# Patient Record
Sex: FEMALE | Race: WHITE | NOT HISPANIC OR LATINO | Employment: OTHER | URBAN - METROPOLITAN AREA
[De-identification: names, ages, dates, MRNs, and addresses within clinical notes are randomized per-mention and may not be internally consistent; named-entity substitution may affect disease eponyms.]

---

## 2017-01-18 ENCOUNTER — OFFICE VISIT (OUTPATIENT)
Dept: INTERNAL MEDICINE | Facility: CLINIC | Age: 62
End: 2017-01-18
Payer: COMMERCIAL

## 2017-01-18 VITALS
RESPIRATION RATE: 20 BRPM | BODY MASS INDEX: 50.59 KG/M2 | TEMPERATURE: 98 F | DIASTOLIC BLOOD PRESSURE: 82 MMHG | OXYGEN SATURATION: 97 % | SYSTOLIC BLOOD PRESSURE: 116 MMHG | WEIGHT: 241 LBS | HEART RATE: 88 BPM | HEIGHT: 58 IN

## 2017-01-18 DIAGNOSIS — J06.9 VIRAL URI WITH COUGH: Primary | ICD-10-CM

## 2017-01-18 PROCEDURE — 99213 OFFICE O/P EST LOW 20 MIN: CPT | Mod: 25,S$GLB,, | Performed by: INTERNAL MEDICINE

## 2017-01-18 PROCEDURE — 3079F DIAST BP 80-89 MM HG: CPT | Mod: S$GLB,,, | Performed by: INTERNAL MEDICINE

## 2017-01-18 PROCEDURE — 1159F MED LIST DOCD IN RCRD: CPT | Mod: S$GLB,,, | Performed by: INTERNAL MEDICINE

## 2017-01-18 PROCEDURE — 99999 PR PBB SHADOW E&M-EST. PATIENT-LVL III: CPT | Mod: PBBFAC,,, | Performed by: INTERNAL MEDICINE

## 2017-01-18 PROCEDURE — 3074F SYST BP LT 130 MM HG: CPT | Mod: S$GLB,,, | Performed by: INTERNAL MEDICINE

## 2017-01-18 PROCEDURE — 96372 THER/PROPH/DIAG INJ SC/IM: CPT | Mod: S$GLB,,, | Performed by: INTERNAL MEDICINE

## 2017-01-18 RX ORDER — METHYLPREDNISOLONE ACETATE 40 MG/ML
40 INJECTION, SUSPENSION INTRA-ARTICULAR; INTRALESIONAL; INTRAMUSCULAR; SOFT TISSUE
Status: COMPLETED | OUTPATIENT
Start: 2017-01-18 | End: 2017-01-18

## 2017-01-18 RX ADMIN — METHYLPREDNISOLONE ACETATE 40 MG: 40 INJECTION, SUSPENSION INTRA-ARTICULAR; INTRALESIONAL; INTRAMUSCULAR; SOFT TISSUE at 02:01

## 2017-01-18 NOTE — PROGRESS NOTES
Subjective:       Patient ID: Micaela Arvizu is a 61 y.o. female.    Chief Complaint: Nasal Congestion (head pressure x 5 days ) and Cough      HPI:  Patient is known to me and presents with cough and congestion. Sx started 5 days ago. Having trouble wearing CPAP due to cough and congestion. Cough is productive. No SOB or wheezing. No fevers. Started with sore throat but better now. + b/l ear congestion but no pain. Tried zyrtec without relief.     Past Medical History   Diagnosis Date    Arthritis     Asthma     GERD (gastroesophageal reflux disease)     Hiatal hernia     History of colonoscopy with polypectomy     History of esophagogastroduodenoscopy (EGD)     Hyperlipidemia     Hypertension     Lumbar facet arthropathy        Family History   Problem Relation Age of Onset    Hypertension Mother     Hypertension Father        Social History     Social History    Marital status:      Spouse name: N/A    Number of children: N/A    Years of education: N/A     Occupational History    Not on file.     Social History Main Topics    Smoking status: Never Smoker    Smokeless tobacco: Never Used    Alcohol use No    Drug use: No    Sexual activity: Not Currently      Comment:      Other Topics Concern    Not on file     Social History Narrative       Review of Systems   Constitutional: Negative for activity change, fatigue, fever and unexpected weight change.   HENT: Positive for congestion. Negative for ear pain, hearing loss, rhinorrhea and sore throat.         B/l ear congestion   Eyes: Negative for pain, redness and visual disturbance.   Respiratory: Positive for cough. Negative for shortness of breath and wheezing.    Cardiovascular: Negative for chest pain, palpitations and leg swelling.   Gastrointestinal: Negative for abdominal pain, constipation, diarrhea, nausea and vomiting.   Genitourinary: Negative for dysuria, frequency, pelvic pain and urgency.   Musculoskeletal: Negative  for back pain, joint swelling and neck pain.   Skin: Negative for color change, rash and wound.   Neurological: Negative for dizziness, tremors, weakness, light-headedness and headaches.         Objective:      Physical Exam   Constitutional: She is oriented to person, place, and time. She appears well-developed and well-nourished. No distress.   HENT:   Head: Normocephalic and atraumatic.   Right Ear: External ear normal.   Left Ear: External ear normal.   Eyes: Conjunctivae and EOM are normal. Pupils are equal, round, and reactive to light. Right eye exhibits no discharge. Left eye exhibits no discharge.   Neck: Neck supple. No tracheal deviation present.   Cardiovascular: Normal rate and regular rhythm.  Exam reveals no gallop and no friction rub.    No murmur heard.  Pulmonary/Chest: Effort normal and breath sounds normal. No respiratory distress. She has no wheezes. She has no rales.   Abdominal: Soft. Bowel sounds are normal. She exhibits no distension. There is no tenderness.   Lymphadenopathy:     She has no cervical adenopathy.   Neurological: She is alert and oriented to person, place, and time. No cranial nerve deficit.   Skin: Skin is warm and dry.   Psychiatric: She has a normal mood and affect. Her behavior is normal.   Vitals reviewed.      Assessment:       1. Viral URI with cough        Plan:       Micaela was seen today for nasal congestion and cough.    Diagnoses and all orders for this visit:    Viral URI with cough  -     methylPREDNISolone acetate injection 40 mg; Inject 1 mL (40 mg total) into the muscle one time.  start zyrtec and flonase daily  Saline nasal spray PRN for congestion    Call for worsening sx or fever > 101 F

## 2017-01-18 NOTE — MR AVS SNAPSHOT
[facility]  [address] [place] [address]  Phone: [phone]  Fax: [phone]                  Micaela Arvizu   2017 1:45 PM   Office Visit    Description:  Female : 1955   Provider:  Kinga Robles MD   Department:  [facility]           Reason for Visit     Nasal Congestion     Cough           Diagnoses this Visit        Comments    Viral URI with cough    -  Primary            To Do List           Future Appointments        Provider Department Dept Phone    3/9/2017 8:30 AM NURSEST. CHAN [facility] [phone]    3/16/2017 12:30 PM Zena Pettit NP [facility] [phone]      Goals (5 Years of Data)     None      Follow-Up and Disposition     Return if symptoms worsen or fail to improve.      [facility] On Call     [facility] On Call Nurse [facility] Line -  Assistance  Registered nurses in the [facility] On Call Center provide clinical advisement, health education, appointment booking, and other advisory services.  Call for this free service at [phone].             Medications           Message regarding Medications     Verify the changes and/or additions to your medication regime listed below are the same as discussed with your clinician today.  If any of these changes or additions are incorrect, please notify your healthcare provider.        These medications were administered today        Dose Freq    methylPREDNISolone acetate injection 40 mg 40 mg Clinic/HOD 1 time    Sig: Inject 1 mL (40 mg total) into the muscle one time.    Class: Normal    Route: Intramuscular           Verify that the below list of medications is an accurate representation of the medications you are currently taking.  If none reported, the list may be blank. If incorrect, please contact your healthcare provider. Carry this list with you in case of emergency.           Current Medications     aspirin 81 MG Chew Take 81 mg  "by mouth once daily.    atorvastatin (LIPITOR) 10 MG tablet Take 1 tablet (10 mg total) by mouth once daily.    clonazePAM (KLONOPIN) 0.5 MG tablet Take 1 tablet (0.5 mg total) by mouth daily as needed for Anxiety.    hydrOXYzine HCl (ATARAX) 10 MG Tab Take 1 tablet (10 mg total) by mouth 3 (three) times daily as needed.    lisinopril-hydrochlorothiazide (PRINZIDE,ZESTORETIC) 10-12.5 mg per tablet Take 1 tablet by mouth once daily.    multivitamin capsule Take 1 capsule by mouth once daily.      omega-3 fatty acids-vitamin E (FISH OIL) 1,000 mg Cap Take 2 tablets by mouth once daily.      omeprazole (PRILOSEC) 20 MG capsule Take 1 capsule (20 mg total) by mouth once daily.    PROLIA 60 mg/mL Syrg Inject 60 mg into the skin every 6 (six) months.     ranitidine (ZANTAC) 150 MG tablet Take 1 tablet (150 mg total) by mouth 2 (two) times daily.    sucralfate (CARAFATE) 100 mg/mL suspension Take 10 mLs (1 g total) by mouth 4 (four) times daily with meals and nightly.    tramadol (ULTRAM) 50 mg tablet Take 1 tablet (50 mg total) by mouth every 6 (six) hours as needed.    WELCHOL 625 mg tablet TAKE 2 TABLETS (1,250 MG TOTAL) TWICE A DAY WITH MEALS           Clinical Reference Information           Vital Signs - Last Recorded  Most recent update: 1/18/2017  2:09 PM by Adriana Urias MA    BP Pulse Temp Resp Ht Wt    116/82 88 98.1 °F (36.7 °C) (Oral) 20 4' 10" (1.473 m) 109.3 kg (241 lb)    SpO2 BMI             97% 50.37 kg/m2         Blood Pressure          Most Recent Value    BP  116/82      Allergies as of 1/18/2017     No Known Allergies      Immunizations Administered on Date of Encounter - 1/18/2017     None      Instructions      Viral Upper Respiratory Illness (Adult)  You have a viral upper respiratory illness (URI), which is another term for the common cold. This illness is contagious during the first few days. It is spread through the air by coughing and sneezing. It may also be spread by direct contact " (touching the sick person and then touching your own eyes, nose, or mouth). Frequent handwashing will decrease risk of spread. Most viral illnesses go away within 7 to 10 days with rest and simple home remedies. Sometimes the illness may last for several weeks. Antibiotics will not kill a virus, and they are generally not prescribed for this condition.    Home care  · If symptoms are severe, rest at home for the first 2 to 3 days. When you resume activity, don't let yourself get too tired.  · Avoid being exposed to cigarette smoke (yours or others).  · You may use acetaminophen or ibuprofen to control pain and fever, unless another medicine was prescribed. (Note: If you have chronic liver or kidney disease, have ever had a stomach ulcer or gastrointestinal bleeding, or are taking blood-thinning medicines, talk with your healthcare provider before using these medicines.) Aspirin should never be given to anyone under 18 years of age who is ill with a viral infection or fever. It may cause severe liver or brain damage.  · Your appetite may be poor, so a light diet is fine. Avoid dehydration by drinking 6 to 8 glasses of fluids per day (water, soft drinks, juices, tea, or soup). Extra fluids will help loosen secretions in the nose and lungs.  · Over-the-counter cold medicines will not shorten the length of time youre sick, but they may be helpful for the following symptoms: cough, sore throat, and nasal and sinus congestion. (Note: Do not use decongestants if you have high blood pressure.)  Follow-up care  Follow up with your healthcare provider, or as advised.  When to seek medical advice  Call your healthcare provider right away if any of these occur:  · Cough with lots of colored sputum (mucus)  · Severe headache; face, neck, or ear pain  · Difficulty swallowing due to throat pain  · Fever of 100.4°F (38°C)  Call 911, or get immediate medical care  Call emergency services right away if any of these occur:  · Chest  pain, shortness of breath, wheezing, or difficulty breathing  · Coughing up blood  · Inability to swallow due to throat pain  © 3476-2229 [employer]. [address] [place] [address], [place] [address]. All rights reserved. This information is not intended as a substitute for professional medical care. Always follow your healthcare professional's instructions.

## 2017-01-18 NOTE — PATIENT INSTRUCTIONS
Viral Upper Respiratory Illness (Adult)  You have a viral upper respiratory illness (URI), which is another term for the common cold. This illness is contagious during the first few days. It is spread through the air by coughing and sneezing. It may also be spread by direct contact (touching the sick person and then touching your own eyes, nose, or mouth). Frequent handwashing will decrease risk of spread. Most viral illnesses go away within 7 to 10 days with rest and simple home remedies. Sometimes the illness may last for several weeks. Antibiotics will not kill a virus, and they are generally not prescribed for this condition.    Home care  · If symptoms are severe, rest at home for the first 2 to 3 days. When you resume activity, don't let yourself get too tired.  · Avoid being exposed to cigarette smoke (yours or others).  · You may use acetaminophen or ibuprofen to control pain and fever, unless another medicine was prescribed. (Note: If you have chronic liver or kidney disease, have ever had a stomach ulcer or gastrointestinal bleeding, or are taking blood-thinning medicines, talk with your healthcare provider before using these medicines.) Aspirin should never be given to anyone under 18 years of age who is ill with a viral infection or fever. It may cause severe liver or brain damage.  · Your appetite may be poor, so a light diet is fine. Avoid dehydration by drinking 6 to 8 glasses of fluids per day (water, soft drinks, juices, tea, or soup). Extra fluids will help loosen secretions in the nose and lungs.  · Over-the-counter cold medicines will not shorten the length of time youre sick, but they may be helpful for the following symptoms: cough, sore throat, and nasal and sinus congestion. (Note: Do not use decongestants if you have high blood pressure.)  Follow-up care  Follow up with your healthcare provider, or as advised.  When to seek medical advice  Call your healthcare provider right away if any  of these occur:  · Cough with lots of colored sputum (mucus)  · Severe headache; face, neck, or ear pain  · Difficulty swallowing due to throat pain  · Fever of 100.4°F (38°C)  Call 911, or get immediate medical care  Call emergency services right away if any of these occur:  · Chest pain, shortness of breath, wheezing, or difficulty breathing  · Coughing up blood  · Inability to swallow due to throat pain  © 5628-6503 [employer]. [address] [place] [address], [place] [address]. All rights reserved. This information is not intended as a substitute for professional medical care. Always follow your healthcare professional's instructions.

## 2017-03-09 ENCOUNTER — TELEPHONE (OUTPATIENT)
Dept: INTERNAL MEDICINE | Facility: CLINIC | Age: 62
End: 2017-03-09

## 2017-03-09 ENCOUNTER — CLINICAL SUPPORT (OUTPATIENT)
Dept: INTERNAL MEDICINE | Facility: CLINIC | Age: 62
End: 2017-03-09
Payer: COMMERCIAL

## 2017-03-09 DIAGNOSIS — I10 ESSENTIAL HYPERTENSION: ICD-10-CM

## 2017-03-09 DIAGNOSIS — E78.00 HYPERCHOLESTEREMIA: ICD-10-CM

## 2017-03-09 LAB
ALBUMIN SERPL BCP-MCNC: 3.2 G/DL
ALP SERPL-CCNC: 82 U/L
ALT SERPL W/O P-5'-P-CCNC: 21 U/L
ANION GAP SERPL CALC-SCNC: 9 MMOL/L
AST SERPL-CCNC: 18 U/L
BASOPHILS # BLD AUTO: 0.02 K/UL
BASOPHILS NFR BLD: 0.3 %
BILIRUB SERPL-MCNC: 0.6 MG/DL
BUN SERPL-MCNC: 15 MG/DL
CALCIUM SERPL-MCNC: 9.7 MG/DL
CHLORIDE SERPL-SCNC: 103 MMOL/L
CHOLEST/HDLC SERPL: 3.2 {RATIO}
CO2 SERPL-SCNC: 27 MMOL/L
CREAT SERPL-MCNC: 0.6 MG/DL
DIFFERENTIAL METHOD: ABNORMAL
EOSINOPHIL # BLD AUTO: 0.2 K/UL
EOSINOPHIL NFR BLD: 3.4 %
ERYTHROCYTE [DISTWIDTH] IN BLOOD BY AUTOMATED COUNT: 14.4 %
EST. GFR  (AFRICAN AMERICAN): >60 ML/MIN/1.73 M^2
EST. GFR  (NON AFRICAN AMERICAN): >60 ML/MIN/1.73 M^2
GLUCOSE SERPL-MCNC: 121 MG/DL
HCT VFR BLD AUTO: 44.2 %
HDL/CHOLESTEROL RATIO: 30.8 %
HDLC SERPL-MCNC: 172 MG/DL
HDLC SERPL-MCNC: 53 MG/DL
HGB BLD-MCNC: 14.4 G/DL
LDLC SERPL CALC-MCNC: 94.8 MG/DL
LYMPHOCYTES # BLD AUTO: 1.4 K/UL
LYMPHOCYTES NFR BLD: 21.6 %
MCH RBC QN AUTO: 27.2 PG
MCHC RBC AUTO-ENTMCNC: 32.6 %
MCV RBC AUTO: 84 FL
MONOCYTES # BLD AUTO: 0.5 K/UL
MONOCYTES NFR BLD: 8.1 %
NEUTROPHILS # BLD AUTO: 4.3 K/UL
NEUTROPHILS NFR BLD: 66.6 %
NONHDLC SERPL-MCNC: 119 MG/DL
PLATELET # BLD AUTO: 326 K/UL
PMV BLD AUTO: 8.9 FL
POTASSIUM SERPL-SCNC: 4 MMOL/L
PROT SERPL-MCNC: 7.3 G/DL
RBC # BLD AUTO: 5.29 M/UL
SODIUM SERPL-SCNC: 139 MMOL/L
TRIGL SERPL-MCNC: 121 MG/DL
WBC # BLD AUTO: 6.4 K/UL

## 2017-03-09 PROCEDURE — 36415 COLL VENOUS BLD VENIPUNCTURE: CPT | Mod: S$GLB,,, | Performed by: NURSE PRACTITIONER

## 2017-03-09 PROCEDURE — 80061 LIPID PANEL: CPT

## 2017-03-09 PROCEDURE — 80053 COMPREHEN METABOLIC PANEL: CPT

## 2017-03-09 PROCEDURE — 85025 COMPLETE CBC W/AUTO DIFF WBC: CPT

## 2017-03-09 RX ORDER — LISINOPRIL AND HYDROCHLOROTHIAZIDE 10; 12.5 MG/1; MG/1
TABLET ORAL
Qty: 90 TABLET | Refills: 0 | Status: SHIPPED | OUTPATIENT
Start: 2017-03-09 | End: 2017-05-28 | Stop reason: SDUPTHER

## 2017-03-09 NOTE — TELEPHONE ENCOUNTER
----- Message from Iris Villanueva sent at 3/9/2017  2:59 PM CST -----  Contact: self  Micaela Arvizu  MRN: [id number]  : 1955  PCP: Casimiro Stone  Home Phone      [phone]  Work Phone      Not on file.  Mobile          [phone]      MESSAGE:   Pt said that her ob doctor is asking to see if in her labs she had her vitamin d and cmt checked. Please call to let her know.    Phone: [phone]

## 2017-03-13 RX ORDER — TRAMADOL HYDROCHLORIDE 50 MG/1
50 TABLET ORAL EVERY 6 HOURS PRN
Qty: 180 TABLET | Refills: 0 | Status: SHIPPED | OUTPATIENT
Start: 2017-03-13 | End: 2017-04-17 | Stop reason: SDUPTHER

## 2017-03-20 ENCOUNTER — OFFICE VISIT (OUTPATIENT)
Dept: INTERNAL MEDICINE | Facility: CLINIC | Age: 62
End: 2017-03-20
Payer: COMMERCIAL

## 2017-03-20 VITALS
HEIGHT: 58 IN | BODY MASS INDEX: 50.12 KG/M2 | WEIGHT: 238.75 LBS | SYSTOLIC BLOOD PRESSURE: 124 MMHG | RESPIRATION RATE: 18 BRPM | HEART RATE: 80 BPM | DIASTOLIC BLOOD PRESSURE: 70 MMHG

## 2017-03-20 DIAGNOSIS — I10 ESSENTIAL HYPERTENSION: ICD-10-CM

## 2017-03-20 DIAGNOSIS — R73.01 ELEVATED FASTING GLUCOSE: ICD-10-CM

## 2017-03-20 DIAGNOSIS — E78.00 HYPERCHOLESTEREMIA: ICD-10-CM

## 2017-03-20 DIAGNOSIS — K91.1 DUMPING SYNDROME: ICD-10-CM

## 2017-03-20 DIAGNOSIS — M54.16 BILATERAL LUMBAR RADICULOPATHY: ICD-10-CM

## 2017-03-20 DIAGNOSIS — F41.1 GAD (GENERALIZED ANXIETY DISORDER): Primary | ICD-10-CM

## 2017-03-20 PROCEDURE — 1160F RVW MEDS BY RX/DR IN RCRD: CPT | Mod: S$GLB,,, | Performed by: NURSE PRACTITIONER

## 2017-03-20 PROCEDURE — 3078F DIAST BP <80 MM HG: CPT | Mod: S$GLB,,, | Performed by: NURSE PRACTITIONER

## 2017-03-20 PROCEDURE — 3074F SYST BP LT 130 MM HG: CPT | Mod: S$GLB,,, | Performed by: NURSE PRACTITIONER

## 2017-03-20 PROCEDURE — 99214 OFFICE O/P EST MOD 30 MIN: CPT | Mod: S$GLB,,, | Performed by: NURSE PRACTITIONER

## 2017-03-20 PROCEDURE — 99999 PR PBB SHADOW E&M-EST. PATIENT-LVL III: CPT | Mod: PBBFAC,,, | Performed by: NURSE PRACTITIONER

## 2017-03-20 RX ORDER — PROPRANOLOL HYDROCHLORIDE 80 MG/1
80 TABLET ORAL 2 TIMES DAILY
Qty: 60 TABLET | Refills: 5 | Status: SHIPPED | OUTPATIENT
Start: 2017-03-20 | End: 2017-04-17 | Stop reason: SDUPTHER

## 2017-03-20 NOTE — PROGRESS NOTES
Subjective:       Patient ID: Micaela Arvizu is a 61 y.o. female.    Chief Complaint: 6 month checkup    HPI: pt known to me presents for 6 month f/up. Reports she is not doing the sleeve anymore. Reports she is noticing that her head has a tremor. Reports her father as well as some of her sisters have it as well. Also reports being woken up by her legs being restless. Doesn't want another med. Reports sometimes night time klonopin helps.     Lab Results   Component Value Date    WBC 6.40 03/09/2017    HGB 14.4 03/09/2017    HCT 44.2 03/09/2017     03/09/2017    CHOL 172 03/09/2017    TRIG 121 03/09/2017    HDL 53 03/09/2017    ALT 21 03/09/2017    AST 18 03/09/2017     03/09/2017    K 4.0 03/09/2017     03/09/2017    CREATININE 0.6 03/09/2017    BUN 15 03/09/2017    CO2 27 03/09/2017    TSH 1.610 11/03/2016    HGBA1C 6.0 09/09/2016       Review of Systems   Constitutional: Negative for chills, diaphoresis, fatigue and fever.   Respiratory: Negative for cough, shortness of breath and wheezing.    Cardiovascular: Negative for chest pain.   Gastrointestinal: Negative for abdominal distention, abdominal pain, constipation, diarrhea, nausea and vomiting.   Musculoskeletal: Positive for arthralgias. Negative for back pain and myalgias.   Neurological: Negative for headaches.   Psychiatric/Behavioral: Negative for sleep disturbance.       Objective:      Physical Exam   Constitutional: She is oriented to person, place, and time. She appears well-developed and well-nourished.   HENT:   Head: Normocephalic and atraumatic.   Cardiovascular: Normal rate, regular rhythm and normal heart sounds.    Pulmonary/Chest: Effort normal and breath sounds normal.   Abdominal: Soft. Bowel sounds are normal. There is no tenderness.   Musculoskeletal: She exhibits edema (plus 1 pitting edema).   Neurological: She is alert and oriented to person, place, and time.   Skin: Skin is warm and dry.   Psychiatric: She has a  normal mood and affect. Her behavior is normal. Judgment and thought content normal.   Nursing note and vitals reviewed.      Assessment:       1. JOHNNIE (generalized anxiety disorder)    2. Bilateral lumbar radiculopathy    3. Hypercholesteremia    4. Essential hypertension    5. Dumping syndrome        Plan:   1. JOHNNIE (generalized anxiety disorder)  Doing well on occ klonopin    2. Bilateral lumbar radiculopathy  Doing well with weight loss; still having occ pain. May do 2 week trial of no statin and compare.     3. Hypercholesteremia  Well controlled on current therapy    4. Essential hypertension  Doing well on current therapy.     5. Dumping syndrome  Doing well on welchol.

## 2017-03-20 NOTE — MR AVS SNAPSHOT
[facility]  [address] [facility] [address]  Phone: [phone]  Fax: [phone]                  Micaela Arvizu   3/20/2017 9:15 AM   Office Visit    Description:  Female : 1955   Provider:  Zena Pettit NP   Department:  [facility]           Reason for Visit     6 month checkup           Diagnoses this Visit        Comments    JOHNNIE (generalized anxiety disorder)    -  Primary     Bilateral lumbar radiculopathy         Hypercholesteremia         Essential hypertension         Dumping syndrome         Elevated fasting glucose                To Do List           Future Appointments        Provider Department Dept Phone    2017 7:45 AM Zena Pettit NP [facility] [phone]    2017 8:30 AM NURSE, [facility] [phone]    2017 12:45 PM Zena Pettit NP [facility] [phone]      Goals (5 Years of Data)     None      Follow-Up and Disposition     Return in about 4 weeks (around 2017).    Follow-up and Disposition History       These Medications        Disp Refills Start End    propranolol (INDERAL) 80 MG tablet 60 tablet 5 3/20/2017 3/20/2018    Take 1 tablet (80 mg total) by mouth 2 (two) times daily. - Oral    Pharmacy: [facility] [address] [place] [address] #: [phone]         [facility] On Call     [facility] On Call Nurse Care Line -  Assistance  Registered nurses in the [facility] On Call Center provide clinical advisement, health education, appointment booking, and other advisory services.  Call for this free service at [phone].             Medications           Message regarding Medications     Verify the changes and/or additions to your medication regime listed below are the same as discussed with your clinician today.  If any of these changes or additions are incorrect, please notify your healthcare provider.         START taking these NEW medications        Refills    propranolol (INDERAL) 80 MG tablet 5    Sig: Take 1 tablet (80 mg total) by mouth 2 (two) times daily.    Class: Normal    Route: Oral      STOP taking these medications     hydrOXYzine HCl (ATARAX) 10 MG Tab Take 1 tablet (10 mg total) by mouth 3 (three) times daily as needed.    ranitidine (ZANTAC) 150 MG tablet Take 1 tablet (150 mg total) by mouth 2 (two) times daily.           Verify that the below list of medications is an accurate representation of the medications you are currently taking.  If none reported, the list may be blank. If incorrect, please contact your healthcare provider. Carry this list with you in case of emergency.           Current Medications     aspirin 81 MG Chew Take 81 mg by mouth once daily.    atorvastatin (LIPITOR) 10 MG tablet Take 1 tablet (10 mg total) by mouth once daily.    clonazePAM (KLONOPIN) 0.5 MG tablet Take 1 tablet (0.5 mg total) by mouth daily as needed for Anxiety.    lisinopril-hydrochlorothiazide (PRINZIDE,ZESTORETIC) 10-12.5 mg per tablet TAKE 1 TABLET DAILY    multivitamin capsule Take 1 capsule by mouth once daily.      omega-3 fatty acids-vitamin E (FISH OIL) 1,000 mg Cap Take 2 tablets by mouth once daily.      omeprazole (PRILOSEC) 20 MG capsule Take 1 capsule (20 mg total) by mouth once daily.    PROLIA 60 mg/mL Syrg Inject 60 mg into the skin every 6 (six) months.     sucralfate (CARAFATE) 100 mg/mL suspension Take 10 mLs (1 g total) by mouth 4 (four) times daily with meals and nightly.    tramadol (ULTRAM) 50 mg tablet Take 1 tablet (50 mg total) by mouth every 6 (six) hours as needed.    WELCHOL 625 mg tablet TAKE 2 TABLETS (1,250 MG TOTAL) TWICE A DAY WITH MEALS    propranolol (INDERAL) 80 MG tablet Take 1 tablet (80 mg total) by mouth 2 (two) times daily.           Clinical Reference Information           Your Vitals Were     BP Pulse Resp Height Weight BMI    124/70 (BP Location: Right arm, Patient  "Position: Sitting, BP Method: Manual) 80 18 4' 10" (1.473 m) 108.3 kg (238 lb 12.1 oz) 49.9 kg/m2      Blood Pressure          Most Recent Value    BP  124/70      Allergies as of 3/20/2017     No Known Allergies      Immunizations Administered on Date of Encounter - 3/20/2017     None      Orders Placed During Today's Visit     Future Labs/Procedures Expected by Expires    CBC auto differential  9/16/2017 (Approximate) 3/20/2018    Comprehensive metabolic panel  9/16/2017 (Approximate) 3/20/2018    Hemoglobin A1c  9/16/2017 (Approximate) 3/20/2018    Lipid panel  9/16/2017 (Approximate) 3/20/2018      Instructions    Tylenol or motrin for legs; if not working well, try Mag ox 400 mg nightly.       Language Assistance Services     ATTENTION: Language assistance services are available, free of charge. Please call [phone].      ATENCIÓN: Si habla español, tiene a solis disposición servicios gratuitos de asistencia lingüística. Llame al [phone].     CHÚ Ý: N?u b?n nói Ti?ng Vi?t, có các d?ch v? h? tr? ngôn ng? mi?n phí dành cho b?n. G?i s? [phone].         [facility] complies with applicable Federal civil rights laws and does not discriminate on the basis of race, color, national origin, age, disability, or sex.        "

## 2017-04-17 ENCOUNTER — OFFICE VISIT (OUTPATIENT)
Dept: INTERNAL MEDICINE | Facility: CLINIC | Age: 62
End: 2017-04-17
Payer: COMMERCIAL

## 2017-04-17 VITALS
WEIGHT: 239 LBS | SYSTOLIC BLOOD PRESSURE: 114 MMHG | DIASTOLIC BLOOD PRESSURE: 64 MMHG | HEIGHT: 58 IN | HEART RATE: 60 BPM | BODY MASS INDEX: 50.17 KG/M2 | RESPIRATION RATE: 16 BRPM | OXYGEN SATURATION: 98 %

## 2017-04-17 DIAGNOSIS — F41.1 GAD (GENERALIZED ANXIETY DISORDER): ICD-10-CM

## 2017-04-17 DIAGNOSIS — G25.81 RESTLESS LEG SYNDROME: ICD-10-CM

## 2017-04-17 DIAGNOSIS — G25.0 FAMILIAL TREMOR: ICD-10-CM

## 2017-04-17 DIAGNOSIS — M54.16 BILATERAL LUMBAR RADICULOPATHY: ICD-10-CM

## 2017-04-17 DIAGNOSIS — Z09 FOLLOW UP: Primary | ICD-10-CM

## 2017-04-17 PROCEDURE — 99999 PR PBB SHADOW E&M-EST. PATIENT-LVL III: CPT | Mod: PBBFAC,,, | Performed by: NURSE PRACTITIONER

## 2017-04-17 PROCEDURE — 3074F SYST BP LT 130 MM HG: CPT | Mod: S$GLB,,, | Performed by: NURSE PRACTITIONER

## 2017-04-17 PROCEDURE — 99214 OFFICE O/P EST MOD 30 MIN: CPT | Mod: S$GLB,,, | Performed by: NURSE PRACTITIONER

## 2017-04-17 PROCEDURE — 1160F RVW MEDS BY RX/DR IN RCRD: CPT | Mod: S$GLB,,, | Performed by: NURSE PRACTITIONER

## 2017-04-17 PROCEDURE — 3078F DIAST BP <80 MM HG: CPT | Mod: S$GLB,,, | Performed by: NURSE PRACTITIONER

## 2017-04-17 RX ORDER — TRAMADOL HYDROCHLORIDE 50 MG/1
50 TABLET ORAL EVERY 6 HOURS PRN
Qty: 180 TABLET | Refills: 0 | Status: SHIPPED | OUTPATIENT
Start: 2017-04-17 | End: 2017-05-24 | Stop reason: SDUPTHER

## 2017-04-17 RX ORDER — PROPRANOLOL HYDROCHLORIDE 80 MG/1
80 TABLET ORAL 2 TIMES DAILY
Qty: 180 TABLET | Refills: 1 | Status: SHIPPED | OUTPATIENT
Start: 2017-04-17 | End: 2017-10-24 | Stop reason: SDUPTHER

## 2017-04-17 NOTE — PROGRESS NOTES
Subjective:       Patient ID: Micaela Arvizu is a 61 y.o. female.    Chief Complaint: Follow-up (4 week follow-up) and Medication Refill    HPI: pt known to me presents for 4 week f/up. Reports doing well with tremors. Feels like the last week has really settled down. Also reports her leg restlessness has gotten better as well.     Review of Systems   Constitutional: Negative for chills, diaphoresis, fatigue and fever.   Respiratory: Negative for cough, shortness of breath and wheezing.    Cardiovascular: Negative for chest pain.   Gastrointestinal: Negative for abdominal distention, abdominal pain, constipation, diarrhea, nausea and vomiting.   Musculoskeletal: Negative for arthralgias, back pain and myalgias.   Neurological: Negative for headaches.   Psychiatric/Behavioral: Negative for sleep disturbance.       Objective:      Physical Exam   Constitutional: She is oriented to person, place, and time. She appears well-developed and well-nourished.   HENT:   Head: Normocephalic and atraumatic.   Cardiovascular: Normal rate, regular rhythm and normal heart sounds.    Pulmonary/Chest: Effort normal and breath sounds normal.   Abdominal: Soft. Bowel sounds are normal. There is no tenderness.   Musculoskeletal: She exhibits no edema.   Neurological: She is alert and oriented to person, place, and time.   Skin: Skin is warm and dry.   Psychiatric: She has a normal mood and affect. Her behavior is normal. Judgment and thought content normal.   Nursing note and vitals reviewed.      Assessment:       1. Follow up    2. JOHNNIE (generalized anxiety disorder)    3. Familial tremor    4. Restless leg syndrome    5. Bilateral lumbar radiculopathy        Plan:   1. Follow up      2. JOHNNIE (generalized anxiety disorder)  3. Familial tremor  - propranolol (INDERAL) 80 MG tablet; Take 1 tablet (80 mg total) by mouth 2 (two) times daily.  Dispense: 180 tablet; Refill: 1    4. Restless leg syndrome  Much better.     5. Bilateral lumbar  radiculopathy    - tramadol (ULTRAM) 50 mg tablet; Take 1 tablet (50 mg total) by mouth every 6 (six) hours as needed.  Dispense: 180 tablet; Refill: 0

## 2017-04-17 NOTE — MR AVS SNAPSHOT
[facility]  [address] [facility] [address]  Phone: [phone]  Fax: [phone]                  Micaela Arvizu   2017 7:45 AM   Office Visit    Description:  Female : 1955   Provider:  Zena Pettit NP   Department:  [facility]           Reason for Visit     Follow-up     Medication Refill           Diagnoses this Visit        Comments    Follow up    -  Primary     JOHNNIE (generalized anxiety disorder)         Familial tremor         Restless leg syndrome         Bilateral lumbar radiculopathy                To Do List           Future Appointments        Provider Department Dept Phone    2017 8:30 AM NURSE, [facility] [phone]    2017 12:45 PM Zena Pettit NP [facility] [phone]      Goals (5 Years of Data)     None      Follow-Up and Disposition     Return if symptoms worsen or fail to improve.       These Medications        Disp Refills Start End    propranolol (INDERAL) 80 MG tablet 180 tablet 1 2017    Take 1 tablet (80 mg total) by mouth 2 (two) times daily. - Oral    Pharmacy: [facility] - [address] Ph #: [phone]       tramadol (ULTRAM) 50 mg tablet 180 tablet 0 2017     Take 1 tablet (50 mg total) by mouth every 6 (six) hours as needed. - Oral    Pharmacy: [facility] - [address] Ph #: [phone]         [facility]     [facility] -  Assistance  Unless otherwise directed by your provider, please contact [facility], our nurse care line that is available for  assistance.     Registered nurses in the [facility] provide: appointment scheduling, clinical advisement, health education, and other advisory services.  Call: [phone] (toll free)               Medications           Message regarding  "Medications     Verify the changes and/or additions to your medication regime listed below are the same as discussed with your clinician today.  If any of these changes or additions are incorrect, please notify your healthcare provider.             Verify that the below list of medications is an accurate representation of the medications you are currently taking.  If none reported, the list may be blank. If incorrect, please contact your healthcare provider. Carry this list with you in case of emergency.           Current Medications     aspirin 81 MG Chew Take 81 mg by mouth once daily.    atorvastatin (LIPITOR) 10 MG tablet Take 1 tablet (10 mg total) by mouth once daily.    clonazePAM (KLONOPIN) 0.5 MG tablet Take 1 tablet (0.5 mg total) by mouth daily as needed for Anxiety.    lisinopril-hydrochlorothiazide (PRINZIDE,ZESTORETIC) 10-12.5 mg per tablet TAKE 1 TABLET DAILY    multivitamin capsule Take 1 capsule by mouth once daily.      omega-3 fatty acids-vitamin E (FISH OIL) 1,000 mg Cap Take 2 tablets by mouth once daily.      omeprazole (PRILOSEC) 20 MG capsule Take 1 capsule (20 mg total) by mouth once daily.    PROLIA 60 mg/mL Syrg Inject 60 mg into the skin every 6 (six) months.     propranolol (INDERAL) 80 MG tablet Take 1 tablet (80 mg total) by mouth 2 (two) times daily.    sucralfate (CARAFATE) 100 mg/mL suspension Take 10 mLs (1 g total) by mouth 4 (four) times daily with meals and nightly.    tramadol (ULTRAM) 50 mg tablet Take 1 tablet (50 mg total) by mouth every 6 (six) hours as needed.    WELCHOL 625 mg tablet TAKE 2 TABLETS (1,250 MG TOTAL) TWICE A DAY WITH MEALS           Clinical Reference Information           Your Vitals Were     BP Pulse Resp Height Weight SpO2    114/64 60 16 4' 10" (1.473 m) 108.4 kg (238 lb 15.7 oz) 98%    BMI                49.95 kg/m2          Blood Pressure          Most Recent Value    BP  114/64      Allergies as of 4/17/2017     No Known Allergies      Immunizations " Administered on Date of Encounter - 4/17/2017     None      Language Assistance Services     ATTENTION: Language assistance services are available, free of charge. Please call [phone].      ATENCIÓN: Si habmahad fonseca, tiene a solis disposición servicios gratuitos de asistencia lingüística. Llame al [phone].     CHÚ Ý: N?u b?n nói Ti?ng Vi?t, có các d?ch v? h? tr? ngôn ng? mi?n phí dành cho b?n. G?i s? [phone].         [facility] complies with applicable Federal civil rights laws and does not discriminate on the basis of race, color, national origin, age, disability, or sex.

## 2017-04-18 ENCOUNTER — TELEPHONE (OUTPATIENT)
Dept: INTERNAL MEDICINE | Facility: CLINIC | Age: 62
End: 2017-04-18

## 2017-04-18 NOTE — TELEPHONE ENCOUNTER
----- Message from Iris Villanueva sent at 2017  8:40 AM CDT -----  Contact: self  Micaela Arvizu  MRN: [id number]  : 1955  PCP: Casimiro Stone  Home Phone      [phone]  Work Phone      Not on file.  Mobile          [phone]      MESSAGE:   Pt was seen yesterday and forgot to ask to get a shot for her arthritis. She is wanting to see if she can come in to get one.    Phone: [phone]

## 2017-04-18 NOTE — TELEPHONE ENCOUNTER
Patient is aware that you are off today and states that she can wait until tomorrow.  Please advise.

## 2017-04-19 ENCOUNTER — CLINICAL SUPPORT (OUTPATIENT)
Dept: INTERNAL MEDICINE | Facility: CLINIC | Age: 62
End: 2017-04-19
Payer: COMMERCIAL

## 2017-04-19 DIAGNOSIS — M19.90 ARTHRITIS PAIN: Primary | ICD-10-CM

## 2017-04-19 PROCEDURE — 96372 THER/PROPH/DIAG INJ SC/IM: CPT | Mod: S$GLB,,, | Performed by: NURSE PRACTITIONER

## 2017-04-19 RX ORDER — KETOROLAC TROMETHAMINE 30 MG/ML
60 INJECTION, SOLUTION INTRAMUSCULAR; INTRAVENOUS
Status: COMPLETED | OUTPATIENT
Start: 2017-04-19 | End: 2017-04-19

## 2017-04-19 RX ADMIN — KETOROLAC TROMETHAMINE 60 MG: 30 INJECTION, SOLUTION INTRAMUSCULAR; INTRAVENOUS at 10:04

## 2017-05-11 ENCOUNTER — OFFICE VISIT (OUTPATIENT)
Dept: FAMILY MEDICINE | Facility: CLINIC | Age: 62
End: 2017-05-11
Payer: COMMERCIAL

## 2017-05-11 VITALS
HEART RATE: 71 BPM | SYSTOLIC BLOOD PRESSURE: 130 MMHG | BODY MASS INDEX: 46.88 KG/M2 | RESPIRATION RATE: 18 BRPM | WEIGHT: 238.81 LBS | HEIGHT: 60 IN | DIASTOLIC BLOOD PRESSURE: 78 MMHG

## 2017-05-11 DIAGNOSIS — R22.0 SUPERFICIAL SWELLING OF SCALP: ICD-10-CM

## 2017-05-11 DIAGNOSIS — L08.9 INFECTION OF SCALP: Primary | ICD-10-CM

## 2017-05-11 PROCEDURE — 99999 PR PBB SHADOW E&M-EST. PATIENT-LVL III: CPT | Mod: PBBFAC,,, | Performed by: NURSE PRACTITIONER

## 2017-05-11 PROCEDURE — 3078F DIAST BP <80 MM HG: CPT | Mod: S$GLB,,, | Performed by: NURSE PRACTITIONER

## 2017-05-11 PROCEDURE — 96372 THER/PROPH/DIAG INJ SC/IM: CPT | Mod: S$GLB,,, | Performed by: NURSE PRACTITIONER

## 2017-05-11 PROCEDURE — 99214 OFFICE O/P EST MOD 30 MIN: CPT | Mod: 25,S$GLB,, | Performed by: NURSE PRACTITIONER

## 2017-05-11 PROCEDURE — 1160F RVW MEDS BY RX/DR IN RCRD: CPT | Mod: S$GLB,,, | Performed by: NURSE PRACTITIONER

## 2017-05-11 PROCEDURE — 3075F SYST BP GE 130 - 139MM HG: CPT | Mod: S$GLB,,, | Performed by: NURSE PRACTITIONER

## 2017-05-11 RX ORDER — METHYLPREDNISOLONE ACETATE 40 MG/ML
60 INJECTION, SUSPENSION INTRA-ARTICULAR; INTRALESIONAL; INTRAMUSCULAR; SOFT TISSUE
Status: COMPLETED | OUTPATIENT
Start: 2017-05-11 | End: 2017-05-11

## 2017-05-11 RX ORDER — AMOXICILLIN AND CLAVULANATE POTASSIUM 875; 125 MG/1; MG/1
1 TABLET, FILM COATED ORAL EVERY 12 HOURS
Qty: 20 TABLET | Refills: 0 | Status: SHIPPED | OUTPATIENT
Start: 2017-05-11 | End: 2017-05-21

## 2017-05-11 RX ADMIN — METHYLPREDNISOLONE ACETATE 60 MG: 40 INJECTION, SUSPENSION INTRA-ARTICULAR; INTRALESIONAL; INTRAMUSCULAR; SOFT TISSUE at 11:05

## 2017-05-11 NOTE — PROGRESS NOTES
Subjective:       Patient ID: Micaela Arvizu is a 61 y.o. female.    Chief Complaint: Insect Bite (in back of L ear; constant pain; ps 8)    HPI Comments: Swelling and pain behind the left ear for a few days. Doesn't remember having been bitten. No itching.    Insect Bite   Pertinent negatives include no rash.     Review of Systems   Constitutional: Negative.    HENT: Negative.  Negative for ear pain.    Eyes: Negative.    Respiratory: Negative.    Cardiovascular: Negative.    Gastrointestinal: Negative.    Endocrine: Negative.    Genitourinary: Negative.    Musculoskeletal: Negative.    Skin: Negative for color change, rash and wound.        Swelling behind the left ear with pain that goes into the back of the auricle   Neurological: Negative.    Psychiatric/Behavioral: Negative.    All other systems reviewed and are negative.      Objective:      Physical Exam   Constitutional: She is oriented to person, place, and time. She appears well-developed and well-nourished. No distress.   HENT:   Head: Normocephalic and atraumatic.   Right Ear: Tympanic membrane, external ear and ear canal normal.   Left Ear: Tympanic membrane, external ear and ear canal normal.   Eyes: Pupils are equal, round, and reactive to light.   Neck: Normal range of motion. Neck supple.   Cardiovascular: Normal rate and regular rhythm.    No murmur heard.  Pulmonary/Chest: Effort normal and breath sounds normal. No respiratory distress.   Musculoskeletal: Normal range of motion.   Neurological: She is alert and oriented to person, place, and time.   Skin: Skin is warm and dry.   Tissue behind the left ear is swollen with no redness or wound. Area of swelling is over the mastoid process with no pain to palpation   Psychiatric: She has a normal mood and affect.   Nursing note and vitals reviewed.      Assessment:       1. Infection of scalp    2. Superficial swelling of scalp        Plan:   Micaela was seen today for insect bite.    Diagnoses and  all orders for this visit:    Infection of scalp  -     amoxicillin-clavulanate 875-125mg (AUGMENTIN) 875-125 mg per tablet; Take 1 tablet by mouth every 12 (twelve) hours.  -     methylPREDNISolone acetate injection 60 mg; Inject 1.5 mLs (60 mg total) into the muscle one time.    Superficial swelling of scalp  -     amoxicillin-clavulanate 875-125mg (AUGMENTIN) 875-125 mg per tablet; Take 1 tablet by mouth every 12 (twelve) hours.  -     methylPREDNISolone acetate injection 60 mg; Inject 1.5 mLs (60 mg total) into the muscle one time.    RTC Monday for recheck - differentials include mastoiditis, skin infection and shingles    She will take the gabapentin she has at home 300 mg nightly

## 2017-05-11 NOTE — MR AVS SNAPSHOT
[address] [address]  Phone: [phone]  Fax: [phone]                  Micaela Arvizu   2017 10:45 AM   Office Visit    Description:  Female : 1955   Provider:  Pebbles Mcleod NP   Department:  [facility]           Reason for Visit     Insect Bite           Diagnoses this Visit        Comments    Infection of scalp    -  Primary     Superficial swelling of scalp                To Do List           Future Appointments        Provider Department Dept Phone    5/15/2017 12:45 PM Pebbles Mcleod NP [facility] [phone]    2017 8:30 AM NURSE, [facility] [phone]    2017 12:45 PM Zena Pettit NP [facility] [phone]      Goals (5 Years of Data)     None       These Medications        Disp Refills Start End    amoxicillin-clavulanate 875-125mg (AUGMENTIN) 875-125 mg per tablet 20 tablet 0 2017    Take 1 tablet by mouth every 12 (twelve) hours. - Oral    Pharmacy: [facility] [address] [place] [address] #: [phone]         [facility]     [facility] Nurse Care Line -  Assistance  Unless otherwise directed by your provider, please contact [facility], our nurse care line that is available for  assistance.     Registered nurses in the [facility] provide: appointment scheduling, clinical advisement, health education, and other advisory services.  Call: [phone] (toll free)               Medications           Message regarding Medications     Verify the changes and/or additions to your medication regime listed below are the same as discussed with your clinician today.  If any of these changes or additions are incorrect, please notify your healthcare provider.        START taking these NEW medications        Refills    amoxicillin-clavulanate 875-125mg (AUGMENTIN)  875-125 mg per tablet 0    Sig: Take 1 tablet by mouth every 12 (twelve) hours.    Class: Normal    Route: Oral      These medications were administered today        Dose Freq    methylPREDNISolone acetate injection 60 mg 60 mg Clinic/HOD 1 time    Sig: Inject 1.5 mLs (60 mg total) into the muscle one time.    Class: Normal    Route: Intramuscular           Verify that the below list of medications is an accurate representation of the medications you are currently taking.  If none reported, the list may be blank. If incorrect, please contact your healthcare provider. Carry this list with you in case of emergency.           Current Medications     aspirin 81 MG Chew Take 81 mg by mouth once daily.    atorvastatin (LIPITOR) 10 MG tablet Take 1 tablet (10 mg total) by mouth once daily.    clonazePAM (KLONOPIN) 0.5 MG tablet Take 1 tablet (0.5 mg total) by mouth daily as needed for Anxiety.    lisinopril-hydrochlorothiazide (PRINZIDE,ZESTORETIC) 10-12.5 mg per tablet TAKE 1 TABLET DAILY    multivitamin capsule Take 1 capsule by mouth once daily.      omega-3 fatty acids-vitamin E (FISH OIL) 1,000 mg Cap Take 2 tablets by mouth once daily.      omeprazole (PRILOSEC) 20 MG capsule Take 1 capsule (20 mg total) by mouth once daily.    PROLIA 60 mg/mL Syrg Inject 60 mg into the skin every 6 (six) months.     propranolol (INDERAL) 80 MG tablet Take 1 tablet (80 mg total) by mouth 2 (two) times daily.    sucralfate (CARAFATE) 100 mg/mL suspension Take 10 mLs (1 g total) by mouth 4 (four) times daily with meals and nightly.    tramadol (ULTRAM) 50 mg tablet Take 1 tablet (50 mg total) by mouth every 6 (six) hours as needed.    WELCHOL 625 mg tablet TAKE 2 TABLETS (1,250 MG TOTAL) TWICE A DAY WITH MEALS    amoxicillin-clavulanate 875-125mg (AUGMENTIN) 875-125 mg per tablet Take 1 tablet by mouth every 12 (twelve) hours.           Clinical Reference Information           Your Vitals Were     BP Pulse Resp Height Weight BMI     130/78 71 18 5' (1.524 m) 108.3 kg (238 lb 12.8 oz) 46.64 kg/m2      Blood Pressure          Most Recent Value    BP  130/78      Allergies as of 5/11/2017     No Known Allergies      Immunizations Administered on Date of Encounter - 5/11/2017     None      Language Assistance Services     ATTENTION: Language assistance services are available, free of charge. Please call [phone].      ATENCIÓN: Si habla español, tiene a solis disposición servicios gratuitos de asistencia lingüística. Llame al [phone].     CHÚ Ý: N?u b?n nói Ti?ng Vi?t, có các d?ch v? h? tr? ngôn ng? mi?n phí dành cho b?n. G?i s? [phone].         [facility] complies with applicable Federal civil rights laws and does not discriminate on the basis of race, color, national origin, age, disability, or sex.

## 2017-05-23 DIAGNOSIS — E78.00 HYPERCHOLESTEREMIA: ICD-10-CM

## 2017-05-24 DIAGNOSIS — M54.16 BILATERAL LUMBAR RADICULOPATHY: ICD-10-CM

## 2017-05-24 RX ORDER — TRAMADOL HYDROCHLORIDE 50 MG/1
50 TABLET ORAL EVERY 6 HOURS PRN
Qty: 180 TABLET | Refills: 0 | Status: SHIPPED | OUTPATIENT
Start: 2017-05-24 | End: 2017-08-08 | Stop reason: SDUPTHER

## 2017-05-24 RX ORDER — ATORVASTATIN CALCIUM 10 MG/1
TABLET, FILM COATED ORAL
Qty: 90 TABLET | Refills: 0 | Status: SHIPPED | OUTPATIENT
Start: 2017-05-24 | End: 2017-08-16 | Stop reason: SDUPTHER

## 2017-05-28 DIAGNOSIS — I10 ESSENTIAL HYPERTENSION: ICD-10-CM

## 2017-05-29 RX ORDER — LISINOPRIL AND HYDROCHLOROTHIAZIDE 10; 12.5 MG/1; MG/1
TABLET ORAL
Qty: 90 TABLET | Refills: 1 | Status: SHIPPED | OUTPATIENT
Start: 2017-05-29 | End: 2017-10-24 | Stop reason: SDUPTHER

## 2017-07-25 DIAGNOSIS — M54.16 BILATERAL LUMBAR RADICULOPATHY: ICD-10-CM

## 2017-07-26 RX ORDER — TRAMADOL HYDROCHLORIDE 50 MG/1
50 TABLET ORAL EVERY 6 HOURS PRN
Qty: 180 TABLET | Refills: 0 | OUTPATIENT
Start: 2017-07-26

## 2017-08-08 DIAGNOSIS — M54.16 BILATERAL LUMBAR RADICULOPATHY: ICD-10-CM

## 2017-08-09 RX ORDER — TRAMADOL HYDROCHLORIDE 50 MG/1
50 TABLET ORAL EVERY 6 HOURS PRN
Qty: 180 TABLET | Refills: 0 | Status: SHIPPED | OUTPATIENT
Start: 2017-08-09 | End: 2017-10-24 | Stop reason: SDUPTHER

## 2017-08-16 DIAGNOSIS — K21.9 GASTROESOPHAGEAL REFLUX DISEASE WITHOUT ESOPHAGITIS: ICD-10-CM

## 2017-08-16 DIAGNOSIS — E78.00 HYPERCHOLESTEREMIA: ICD-10-CM

## 2017-08-21 ENCOUNTER — PATIENT MESSAGE (OUTPATIENT)
Dept: INTERNAL MEDICINE | Facility: CLINIC | Age: 62
End: 2017-08-21

## 2017-08-21 RX ORDER — OMEPRAZOLE 20 MG/1
CAPSULE, DELAYED RELEASE ORAL
Qty: 90 CAPSULE | Refills: 2 | Status: SHIPPED | OUTPATIENT
Start: 2017-08-21 | End: 2018-05-09 | Stop reason: SDUPTHER

## 2017-08-21 RX ORDER — ATORVASTATIN CALCIUM 10 MG/1
TABLET, FILM COATED ORAL
Qty: 90 TABLET | Refills: 0 | Status: SHIPPED | OUTPATIENT
Start: 2017-08-21 | End: 2017-11-02 | Stop reason: SDUPTHER

## 2017-08-22 ENCOUNTER — PATIENT MESSAGE (OUTPATIENT)
Dept: INTERNAL MEDICINE | Facility: CLINIC | Age: 62
End: 2017-08-22

## 2017-08-22 NOTE — TELEPHONE ENCOUNTER
OK for handicap permit. If we have the forms here I can fill out and sign for her to come .    She has lumbar radiculopathy which limits her ability to ambulate therefore OK for permit. Thanks

## 2017-09-18 ENCOUNTER — CLINICAL SUPPORT (OUTPATIENT)
Dept: INTERNAL MEDICINE | Facility: CLINIC | Age: 62
End: 2017-09-18
Payer: COMMERCIAL

## 2017-09-18 DIAGNOSIS — E78.00 HYPERCHOLESTEREMIA: ICD-10-CM

## 2017-09-18 DIAGNOSIS — R73.01 ELEVATED FASTING GLUCOSE: ICD-10-CM

## 2017-09-18 LAB
ALBUMIN SERPL BCP-MCNC: 2.9 G/DL
ALP SERPL-CCNC: 75 U/L
ALT SERPL W/O P-5'-P-CCNC: 20 U/L
ANION GAP SERPL CALC-SCNC: 7 MMOL/L
AST SERPL-CCNC: 18 U/L
BASOPHILS # BLD AUTO: 0.03 K/UL
BASOPHILS NFR BLD: 0.4 %
BILIRUB SERPL-MCNC: 0.5 MG/DL
BUN SERPL-MCNC: 20 MG/DL
CALCIUM SERPL-MCNC: 9.5 MG/DL
CHLORIDE SERPL-SCNC: 104 MMOL/L
CHOLEST SERPL-MCNC: 171 MG/DL
CHOLEST/HDLC SERPL: 3.1 {RATIO}
CO2 SERPL-SCNC: 29 MMOL/L
CREAT SERPL-MCNC: 0.6 MG/DL
DIFFERENTIAL METHOD: ABNORMAL
EOSINOPHIL # BLD AUTO: 0.3 K/UL
EOSINOPHIL NFR BLD: 3.7 %
ERYTHROCYTE [DISTWIDTH] IN BLOOD BY AUTOMATED COUNT: 15.5 %
EST. GFR  (AFRICAN AMERICAN): >60 ML/MIN/1.73 M^2
EST. GFR  (NON AFRICAN AMERICAN): >60 ML/MIN/1.73 M^2
GLUCOSE SERPL-MCNC: 115 MG/DL
HCT VFR BLD AUTO: 42.7 %
HDLC SERPL-MCNC: 56 MG/DL
HDLC SERPL: 32.7 %
HGB BLD-MCNC: 13.4 G/DL
LDLC SERPL CALC-MCNC: 90.4 MG/DL
LYMPHOCYTES # BLD AUTO: 1.7 K/UL
LYMPHOCYTES NFR BLD: 24.9 %
MCH RBC QN AUTO: 26.6 PG
MCHC RBC AUTO-ENTMCNC: 31.4 G/DL
MCV RBC AUTO: 85 FL
MONOCYTES # BLD AUTO: 0.7 K/UL
MONOCYTES NFR BLD: 9.6 %
NEUTROPHILS # BLD AUTO: 4.2 K/UL
NEUTROPHILS NFR BLD: 61.4 %
NONHDLC SERPL-MCNC: 115 MG/DL
PLATELET # BLD AUTO: 283 K/UL
PMV BLD AUTO: 8.9 FL
POTASSIUM SERPL-SCNC: 4.2 MMOL/L
PROT SERPL-MCNC: 7.1 G/DL
RBC # BLD AUTO: 5.04 M/UL
SODIUM SERPL-SCNC: 140 MMOL/L
TRIGL SERPL-MCNC: 123 MG/DL
WBC # BLD AUTO: 6.76 K/UL

## 2017-09-18 PROCEDURE — 83036 HEMOGLOBIN GLYCOSYLATED A1C: CPT

## 2017-09-18 PROCEDURE — 85025 COMPLETE CBC W/AUTO DIFF WBC: CPT

## 2017-09-18 PROCEDURE — 80061 LIPID PANEL: CPT

## 2017-09-18 PROCEDURE — 36415 COLL VENOUS BLD VENIPUNCTURE: CPT | Mod: S$GLB,,, | Performed by: NURSE PRACTITIONER

## 2017-09-18 PROCEDURE — 80053 COMPREHEN METABOLIC PANEL: CPT

## 2017-09-19 LAB
ESTIMATED AVG GLUCOSE: 117 MG/DL
HBA1C MFR BLD HPLC: 5.7 %

## 2017-09-22 ENCOUNTER — OFFICE VISIT (OUTPATIENT)
Dept: INTERNAL MEDICINE | Facility: CLINIC | Age: 62
End: 2017-09-22
Payer: COMMERCIAL

## 2017-09-22 VITALS
DIASTOLIC BLOOD PRESSURE: 60 MMHG | HEIGHT: 57 IN | HEART RATE: 59 BPM | WEIGHT: 242.31 LBS | RESPIRATION RATE: 18 BRPM | SYSTOLIC BLOOD PRESSURE: 112 MMHG | BODY MASS INDEX: 52.28 KG/M2

## 2017-09-22 DIAGNOSIS — M81.0 OSTEOPOROSIS, UNSPECIFIED OSTEOPOROSIS TYPE, UNSPECIFIED PATHOLOGICAL FRACTURE PRESENCE: ICD-10-CM

## 2017-09-22 DIAGNOSIS — I10 ESSENTIAL HYPERTENSION: Primary | ICD-10-CM

## 2017-09-22 DIAGNOSIS — R73.01 IFG (IMPAIRED FASTING GLUCOSE): ICD-10-CM

## 2017-09-22 DIAGNOSIS — I87.2 VENOUS INSUFFICIENCY OF BOTH LOWER EXTREMITIES: ICD-10-CM

## 2017-09-22 DIAGNOSIS — R25.1 TREMOR: ICD-10-CM

## 2017-09-22 DIAGNOSIS — F41.1 GAD (GENERALIZED ANXIETY DISORDER): ICD-10-CM

## 2017-09-22 DIAGNOSIS — E78.00 HYPERCHOLESTEREMIA: ICD-10-CM

## 2017-09-22 DIAGNOSIS — Z23 NEED FOR INFLUENZA VACCINATION: ICD-10-CM

## 2017-09-22 DIAGNOSIS — G47.33 OSA (OBSTRUCTIVE SLEEP APNEA): ICD-10-CM

## 2017-09-22 PROCEDURE — 90688 IIV4 VACCINE SPLT 0.5 ML IM: CPT | Mod: S$GLB,,, | Performed by: INTERNAL MEDICINE

## 2017-09-22 PROCEDURE — 90471 IMMUNIZATION ADMIN: CPT | Mod: S$GLB,,, | Performed by: INTERNAL MEDICINE

## 2017-09-22 PROCEDURE — 3078F DIAST BP <80 MM HG: CPT | Mod: S$GLB,,, | Performed by: INTERNAL MEDICINE

## 2017-09-22 PROCEDURE — 99214 OFFICE O/P EST MOD 30 MIN: CPT | Mod: 25,S$GLB,, | Performed by: INTERNAL MEDICINE

## 2017-09-22 PROCEDURE — 99999 PR PBB SHADOW E&M-EST. PATIENT-LVL III: CPT | Mod: PBBFAC,,, | Performed by: INTERNAL MEDICINE

## 2017-09-22 PROCEDURE — 3074F SYST BP LT 130 MM HG: CPT | Mod: S$GLB,,, | Performed by: INTERNAL MEDICINE

## 2017-09-22 PROCEDURE — 3008F BODY MASS INDEX DOCD: CPT | Mod: S$GLB,,, | Performed by: INTERNAL MEDICINE

## 2017-09-22 NOTE — PROGRESS NOTES
"Subjective:       Patient ID: Micaela Arvizu is a 62 y.o. female.    Chief Complaint: 6 month checkup      HPI:  Patient is known to me and presents to establish care with me (Zena patient) and follow up HTN, HLD, osteoporosis, GERD and anxiety.    HTN: on lisinopril-HCTZ and propranolol. (however BB started for shaking in the head).Home BP < 140/90. Denies chest pains, SOB and LE edema.     HLD: on atorvastatin and fish oil. LDL 90. Denies medicatoin side effects    GERD: taking omeprazole. She has h/o of esophageal stricture. Had "stretching" with Dr. Bojorquez. Denies abd pain, n/v/d/c.     Anxiety: using Klonopin PRN, not using daily. Uses for panic attacks.     Osteoporosis: follows with GYN for this. Had DEXA done 9/2017 with GYN which showed improvement. Doing Prolia with her    Tremor: dad has Parkinson's disease. Tells me she and her sisters all have this. Tremor only occurs in head. Notices it with rest. Prior PCP started propranolol without relief of sx. No other tremors reports. Has had this for <1 year.     Past Medical History:   Diagnosis Date    Arthritis     Asthma     GERD (gastroesophageal reflux disease)     Hiatal hernia     History of colonoscopy with polypectomy     History of esophagogastroduodenoscopy (EGD)     Hyperlipidemia     Hypertension     Lumbar facet arthropathy        Family History   Problem Relation Age of Onset    Hypertension Mother     Hypertension Father        Social History     Social History    Marital status:      Spouse name: N/A    Number of children: N/A    Years of education: N/A     Occupational History    Not on file.     Social History Main Topics    Smoking status: Never Smoker    Smokeless tobacco: Never Used    Alcohol use No    Drug use: No    Sexual activity: Not Currently      Comment:      Other Topics Concern    Not on file     Social History Narrative    No narrative on file       Review of Systems   Constitutional: " Negative for activity change, fatigue, fever and unexpected weight change.   HENT: Negative for congestion, ear pain, hearing loss, rhinorrhea and sore throat.    Eyes: Negative for pain, redness and visual disturbance.   Respiratory: Negative for cough, shortness of breath and wheezing.    Cardiovascular: Negative for chest pain, palpitations and leg swelling.   Gastrointestinal: Negative for abdominal pain, constipation, diarrhea, nausea and vomiting.   Genitourinary: Negative for dysuria, frequency and urgency.   Musculoskeletal: Negative for back pain, joint swelling and neck pain.   Skin: Negative for color change, rash and wound.   Neurological: Positive for tremors (head). Negative for dizziness, weakness, light-headedness and headaches.         Objective:      Physical Exam   Constitutional: She is oriented to person, place, and time. She appears well-developed and well-nourished. No distress.   HENT:   Head: Normocephalic and atraumatic.   Right Ear: External ear normal.   Left Ear: External ear normal.   Eyes: Conjunctivae and EOM are normal. Pupils are equal, round, and reactive to light. Right eye exhibits no discharge. Left eye exhibits no discharge.   Neck: Neck supple. No tracheal deviation present.   Cardiovascular: Normal rate and regular rhythm.  Exam reveals no gallop.    Pulmonary/Chest: Effort normal and breath sounds normal. No respiratory distress. She has no wheezes. She has no rales.   Abdominal: Soft. Bowel sounds are normal. She exhibits no distension. There is no tenderness.   Neurological: She is alert and oriented to person, place, and time. No cranial nerve deficit.   Skin: Skin is warm and dry.   Psychiatric: She has a normal mood and affect. Her behavior is normal.   Vitals reviewed.      Assessment:       1. Essential hypertension    2. Hypercholesteremia    3. Venous insufficiency of both lower extremities    4. JOHNNIE (generalized anxiety disorder)    5. Osteoporosis, unspecified  osteoporosis type, unspecified pathological fracture presence    6. Tremor    7. ROBERTO (obstructive sleep apnea)    8. IFG (impaired fasting glucose)    9. Need for influenza vaccination        Plan:         Micaela was seen today for 6 month checkup.    Diagnoses and all orders for this visit:    Essential hypertension  -     CBC auto differential; Future  -     Comprehensive metabolic panel; Future  -     Lipid panel; Future  -     Hemoglobin A1c; Future  -     Vitamin D; Future  Chronic conrolled  Cont lis-hctz at aubrey dose  Low Na diet  Exercise, weight loss  Check BP and keep log  Can stop propranolol if not helping tremor    Hypercholesteremia  -     CBC auto differential; Future  -     Comprehensive metabolic panel; Future  -     Lipid panel; Future  -     Hemoglobin A1c; Future  -     Vitamin D; Future  Chronic controlled  Cont statin and fish oil at same dose  Exercise, weigh tloss    Venous insufficiency of both lower extremities  Has compresison stockings  Controlled    JOHNNIE (generalized anxiety disorder)  Cont klonopin PRN from her prior PCP  If worsening will trial SSRI    Osteoporosis, unspecified osteoporosis type, unspecified pathological fracture presence  Cont prolia with GYN who is managing this condition  dEXA 9/2017    Tremor  -     Ambulatory Referral to Neurology  Ok to stop propranolol if not effective  Given her FH will send to neurology for eval    ROBERTO (obstructive sleep apnea)  weraing CPAP nightly    IFG (impaired fasting glucose)  -     CBC auto differential; Future  -     Comprehensive metabolic panel; Future  -     Lipid panel; Future  -     Hemoglobin A1c; Future  -     Vitamin D; Future    Need for influenza vaccination  -     Influenza - Quadrivalent          Health Maintenance:  -CRC: doing with Dr. Bojorquez  -PAP/MMG: doing with GYN  -Bone Density: doing with GYH  -tobacco: not smoking  -Vaccines  Flu- 9/2017    RTC  6 months with labs

## 2017-10-17 ENCOUNTER — OFFICE VISIT (OUTPATIENT)
Dept: NEUROLOGY | Facility: CLINIC | Age: 62
End: 2017-10-17
Payer: COMMERCIAL

## 2017-10-17 VITALS
SYSTOLIC BLOOD PRESSURE: 126 MMHG | DIASTOLIC BLOOD PRESSURE: 68 MMHG | HEART RATE: 68 BPM | WEIGHT: 242.06 LBS | RESPIRATION RATE: 16 BRPM | BODY MASS INDEX: 50.81 KG/M2 | HEIGHT: 58 IN

## 2017-10-17 DIAGNOSIS — G25.0 BENIGN ESSENTIAL TREMOR: ICD-10-CM

## 2017-10-17 DIAGNOSIS — G24.3 CERVICAL DYSTONIA: Primary | ICD-10-CM

## 2017-10-17 DIAGNOSIS — M54.16 BILATERAL LUMBAR RADICULOPATHY: ICD-10-CM

## 2017-10-17 PROCEDURE — 99214 OFFICE O/P EST MOD 30 MIN: CPT | Mod: S$GLB,,, | Performed by: PSYCHIATRY & NEUROLOGY

## 2017-10-17 PROCEDURE — 99999 PR PBB SHADOW E&M-EST. PATIENT-LVL III: CPT | Mod: PBBFAC,,, | Performed by: PSYCHIATRY & NEUROLOGY

## 2017-10-17 RX ORDER — ACETAMINOPHEN 500 MG
2 TABLET ORAL DAILY
COMMUNITY

## 2017-10-17 NOTE — LETTER
October 17, 2017      Kinga Robles MD  [address] [address]           [facility]  [address] [facility] [address]  Phone: [phone]  Fax: [phone]          Patient: Micaela Arvizu   MR Number: [id number]   YOB: 1955   Date of Visit: 10/17/2017       Dear Dr. Kinga Robles:    Thank you for referring Micaela Arvizu to me for evaluation. Attached you will find relevant portions of my assessment and plan of care.    If you have questions, please do not hesitate to call me. I look forward to following Micaela Arvizu along with you.    Sincerely,    Malik Ellis MD    Enclosure  CC:  No Recipients    If you would like to receive this communication electronically, please contact [email or web address] or [phone] to request more information on [employer] access.    For providers and/or their staff who would like to refer a patient to [facility], please contact us through our one-stop-shop provider referral line, [facility], at [phone].    If you feel you have received this communication in error or would no longer like to receive these types of communications, please e-mail [email or web address]

## 2017-10-17 NOTE — PROGRESS NOTES
"HPI: Micaela Arvizu is a 62 y.o. female with multiple level degenerative changes of the L spine, prior lower T spine compression fractures and L2  And L4 compression deformities. She has bilateral  lumbar radicular pain. EMG/NCS of the legs normal 5/2015  Patient has not seen me in over 2 years.  Since then, her PCP has treated a head tremor. She has noted this for about a year especially in quite time. She has some head and neck pain. She was found to have some "spurring in the neck" by chiropractor after having "lump" in the right shoulder and she no longer sees chiropractor. PCP performed US which was normal. She notices her head is tilted to the left side  The patient's father and sister both have essential tremor.  PCP started the patient on Propranolol 80mg, but patient noted some hypotension. She is splitting the pill to 40mg BID but medication is expensive for her and is not effective at all.  She feels her head shakes in a no-no pattern.   She uses tramadol for lower back pain which is under good control. BP was good prior to propranolol with lisinopril.   No tremor of the hands.    Review of Systems   Constitutional: Negative for fever.   HENT: Negative for nosebleeds.    Eyes: Negative for double vision.   Respiratory: Negative for hemoptysis.    Cardiovascular: Negative for leg swelling.   Gastrointestinal: Negative for blood in stool.   Genitourinary: Negative for hematuria.   Musculoskeletal: Positive for back pain and neck pain.   Skin: Negative for rash.   Neurological: Positive for tremors.   Psychiatric/Behavioral: The patient has insomnia.         Poor sleep due to pain     Exam:  Gen Appearance, well developed/nourished in no apparent distress  CV: 2+ distal pulses with no edema or swelling  Neuro:  MS: Awake, alert, oriented to place, person, time, situation. Sustains attention. Recent/remote memory intact, Language is full to spontaneous speech/comprehension. Fund of Knowledge is full  CN: Optic " discs are flat with normal vasculature, PERRL, Extraoccular movements and visual fields are full. Normal facial sensation and strength, Tongue and Palate are midline and strong. Shoulder Shrug symmetric and strong.  Motor: Normal bulk, tone, no abnormal movements in the hands but there is a no-no tremor of the head. 5/5 strength bilateral upper/lower extremities with 1+ reflexes  Sensory: symmetric to temp, and vibration.  Romberg negative  Cerebellar: Finger-nose, Rapid alternating movements intact  Gait: Normal stance, no ataxia  MSK: Multiple spasms in the right more then left trapezius and paraspinal muscle.   Some evidence of venous stasis in the feet, chronic      EMG: Micaela Arvizu is a 60 y.o. female with multiple level degenerative changes of the L spine, prior lower T spine compression fractures and L2  And L4 compression deformities. She has bilateral  lumbar radicular pain    6/2015 MRI L spine:     Interval resolution of the edema like signal involving the inferior end plate of L2 which has been replaced with fatty marrow.    Mild spondylosis of the lumbar spine without evidence for significant central canal stenosis or neural foraminal narrowing.    MRI C spine 2016: Multilevel cervical spondylosis with foraminal encroachment greatest from the C3-C5 levels as noted above.    Assessment/Plan: Micaela Arvizu is a 62 y.o. female with multiple level degenerative changes of the L spine, prior lower T spine compression fractures and L2  And L4 compression deformities. She has bilateral  lumbar radicular pain. EMG/NCS of the legs normal 5/2015  She has essential tremor of the head, which runs in her family. 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 She has not had any relief with propranolol. She can stop use unless HTN worsens  2. Botox 200 units for cervical dystonia and tremor  Pain management facet injections helped relieve pain by 90%- she will see   Tolba back PRN. She is no longer requiring gabapentin. She also had compression deformities of the L spine, treated by orthopedist. The patient is also being treated for osteoporosis.     RTC for Botox

## 2017-10-24 DIAGNOSIS — I10 ESSENTIAL HYPERTENSION: ICD-10-CM

## 2017-10-24 DIAGNOSIS — M54.16 BILATERAL LUMBAR RADICULOPATHY: ICD-10-CM

## 2017-10-24 DIAGNOSIS — G25.0 FAMILIAL TREMOR: ICD-10-CM

## 2017-10-24 RX ORDER — PROPRANOLOL HYDROCHLORIDE 80 MG/1
80 TABLET ORAL 2 TIMES DAILY
Qty: 180 TABLET | Refills: 1 | Status: SHIPPED | OUTPATIENT
Start: 2017-10-24 | End: 2018-03-20 | Stop reason: SDUPTHER

## 2017-10-24 RX ORDER — TRAMADOL HYDROCHLORIDE 50 MG/1
50 TABLET ORAL EVERY 6 HOURS PRN
Qty: 120 TABLET | Refills: 0 | Status: SHIPPED | OUTPATIENT
Start: 2017-10-24 | End: 2017-12-29 | Stop reason: SDUPTHER

## 2017-10-24 RX ORDER — LISINOPRIL AND HYDROCHLOROTHIAZIDE 10; 12.5 MG/1; MG/1
1 TABLET ORAL DAILY
Qty: 90 TABLET | Refills: 1 | Status: SHIPPED | OUTPATIENT
Start: 2017-10-24 | End: 2018-03-20 | Stop reason: SDUPTHER

## 2017-11-02 DIAGNOSIS — E78.00 HYPERCHOLESTEREMIA: ICD-10-CM

## 2017-11-03 RX ORDER — ATORVASTATIN CALCIUM 10 MG/1
TABLET, FILM COATED ORAL
Qty: 90 TABLET | Refills: 1 | Status: SHIPPED | OUTPATIENT
Start: 2017-11-03 | End: 2018-04-11 | Stop reason: SDUPTHER

## 2017-11-09 ENCOUNTER — PROCEDURE VISIT (OUTPATIENT)
Dept: NEUROLOGY | Facility: CLINIC | Age: 62
End: 2017-11-09
Payer: COMMERCIAL

## 2017-11-09 DIAGNOSIS — G24.3 CERVICAL DYSTONIA: ICD-10-CM

## 2017-11-09 DIAGNOSIS — G25.0 BENIGN ESSENTIAL TREMOR: ICD-10-CM

## 2017-11-09 PROCEDURE — 64616 CHEMODENERV MUSC NECK DYSTON: CPT | Mod: 50,S$GLB,, | Performed by: PSYCHIATRY & NEUROLOGY

## 2017-11-09 NOTE — PROCEDURES
Micaela Arvizu is a 62 y.o. female patient.           BOTOX PROCEDURE NOTE     Date of Procedure: 11/9/17    Reason for Proceedure: Cervical dystonia and tremor    BOTOX WAS SUPPLIED FROM [facility]    Informed consent was obtained prior to performing this study. Two patient identifiers were confirmed with the patient prior to performing this study. A time out to determine correct patient and and agreement on procedure performed was conducted prior to the injections.     Procedure Details: After informed consent obtained the patient's r neck was cleansed with alcohol rub and 200 Units of Botox (diluted 1:1) was injected in the following bilateral muscles:   40Units in  eachTrapezius  30Units in each Levator Scapulae*  10Units in eachSplenius capitus*  10Units in eachSemispinal capitus*  10Units in each Longismus*  *= denotes bilateral injections  The patient tolerated the procedure well with no  blood loss. She was observed for several minutes post injection and given a handout from [employer] regarding when and where to seek help if side effects are experienced.Stoppping propranolol caused worse tremor so she resumed.   RTC in 6 weeks.       Procedures    Malik Ellis  11/9/2017

## 2017-12-21 ENCOUNTER — OFFICE VISIT (OUTPATIENT)
Dept: NEUROLOGY | Facility: CLINIC | Age: 62
End: 2017-12-21
Payer: COMMERCIAL

## 2017-12-21 VITALS
WEIGHT: 250.56 LBS | HEIGHT: 58 IN | HEART RATE: 64 BPM | BODY MASS INDEX: 52.59 KG/M2 | DIASTOLIC BLOOD PRESSURE: 80 MMHG | RESPIRATION RATE: 18 BRPM | SYSTOLIC BLOOD PRESSURE: 114 MMHG

## 2017-12-21 DIAGNOSIS — G24.3 CERVICAL DYSTONIA: ICD-10-CM

## 2017-12-21 DIAGNOSIS — G25.0 ESSENTIAL TREMOR: ICD-10-CM

## 2017-12-21 PROCEDURE — 99999 PR PBB SHADOW E&M-EST. PATIENT-LVL IV: CPT | Mod: PBBFAC,,, | Performed by: NURSE PRACTITIONER

## 2017-12-21 PROCEDURE — 99214 OFFICE O/P EST MOD 30 MIN: CPT | Mod: S$GLB,,, | Performed by: NURSE PRACTITIONER

## 2017-12-21 RX ORDER — PRIMIDONE 50 MG/1
50 TABLET ORAL NIGHTLY
Qty: 30 TABLET | Refills: 11 | Status: SHIPPED | OUTPATIENT
Start: 2017-12-21 | End: 2018-03-20 | Stop reason: SDUPTHER

## 2017-12-21 NOTE — PROGRESS NOTES
HPI: Micaela Arvizu is a 62 y.o. female with cervical dystonia with head tremor, multiple level degenerative changes of the L spine, prior lower T spine compression fractures and L2  And L4 compression deformities. She has bilateral  lumbar radicular pain. EMG/NCS of the legs normal 5/2015.     She presents today for a Botox follow up. She received injections for cervical dystonia and head tremor on 11/9/2017, which were overall ineffective, other than some loosening of her shoulder, and resulted in a high co-pay.     She continues with a head tremor in a no-no pattern. She attempted to discontinue Propranolol, as she thought it to be ineffective for her head tremor, but noticed worsening of her head tremor afterwards, with appearance of bilateral hand tremors.     Review of Systems   Constitutional: Negative for fever.   HENT: Negative for nosebleeds.    Eyes: Negative for double vision.   Respiratory: Negative for hemoptysis.    Cardiovascular: Negative for leg swelling.   Gastrointestinal: Negative for blood in stool.   Genitourinary: Negative for hematuria.   Musculoskeletal: Positive for back pain and neck pain.   Skin: Negative for rash.   Neurological: Positive for tremors.   Psychiatric/Behavioral: The patient has insomnia.         Poor sleep due to pain     Exam:  Gen Appearance, well developed/nourished in no apparent distress  CV: 2+ distal pulses with no edema or swelling  Neuro:  MS: Awake, alert, oriented to place, person, time, situation. Sustains attention. Recent/remote memory intact, Language is full to spontaneous speech/comprehension. Fund of Knowledge is full  CN: Optic discs are flat with normal vasculature, PERRL, Extraoccular movements and visual fields are full. Normal facial sensation and strength, Tongue and Palate are midline and strong. Shoulder Shrug symmetric and strong.  Motor: Normal bulk, tone, no abnormal movements in the hands but there is a no-no tremor of the head. 5/5 strength  bilateral upper/lower extremities with 1+ reflexes  Sensory: symmetric to temp, and vibration.  Romberg negative  Cerebellar: Finger-nose, Rapid alternating movements intact  Gait: Normal stance, no ataxia  MSK: Multiple spasms in the right more then left trapezius and paraspinal muscle.   Some evidence of venous stasis in the feet, chronic    EMG: Micaela Arvizu is a 60 y.o. female with multiple level degenerative changes of the L spine, prior lower T spine compression fractures and L2  And L4 compression deformities. She has bilateral  lumbar radicular pain    6/2015 MRI L spine:     Interval resolution of the edema like signal involving the inferior end plate of L2 which has been replaced with fatty marrow.    Mild spondylosis of the lumbar spine without evidence for significant central canal stenosis or neural foraminal narrowing.    MRI C spine 2016: Multilevel cervical spondylosis with foraminal encroachment greatest from the C3-C5 levels as noted above.    Assessment/Plan: Micaela Arvizu is a 62 y.o. female with multiple level degenerative changes of the L spine, prior lower T spine compression fractures and L2  And L4 compression deformities. She has bilateral  lumbar radicular pain. EMG/NCS of the legs normal 5/2015  She has essential tremor of the head, which runs in her family. Exam shows essential tremor with cervical dystonia with left torticollis    I recommend:   1. Start Primidone for essential tremor, and titrate as tolerated.   2. Continue Propranolol for ET. She cannot tolerate higher dosing, due to hypotension.   3. Botox was ineffective for cervical dystonia; advised to try Flexeril Rx, which she has at home.  4. Pain management facet injections helped relieve pain by 90%- she will see Dr Wolfe back PRN. She is no longer requiring Gabapentin. She also had compression deformities of the L spine, treated by orthopedist. The patient is also being treated for osteoporosis.     FU 6 weeks.

## 2017-12-29 DIAGNOSIS — M54.16 BILATERAL LUMBAR RADICULOPATHY: ICD-10-CM

## 2017-12-29 DIAGNOSIS — F41.1 GAD (GENERALIZED ANXIETY DISORDER): ICD-10-CM

## 2018-01-02 RX ORDER — CLONAZEPAM 0.5 MG/1
0.5 TABLET ORAL DAILY PRN
Qty: 30 TABLET | Refills: 0 | Status: SHIPPED | OUTPATIENT
Start: 2018-01-02 | End: 2018-03-20 | Stop reason: SDUPTHER

## 2018-01-02 RX ORDER — TRAMADOL HYDROCHLORIDE 50 MG/1
50 TABLET ORAL EVERY 6 HOURS PRN
Qty: 120 TABLET | Refills: 0 | Status: SHIPPED | OUTPATIENT
Start: 2018-01-02 | End: 2018-03-20 | Stop reason: SDUPTHER

## 2018-01-15 ENCOUNTER — TELEPHONE (OUTPATIENT)
Dept: INTERNAL MEDICINE | Facility: CLINIC | Age: 63
End: 2018-01-15

## 2018-01-15 NOTE — TELEPHONE ENCOUNTER
----- Message from Krishna Crum sent at 1/15/2018  2:14 PM CST -----  Contact: SELF  Micaela Arvizu  MRN: [id number]  : 1955  PCP: Kinga Yung  Home Phone      [phone]  Work Phone      Not on file.  Mobile          [phone]      MESSAGE: PT SCHEDULED AN APPT TOMORROW WITH DR YUNG BUT WOULD LIKE TO SPEAK WITH NURSE TO DISCUSS SYMPTOMS. PLEASE CALL    [phone]

## 2018-03-12 ENCOUNTER — TELEPHONE (OUTPATIENT)
Dept: INTERNAL MEDICINE | Facility: CLINIC | Age: 63
End: 2018-03-12

## 2018-03-12 DIAGNOSIS — Z11.59 NEED FOR HEPATITIS C SCREENING TEST: Primary | ICD-10-CM

## 2018-03-14 ENCOUNTER — LAB VISIT (OUTPATIENT)
Dept: LAB | Facility: HOSPITAL | Age: 63
End: 2018-03-14
Attending: INTERNAL MEDICINE
Payer: COMMERCIAL

## 2018-03-14 DIAGNOSIS — Z11.59 NEED FOR HEPATITIS C SCREENING TEST: ICD-10-CM

## 2018-03-14 DIAGNOSIS — E78.00 HYPERCHOLESTEREMIA: ICD-10-CM

## 2018-03-14 DIAGNOSIS — R73.01 IFG (IMPAIRED FASTING GLUCOSE): ICD-10-CM

## 2018-03-14 DIAGNOSIS — I10 ESSENTIAL HYPERTENSION: ICD-10-CM

## 2018-03-14 LAB
25(OH)D3+25(OH)D2 SERPL-MCNC: 57 NG/ML
ALBUMIN SERPL BCP-MCNC: 3.2 G/DL
ALP SERPL-CCNC: 81 U/L
ALT SERPL W/O P-5'-P-CCNC: 25 U/L
ANION GAP SERPL CALC-SCNC: 10 MMOL/L
AST SERPL-CCNC: 22 U/L
BASOPHILS # BLD AUTO: 0.02 K/UL
BASOPHILS NFR BLD: 0.3 %
BILIRUB SERPL-MCNC: 0.7 MG/DL
BUN SERPL-MCNC: 14 MG/DL
CALCIUM SERPL-MCNC: 9.6 MG/DL
CHLORIDE SERPL-SCNC: 103 MMOL/L
CHOLEST SERPL-MCNC: 175 MG/DL
CHOLEST/HDLC SERPL: 3 {RATIO}
CO2 SERPL-SCNC: 27 MMOL/L
CREAT SERPL-MCNC: 0.6 MG/DL
DIFFERENTIAL METHOD: ABNORMAL
EOSINOPHIL # BLD AUTO: 0.2 K/UL
EOSINOPHIL NFR BLD: 3 %
ERYTHROCYTE [DISTWIDTH] IN BLOOD BY AUTOMATED COUNT: 14.2 %
EST. GFR  (AFRICAN AMERICAN): >60 ML/MIN/1.73 M^2
EST. GFR  (NON AFRICAN AMERICAN): >60 ML/MIN/1.73 M^2
ESTIMATED AVG GLUCOSE: 120 MG/DL
GLUCOSE SERPL-MCNC: 123 MG/DL
HBA1C MFR BLD HPLC: 5.8 %
HCT VFR BLD AUTO: 42.7 %
HDLC SERPL-MCNC: 59 MG/DL
HDLC SERPL: 33.7 %
HGB BLD-MCNC: 14.1 G/DL
LDLC SERPL CALC-MCNC: 94.4 MG/DL
LYMPHOCYTES # BLD AUTO: 1.4 K/UL
LYMPHOCYTES NFR BLD: 18.2 %
MCH RBC QN AUTO: 28.2 PG
MCHC RBC AUTO-ENTMCNC: 33 G/DL
MCV RBC AUTO: 85 FL
MONOCYTES # BLD AUTO: 0.8 K/UL
MONOCYTES NFR BLD: 10.5 %
NEUTROPHILS # BLD AUTO: 5.4 K/UL
NEUTROPHILS NFR BLD: 68 %
NONHDLC SERPL-MCNC: 116 MG/DL
PLATELET # BLD AUTO: 308 K/UL
PMV BLD AUTO: 8.4 FL
POTASSIUM SERPL-SCNC: 4.3 MMOL/L
PROT SERPL-MCNC: 7.3 G/DL
RBC # BLD AUTO: 5 M/UL
SODIUM SERPL-SCNC: 140 MMOL/L
TRIGL SERPL-MCNC: 108 MG/DL
WBC # BLD AUTO: 7.87 K/UL

## 2018-03-14 PROCEDURE — 83036 HEMOGLOBIN GLYCOSYLATED A1C: CPT

## 2018-03-14 PROCEDURE — 36415 COLL VENOUS BLD VENIPUNCTURE: CPT

## 2018-03-14 PROCEDURE — 86803 HEPATITIS C AB TEST: CPT

## 2018-03-14 PROCEDURE — 80061 LIPID PANEL: CPT

## 2018-03-14 PROCEDURE — 85025 COMPLETE CBC W/AUTO DIFF WBC: CPT

## 2018-03-14 PROCEDURE — 80053 COMPREHEN METABOLIC PANEL: CPT

## 2018-03-14 PROCEDURE — 82306 VITAMIN D 25 HYDROXY: CPT

## 2018-03-15 LAB — HCV AB SERPL QL IA: NEGATIVE

## 2018-03-20 ENCOUNTER — OFFICE VISIT (OUTPATIENT)
Dept: INTERNAL MEDICINE | Facility: CLINIC | Age: 63
End: 2018-03-20
Payer: COMMERCIAL

## 2018-03-20 VITALS
OXYGEN SATURATION: 96 % | HEART RATE: 68 BPM | DIASTOLIC BLOOD PRESSURE: 68 MMHG | WEIGHT: 256.63 LBS | RESPIRATION RATE: 18 BRPM | SYSTOLIC BLOOD PRESSURE: 120 MMHG | BODY MASS INDEX: 53.87 KG/M2 | HEIGHT: 58 IN

## 2018-03-20 DIAGNOSIS — E78.00 HYPERCHOLESTEREMIA: ICD-10-CM

## 2018-03-20 DIAGNOSIS — G25.0 ESSENTIAL TREMOR: ICD-10-CM

## 2018-03-20 DIAGNOSIS — I87.2 VENOUS INSUFFICIENCY OF BOTH LOWER EXTREMITIES: ICD-10-CM

## 2018-03-20 DIAGNOSIS — M54.16 BILATERAL LUMBAR RADICULOPATHY: ICD-10-CM

## 2018-03-20 DIAGNOSIS — F41.1 GAD (GENERALIZED ANXIETY DISORDER): ICD-10-CM

## 2018-03-20 DIAGNOSIS — I10 ESSENTIAL HYPERTENSION: Primary | ICD-10-CM

## 2018-03-20 DIAGNOSIS — R73.01 IFG (IMPAIRED FASTING GLUCOSE): ICD-10-CM

## 2018-03-20 PROCEDURE — 99214 OFFICE O/P EST MOD 30 MIN: CPT | Mod: S$GLB,,, | Performed by: INTERNAL MEDICINE

## 2018-03-20 PROCEDURE — 99999 PR PBB SHADOW E&M-EST. PATIENT-LVL III: CPT | Mod: PBBFAC,,, | Performed by: INTERNAL MEDICINE

## 2018-03-20 PROCEDURE — 3074F SYST BP LT 130 MM HG: CPT | Mod: CPTII,S$GLB,, | Performed by: INTERNAL MEDICINE

## 2018-03-20 PROCEDURE — 3078F DIAST BP <80 MM HG: CPT | Mod: CPTII,S$GLB,, | Performed by: INTERNAL MEDICINE

## 2018-03-20 RX ORDER — PROPRANOLOL HYDROCHLORIDE 80 MG/1
80 TABLET ORAL 2 TIMES DAILY
Qty: 180 TABLET | Refills: 1 | Status: SHIPPED | OUTPATIENT
Start: 2018-03-20 | End: 2018-05-22 | Stop reason: SDUPTHER

## 2018-03-20 RX ORDER — LISINOPRIL AND HYDROCHLOROTHIAZIDE 10; 12.5 MG/1; MG/1
1 TABLET ORAL DAILY
Qty: 90 TABLET | Refills: 1 | Status: SHIPPED | OUTPATIENT
Start: 2018-03-20 | End: 2018-11-04 | Stop reason: SDUPTHER

## 2018-03-20 RX ORDER — PRIMIDONE 50 MG/1
50 TABLET ORAL NIGHTLY
Qty: 90 TABLET | Refills: 1 | Status: SHIPPED | OUTPATIENT
Start: 2018-03-20 | End: 2018-05-22

## 2018-03-20 RX ORDER — CLONAZEPAM 0.5 MG/1
0.5 TABLET ORAL DAILY PRN
Qty: 30 TABLET | Refills: 0 | Status: SHIPPED | OUTPATIENT
Start: 2018-03-20 | End: 2018-07-03 | Stop reason: SDUPTHER

## 2018-03-20 RX ORDER — TRAMADOL HYDROCHLORIDE 50 MG/1
50 TABLET ORAL EVERY 8 HOURS PRN
Qty: 30 TABLET | Refills: 0 | Status: SHIPPED | OUTPATIENT
Start: 2018-03-20 | End: 2018-04-20 | Stop reason: SDUPTHER

## 2018-03-20 NOTE — PROGRESS NOTES
"Subjective:       Patient ID: Micaela Arvizu is a 62 y.o. female.    Chief Complaint: Follow-up (6 month followup)      HPI:  Patient is known to me and presents for follow up HTN, HLD, osteoporosis, GERD and anxiety. Labs from 3/14/18 were personally reviewed and discussed with the patient today.     Today she reports b/l sciatica. She reports b/l low back pain and pain radiating down the back of both legs. Pain is intermittent. Worse with walking. Has had MRIs in the past. She sees Dr. Wolfe for this already---planning for more injections. She uses tramadol for pain management for her arthrits which is working well. Had stomach ulcer with NSAIDs.    HTN: on lisinopril-HCTZ. Home BP < 140/90. Denies chest pains, SOB and LE edema.      HLD: on atorvastatin and fish oil. LDL 90. Denies medicatoin side effects     GERD: taking omeprazole. She has h/o of esophageal stricture. Had "stretching" with Dr. Bojorquez. Denies abd pain, n/v/d/c.      Anxiety: using Klonopin PRN, using half tab every morning and sometimes half tab at night. Uses for panic attacks.      Osteoporosis: follows with GYN for this. Had DEXA done 9/2017 with GYN which showed improvement. Doing Prolia with her     Tremor: dad has Parkinson's disease. Tells me she and her sisters all have this. Sees neurology and dx with essential tremor on propranolol and primidone.     Past Medical History:   Diagnosis Date    Arthritis     Asthma     GERD (gastroesophageal reflux disease)     Hiatal hernia     History of colonoscopy with polypectomy     History of esophagogastroduodenoscopy (EGD)     Hyperlipidemia     Hypertension     Lumbar facet arthropathy        Family History   Problem Relation Age of Onset    Hypertension Mother     Hypertension Father     Parkinsonism Father        Social History     Social History    Marital status:      Spouse name: N/A    Number of children: N/A    Years of education: N/A     Occupational History    Not " on file.     Social History Main Topics    Smoking status: Never Smoker    Smokeless tobacco: Never Used    Alcohol use No    Drug use: No    Sexual activity: Not Currently      Comment:      Other Topics Concern    Not on file     Social History Narrative    No narrative on file       Review of Systems   Constitutional: Positive for activity change and unexpected weight change. Negative for fatigue and fever.   HENT: Negative for congestion, ear pain, hearing loss, rhinorrhea, sore throat, tinnitus and trouble swallowing.    Eyes: Negative for pain, discharge, redness and visual disturbance.   Respiratory: Negative for cough, chest tightness, shortness of breath and wheezing.    Cardiovascular: Negative for chest pain, palpitations and leg swelling.   Gastrointestinal: Negative for abdominal pain, blood in stool, constipation, diarrhea, nausea and vomiting.   Endocrine: Negative for polydipsia and polyuria.   Genitourinary: Negative for difficulty urinating, dysuria, frequency, hematuria, menstrual problem, pelvic pain and urgency.   Musculoskeletal: Positive for arthralgias. Negative for back pain, joint swelling and neck pain.   Skin: Negative for color change, rash and wound.   Neurological: Positive for tremors. Negative for dizziness, weakness, light-headedness and headaches.   Psychiatric/Behavioral: Negative for confusion and dysphoric mood.         Objective:      Physical Exam   Constitutional: She is oriented to person, place, and time. She appears well-developed and well-nourished. No distress.   HENT:   Head: Normocephalic and atraumatic.   Right Ear: External ear normal.   Left Ear: External ear normal.   Eyes: Conjunctivae and EOM are normal. Pupils are equal, round, and reactive to light. Right eye exhibits no discharge. Left eye exhibits no discharge.   Neck: Neck supple. No thyromegaly present.   Cardiovascular: Normal rate and regular rhythm.  Exam reveals no gallop and no friction  rub.    No murmur heard.  Pulmonary/Chest: Effort normal and breath sounds normal. No respiratory distress. She has no wheezes. She has no rales.   Abdominal: Soft. Bowel sounds are normal. She exhibits no distension. There is no tenderness.   Musculoskeletal: She exhibits edema (1+ b/l LE edema).   Lymphadenopathy:     She has no cervical adenopathy.   Neurological: She is alert and oriented to person, place, and time. No cranial nerve deficit.   Skin: Skin is warm and dry.   Venous stasis dermatitis b/l   Psychiatric: She has a normal mood and affect. Her behavior is normal.   Vitals reviewed.      Assessment:       1. Essential hypertension    2. Hypercholesteremia    3. JOHNNIE (generalized anxiety disorder)    4. Essential tremor    5. IFG (impaired fasting glucose)    6. Venous insufficiency of both lower extremities    7. Familial tremor    8. Bilateral lumbar radiculopathy        Plan:       Micaela was seen today for follow-up.    Diagnoses and all orders for this visit:    Essential hypertension  -     lisinopril-hydrochlorothiazide (PRINZIDE,ZESTORETIC) 10-12.5 mg per tablet; Take 1 tablet by mouth once daily.  -     Comprehensive metabolic panel; Future  -     CBC auto differential; Future  -     TSH; Future  -     Lipid panel; Future  -     Hemoglobin A1c; Future  Chronic controlled  Continue medications at same dose  Low Na diet  Exercise, weight loss  Check BP and keep log for next visit    Hypercholesteremia  -     Comprehensive metabolic panel; Future  -     CBC auto differential; Future  -     TSH; Future  -     Lipid panel; Future  -     Hemoglobin A1c; Future  Chronic controlled  Cont statin and fish oil at same dose    JOHNNIE (generalized anxiety disorder)  -     clonazePAM (KLONOPIN) 0.5 MG tablet; Take 1 tablet (0.5 mg total) by mouth daily as needed for Anxiety.  Chronic controlled  Cont klonopoin PRN at same dose  No early refills    Essential tremor  -     primidone (MYSOLINE) 50 MG Tab; Take 1  tablet (50 mg total) by mouth every evening.  Sees neurology  Cont meds at same dose  Controlled    IFG (impaired fasting glucose)  -     Comprehensive metabolic panel; Future  -     CBC auto differential; Future  -     TSH; Future  -     Lipid panel; Future  -     Hemoglobin A1c; Future  A1C stable  Diet and exercise discussed    Venous insufficiency of both lower extremities  -     COMPRESSION STOCKINGS  Chronic stable  Exercise, weight loss  Leg elevation      Bilateral lumbar radiculopathy  -     traMADol (ULTRAM) 50 mg tablet; Take 1 tablet (50 mg total) by mouth every 8 (eight) hours as needed.  Sees Dr. Wolfe  Cont PRN tramadol  PUD with NSAIDs  Exercise, weight loss       Health Maintenance:  -CRC: doing with Dr. Bojorquez  -PAP/MMG: doing with GYN  -Bone Density: doing with GYN  -tobacco: not smoking  -Hep C: 3/2018 negative  -Vaccines  Flu- 9/2017    RTC 6 months with labs and PRN

## 2018-03-26 ENCOUNTER — TELEPHONE (OUTPATIENT)
Dept: HEPATOLOGY | Facility: HOSPITAL | Age: 63
End: 2018-03-26

## 2018-03-26 DIAGNOSIS — G47.33 OSA (OBSTRUCTIVE SLEEP APNEA): Primary | ICD-10-CM

## 2018-03-26 NOTE — TELEPHONE ENCOUNTER
----- Message from Opal Dennis LPN sent at 3/26/2018 12:16 PM CDT -----  Contact: Silverio @ [facility], [id number]      ----- Message -----  From: Savanna Humphrey  Sent: 3/26/2018  11:07 AM  To: Opal Dennis LPN    My apologies.     The Pt advised she has seen this Provider for her ROBERTO, so it was assumed that information was discussed face to face. According to our records from her previous provider, she is on a CPAP with pressure of 12cm H20: Ramp time: mins. Pt is requesting an Rx for [facility], headgear, tubing, disposable filters, reusable filter and water chamber. The sleep study information that we have on file indicates that she had a PSG on 10/22/2012.    Thank you.    ----- Message -----  From: Opal Dennis LPN  Sent: 3/26/2018  10:16 AM  To: Savanna Humphrey        ----- Message -----  From: Kinga Robles MD  Sent: 3/23/2018   8:38 AM  To: Opal Dennis LPN    Do we know exactly what she needs? This is for CPAP? Auto-PAP? What are her settings? How old was her last sleep study?    She needs to send us this info so I can write a new order  ----- Message -----  From: Opal Dennis LPN  Sent: 3/22/2018   2:55 PM  To: Kinga Robles MD        ----- Message -----  From: Savanna Humphrey  Sent: 3/22/2018  12:33 PM  To: Travis Medina afternoon,     Pt called in to [facility] to order PAP supplies. The Rx we have on file from her previous provider has  and she has advised that she is now seeing Dr. Robles for her care. Please submit an order for PAP supplies. Pt uses [facility].    Thank you!

## 2018-03-27 ENCOUNTER — PATIENT MESSAGE (OUTPATIENT)
Dept: INTERNAL MEDICINE | Facility: CLINIC | Age: 63
End: 2018-03-27

## 2018-03-28 RX ORDER — KETOROLAC TROMETHAMINE 30 MG/ML
30 INJECTION, SOLUTION INTRAMUSCULAR; INTRAVENOUS ONCE
Qty: 1 ML | Refills: 0
Start: 2018-03-28 | End: 2018-03-28

## 2018-03-28 NOTE — TELEPHONE ENCOUNTER
IM toradol ordered. Please scheudle patient for nurse visit for this injection for low back pain. Thanks!

## 2018-04-11 DIAGNOSIS — E78.00 HYPERCHOLESTEREMIA: ICD-10-CM

## 2018-04-11 RX ORDER — ATORVASTATIN CALCIUM 10 MG/1
TABLET, FILM COATED ORAL
Qty: 90 TABLET | Refills: 1 | Status: SHIPPED | OUTPATIENT
Start: 2018-04-11 | End: 2018-11-04 | Stop reason: SDUPTHER

## 2018-04-13 ENCOUNTER — TELEPHONE (OUTPATIENT)
Dept: INTERNAL MEDICINE | Facility: CLINIC | Age: 63
End: 2018-04-13

## 2018-04-13 RX ORDER — ONDANSETRON 4 MG/1
4 TABLET, FILM COATED ORAL EVERY 8 HOURS PRN
Qty: 30 TABLET | Refills: 0 | Status: SHIPPED | OUTPATIENT
Start: 2018-04-13 | End: 2018-05-22

## 2018-04-13 RX ORDER — ONDANSETRON 4 MG/1
4 TABLET, FILM COATED ORAL EVERY 8 HOURS PRN
Qty: 30 TABLET | Refills: 0 | Status: SHIPPED | OUTPATIENT
Start: 2018-04-13 | End: 2018-04-13 | Stop reason: SDUPTHER

## 2018-04-13 NOTE — TELEPHONE ENCOUNTER
Sent but [facility]--please cancel that prescription. Resent to [facility]

## 2018-04-13 NOTE — TELEPHONE ENCOUNTER
----- Message from Krishna Crum sent at 2018 11:08 AM CDT -----  Contact: SELF  Micaela Arvizu  MRN: [id number]  : 1955  PCP: Kinga Robles  Home Phone      [phone]  Work Phone      Not on file.  Mobile          [phone]      MESSAGE: PT HAS VIRUS AND WANTS SOMETHING CALLED IN FOR NAUSEA. PLEASE CALL     PHONE: [phone]    PHARMACY: [facility]

## 2018-04-20 DIAGNOSIS — M54.16 BILATERAL LUMBAR RADICULOPATHY: ICD-10-CM

## 2018-04-20 RX ORDER — TRAMADOL HYDROCHLORIDE 50 MG/1
50 TABLET ORAL EVERY 8 HOURS PRN
Qty: 30 TABLET | Refills: 0 | Status: SHIPPED | OUTPATIENT
Start: 2018-04-20 | End: 2018-06-05 | Stop reason: SDUPTHER

## 2018-05-09 DIAGNOSIS — K21.9 GASTROESOPHAGEAL REFLUX DISEASE WITHOUT ESOPHAGITIS: ICD-10-CM

## 2018-05-11 RX ORDER — OMEPRAZOLE 20 MG/1
CAPSULE, DELAYED RELEASE ORAL
Qty: 90 CAPSULE | Refills: 2 | Status: SHIPPED | OUTPATIENT
Start: 2018-05-11 | End: 2019-02-08 | Stop reason: SDUPTHER

## 2018-05-22 ENCOUNTER — OFFICE VISIT (OUTPATIENT)
Dept: NEUROLOGY | Facility: CLINIC | Age: 63
End: 2018-05-22
Payer: COMMERCIAL

## 2018-05-22 VITALS
RESPIRATION RATE: 16 BRPM | WEIGHT: 260.13 LBS | HEIGHT: 58 IN | BODY MASS INDEX: 54.6 KG/M2 | HEART RATE: 66 BPM | DIASTOLIC BLOOD PRESSURE: 74 MMHG | SYSTOLIC BLOOD PRESSURE: 128 MMHG

## 2018-05-22 DIAGNOSIS — F41.1 GAD (GENERALIZED ANXIETY DISORDER): ICD-10-CM

## 2018-05-22 DIAGNOSIS — G24.3 CERVICAL DYSTONIA: Primary | ICD-10-CM

## 2018-05-22 DIAGNOSIS — G25.0 ESSENTIAL TREMOR: ICD-10-CM

## 2018-05-22 DIAGNOSIS — M54.16 BILATERAL LUMBAR RADICULOPATHY: ICD-10-CM

## 2018-05-22 PROCEDURE — 99214 OFFICE O/P EST MOD 30 MIN: CPT | Mod: SA,S$GLB,, | Performed by: NURSE PRACTITIONER

## 2018-05-22 PROCEDURE — 3074F SYST BP LT 130 MM HG: CPT | Mod: CPTII,S$GLB,, | Performed by: NURSE PRACTITIONER

## 2018-05-22 PROCEDURE — 99999 PR PBB SHADOW E&M-EST. PATIENT-LVL IV: CPT | Mod: PBBFAC,,, | Performed by: NURSE PRACTITIONER

## 2018-05-22 PROCEDURE — 3078F DIAST BP <80 MM HG: CPT | Mod: CPTII,S$GLB,, | Performed by: NURSE PRACTITIONER

## 2018-05-22 PROCEDURE — 3008F BODY MASS INDEX DOCD: CPT | Mod: CPTII,S$GLB,, | Performed by: NURSE PRACTITIONER

## 2018-05-22 RX ORDER — PROPRANOLOL HYDROCHLORIDE 40 MG/1
40 TABLET ORAL 2 TIMES DAILY
Qty: 180 TABLET | Refills: 1 | Status: SHIPPED | OUTPATIENT
Start: 2018-05-22 | End: 2018-05-29

## 2018-05-22 NOTE — PROGRESS NOTES
HPI: Micaela Arvizu is a 62 y.o. female with cervical dystonia with head tremor, multiple level degenerative changes of the L spine, prior lower T spine compression fractures and L2  And L4 compression deformities. She has bilateral  lumbar radicular pain. EMG/NCS of the legs normal 5/2015.     She presents today to reestablish care. She was placed on a trial of Primidone for her head tremor in 12/2017, but did not follow up 6 weeks afterwards as advised. She does not believe that this has helped her head tremor, and she now has gait imbalance.     She additionally has some crying spells lately, and more anxiety; however, this began recently and not near initiation of Primidone 6 months ago. Her father's health is declining, and this has caused her a great deal of stress lately. She is prescribed Klonopin by her PCP prn, but rarely takes this.     BP was in the 100's per Ob/Gyn, and she was advised to report to our office for evaluation.     Review of Systems   Constitutional: Negative for fever.   HENT: Negative for nosebleeds.    Eyes: Negative for double vision.   Respiratory: Negative for hemoptysis.    Cardiovascular: Negative for leg swelling.   Gastrointestinal: Negative for blood in stool.   Genitourinary: Negative for hematuria.   Musculoskeletal: Positive for back pain and neck pain.   Skin: Negative for rash.   Neurological: Positive for tremors.   Psychiatric/Behavioral: Positive for depression. The patient is nervous/anxious and has insomnia.         Poor sleep due to pain     Exam:  Gen Appearance, well developed/nourished in no apparent distress  CV: 2+ distal pulses with no edema or swelling  Neuro:  MS: Awake, alert, oriented to place, person, time, situation. Sustains attention. Recent/remote memory intact, Language is full to spontaneous speech/comprehension. Fund of Knowledge is full  CN: Optic discs are flat with normal vasculature, PERRL, Extraoccular movements and visual fields are full. Normal  facial sensation and strength, Tongue and Palate are midline and strong. Shoulder Shrug symmetric and strong.  Motor: Normal bulk, tone, no abnormal movements in the hands but there is a no-no tremor of the head. 5/5 strength bilateral upper/lower extremities with 1+ reflexes  Sensory: symmetric to temp, and vibration.  Romberg negative  Cerebellar: Finger-nose, Rapid alternating movements intact  Gait: Normal stance, no ataxia  MSK: Multiple spasms in the right more then left trapezius and paraspinal muscle.   Some evidence of venous stasis in the feet, chronic    EMG: Micaela Arvizu is a 60 y.o. female with multiple level degenerative changes of the L spine, prior lower T spine compression fractures and L2  And L4 compression deformities. She has bilateral  lumbar radicular pain    6/2015 MRI L spine:     Interval resolution of the edema like signal involving the inferior end plate of L2 which has been replaced with fatty marrow.    Mild spondylosis of the lumbar spine without evidence for significant central canal stenosis or neural foraminal narrowing.    MRI C spine 2016: Multilevel cervical spondylosis with foraminal encroachment greatest from the C3-C5 levels as noted above.    Assessment/Plan: Micaela Arvizu is a 62 y.o. female with multiple level degenerative changes of the L spine, prior lower T spine compression fractures and L2  And L4 compression deformities. She has bilateral  lumbar radicular pain. EMG/NCS of the legs normal 5/2015  She has essential tremor of the head, which runs in her family. Exam shows essential tremor with cervical dystonia with left torticollis    I recommend:   1. Reduce Propranolol to 60 mg bid, given her hypotension per Ob/Gyn. Advised to monitor for worsening of her head tremor. She was unable to stop prior, due to worsening of her tremor.   2. Two weeks after reducing Propranolol, she should wean Primidone, as this has been ineffective for her head tremor and may have  resulted in some gait imbalance. If this does not resolve after stopping the Primidone, I will evaluate further for lumbar/neuropathic causes.   3. Current anxiety/stress may be worsening her head tremor. Advised to try the prn Klonopin per her PCP.   4. Botox was ineffective for cervical dystonia; advised to try Flexeril Rx, which she has at home.  5. Pain management facet injections helped relieve pain by 90%- she will see Dr Wolfe back PRN. She is no longer requiring Gabapentin. She also had compression deformities of the L spine, treated by orthopedist. The patient is also being treated for osteoporosis.   6. She may need antidepressant therapy for her anxiety. Re-assess anxiety at next visit.     FU 6 weeks.

## 2018-05-29 ENCOUNTER — TELEPHONE (OUTPATIENT)
Dept: NEUROLOGY | Facility: CLINIC | Age: 63
End: 2018-05-29

## 2018-05-29 DIAGNOSIS — G25.0 ESSENTIAL TREMOR: Primary | ICD-10-CM

## 2018-05-29 RX ORDER — PROPRANOLOL HYDROCHLORIDE 60 MG/1
60 TABLET ORAL 2 TIMES DAILY
Qty: 60 TABLET | Refills: 11 | Status: SHIPPED | OUTPATIENT
Start: 2018-05-29 | End: 2018-05-29 | Stop reason: CLARIF

## 2018-05-29 RX ORDER — PROPRANOLOL HYDROCHLORIDE 80 MG/1
80 TABLET ORAL 2 TIMES DAILY
Qty: 60 TABLET | Refills: 11 | Status: SHIPPED | OUTPATIENT
Start: 2018-05-29 | End: 2018-10-03 | Stop reason: SDUPTHER

## 2018-05-29 NOTE — TELEPHONE ENCOUNTER
She may take the Propranolol 80 mg. I updated her Rx to reflect this. Please call [facility] and cancel the Propranolol 60 mg.     If she has any dizziness from increasing from the 40 mg twice a day to the 80 mg twice a day, she can take 1.5 tablets of the 40 mg to equal 60 mg twice a day for 1 week before increasing back to 80 mg twice a day.

## 2018-05-29 NOTE — TELEPHONE ENCOUNTER
"Her Propranolol was reduced only because of her report of low blood pressure per her Ob/Gyn. Was she having dizziness at the time of her "low blood pressure" at her Ob/Gyn visit? If she was tolerating the lower blood pressure, then we can increase her dose of Propranolol back up.   "

## 2018-05-29 NOTE — TELEPHONE ENCOUNTER
Patient states that she did not have any dizziness when the bp was found to be low. She also states that yesterday and today, the tremors in the head have been really bad. She would like to increase Propranolol. Please advise.

## 2018-05-29 NOTE — TELEPHONE ENCOUNTER
----- Message from Kayley Grace sent at 2018 12:59 PM CDT -----  Contact: PATIENT  Micaela Arvizu  MRN: [id number]  : 1955  PCP: Kinga Robles  Home Phone      [phone]  Work Phone      Not on file.  Mobile          [phone]      MESSAGE: Patient states that since Adelina decreased her Propanolol her tremors have gotten worse.        Phone: [phone]

## 2018-05-29 NOTE — TELEPHONE ENCOUNTER
Patient states that she was taking 80 mg Propranolol. Do you want her to start at a lower dose?  Please advise.

## 2018-06-05 ENCOUNTER — TELEPHONE (OUTPATIENT)
Dept: INTERNAL MEDICINE | Facility: CLINIC | Age: 63
End: 2018-06-05

## 2018-06-05 DIAGNOSIS — M54.16 BILATERAL LUMBAR RADICULOPATHY: ICD-10-CM

## 2018-06-05 RX ORDER — TRAMADOL HYDROCHLORIDE 50 MG/1
50 TABLET ORAL EVERY 8 HOURS PRN
Qty: 30 TABLET | Refills: 0 | Status: SHIPPED | OUTPATIENT
Start: 2018-06-05 | End: 2018-08-09 | Stop reason: SDUPTHER

## 2018-07-03 ENCOUNTER — OFFICE VISIT (OUTPATIENT)
Dept: NEUROLOGY | Facility: CLINIC | Age: 63
End: 2018-07-03
Payer: COMMERCIAL

## 2018-07-03 VITALS
RESPIRATION RATE: 18 BRPM | DIASTOLIC BLOOD PRESSURE: 84 MMHG | WEIGHT: 263.44 LBS | BODY MASS INDEX: 55.3 KG/M2 | SYSTOLIC BLOOD PRESSURE: 142 MMHG | HEIGHT: 58 IN | HEART RATE: 72 BPM

## 2018-07-03 DIAGNOSIS — G24.3 CERVICAL DYSTONIA: ICD-10-CM

## 2018-07-03 DIAGNOSIS — M54.16 BILATERAL LUMBAR RADICULOPATHY: ICD-10-CM

## 2018-07-03 DIAGNOSIS — G25.0 ESSENTIAL TREMOR: Primary | ICD-10-CM

## 2018-07-03 DIAGNOSIS — F41.1 GAD (GENERALIZED ANXIETY DISORDER): ICD-10-CM

## 2018-07-03 PROBLEM — R25.1 TREMOR: Status: RESOLVED | Noted: 2017-09-22 | Resolved: 2018-07-03

## 2018-07-03 PROCEDURE — 3077F SYST BP >= 140 MM HG: CPT | Mod: CPTII,S$GLB,, | Performed by: NURSE PRACTITIONER

## 2018-07-03 PROCEDURE — 3008F BODY MASS INDEX DOCD: CPT | Mod: CPTII,S$GLB,, | Performed by: NURSE PRACTITIONER

## 2018-07-03 PROCEDURE — 99999 PR PBB SHADOW E&M-EST. PATIENT-LVL IV: CPT | Mod: PBBFAC,,, | Performed by: NURSE PRACTITIONER

## 2018-07-03 PROCEDURE — 99214 OFFICE O/P EST MOD 30 MIN: CPT | Mod: S$GLB,,, | Performed by: NURSE PRACTITIONER

## 2018-07-03 PROCEDURE — 3079F DIAST BP 80-89 MM HG: CPT | Mod: CPTII,S$GLB,, | Performed by: NURSE PRACTITIONER

## 2018-07-03 RX ORDER — CLONAZEPAM 0.5 MG/1
0.25 TABLET ORAL 2 TIMES DAILY
Qty: 30 TABLET | Refills: 5 | Status: SHIPPED | OUTPATIENT
Start: 2018-07-03 | End: 2018-10-03 | Stop reason: SDUPTHER

## 2018-07-03 RX ORDER — PRIMIDONE 50 MG/1
50 TABLET ORAL DAILY
COMMUNITY
Start: 2018-05-27 | End: 2018-10-03

## 2018-07-03 NOTE — PROGRESS NOTES
HPI: Micaela Arvizu is a 62 y.o. female with cervical dystonia with head tremor, multiple level degenerative changes of the L spine, prior lower T spine compression fractures and L2  And L4 compression deformities. She has bilateral  lumbar radicular pain. EMG/NCS of the legs normal 5/2015.     She presents today for a follow up visit. Her Propranolol was reduced to 40 mg bid at her last visit, due to concern of hypotension, given her low BP at her Ob/Gyn visit; however, there was no dizziness or other associated complaints with her BP at the time. Her head tremor became much worse after reducing her Propranolol, and her dose was again increased to 80 mg bid. BP is mildly elevated today, rather than low. She continues on Primidone as well for her tremor, which is somewhat helpful.     Head tremors are variable, and are worse with stress, which is in excess at the moment. She has a family member who is dying, and a friend's child passed away this weekend. She denies depression, but does worry constantly. She takes Klonopin prn for anxiety/stress, which is helpful for her tremor. She asks that I take over this Rx today.     Cervical and lumbar complaints ongoing, but tolerable.     Review of Systems   Constitutional: Negative for fever.   HENT: Negative for nosebleeds.    Eyes: Negative for double vision.   Respiratory: Negative for hemoptysis.    Cardiovascular: Negative for leg swelling.   Gastrointestinal: Negative for blood in stool.   Genitourinary: Negative for hematuria.   Musculoskeletal: Positive for back pain and neck pain.   Skin: Negative for rash.   Neurological: Positive for tremors.   Psychiatric/Behavioral: Negative for depression. The patient is nervous/anxious. The patient does not have insomnia.      Exam:  Gen Appearance, well developed/nourished in no apparent distress  CV: 2+ distal pulses with no edema or swelling  Neuro:  MS: Awake, alert, oriented to place, person, time, situation. Sustains  attention. Recent/remote memory intact, Language is full to spontaneous speech/comprehension. Fund of Knowledge is full  CN: Optic discs are flat with normal vasculature, PERRL, Extraoccular movements and visual fields are full. Normal facial sensation and strength, Tongue and Palate are midline and strong. Shoulder Shrug symmetric and strong.  Motor: Normal bulk, tone, no abnormal movements in the hands but there is a no-no tremor of the head. 5/5 strength bilateral upper/lower extremities with 1+ reflexes  Sensory: symmetric to temp, and vibration.  Romberg negative  Cerebellar: Finger-nose, Rapid alternating movements intact  Gait: Normal stance, no ataxia    EMG: Micaela Arvizu is a 60 y.o. female with multiple level degenerative changes of the L spine, prior lower T spine compression fractures and L2  And L4 compression deformities. She has bilateral  lumbar radicular pain    6/2015 MRI L spine:     Interval resolution of the edema like signal involving the inferior end plate of L2 which has been replaced with fatty marrow.    Mild spondylosis of the lumbar spine without evidence for significant central canal stenosis or neural foraminal narrowing.    MRI C spine 2016: Multilevel cervical spondylosis with foraminal encroachment greatest from the C3-C5 levels as noted above.    Assessment/Plan: Micaela Arvizu is a 62 y.o. female with multiple level degenerative changes of the L spine, prior lower T spine compression fractures and L2  And L4 compression deformities. She has bilateral  lumbar radicular pain. EMG/NCS of the legs normal 5/2015. She has essential tremor of the head, which runs in her family. Exam shows essential tremor with cervical dystonia with left torticollis    I recommend:   1. Fill Klonopin 0.25 mg bid prn anxiety, as head tremor is worse with anxiety. Request for 90 day supply of Klonopin not given today, as this is a controlled substance, which was explained to patient.   2. If her  anxiety/mood issues extend past 3 months from their initiation at her next visit, I plan to start an SSRI for her generalized anxiety disorder.   3. Continue Propranolol and Primidone for her head tremor.   4. Botox was ineffective for cervical dystonia.  5. Pain management facet injections helped relieve pain by 90%- she will see [facility] back PRN. She is no longer requiring Gabapentin. She also had compression deformities of the L spine, treated by orthopedist. The patient is also being treated for osteoporosis.     FU 3 months

## 2018-08-09 DIAGNOSIS — M54.16 BILATERAL LUMBAR RADICULOPATHY: ICD-10-CM

## 2018-08-10 RX ORDER — TRAMADOL HYDROCHLORIDE 50 MG/1
50 TABLET ORAL EVERY 8 HOURS PRN
Qty: 30 TABLET | Refills: 0 | Status: SHIPPED | OUTPATIENT
Start: 2018-08-10 | End: 2019-03-22 | Stop reason: SDUPTHER

## 2018-08-22 DIAGNOSIS — M54.16 BILATERAL LUMBAR RADICULOPATHY: ICD-10-CM

## 2018-08-22 RX ORDER — TRAMADOL HYDROCHLORIDE 50 MG/1
50 TABLET ORAL EVERY 8 HOURS PRN
Qty: 30 TABLET | Refills: 0 | OUTPATIENT
Start: 2018-08-22

## 2018-08-22 NOTE — TELEPHONE ENCOUNTER
30 pills typically lasts her 2 months. I refilled tramadol 12 days ago. Is there something going on that she is using more or was this sent in error?

## 2018-09-10 ENCOUNTER — CLINICAL SUPPORT (OUTPATIENT)
Dept: INTERNAL MEDICINE | Facility: CLINIC | Age: 63
End: 2018-09-10
Payer: COMMERCIAL

## 2018-09-10 DIAGNOSIS — E78.00 HYPERCHOLESTEREMIA: ICD-10-CM

## 2018-09-10 DIAGNOSIS — R73.01 IFG (IMPAIRED FASTING GLUCOSE): ICD-10-CM

## 2018-09-10 DIAGNOSIS — I10 ESSENTIAL HYPERTENSION: ICD-10-CM

## 2018-09-10 LAB
ALBUMIN SERPL BCP-MCNC: 3.2 G/DL
ALP SERPL-CCNC: 66 U/L
ALT SERPL W/O P-5'-P-CCNC: 24 U/L
ANION GAP SERPL CALC-SCNC: 10 MMOL/L
AST SERPL-CCNC: 22 U/L
BASOPHILS # BLD AUTO: 0.02 K/UL
BASOPHILS NFR BLD: 0.3 %
BILIRUB SERPL-MCNC: 0.4 MG/DL
BUN SERPL-MCNC: 16 MG/DL
CALCIUM SERPL-MCNC: 9.5 MG/DL
CHLORIDE SERPL-SCNC: 105 MMOL/L
CHOLEST SERPL-MCNC: 172 MG/DL
CHOLEST/HDLC SERPL: 2.9 {RATIO}
CO2 SERPL-SCNC: 26 MMOL/L
CREAT SERPL-MCNC: 0.6 MG/DL
DIFFERENTIAL METHOD: ABNORMAL
EOSINOPHIL # BLD AUTO: 0.3 K/UL
EOSINOPHIL NFR BLD: 3.9 %
ERYTHROCYTE [DISTWIDTH] IN BLOOD BY AUTOMATED COUNT: 14.4 %
EST. GFR  (AFRICAN AMERICAN): >60 ML/MIN/1.73 M^2
EST. GFR  (NON AFRICAN AMERICAN): >60 ML/MIN/1.73 M^2
ESTIMATED AVG GLUCOSE: 126 MG/DL
GLUCOSE SERPL-MCNC: 122 MG/DL
HBA1C MFR BLD HPLC: 6 %
HCT VFR BLD AUTO: 44.3 %
HDLC SERPL-MCNC: 60 MG/DL
HDLC SERPL: 34.9 %
HGB BLD-MCNC: 14.3 G/DL
LDLC SERPL CALC-MCNC: 86.8 MG/DL
LYMPHOCYTES # BLD AUTO: 1.4 K/UL
LYMPHOCYTES NFR BLD: 18.3 %
MCH RBC QN AUTO: 28.7 PG
MCHC RBC AUTO-ENTMCNC: 32.3 G/DL
MCV RBC AUTO: 89 FL
MONOCYTES # BLD AUTO: 0.6 K/UL
MONOCYTES NFR BLD: 8 %
NEUTROPHILS # BLD AUTO: 5.2 K/UL
NEUTROPHILS NFR BLD: 69.5 %
NONHDLC SERPL-MCNC: 112 MG/DL
PLATELET # BLD AUTO: 287 K/UL
PMV BLD AUTO: 9.1 FL
POTASSIUM SERPL-SCNC: 4.3 MMOL/L
PROT SERPL-MCNC: 7.3 G/DL
RBC # BLD AUTO: 4.98 M/UL
SODIUM SERPL-SCNC: 141 MMOL/L
TRIGL SERPL-MCNC: 126 MG/DL
TSH SERPL DL<=0.005 MIU/L-ACNC: 1.82 UIU/ML
WBC # BLD AUTO: 7.53 K/UL

## 2018-09-10 PROCEDURE — 80061 LIPID PANEL: CPT

## 2018-09-10 PROCEDURE — 84443 ASSAY THYROID STIM HORMONE: CPT

## 2018-09-10 PROCEDURE — 85025 COMPLETE CBC W/AUTO DIFF WBC: CPT

## 2018-09-10 PROCEDURE — 83036 HEMOGLOBIN GLYCOSYLATED A1C: CPT

## 2018-09-10 PROCEDURE — 80053 COMPREHEN METABOLIC PANEL: CPT

## 2018-09-10 PROCEDURE — 36415 COLL VENOUS BLD VENIPUNCTURE: CPT | Mod: S$GLB,,, | Performed by: INTERNAL MEDICINE

## 2018-09-14 ENCOUNTER — PATIENT MESSAGE (OUTPATIENT)
Dept: INTERNAL MEDICINE | Facility: CLINIC | Age: 63
End: 2018-09-14

## 2018-09-14 ENCOUNTER — OFFICE VISIT (OUTPATIENT)
Dept: INTERNAL MEDICINE | Facility: CLINIC | Age: 63
End: 2018-09-14
Payer: COMMERCIAL

## 2018-09-14 VITALS
RESPIRATION RATE: 16 BRPM | HEART RATE: 72 BPM | SYSTOLIC BLOOD PRESSURE: 126 MMHG | BODY MASS INDEX: 55.2 KG/M2 | OXYGEN SATURATION: 94 % | DIASTOLIC BLOOD PRESSURE: 78 MMHG | HEIGHT: 58 IN | WEIGHT: 263 LBS

## 2018-09-14 DIAGNOSIS — E66.01 MORBID OBESITY WITH BMI OF 50.0-59.9, ADULT: ICD-10-CM

## 2018-09-14 DIAGNOSIS — I10 ESSENTIAL HYPERTENSION: ICD-10-CM

## 2018-09-14 DIAGNOSIS — F41.1 GAD (GENERALIZED ANXIETY DISORDER): ICD-10-CM

## 2018-09-14 DIAGNOSIS — E78.00 HYPERCHOLESTEREMIA: ICD-10-CM

## 2018-09-14 DIAGNOSIS — G25.0 ESSENTIAL TREMOR: ICD-10-CM

## 2018-09-14 DIAGNOSIS — R73.01 IFG (IMPAIRED FASTING GLUCOSE): ICD-10-CM

## 2018-09-14 DIAGNOSIS — M54.16 BILATERAL LUMBAR RADICULOPATHY: Primary | ICD-10-CM

## 2018-09-14 PROCEDURE — 3008F BODY MASS INDEX DOCD: CPT | Mod: CPTII,S$GLB,, | Performed by: INTERNAL MEDICINE

## 2018-09-14 PROCEDURE — 3078F DIAST BP <80 MM HG: CPT | Mod: CPTII,S$GLB,, | Performed by: INTERNAL MEDICINE

## 2018-09-14 PROCEDURE — 99999 PR PBB SHADOW E&M-EST. PATIENT-LVL III: CPT | Mod: PBBFAC,,, | Performed by: INTERNAL MEDICINE

## 2018-09-14 PROCEDURE — 99214 OFFICE O/P EST MOD 30 MIN: CPT | Mod: S$GLB,,, | Performed by: INTERNAL MEDICINE

## 2018-09-14 PROCEDURE — 3074F SYST BP LT 130 MM HG: CPT | Mod: CPTII,S$GLB,, | Performed by: INTERNAL MEDICINE

## 2018-09-14 NOTE — PATIENT INSTRUCTIONS
Exercises to Strengthen Your Lower Back  Strong lower back and abdominal muscles work together to support your spine. The exercises below will help strengthen the lower back. It is important that you begin exercising slowly and increase levels gradually.  Always begin any exercise program with stretching. If you feel pain while doing any of these exercises, stop and talk to your doctor about a more specific exercise program that better suits your condition.   Low back stretch  The point of stretching is to make you more flexible and increase your range of motion. Stretch only as much as you are able. Stretch slowly. Do not push your stretch to the limit. If at any point you feel pain while stretching, this is your (temporary) limit.  · Lie on your back with your knees bent and both feet on the ground.  · Slowly raise your left knee to your chest as you flatten your lower back against the floor. Hold for 5 seconds.  · Relax and repeat the exercise with your right knee.  · Do 10 of these exercises for each leg.  · Repeat hugging both knees to your chest at the same time.  Building lower back strength  Start your exercise routine with 10 to 30 minutes a day, 1 to 3 times a day.  Initial exercises  Lying on your back:  1. Ankle pumps: Move your foot up and down, towards your head, and then away. Repeat 10 times with each foot.  2. Heel slides: Slowly bend your knee, drawing the heel of your foot towards you. Then slide your heel/foot from you, straightening your knee. Do not lift your foot off the floor (this is not a leg lift).  3. Abdominal contraction: Bend your knees and put your hands on your stomach. Tighten your stomach muscles. Hold for 5 seconds, then relax. Repeat 10 times.  4. Straight leg raise: Bend one leg at the knee and keep the other leg straight. Tighten your stomach muscles. Slowly lift your straight leg 6 to 12 inches off the floor and hold for up to 5 seconds. Repeat 10 times on each  side.  Standin. Wall squats: Stand with your back against the wall. Move your feet about 12 inches away from the wall. Tighten your stomach muscles, and slowly bend your knees until they are at about a 45 degree angle. Do not go down too far. Hold about 5 seconds. Then slowly return to your starting position. Repeat 10 times.  2. Heel raises: Stand facing the wall. Slowly raise the heels of your feet up and down, while keeping your toes on the floor. If you have trouble balancing, you can touch the wall with your hands. Repeat 10 times.  More advanced exercises  When you feel comfortable enough, try these exercises.  1. Kneeling lumbar extension: Begin on your hands and knees. At the same time, raise and straighten your right arm and left leg until they are parallel to the ground. Hold for 2 seconds and come back slowly to a starting position. Repeat with left arm and right leg, alternating 10 times.  2. Prone lumbar extension: Lie face down, arms extended overhead, palms on the floor. At the same time, raise your right arm and left leg as high as comfortably possible. Hold for 10 seconds and slowly return to start. Repeat with left arm and right leg, alternating 10 times. Gradually build up to 20 times. (Advanced: Repeat this exercise raising both arms and both legs a few inches off the floor at the same time. Hold for 5 seconds and release.)  3. Pelvic tilt: Lie on the floor on your back with your knees bent at 90 degrees. Your feet should be flat on the floor. Inhale, exhale, then slowly contract your abdominal muscles bringing your navel toward your spine. Let your pelvis rock back until your lower back is flat on the floor. Hold for 10 seconds while breathing smoothly.  4. Abdominal crunch: Perform a pelvic tilt (above) flattening your lower back against the floor. Holding the tension in your abdominal muscles, take another breath and raise your shoulder blades off the ground (this is not a full sit-up).  Keep your head in line with your body (dont bend your neck forward). Hold for 2 seconds, then slowly lower.  Date Last Reviewed: 6/1/2016  © 0637-1338 [employer]. [address] [place] [address], [place] [address]. All rights reserved. This information is not intended as a substitute for professional medical care. Always follow your healthcare professional's instructions.

## 2018-09-14 NOTE — PROGRESS NOTES
"Subjective:       Patient ID: Micaela Arvizu is a 63 y.o. female.    Chief Complaint: Hyperlipidemia (follow up with lab review); Hypertension; Gastroesophageal Reflux; and Arthritis      HPI:  Patient is known to me and presents for follow up HTN, HLD, osteoporosis, GERD and anxiety. Labs from 9/10/18 were personally reviewed and discussed with the patient today.     Chronic low back pain: Has had MRIs in the past. Has seen Dr. Wolfe for injections She uses tramadol for pain management for her arthrits which is working well. Supplementing with tylenol right now as trying not to use tramadol with klonopin. Had stomach ulcer with NSAIDs.     HTN: on lisinopril-HCTZ. Home BP < 140/90. Denies chest pains, SOB and LE edema.      HLD: on atorvastatin and fish oil. Denies medicatoin side effects     GERD: taking omeprazole daily. She has h/o of esophageal stricture. Had "stretching" with Dr. Bojorquez. Denies abd pain, n/v/d/c.      Anxiety: using Klonopin PRN, using half tab every morning and sometimes half tab at night. Uses for panic attacks. Taking pretty consistently as brother in law recently diagnosed with brain cancer.     Osteoporosis: follows with GYN (lorena) for this. Had DEXA done 3/2018 with GYN which showed improvement. Doing Prolia with her     Tremor: dad has Parkinson's disease. Tells me she and her sisters all have this. Sees neurology and dx with essential tremor on propranolol and primidone.       Past Medical History:   Diagnosis Date    Arthritis     Asthma     GERD (gastroesophageal reflux disease)     Hiatal hernia     History of colonoscopy with polypectomy     History of esophagogastroduodenoscopy (EGD)     Hyperlipidemia     Hypertension     Lumbar facet arthropathy        Family History   Problem Relation Age of Onset    Hypertension Mother     Hypertension Father     Parkinsonism Father        Social History     Socioeconomic History    Marital status:      Spouse name: Not " on file    Number of children: Not on file    Years of education: Not on file    Highest education level: Not on file   Social Needs    Financial resource strain: Not on file    Food insecurity - worry: Not on file    Food insecurity - inability: Not on file    Transportation needs - medical: Not on file    Transportation needs - non-medical: Not on file   Occupational History    Not on file   Tobacco Use    Smoking status: Never Smoker    Smokeless tobacco: Never Used   Substance and Sexual Activity    Alcohol use: No    Drug use: No    Sexual activity: Not Currently     Comment:    Other Topics Concern    Not on file   Social History Narrative    Not on file       Review of Systems   Constitutional: Negative for activity change, fatigue, fever and unexpected weight change.   HENT: Negative for congestion, ear pain, hearing loss, rhinorrhea and sore throat.    Eyes: Negative for pain, redness and visual disturbance.   Respiratory: Negative for cough, shortness of breath and wheezing.    Cardiovascular: Negative for chest pain, palpitations and leg swelling.   Gastrointestinal: Negative for abdominal pain, constipation, diarrhea, nausea and vomiting.   Genitourinary: Negative for dysuria, frequency, pelvic pain and urgency.   Musculoskeletal: Negative for back pain, joint swelling and neck pain.   Skin: Negative for color change, rash and wound.   Neurological: Positive for tremors. Negative for dizziness, weakness, light-headedness and headaches.   Psychiatric/Behavioral: The patient is nervous/anxious.          Objective:      Physical Exam   Constitutional: She is oriented to person, place, and time. She appears well-developed and well-nourished. No distress.   HENT:   Head: Normocephalic and atraumatic.   Right Ear: External ear normal.   Left Ear: External ear normal.   Eyes: Conjunctivae and EOM are normal. Pupils are equal, round, and reactive to light. Right eye exhibits no discharge.  Left eye exhibits no discharge.   Neck: Neck supple. No tracheal deviation present.   Cardiovascular: Normal rate and regular rhythm.   No murmur heard.  Pulmonary/Chest: Effort normal and breath sounds normal. No respiratory distress. She has no wheezes. She has no rales.   Abdominal: Soft. Bowel sounds are normal. She exhibits no distension. There is no tenderness.   Neurological: She is alert and oriented to person, place, and time. No cranial nerve deficit.   Skin: Skin is warm and dry.   Psychiatric: She has a normal mood and affect. Her behavior is normal.   Vitals reviewed.      Assessment:       1. Bilateral lumbar radiculopathy    2. Essential tremor    3. JOHNNIE (generalized anxiety disorder)    4. Essential hypertension    5. Hypercholesteremia    6. IFG (impaired fasting glucose)    7. Morbid obesity with BMI of 50.0-59.9, adult        Plan:       Micaela was seen today for hyperlipidemia, hypertension, gastroesophageal reflux and arthritis.    Diagnoses and all orders for this visit:    Bilateral lumbar radiculopathy  Chronic controlled  PRN tramadol is ok. Will call me with her OTC mg of tylenol and we'll decide on a dosing schedule so she can take tramadol with tylenol and not interfere with her klonopin use  Weight loss discussed at length today    Essential tremor  -     TSH; Future  Chronic controlled  Cont meds per neurology    JOHNNIE (generalized anxiety disorder)  Chronic controlled  Cont klonopin PRN, prescribed by neurology right now as helping with tremor as well  Will try to wean down as tolerated in the future    Essential hypertension  -     CBC auto differential; Future  -     Comprehensive metabolic panel; Future  -     Lipid panel; Future  -     TSH; Future  Chronic controlled  Continue medications at same dose  Low Na diet  Exercise, weight loss  Check BP and keep log for next visit    Hypercholesteremia  -     CBC auto differential; Future  -     Comprehensive metabolic panel; Future  -      Lipid panel; Future  -     TSH; Future  Chronic controlled  Cont meds at same dose    IFG (impaired fasting glucose)  -     CBC auto differential; Future  -     Comprehensive metabolic panel; Future  -     Lipid panel; Future  -     Hemoglobin A1c; Future  -     TSH; Future  Chronic stable  Hold meds for now but if still around 6% next visit discuss metformin  Diet and exercise  1800 mary ADA diet    Morbid obesity with BMI of 50.0-59.9, adult  -     CBC auto differential; Future  -     Comprehensive metabolic panel; Future  -     Lipid panel; Future  -     TSH; Future  Spent > 10 minutes in diet and exercise counseling today       Health Maintenance:  -CRC: doing with Dr. Bojorquez  -PAP/MMG: doing with GYN  -Bone Density: doing with GYN (lorena, last 3/2018)  -tobacco: not smoking  -Hep C: 3/2018 negative  -Vaccines  Flu- 9/2017    RTC 6 months with labs and PRN

## 2018-10-03 ENCOUNTER — OFFICE VISIT (OUTPATIENT)
Dept: NEUROLOGY | Facility: CLINIC | Age: 63
End: 2018-10-03
Payer: COMMERCIAL

## 2018-10-03 VITALS
SYSTOLIC BLOOD PRESSURE: 120 MMHG | HEART RATE: 80 BPM | BODY MASS INDEX: 55.67 KG/M2 | HEIGHT: 58 IN | RESPIRATION RATE: 18 BRPM | WEIGHT: 265.19 LBS | DIASTOLIC BLOOD PRESSURE: 82 MMHG

## 2018-10-03 DIAGNOSIS — F32.A DEPRESSION, UNSPECIFIED DEPRESSION TYPE: ICD-10-CM

## 2018-10-03 DIAGNOSIS — E78.00 HYPERCHOLESTEREMIA: ICD-10-CM

## 2018-10-03 DIAGNOSIS — E66.01 MORBID OBESITY WITH BMI OF 50.0-59.9, ADULT: ICD-10-CM

## 2018-10-03 DIAGNOSIS — G25.0 ESSENTIAL TREMOR: Primary | ICD-10-CM

## 2018-10-03 DIAGNOSIS — G47.33 OSA (OBSTRUCTIVE SLEEP APNEA): ICD-10-CM

## 2018-10-03 DIAGNOSIS — M81.0 OSTEOPOROSIS, UNSPECIFIED OSTEOPOROSIS TYPE, UNSPECIFIED PATHOLOGICAL FRACTURE PRESENCE: ICD-10-CM

## 2018-10-03 DIAGNOSIS — G47.00 INSOMNIA, UNSPECIFIED TYPE: ICD-10-CM

## 2018-10-03 DIAGNOSIS — G24.3 CERVICAL DYSTONIA: ICD-10-CM

## 2018-10-03 DIAGNOSIS — I10 ESSENTIAL HYPERTENSION: ICD-10-CM

## 2018-10-03 DIAGNOSIS — S32.020A CLOSED COMPRESSION FRACTURE OF SECOND LUMBAR VERTEBRA, INITIAL ENCOUNTER: ICD-10-CM

## 2018-10-03 DIAGNOSIS — F41.1 GAD (GENERALIZED ANXIETY DISORDER): ICD-10-CM

## 2018-10-03 PROCEDURE — 99214 OFFICE O/P EST MOD 30 MIN: CPT | Mod: S$GLB,,, | Performed by: NURSE PRACTITIONER

## 2018-10-03 PROCEDURE — 3008F BODY MASS INDEX DOCD: CPT | Mod: CPTII,S$GLB,, | Performed by: NURSE PRACTITIONER

## 2018-10-03 PROCEDURE — 3079F DIAST BP 80-89 MM HG: CPT | Mod: CPTII,S$GLB,, | Performed by: NURSE PRACTITIONER

## 2018-10-03 PROCEDURE — 3074F SYST BP LT 130 MM HG: CPT | Mod: CPTII,S$GLB,, | Performed by: NURSE PRACTITIONER

## 2018-10-03 PROCEDURE — 99999 PR PBB SHADOW E&M-EST. PATIENT-LVL IV: CPT | Mod: PBBFAC,,, | Performed by: NURSE PRACTITIONER

## 2018-10-03 RX ORDER — EPINEPHRINE 0.22MG
100 AEROSOL WITH ADAPTER (ML) INHALATION DAILY
COMMUNITY
End: 2020-05-20

## 2018-10-03 RX ORDER — ESCITALOPRAM OXALATE 5 MG/1
5 TABLET ORAL DAILY
Qty: 30 TABLET | Refills: 5 | Status: SHIPPED | OUTPATIENT
Start: 2018-10-03 | End: 2018-11-15 | Stop reason: SDUPTHER

## 2018-10-03 RX ORDER — PROPRANOLOL HYDROCHLORIDE 80 MG/1
80 TABLET ORAL SEE ADMIN INSTRUCTIONS
Qty: 135 TABLET | Refills: 1 | Status: SHIPPED | OUTPATIENT
Start: 2018-10-03 | End: 2019-02-19 | Stop reason: SDUPTHER

## 2018-10-03 RX ORDER — CLONAZEPAM 0.5 MG/1
0.25 TABLET ORAL DAILY PRN
Qty: 15 TABLET | Refills: 1 | Status: SHIPPED | OUTPATIENT
Start: 2018-10-03 | End: 2018-11-15 | Stop reason: SDUPTHER

## 2018-10-03 RX ORDER — PROPRANOLOL HYDROCHLORIDE 40 MG/1
40 TABLET ORAL NIGHTLY
COMMUNITY
End: 2018-10-03 | Stop reason: DRUGHIGH

## 2018-10-03 NOTE — PROGRESS NOTES
HPI: Micaela Arvizu is a 63 y.o. female with cervical dystonia with head tremor, multiple level degenerative changes of the L spine, prior lower T spine compression fractures and L2  And L4 compression deformities. She has bilateral  lumbar radicular pain. EMG/NCS of the legs normal 5/2015. She has GERD, HLD, HTN, JOHNNIE, insomnia, osteoporosis, and ROBERTO, as well as a history of asthmatic bronchitis.     She presents today for a follow up visit. She was taking Propranolol 80 mg bid at her last visit; however, she reduced this to 80 mg qam and 40 mg qpm, as her tremor is worse during the day. Tremor is overall tolerable at this time. She is no longer taking Primidone. She weaned off of this, with no worsening of her tremor.     She has been taking prn Klonopin bid on a schedule, rather than as needed. She worries throughout the day, and has crying spells as well. Continues with a great deal of stress.     Cervical and lumbar complaints ongoing, but tolerable.     Review of Systems   Constitutional: Negative for fever.   HENT: Negative for nosebleeds.    Eyes: Negative for double vision.   Respiratory: Negative for hemoptysis.    Cardiovascular: Negative for leg swelling.   Gastrointestinal: Negative for blood in stool.   Genitourinary: Negative for hematuria.   Musculoskeletal: Positive for back pain and neck pain.   Skin: Negative for rash.   Neurological: Positive for tremors.   Psychiatric/Behavioral: Positive for depression. The patient is nervous/anxious. The patient does not have insomnia.      Exam:  Gen Appearance, well developed/nourished in no apparent distress  CV: 2+ distal pulses with no edema or swelling  Neuro:  MS: Awake, alert, oriented to place, person, time, situation. Sustains attention. Recent/remote memory intact, Language is full to spontaneous speech/comprehension. Fund of Knowledge is full  CN: Optic discs are flat with normal vasculature, PERRL, Extraoccular movements and visual fields are full.  Normal facial sensation and strength, Tongue and Palate are midline and strong. Shoulder Shrug symmetric and strong.  Motor: Normal bulk, tone, no abnormal movements in the hands but there is a no-no tremor of the head. 5/5 strength bilateral upper/lower extremities with 1+ reflexes  Sensory: symmetric to temp, and vibration.  Romberg negative  Cerebellar: Finger-nose, Rapid alternating movements intact  Gait: Normal stance, no ataxia    EMG: Micaela Arvizu is a 60 y.o. female with multiple level degenerative changes of the L spine, prior lower T spine compression fractures and L2  And L4 compression deformities. She has bilateral  lumbar radicular pain    6/2015 MRI L spine:     Interval resolution of the edema like signal involving the inferior end plate of L2 which has been replaced with fatty marrow.    Mild spondylosis of the lumbar spine without evidence for significant central canal stenosis or neural foraminal narrowing.    MRI C spine 2016: Multilevel cervical spondylosis with foraminal encroachment greatest from the C3-C5 levels as noted above.    Assessment/Plan: Micaela Arvizu is a 63 y.o. female with multiple level degenerative changes of the L spine, prior lower T spine compression fractures and L2  And L4 compression deformities. She has bilateral  lumbar radicular pain. EMG/NCS of the legs normal 5/2015. She has essential tremor of the head, which runs in her family. Exam shows essential tremor with cervical dystonia with left torticollis.    I recommend:   1. Start Lexapro for anxiety and depression, given her daily reliance on Klonopin. Weight gain could be related to psychogenic polyphagia, which was discussed today. Monitor weight as depression resolves. She is in a weight loss program currently, and would like to attend more often.   2. Reduce Klonopin for anxiety to 0.25 mg prn, #15, given daily reliance.   3. Continue Propranolol for essential tremor. She takes 80mg/40mg, as tremor is improved  in evening. No worsening of tremor off of Primidone, but tremor much worse when Propranolol was reduced to 40 mg bid.   4. Botox was ineffective for cervical dystonia.  5. Pain management facet injections helped relieve pain by 90%- she will see [facility] back PRN. She is no longer requiring Gabapentin. She also had compression deformities of the L spine, treated by orthopedist. The patient is also being treated for osteoporosis.   6. On Prolia for osteoporosis.     FU 6 weeks.

## 2018-10-03 NOTE — PATIENT INSTRUCTIONS
Klonopin is only supposed to be used AS NEEDED for anxiety, and should not be used every day on a schedule. If you find that you are requiring Klonopin daily, then you need to start taking an antidepressant to help with your anxiety instead. This will be started today. Over time, you should need less and less Klonopin.

## 2018-10-16 ENCOUNTER — TELEPHONE (OUTPATIENT)
Dept: INTERNAL MEDICINE | Facility: CLINIC | Age: 63
End: 2018-10-16

## 2018-10-17 NOTE — TELEPHONE ENCOUNTER
If that is the only abnormality then yes it is fine. There are no renal dosing adjustments for prolia

## 2018-11-04 DIAGNOSIS — E78.00 HYPERCHOLESTEREMIA: ICD-10-CM

## 2018-11-04 DIAGNOSIS — I10 ESSENTIAL HYPERTENSION: ICD-10-CM

## 2018-11-06 RX ORDER — LISINOPRIL AND HYDROCHLOROTHIAZIDE 10; 12.5 MG/1; MG/1
TABLET ORAL
Qty: 90 TABLET | Refills: 1 | Status: SHIPPED | OUTPATIENT
Start: 2018-11-06 | End: 2019-04-16 | Stop reason: SDUPTHER

## 2018-11-06 RX ORDER — ATORVASTATIN CALCIUM 10 MG/1
TABLET, FILM COATED ORAL
Qty: 90 TABLET | Refills: 1 | Status: SHIPPED | OUTPATIENT
Start: 2018-11-06 | End: 2019-04-16 | Stop reason: SDUPTHER

## 2018-11-15 ENCOUNTER — OFFICE VISIT (OUTPATIENT)
Dept: NEUROLOGY | Facility: CLINIC | Age: 63
End: 2018-11-15
Payer: COMMERCIAL

## 2018-11-15 VITALS
DIASTOLIC BLOOD PRESSURE: 82 MMHG | WEIGHT: 261.94 LBS | HEART RATE: 76 BPM | RESPIRATION RATE: 16 BRPM | BODY MASS INDEX: 54.98 KG/M2 | HEIGHT: 58 IN | SYSTOLIC BLOOD PRESSURE: 128 MMHG

## 2018-11-15 DIAGNOSIS — G47.00 INSOMNIA, UNSPECIFIED TYPE: ICD-10-CM

## 2018-11-15 DIAGNOSIS — G25.0 ESSENTIAL TREMOR: ICD-10-CM

## 2018-11-15 DIAGNOSIS — M81.0 OSTEOPOROSIS, UNSPECIFIED OSTEOPOROSIS TYPE, UNSPECIFIED PATHOLOGICAL FRACTURE PRESENCE: ICD-10-CM

## 2018-11-15 DIAGNOSIS — E78.00 HYPERCHOLESTEREMIA: ICD-10-CM

## 2018-11-15 DIAGNOSIS — F32.A DEPRESSION, UNSPECIFIED DEPRESSION TYPE: ICD-10-CM

## 2018-11-15 DIAGNOSIS — M54.5 MIDLINE LOW BACK PAIN, UNSPECIFIED CHRONICITY, WITH SCIATICA PRESENCE UNSPECIFIED: ICD-10-CM

## 2018-11-15 DIAGNOSIS — G47.33 OSA (OBSTRUCTIVE SLEEP APNEA): ICD-10-CM

## 2018-11-15 DIAGNOSIS — E66.01 MORBID OBESITY WITH BMI OF 50.0-59.9, ADULT: ICD-10-CM

## 2018-11-15 DIAGNOSIS — G24.3 CERVICAL DYSTONIA: ICD-10-CM

## 2018-11-15 DIAGNOSIS — F41.1 GAD (GENERALIZED ANXIETY DISORDER): Primary | ICD-10-CM

## 2018-11-15 DIAGNOSIS — I10 ESSENTIAL HYPERTENSION: ICD-10-CM

## 2018-11-15 DIAGNOSIS — M54.16 BILATERAL LUMBAR RADICULOPATHY: ICD-10-CM

## 2018-11-15 PROCEDURE — 99214 OFFICE O/P EST MOD 30 MIN: CPT | Mod: S$GLB,,, | Performed by: NURSE PRACTITIONER

## 2018-11-15 PROCEDURE — 3008F BODY MASS INDEX DOCD: CPT | Mod: CPTII,S$GLB,, | Performed by: NURSE PRACTITIONER

## 2018-11-15 PROCEDURE — 99999 PR PBB SHADOW E&M-EST. PATIENT-LVL IV: CPT | Mod: PBBFAC,,, | Performed by: NURSE PRACTITIONER

## 2018-11-15 PROCEDURE — 3079F DIAST BP 80-89 MM HG: CPT | Mod: CPTII,S$GLB,, | Performed by: NURSE PRACTITIONER

## 2018-11-15 PROCEDURE — 3074F SYST BP LT 130 MM HG: CPT | Mod: CPTII,S$GLB,, | Performed by: NURSE PRACTITIONER

## 2018-11-15 RX ORDER — ESCITALOPRAM OXALATE 10 MG/1
10 TABLET ORAL DAILY
Qty: 90 TABLET | Refills: 1 | Status: SHIPPED | OUTPATIENT
Start: 2018-11-15 | End: 2019-02-19 | Stop reason: SDUPTHER

## 2018-11-15 RX ORDER — CLONAZEPAM 0.5 MG/1
0.25 TABLET ORAL DAILY PRN
Qty: 15 TABLET | Refills: 3 | Status: SHIPPED | OUTPATIENT
Start: 2018-11-15 | End: 2019-03-22

## 2018-11-15 NOTE — PROGRESS NOTES
HPI: Micaela Arvizu is a 63 y.o. female with cervical dystonia with head tremor, multiple level degenerative changes of the L spine, prior lower T spine compression fractures and L2  And L4 compression deformities. She has bilateral  lumbar radicular pain. EMG/NCS of the legs normal 5/2015. She has GERD, HLD, HTN, JOHNNIE, insomnia, osteoporosis, and ROBERTO, as well as a history of asthmatic bronchitis.     She presents today for a follow up visit. Lexapro was started at her last visit for depression and anxiety, which has been helpful, though she continues continues with a short temper at times and some anxiety. She would like to increase the dose today.     She is not eating during stress anymore, and has lost 4 pounds since her last visit.     She has reduced reliance on Klonopin, and is now using this rarely.     Tremor is well controlled on current dose of Propranolol, except with moments of increased anxiety.      Cervical and lumbar complaints are improved at this time, and she has not needed to take Tramadol.     Review of Systems   Constitutional: Negative for fever.   HENT: Negative for nosebleeds.    Eyes: Negative for double vision.   Respiratory: Negative for hemoptysis.    Cardiovascular: Negative for leg swelling.   Gastrointestinal: Negative for blood in stool.   Genitourinary: Negative for hematuria.   Musculoskeletal: Positive for back pain and neck pain.   Skin: Negative for rash.   Neurological: Positive for tremors.   Psychiatric/Behavioral: Positive for depression. The patient is nervous/anxious. The patient does not have insomnia.      Exam:  Gen Appearance, well developed/nourished in no apparent distress  CV: 2+ distal pulses with no edema or swelling  Neuro:  MS: Awake, alert, oriented to place, person, time, situation. Sustains attention. Recent/remote memory intact, Language is full to spontaneous speech/comprehension. Fund of Knowledge is full  CN: Optic discs are flat with normal vasculature,  PERRL, Extraoccular movements and visual fields are full. Normal facial sensation and strength, Tongue and Palate are midline and strong. Shoulder Shrug symmetric and strong.  Motor: Normal bulk, tone, no abnormal movements in the hands but there is a no-no tremor of the head. 5/5 strength bilateral upper/lower extremities with 1+ reflexes  Sensory: symmetric to temp, and vibration.  Romberg negative  Cerebellar: Finger-nose, Rapid alternating movements intact  Gait: Normal stance, no ataxia    EMG: Micaela Arvizu is a 60 y.o. female with multiple level degenerative changes of the L spine, prior lower T spine compression fractures and L2  And L4 compression deformities. She has bilateral  lumbar radicular pain    6/2015 MRI L spine:     Interval resolution of the edema like signal involving the inferior end plate of L2 which has been replaced with fatty marrow.    Mild spondylosis of the lumbar spine without evidence for significant central canal stenosis or neural foraminal narrowing.    MRI C spine 2016: Multilevel cervical spondylosis with foraminal encroachment greatest from the C3-C5 levels as noted above.    Assessment/Plan: Micaela Arvizu is a 63 y.o. female with multiple level degenerative changes of the L spine, prior lower T spine compression fractures and L2  And L4 compression deformities. She has bilateral  lumbar radicular pain. EMG/NCS of the legs normal 5/2015. She has essential tremor of the head, which runs in her family. Exam shows essential tremor with cervical dystonia with left torticollis.    I recommend:   1. Increase Lexapro to 10 mg for anxiety and depression. 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   2. She is in a weight loss program currently, and would like to attend more often.   3. Continue Klonopin for anxiety to 0.25 mg prn, #15, given daily reliance prior. Rare use of this currently.   4. Continue Propranolol for  essential tremor. She takes 80mg/40mg, as tremor is improved in evening. No worsening of tremor off of Primidone, but tremor much worse when Propranolol was reduced to 40 mg bid. Tremor worse with anxiety.   5. Botox was ineffective for cervical dystonia.  6. Pain management facet injections helped relieve pain by 90%- she will see [place] pain management back PRN. She is no longer requiring Gabapentin. She also had compression deformities of the L spine, treated by orthopedist. The patient is also being treated for osteoporosis.   7. On Prolia for osteoporosis.     FU 3 months

## 2018-11-16 ENCOUNTER — TELEPHONE (OUTPATIENT)
Dept: INTERNAL MEDICINE | Facility: CLINIC | Age: 63
End: 2018-11-16

## 2018-11-16 NOTE — TELEPHONE ENCOUNTER
No availability in primary care today. LM for patient advising that there may be an opening @ [facility] tomorrow morning. Requested that she return call

## 2018-11-16 NOTE — TELEPHONE ENCOUNTER
----- Message from Gaviota Rivas sent at 2018  9:00 AM CST -----  Contact: Self  Micaela Arvizu  MRN: [id number]  : 1955  PCP: Kinga Robles  Home Phone      [phone]  Work Phone      Not on file.  Mobile          [phone]    MESSAGE:   Requesting appointment today for congestion that she's has been having for a week and it's not getting any better. Would like to be added to schedule today if possible. Please call.    Phone: [phone]

## 2018-11-29 DIAGNOSIS — K91.1 DUMPING SYNDROME: ICD-10-CM

## 2018-11-30 RX ORDER — COLESEVELAM HYDROCHLORIDE 625 MG/1
625 TABLET, FILM COATED ORAL 2 TIMES DAILY WITH MEALS
Qty: 360 TABLET | Refills: 1 | Status: SHIPPED | OUTPATIENT
Start: 2018-11-30 | End: 2018-11-30 | Stop reason: SDUPTHER

## 2018-11-30 RX ORDER — COLESEVELAM HYDROCHLORIDE 625 MG/1
625 TABLET, FILM COATED ORAL 2 TIMES DAILY WITH MEALS
Qty: 360 TABLET | Refills: 1 | Status: SHIPPED | OUTPATIENT
Start: 2018-11-30 | End: 2018-12-18 | Stop reason: SDUPTHER

## 2018-12-18 ENCOUNTER — TELEPHONE (OUTPATIENT)
Dept: INTERNAL MEDICINE | Facility: CLINIC | Age: 63
End: 2018-12-18

## 2018-12-18 DIAGNOSIS — K91.1 DUMPING SYNDROME: ICD-10-CM

## 2018-12-18 RX ORDER — COLESEVELAM HYDROCHLORIDE 625 MG/1
625 TABLET, FILM COATED ORAL 2 TIMES DAILY WITH MEALS
Qty: 360 TABLET | Refills: 1 | Status: SHIPPED | OUTPATIENT
Start: 2018-12-18 | End: 2018-12-28

## 2018-12-18 NOTE — TELEPHONE ENCOUNTER
----- Message from Opal Damon MA sent at 2018 10:35 AM CST -----  Contact: Self  Micaela Arvizu  MRN: [id number]  : 1955  PCP: Kinga Robles  Home Phone      [phone]  Work Phone      Not on file.  Mobile          [phone]      MESSAGE: Patient needs Dr Chavez to resend the RX for Welchol. It needs to say that she can take generic. [facility] to [facility].  Phone: [phone]

## 2018-12-28 ENCOUNTER — TELEPHONE (OUTPATIENT)
Dept: INTERNAL MEDICINE | Facility: CLINIC | Age: 63
End: 2018-12-28

## 2018-12-28 RX ORDER — COLESEVELAM 180 1/1
625 TABLET ORAL 2 TIMES DAILY WITH MEALS
Qty: 180 TABLET | Refills: 3 | Status: SHIPPED | OUTPATIENT
Start: 2018-12-28 | End: 2020-10-27 | Stop reason: SDUPTHER

## 2018-12-28 NOTE — TELEPHONE ENCOUNTER
----- Message from Leonarda Rey MA sent at 2018  1:58 PM CST -----  Contact: Shailesh Arvizu  MRN: [id number]  : 1955  PCP: Kinga Robles  Home Phone      [phone]  Work Phone      Not on file.  Mobile          [phone]      MESSAGE: Patient requesting the generic of WELCHOL 625 mg tablet tp be sent to [facility] - [facility], [address]. Please advise.    Phone number: [phone]

## 2019-02-08 DIAGNOSIS — K21.9 GASTROESOPHAGEAL REFLUX DISEASE WITHOUT ESOPHAGITIS: ICD-10-CM

## 2019-02-12 RX ORDER — OMEPRAZOLE 20 MG/1
CAPSULE, DELAYED RELEASE ORAL
Qty: 90 CAPSULE | Refills: 2 | Status: SHIPPED | OUTPATIENT
Start: 2019-02-12 | End: 2020-08-10

## 2019-02-19 ENCOUNTER — OFFICE VISIT (OUTPATIENT)
Dept: NEUROLOGY | Facility: CLINIC | Age: 64
End: 2019-02-19
Payer: COMMERCIAL

## 2019-02-19 VITALS
SYSTOLIC BLOOD PRESSURE: 108 MMHG | HEART RATE: 72 BPM | HEIGHT: 59 IN | DIASTOLIC BLOOD PRESSURE: 72 MMHG | BODY MASS INDEX: 53.2 KG/M2 | RESPIRATION RATE: 14 BRPM | WEIGHT: 263.88 LBS

## 2019-02-19 DIAGNOSIS — M54.16 BILATERAL LUMBAR RADICULOPATHY: ICD-10-CM

## 2019-02-19 DIAGNOSIS — J45.909 ASTHMATIC BRONCHITIS WITHOUT COMPLICATION, UNSPECIFIED ASTHMA SEVERITY, UNSPECIFIED WHETHER PERSISTENT: ICD-10-CM

## 2019-02-19 DIAGNOSIS — I10 ESSENTIAL HYPERTENSION: ICD-10-CM

## 2019-02-19 DIAGNOSIS — G47.33 OSA (OBSTRUCTIVE SLEEP APNEA): ICD-10-CM

## 2019-02-19 DIAGNOSIS — E66.01 MORBID OBESITY WITH BMI OF 50.0-59.9, ADULT: ICD-10-CM

## 2019-02-19 DIAGNOSIS — G47.00 INSOMNIA, UNSPECIFIED TYPE: ICD-10-CM

## 2019-02-19 DIAGNOSIS — E78.00 HYPERCHOLESTEREMIA: ICD-10-CM

## 2019-02-19 DIAGNOSIS — M81.0 OSTEOPOROSIS, UNSPECIFIED OSTEOPOROSIS TYPE, UNSPECIFIED PATHOLOGICAL FRACTURE PRESENCE: ICD-10-CM

## 2019-02-19 DIAGNOSIS — F41.1 GAD (GENERALIZED ANXIETY DISORDER): ICD-10-CM

## 2019-02-19 DIAGNOSIS — G25.0 ESSENTIAL TREMOR: Primary | ICD-10-CM

## 2019-02-19 DIAGNOSIS — G24.3 CERVICAL DYSTONIA: ICD-10-CM

## 2019-02-19 DIAGNOSIS — F32.A DEPRESSION, UNSPECIFIED DEPRESSION TYPE: ICD-10-CM

## 2019-02-19 PROCEDURE — 99999 PR PBB SHADOW E&M-EST. PATIENT-LVL IV: CPT | Mod: PBBFAC,,, | Performed by: NURSE PRACTITIONER

## 2019-02-19 PROCEDURE — 99214 PR OFFICE/OUTPT VISIT, EST, LEVL IV, 30-39 MIN: ICD-10-PCS | Mod: S$GLB,,, | Performed by: NURSE PRACTITIONER

## 2019-02-19 PROCEDURE — 3008F PR BODY MASS INDEX (BMI) DOCUMENTED: ICD-10-PCS | Mod: CPTII,S$GLB,, | Performed by: NURSE PRACTITIONER

## 2019-02-19 PROCEDURE — 99214 OFFICE O/P EST MOD 30 MIN: CPT | Mod: S$GLB,,, | Performed by: NURSE PRACTITIONER

## 2019-02-19 PROCEDURE — 3078F DIAST BP <80 MM HG: CPT | Mod: CPTII,S$GLB,, | Performed by: NURSE PRACTITIONER

## 2019-02-19 PROCEDURE — 3078F PR MOST RECENT DIASTOLIC BLOOD PRESSURE < 80 MM HG: ICD-10-PCS | Mod: CPTII,S$GLB,, | Performed by: NURSE PRACTITIONER

## 2019-02-19 PROCEDURE — 3008F BODY MASS INDEX DOCD: CPT | Mod: CPTII,S$GLB,, | Performed by: NURSE PRACTITIONER

## 2019-02-19 PROCEDURE — 3074F SYST BP LT 130 MM HG: CPT | Mod: CPTII,S$GLB,, | Performed by: NURSE PRACTITIONER

## 2019-02-19 PROCEDURE — 99999 PR PBB SHADOW E&M-EST. PATIENT-LVL IV: ICD-10-PCS | Mod: PBBFAC,,, | Performed by: NURSE PRACTITIONER

## 2019-02-19 PROCEDURE — 3074F PR MOST RECENT SYSTOLIC BLOOD PRESSURE < 130 MM HG: ICD-10-PCS | Mod: CPTII,S$GLB,, | Performed by: NURSE PRACTITIONER

## 2019-02-19 RX ORDER — PROPRANOLOL HYDROCHLORIDE 80 MG/1
80 TABLET ORAL SEE ADMIN INSTRUCTIONS
Qty: 135 TABLET | Refills: 1 | Status: SHIPPED | OUTPATIENT
Start: 2019-02-19 | End: 2019-05-21 | Stop reason: SDUPTHER

## 2019-02-19 RX ORDER — ESCITALOPRAM OXALATE 10 MG/1
15 TABLET ORAL DAILY
Qty: 135 TABLET | Refills: 1 | Status: SHIPPED | OUTPATIENT
Start: 2019-02-19 | End: 2019-05-21 | Stop reason: SDUPTHER

## 2019-02-19 NOTE — PROGRESS NOTES
HPI: Micaela Arvizu is a 63 y.o. female with cervical dystonia with head tremor, multiple level degenerative changes of the L spine, prior lower T spine compression fractures and L2  And L4 compression deformities. She has bilateral  lumbar radicular pain. EMG/NCS of the legs normal 5/2015. She has GERD, HLD, HTN, JOHNNIE, insomnia, osteoporosis, and ROBERTO, as well as a history of asthmatic bronchitis.     She presents today for a follow up visit. Her Lexapro was increased to 10 mg at her last visit, which has been helpful for her depression and her anxiety, though she would like to increase this again at this time, as she continues with intermittent hand tremors with anxiety.     She has gained 2 pounds since her last visit. She is under more stress recently, as her mother had a stroke. She does have a history of eating more with stress.     She reduced reliance on Klonopin after initiating Lexapro, and is now using this rarely, but continues to need this at times.     Tremor is well controlled on current dose of Propranolol, except with moments of increased anxiety.      Cervical and lumbar complaints are improved at this time, and she has not needed to take Tramadol.     Review of Systems   Constitutional: Negative for fever.   HENT: Negative for nosebleeds.    Eyes: Negative for double vision.   Respiratory: Negative for hemoptysis.    Cardiovascular: Negative for leg swelling.   Gastrointestinal: Negative for blood in stool.   Genitourinary: Negative for hematuria.   Musculoskeletal: Positive for back pain and neck pain.   Skin: Negative for rash.   Neurological: Positive for tremors.   Psychiatric/Behavioral: Positive for depression. The patient is nervous/anxious. The patient does not have insomnia.      Exam:  Gen Appearance, well developed/nourished in no apparent distress  CV: 2+ distal pulses with no edema or swelling  Neuro:  MS: Awake, alert, oriented to place, person, time, situation. Sustains attention.  Recent/remote memory intact, Language is full to spontaneous speech/comprehension. Fund of Knowledge is full  CN: Optic discs are flat with normal vasculature, PERRL, Extraoccular movements and visual fields are full. Normal facial sensation and strength, Tongue and Palate are midline and strong. Shoulder Shrug symmetric and strong.  Motor: Normal bulk, tone, no abnormal movements in the hands but there is a no-no tremor of the head. 5/5 strength bilateral upper/lower extremities with 1+ reflexes  Sensory: symmetric to temp, and vibration.  Romberg negative  Cerebellar: Finger-nose, Rapid alternating movements intact  Gait: Normal stance, no ataxia    EMG: Micaela Arvizu is a 60 y.o. female with multiple level degenerative changes of the L spine, prior lower T spine compression fractures and L2  And L4 compression deformities. She has bilateral  lumbar radicular pain    6/2015 MRI L spine:     Interval resolution of the edema like signal involving the inferior end plate of L2 which has been replaced with fatty marrow.    Mild spondylosis of the lumbar spine without evidence for significant central canal stenosis or neural foraminal narrowing.    MRI C spine 2016: Multilevel cervical spondylosis with foraminal encroachment greatest from the C3-C5 levels as noted above.    Assessment/Plan: Micaela Arvizu is a 63 y.o. female with multiple level degenerative changes of the L spine, prior lower T spine compression fractures and L2  And L4 compression deformities. She has bilateral  lumbar radicular pain. EMG/NCS of the legs normal 5/2015. She has essential tremor of the head, which runs in her family. Exam shows essential tremor with cervical dystonia with left torticollis.    I recommend:   1. Increase Lexapro to 15 mg for anxiety and depression.   2. Some slight weight gain since last visit, which could be due to increased stress, given her history of stress eating; will monitor. This was helped by starting Lexapro  prior.   3. She is in a weight loss program currently, and would like to attend more often.   4. Continue Klonopin for anxiety to 0.25 mg prn, #15, given daily reliance prior. Rare use of this currently.   5. Continue Propranolol for essential tremor. She takes 80mg/40mg, as tremor is improved in evening. No worsening of tremor off of Primidone, but tremor much worse when Propranolol was reduced to 40 mg bid. Tremor worse with anxiety.   6. Botox was ineffective for cervical dystonia prior.  7. Pain management facet injections helped relieve pain by 90%- she will see [place] pain management back PRN. She is no longer requiring Gabapentin. She also had compression deformities of the L spine, treated by orthopedist. The patient is also being treated for osteoporosis.   8. On Prolia for osteoporosis.     FU 3 months

## 2019-03-14 ENCOUNTER — CLINICAL SUPPORT (OUTPATIENT)
Dept: INTERNAL MEDICINE | Facility: CLINIC | Age: 64
End: 2019-03-14
Payer: COMMERCIAL

## 2019-03-14 DIAGNOSIS — E78.00 HYPERCHOLESTEREMIA: ICD-10-CM

## 2019-03-14 DIAGNOSIS — R73.01 IFG (IMPAIRED FASTING GLUCOSE): ICD-10-CM

## 2019-03-14 DIAGNOSIS — E66.01 MORBID OBESITY WITH BMI OF 50.0-59.9, ADULT: ICD-10-CM

## 2019-03-14 DIAGNOSIS — I10 ESSENTIAL HYPERTENSION: ICD-10-CM

## 2019-03-14 DIAGNOSIS — G25.0 ESSENTIAL TREMOR: ICD-10-CM

## 2019-03-14 LAB
ALBUMIN SERPL BCP-MCNC: 3 G/DL
ALP SERPL-CCNC: 76 U/L
ALT SERPL W/O P-5'-P-CCNC: 15 U/L
ANION GAP SERPL CALC-SCNC: 10 MMOL/L
AST SERPL-CCNC: 16 U/L
BASOPHILS # BLD AUTO: 0.04 K/UL
BASOPHILS NFR BLD: 0.5 %
BILIRUB SERPL-MCNC: 0.3 MG/DL
BUN SERPL-MCNC: 16 MG/DL
CALCIUM SERPL-MCNC: 9.5 MG/DL
CHLORIDE SERPL-SCNC: 105 MMOL/L
CHOLEST SERPL-MCNC: 174 MG/DL
CHOLEST/HDLC SERPL: 2.8 {RATIO}
CO2 SERPL-SCNC: 25 MMOL/L
CREAT SERPL-MCNC: 0.6 MG/DL
DIFFERENTIAL METHOD: ABNORMAL
EOSINOPHIL # BLD AUTO: 0.3 K/UL
EOSINOPHIL NFR BLD: 3.6 %
ERYTHROCYTE [DISTWIDTH] IN BLOOD BY AUTOMATED COUNT: 13.8 %
EST. GFR  (AFRICAN AMERICAN): >60 ML/MIN/1.73 M^2
EST. GFR  (NON AFRICAN AMERICAN): >60 ML/MIN/1.73 M^2
ESTIMATED AVG GLUCOSE: 126 MG/DL
GLUCOSE SERPL-MCNC: 125 MG/DL
HBA1C MFR BLD HPLC: 6 %
HCT VFR BLD AUTO: 38.5 %
HDLC SERPL-MCNC: 63 MG/DL
HDLC SERPL: 36.2 %
HGB BLD-MCNC: 12.1 G/DL
LDLC SERPL CALC-MCNC: 88.4 MG/DL
LYMPHOCYTES # BLD AUTO: 1.6 K/UL
LYMPHOCYTES NFR BLD: 19.5 %
MCH RBC QN AUTO: 27.4 PG
MCHC RBC AUTO-ENTMCNC: 31.4 G/DL
MCV RBC AUTO: 87 FL
MONOCYTES # BLD AUTO: 0.7 K/UL
MONOCYTES NFR BLD: 8.6 %
NEUTROPHILS # BLD AUTO: 5.4 K/UL
NEUTROPHILS NFR BLD: 67.8 %
NONHDLC SERPL-MCNC: 111 MG/DL
PLATELET # BLD AUTO: 323 K/UL
PMV BLD AUTO: 8.8 FL
POTASSIUM SERPL-SCNC: 4.2 MMOL/L
PROT SERPL-MCNC: 6.9 G/DL
RBC # BLD AUTO: 4.41 M/UL
SODIUM SERPL-SCNC: 140 MMOL/L
TRIGL SERPL-MCNC: 113 MG/DL
TSH SERPL DL<=0.005 MIU/L-ACNC: 2.58 UIU/ML
WBC # BLD AUTO: 7.98 K/UL

## 2019-03-14 PROCEDURE — 84443 ASSAY THYROID STIM HORMONE: CPT

## 2019-03-14 PROCEDURE — 99999 PR PBB SHADOW E&M-EST. PATIENT-LVL I: ICD-10-PCS | Mod: PBBFAC,,,

## 2019-03-14 PROCEDURE — 36415 PR COLLECTION VENOUS BLOOD,VENIPUNCTURE: ICD-10-PCS | Mod: S$GLB,,, | Performed by: INTERNAL MEDICINE

## 2019-03-14 PROCEDURE — 99999 PR PBB SHADOW E&M-EST. PATIENT-LVL I: CPT | Mod: PBBFAC,,,

## 2019-03-14 PROCEDURE — 80061 LIPID PANEL: CPT

## 2019-03-14 PROCEDURE — 85025 COMPLETE CBC W/AUTO DIFF WBC: CPT

## 2019-03-14 PROCEDURE — 80053 COMPREHEN METABOLIC PANEL: CPT

## 2019-03-14 PROCEDURE — 36415 COLL VENOUS BLD VENIPUNCTURE: CPT | Mod: S$GLB,,, | Performed by: INTERNAL MEDICINE

## 2019-03-14 PROCEDURE — 83036 HEMOGLOBIN GLYCOSYLATED A1C: CPT

## 2019-03-22 ENCOUNTER — OFFICE VISIT (OUTPATIENT)
Dept: INTERNAL MEDICINE | Facility: CLINIC | Age: 64
End: 2019-03-22
Payer: COMMERCIAL

## 2019-03-22 VITALS
SYSTOLIC BLOOD PRESSURE: 106 MMHG | BODY MASS INDEX: 54.88 KG/M2 | RESPIRATION RATE: 16 BRPM | HEIGHT: 59 IN | WEIGHT: 272.25 LBS | HEART RATE: 54 BPM | DIASTOLIC BLOOD PRESSURE: 62 MMHG | OXYGEN SATURATION: 98 %

## 2019-03-22 DIAGNOSIS — F41.1 GAD (GENERALIZED ANXIETY DISORDER): ICD-10-CM

## 2019-03-22 DIAGNOSIS — E78.00 HYPERCHOLESTEREMIA: ICD-10-CM

## 2019-03-22 DIAGNOSIS — G25.0 ESSENTIAL TREMOR: ICD-10-CM

## 2019-03-22 DIAGNOSIS — E66.01 MORBID OBESITY WITH BMI OF 50.0-59.9, ADULT: ICD-10-CM

## 2019-03-22 DIAGNOSIS — I10 ESSENTIAL HYPERTENSION: ICD-10-CM

## 2019-03-22 DIAGNOSIS — M54.16 BILATERAL LUMBAR RADICULOPATHY: ICD-10-CM

## 2019-03-22 DIAGNOSIS — M47.816 LUMBAR FACET ARTHROPATHY: Primary | ICD-10-CM

## 2019-03-22 DIAGNOSIS — R73.01 IFG (IMPAIRED FASTING GLUCOSE): ICD-10-CM

## 2019-03-22 PROCEDURE — 3008F PR BODY MASS INDEX (BMI) DOCUMENTED: ICD-10-PCS | Mod: CPTII,S$GLB,, | Performed by: INTERNAL MEDICINE

## 2019-03-22 PROCEDURE — 99999 PR PBB SHADOW E&M-EST. PATIENT-LVL III: CPT | Mod: PBBFAC,,, | Performed by: INTERNAL MEDICINE

## 2019-03-22 PROCEDURE — 99999 PR PBB SHADOW E&M-EST. PATIENT-LVL III: ICD-10-PCS | Mod: PBBFAC,,, | Performed by: INTERNAL MEDICINE

## 2019-03-22 PROCEDURE — 3074F SYST BP LT 130 MM HG: CPT | Mod: CPTII,S$GLB,, | Performed by: INTERNAL MEDICINE

## 2019-03-22 PROCEDURE — 99214 OFFICE O/P EST MOD 30 MIN: CPT | Mod: S$GLB,,, | Performed by: INTERNAL MEDICINE

## 2019-03-22 PROCEDURE — 3078F DIAST BP <80 MM HG: CPT | Mod: CPTII,S$GLB,, | Performed by: INTERNAL MEDICINE

## 2019-03-22 PROCEDURE — 3078F PR MOST RECENT DIASTOLIC BLOOD PRESSURE < 80 MM HG: ICD-10-PCS | Mod: CPTII,S$GLB,, | Performed by: INTERNAL MEDICINE

## 2019-03-22 PROCEDURE — 3008F BODY MASS INDEX DOCD: CPT | Mod: CPTII,S$GLB,, | Performed by: INTERNAL MEDICINE

## 2019-03-22 PROCEDURE — 3074F PR MOST RECENT SYSTOLIC BLOOD PRESSURE < 130 MM HG: ICD-10-PCS | Mod: CPTII,S$GLB,, | Performed by: INTERNAL MEDICINE

## 2019-03-22 PROCEDURE — 99214 PR OFFICE/OUTPT VISIT, EST, LEVL IV, 30-39 MIN: ICD-10-PCS | Mod: S$GLB,,, | Performed by: INTERNAL MEDICINE

## 2019-03-22 RX ORDER — TRAMADOL HYDROCHLORIDE 50 MG/1
50 TABLET ORAL EVERY 12 HOURS PRN
Qty: 30 TABLET | Refills: 0 | Status: SHIPPED | OUTPATIENT
Start: 2019-03-22 | End: 2019-08-12 | Stop reason: SDUPTHER

## 2019-03-22 NOTE — PROGRESS NOTES
"Subjective:       Patient ID: Micaela Arvizu is a 63 y.o. female.    Chief Complaint: Follow-up      HPI:  Patient is known to me and presents for follow up HTN, HLD, osteoporosis, GERD and anxiety. Labs from 3/14/19 were personally reviewed and discussed with the patient today.     Chronic low back pain: Has had MRIs in the past. Has seen Dr. Wolfe for injections She uses tramadol for pain management for her arthrits which is working well. Supplementing with tylenol right now as trying not to use tramadol often. She is off her Klonopin so ok to refill at this time Had stomach ulcer with NSAIDs.     HTN: on lisinopril-HCTZ. Home BP < 140/90. Denies chest pains, SOB and LE edema.      HLD: on atorvastatin and fish oil. Denies medicatoin side effects     GERD: taking omeprazole daily. She has h/o of esophageal stricture. Had "stretching" with Dr. Bojorquez. Denies abd pain, n/v/d/c.      Anxiety: was Klonopin PRN, using half tab every morning and sometimes half tab at night. Since I saaw her last neurolgoy started her on lexapro and weaned off Klonopin. Mom recently had a CVA, brother in law passed away with brain tumor since our last visit and her nephrew (who's father just ) had a blood clot in his arm. She was started on lexapro in February and she does find it helpful. She is feeling hungry.        Osteoporosis: follows with GYN (lorena) for this. Had DEXA done 3/2018 with GYN which showed improvement. Doing Prolia with her but was too expensive so will discuss with her GYN.      Tremor: dad has Parkinson's disease. Tells me she and her sisters all have this. Sees neurology and dx with essential tremor on propranolol; now off primidone.      Past Medical History:   Diagnosis Date    Arthritis     Asthma     GERD (gastroesophageal reflux disease)     Hiatal hernia     History of colonoscopy with polypectomy     History of esophagogastroduodenoscopy (EGD)     Hyperlipidemia     Hypertension     Lumbar " facet arthropathy        Family History   Problem Relation Age of Onset    Hypertension Mother     Hypertension Father     Parkinsonism Father        Social History     Socioeconomic History    Marital status:      Spouse name: Not on file    Number of children: Not on file    Years of education: Not on file    Highest education level: Not on file   Social Needs    Financial resource strain: Not on file    Food insecurity - worry: Not on file    Food insecurity - inability: Not on file    Transportation needs - medical: Not on file    Transportation needs - non-medical: Not on file   Occupational History    Not on file   Tobacco Use    Smoking status: Never Smoker    Smokeless tobacco: Never Used   Substance and Sexual Activity    Alcohol use: No    Drug use: No    Sexual activity: Not Currently     Comment:    Other Topics Concern    Not on file   Social History Narrative    Not on file       Review of Systems   Constitutional: Negative for activity change, fatigue, fever and unexpected weight change.   HENT: Negative for congestion, ear pain, hearing loss, rhinorrhea and sore throat.    Eyes: Negative for pain, redness and visual disturbance.   Respiratory: Negative for cough, shortness of breath and wheezing.    Cardiovascular: Negative for chest pain, palpitations and leg swelling.   Gastrointestinal: Negative for abdominal pain, constipation, diarrhea, nausea and vomiting.   Genitourinary: Negative for dysuria, frequency and urgency.   Musculoskeletal: Negative for back pain, joint swelling and neck pain.   Skin: Negative for color change, rash and wound.   Neurological: Negative for dizziness, tremors, weakness, light-headedness and headaches.   Psychiatric/Behavioral: The patient is nervous/anxious.          Objective:      Physical Exam   Constitutional: She is oriented to person, place, and time. She appears well-developed and well-nourished. No distress.   HENT:   Head:  Normocephalic and atraumatic.   Right Ear: External ear normal.   Left Ear: External ear normal.   Eyes: Conjunctivae and EOM are normal. Pupils are equal, round, and reactive to light. Right eye exhibits no discharge. Left eye exhibits no discharge.   Neck: Neck supple. No tracheal deviation present.   Cardiovascular: Normal rate and regular rhythm.   No murmur heard.  Pulmonary/Chest: Effort normal and breath sounds normal. No respiratory distress. She has no wheezes.   Abdominal: Soft. Bowel sounds are normal. She exhibits no distension. There is no tenderness.   Musculoskeletal: She exhibits no edema.   Neurological: She is alert and oriented to person, place, and time. No cranial nerve deficit.   Skin: Skin is warm and dry.   Psychiatric: She has a normal mood and affect. Her behavior is normal.   Vitals reviewed.      Assessment:       1. Lumbar facet arthropathy    2. Bilateral lumbar radiculopathy    3. IFG (impaired fasting glucose)    4. Morbid obesity with BMI of 50.0-59.9, adult    5. Essential tremor    6. JOHNNIE (generalized anxiety disorder)    7. Essential hypertension    8. Hypercholesteremia        Plan:       Micaela was seen today for follow-up.    Diagnoses and all orders for this visit:    Lumbar facet arthropathy  Bilateral lumbar radiculopathy  -     traMADol (ULTRAM) 50 mg tablet; Take 1 tablet (50 mg total) by mouth every 12 (twelve) hours as needed.  Now off Klonopin so OK to use tramadol PRN  30 tabs should last 1 month or greater  Supplement with PRN tylenol    IFG (impaired fasting glucose)  -     traMADol (ULTRAM) 50 mg tablet; Take 1 tablet (50 mg total) by mouth every 12 (twelve) hours as needed.  -     CBC auto differential; Future  -     Comprehensive metabolic panel; Future  -     Lipid panel; Future  -     Hemoglobin A1c; Future  A1C 6%  Has been stable  Diet and exercise  Weigh ttrended up, watch closely  Check sugars and keep log    Morbid obesity with BMI of 50.0-59.9, adult  -      traMADol (ULTRAM) 50 mg tablet; Take 1 tablet (50 mg total) by mouth every 12 (twelve) hours as needed.  -     CBC auto differential; Future  -     Comprehensive metabolic panel; Future  -     Lipid panel; Future  -     Hemoglobin A1c; Future  Diet nad exercise discussed    Essential tremor  Chronic stable  Cont propranolol  Follows with neurology    JOHNNIE (generalized anxiety disorder)  Chronic stable  Off klonopin  Doing well with lexapro started by neuro but has increased appetitie  Discussed weight loss goal and ways to control  Watch closely    Essential hypertension  Chronic controlled  Continue medications at same dose  Low Na diet  Exercise, weight loss  Check BP and keep log for next visit    Hypercholesteremia  Chronic controlled  Cont statin same dose       Health Maintenance:  -CRC: doing with Dr. Bojorquez  -PAP/MMG: doing with GYN  -Bone Density: doing with GYN (lorena, last 3/2018)  -tobacco: not smoking  -Hep C: 3/2018 negative  -Vaccines  Flu- 9/2017    RTC 6 months and PRN

## 2019-04-08 ENCOUNTER — OFFICE VISIT (OUTPATIENT)
Dept: INTERNAL MEDICINE | Facility: CLINIC | Age: 64
End: 2019-04-08
Payer: COMMERCIAL

## 2019-04-08 ENCOUNTER — LAB VISIT (OUTPATIENT)
Dept: LAB | Facility: HOSPITAL | Age: 64
End: 2019-04-08
Attending: INTERNAL MEDICINE
Payer: COMMERCIAL

## 2019-04-08 VITALS
BODY MASS INDEX: 56.23 KG/M2 | HEART RATE: 70 BPM | DIASTOLIC BLOOD PRESSURE: 70 MMHG | WEIGHT: 267.88 LBS | RESPIRATION RATE: 16 BRPM | SYSTOLIC BLOOD PRESSURE: 110 MMHG | HEIGHT: 58 IN

## 2019-04-08 DIAGNOSIS — K62.5 RECTAL BLEEDING: Primary | ICD-10-CM

## 2019-04-08 DIAGNOSIS — M54.50 ACUTE BILATERAL LOW BACK PAIN WITHOUT SCIATICA: ICD-10-CM

## 2019-04-08 DIAGNOSIS — K62.5 RECTAL BLEEDING: ICD-10-CM

## 2019-04-08 LAB
ANION GAP SERPL CALC-SCNC: 9 MMOL/L (ref 8–16)
BASOPHILS # BLD AUTO: 0.06 K/UL (ref 0–0.2)
BASOPHILS NFR BLD: 0.6 % (ref 0–1.9)
BUN SERPL-MCNC: 16 MG/DL (ref 8–23)
CALCIUM SERPL-MCNC: 9.6 MG/DL (ref 8.7–10.5)
CHLORIDE SERPL-SCNC: 103 MMOL/L (ref 95–110)
CO2 SERPL-SCNC: 28 MMOL/L (ref 23–29)
CREAT SERPL-MCNC: 0.9 MG/DL (ref 0.5–1.4)
DIFFERENTIAL METHOD: ABNORMAL
EOSINOPHIL # BLD AUTO: 0.4 K/UL (ref 0–0.5)
EOSINOPHIL NFR BLD: 3.9 % (ref 0–8)
ERYTHROCYTE [DISTWIDTH] IN BLOOD BY AUTOMATED COUNT: 13.7 % (ref 11.5–14.5)
EST. GFR  (AFRICAN AMERICAN): >60 ML/MIN/1.73 M^2
EST. GFR  (NON AFRICAN AMERICAN): >60 ML/MIN/1.73 M^2
GLUCOSE SERPL-MCNC: 126 MG/DL (ref 70–110)
HCT VFR BLD AUTO: 37.2 % (ref 37–48.5)
HGB BLD-MCNC: 11.6 G/DL (ref 12–16)
LYMPHOCYTES # BLD AUTO: 1.7 K/UL (ref 1–4.8)
LYMPHOCYTES NFR BLD: 17.4 % (ref 18–48)
MCH RBC QN AUTO: 26.4 PG (ref 27–31)
MCHC RBC AUTO-ENTMCNC: 31.2 G/DL (ref 32–36)
MCV RBC AUTO: 85 FL (ref 82–98)
MONOCYTES # BLD AUTO: 1 K/UL (ref 0.3–1)
MONOCYTES NFR BLD: 9.6 % (ref 4–15)
NEUTROPHILS # BLD AUTO: 6.8 K/UL (ref 1.8–7.7)
NEUTROPHILS NFR BLD: 68.5 % (ref 38–73)
PLATELET # BLD AUTO: 354 K/UL (ref 150–350)
PMV BLD AUTO: 8.1 FL (ref 9.2–12.9)
POTASSIUM SERPL-SCNC: 3.9 MMOL/L (ref 3.5–5.1)
RBC # BLD AUTO: 4.39 M/UL (ref 4–5.4)
SODIUM SERPL-SCNC: 140 MMOL/L (ref 136–145)
WBC # BLD AUTO: 9.95 K/UL (ref 3.9–12.7)

## 2019-04-08 PROCEDURE — 80048 BASIC METABOLIC PNL TOTAL CA: CPT

## 2019-04-08 PROCEDURE — 99214 PR OFFICE/OUTPT VISIT, EST, LEVL IV, 30-39 MIN: ICD-10-PCS | Mod: S$GLB,,, | Performed by: INTERNAL MEDICINE

## 2019-04-08 PROCEDURE — 99999 PR PBB SHADOW E&M-EST. PATIENT-LVL III: ICD-10-PCS | Mod: PBBFAC,,, | Performed by: INTERNAL MEDICINE

## 2019-04-08 PROCEDURE — 3074F SYST BP LT 130 MM HG: CPT | Mod: CPTII,S$GLB,, | Performed by: INTERNAL MEDICINE

## 2019-04-08 PROCEDURE — 99214 OFFICE O/P EST MOD 30 MIN: CPT | Mod: S$GLB,,, | Performed by: INTERNAL MEDICINE

## 2019-04-08 PROCEDURE — 3008F BODY MASS INDEX DOCD: CPT | Mod: CPTII,S$GLB,, | Performed by: INTERNAL MEDICINE

## 2019-04-08 PROCEDURE — 3008F PR BODY MASS INDEX (BMI) DOCUMENTED: ICD-10-PCS | Mod: CPTII,S$GLB,, | Performed by: INTERNAL MEDICINE

## 2019-04-08 PROCEDURE — 3078F DIAST BP <80 MM HG: CPT | Mod: CPTII,S$GLB,, | Performed by: INTERNAL MEDICINE

## 2019-04-08 PROCEDURE — 99999 PR PBB SHADOW E&M-EST. PATIENT-LVL III: CPT | Mod: PBBFAC,,, | Performed by: INTERNAL MEDICINE

## 2019-04-08 PROCEDURE — 85025 COMPLETE CBC W/AUTO DIFF WBC: CPT

## 2019-04-08 PROCEDURE — 36415 COLL VENOUS BLD VENIPUNCTURE: CPT

## 2019-04-08 PROCEDURE — 3078F PR MOST RECENT DIASTOLIC BLOOD PRESSURE < 80 MM HG: ICD-10-PCS | Mod: CPTII,S$GLB,, | Performed by: INTERNAL MEDICINE

## 2019-04-08 PROCEDURE — 3074F PR MOST RECENT SYSTOLIC BLOOD PRESSURE < 130 MM HG: ICD-10-PCS | Mod: CPTII,S$GLB,, | Performed by: INTERNAL MEDICINE

## 2019-04-08 RX ORDER — CYCLOBENZAPRINE HCL 5 MG
5 TABLET ORAL 3 TIMES DAILY PRN
Qty: 60 TABLET | Refills: 0 | Status: SHIPPED | OUTPATIENT
Start: 2019-04-08 | End: 2019-04-18

## 2019-04-08 NOTE — PROGRESS NOTES
Subjective:       Patient ID: Micaela Arvizu is a 63 y.o. female.    Chief Complaint: Rectal Bleeding and Back Pain      HPI:  Patient is known to me and presents with rectal bleeding. Sx started last week. She has had this before due to a hemorrhoid but it doesn't typically last this long. She does have appt with Dr. Bojorquez this Thursday. She only has blood when has a BM but she is seeing bright red blood that fills the toilet. She is having to clean her toilet due to so much blood. Last C-scope unknown--does with Dr. Bojorquez.     3 days ago she bent over the sofa to grab something and felt a pull in her low back. Feels pain in b/l low back. Tried tramadol without much relief.     Past Medical History:   Diagnosis Date    Arthritis     Asthma     GERD (gastroesophageal reflux disease)     Hiatal hernia     History of colonoscopy with polypectomy     History of esophagogastroduodenoscopy (EGD)     Hyperlipidemia     Hypertension     Lumbar facet arthropathy        Family History   Problem Relation Age of Onset    Hypertension Mother     Hypertension Father     Parkinsonism Father        Social History     Socioeconomic History    Marital status:      Spouse name: Not on file    Number of children: Not on file    Years of education: Not on file    Highest education level: Not on file   Occupational History    Not on file   Social Needs    Financial resource strain: Not on file    Food insecurity:     Worry: Not on file     Inability: Not on file    Transportation needs:     Medical: Not on file     Non-medical: Not on file   Tobacco Use    Smoking status: Never Smoker    Smokeless tobacco: Never Used   Substance and Sexual Activity    Alcohol use: No    Drug use: No    Sexual activity: Not Currently     Comment:    Lifestyle    Physical activity:     Days per week: Not on file     Minutes per session: Not on file    Stress: Not on file   Relationships    Social connections:      Talks on phone: Not on file     Gets together: Not on file     Attends Mandaen service: Not on file     Active member of club or organization: Not on file     Attends meetings of clubs or organizations: Not on file     Relationship status: Not on file   Other Topics Concern    Not on file   Social History Narrative    Not on file       Review of Systems   Constitutional: Negative for activity change, fatigue, fever and unexpected weight change.   HENT: Negative for congestion, ear pain, hearing loss, rhinorrhea and sore throat.    Eyes: Negative for pain, redness and visual disturbance.   Respiratory: Negative for cough, shortness of breath and wheezing.    Cardiovascular: Negative for chest pain, palpitations and leg swelling.   Gastrointestinal: Positive for blood in stool. Negative for abdominal pain, constipation, diarrhea, nausea and vomiting.   Genitourinary: Negative for dysuria, frequency and urgency.   Musculoskeletal: Positive for back pain. Negative for joint swelling and neck pain.   Skin: Negative for color change, rash and wound.   Neurological: Negative for dizziness, tremors, weakness, light-headedness and headaches.         Objective:      Physical Exam   Constitutional: She is oriented to person, place, and time. She appears well-developed and well-nourished. No distress.   HENT:   Head: Normocephalic and atraumatic.   Right Ear: External ear normal.   Left Ear: External ear normal.   Eyes: Pupils are equal, round, and reactive to light. Conjunctivae and EOM are normal. Right eye exhibits no discharge. Left eye exhibits no discharge.   Neck: Neck supple. No tracheal deviation present.   Cardiovascular: Normal rate and regular rhythm.   No murmur heard.  Pulmonary/Chest: Effort normal and breath sounds normal. No respiratory distress. She has no wheezes. She has no rales.   Abdominal: Soft. Bowel sounds are normal. She exhibits no distension. There is no tenderness.   Neurological: She is alert  and oriented to person, place, and time. No cranial nerve deficit.   Skin: Skin is warm and dry.   Psychiatric: She has a normal mood and affect. Her behavior is normal.   Vitals reviewed.      Assessment:       1. Rectal bleeding    2. Acute bilateral low back pain without sciatica        Plan:       Micaela was seen today for rectal bleeding and back pain.    Diagnoses and all orders for this visit:    Rectal bleeding  -     CBC auto differential; Future  -     Basic metabolic panel; Future  New problem  Her description does sound worse than hemorrhoid bleeding but her vitals are stable  Could be diverticular  ER precautions discussed in detail. Voiced understanding  She has had no bleeding today and again vitals stable so will not send for admission  Check labs; if H/H stable has GI follow up this Thursday already scheudled    Acute bilateral low back pain without sciatica  -     cyclobenzaprine (FLEXERIL) 5 MG tablet; Take 1 tablet (5 mg total) by mouth 3 (three) times daily as needed for Muscle spasms.  New problem  Cont tramadol for chronic low back pain.  Add flexeril for possible muscle strain  No NSAIDs; PRN tylenol    RTC as scheudled and PRN

## 2019-04-16 DIAGNOSIS — I10 ESSENTIAL HYPERTENSION: ICD-10-CM

## 2019-04-16 DIAGNOSIS — E78.00 HYPERCHOLESTEREMIA: ICD-10-CM

## 2019-04-22 RX ORDER — ATORVASTATIN CALCIUM 10 MG/1
TABLET, FILM COATED ORAL
Qty: 90 TABLET | Refills: 1 | Status: SHIPPED | OUTPATIENT
Start: 2019-04-22 | End: 2019-09-26 | Stop reason: SDUPTHER

## 2019-04-22 RX ORDER — LISINOPRIL AND HYDROCHLOROTHIAZIDE 10; 12.5 MG/1; MG/1
TABLET ORAL
Qty: 90 TABLET | Refills: 1 | Status: SHIPPED | OUTPATIENT
Start: 2019-04-22 | End: 2019-09-26 | Stop reason: SDUPTHER

## 2019-05-02 ENCOUNTER — TELEPHONE (OUTPATIENT)
Dept: INTERNAL MEDICINE | Facility: CLINIC | Age: 64
End: 2019-05-02

## 2019-05-02 NOTE — TELEPHONE ENCOUNTER
----- Message from Gaviota Rivas sent at 2019 11:25 AM CDT -----  Contact: [facility]  Micaela Arvizu  MRN: [id number]  : 1955  PCP: Kinga Robles  Home Phone      [phone]  Work Phone      Not on file.  Mobile          [phone]      MESSAGE:     Calling to check status of fax that was sent to our office regarding prescription request for CPAP supplies. Please call to advise.    Phone: [phone]

## 2019-05-20 ENCOUNTER — TELEPHONE (OUTPATIENT)
Dept: ADMINISTRATIVE | Facility: HOSPITAL | Age: 64
End: 2019-05-20

## 2019-05-21 ENCOUNTER — OFFICE VISIT (OUTPATIENT)
Dept: NEUROLOGY | Facility: CLINIC | Age: 64
End: 2019-05-21
Payer: COMMERCIAL

## 2019-05-21 VITALS
DIASTOLIC BLOOD PRESSURE: 74 MMHG | RESPIRATION RATE: 16 BRPM | HEART RATE: 72 BPM | WEIGHT: 272.25 LBS | BODY MASS INDEX: 57.15 KG/M2 | HEIGHT: 58 IN | SYSTOLIC BLOOD PRESSURE: 122 MMHG

## 2019-05-21 DIAGNOSIS — I10 ESSENTIAL HYPERTENSION: ICD-10-CM

## 2019-05-21 DIAGNOSIS — M51.9 LUMBAR DISC DISEASE: ICD-10-CM

## 2019-05-21 DIAGNOSIS — M54.16 BILATERAL LUMBAR RADICULOPATHY: ICD-10-CM

## 2019-05-21 DIAGNOSIS — E66.01 MORBID OBESITY WITH BMI OF 50.0-59.9, ADULT: ICD-10-CM

## 2019-05-21 DIAGNOSIS — M81.0 OSTEOPOROSIS, UNSPECIFIED OSTEOPOROSIS TYPE, UNSPECIFIED PATHOLOGICAL FRACTURE PRESENCE: ICD-10-CM

## 2019-05-21 DIAGNOSIS — G25.0 ESSENTIAL TREMOR: ICD-10-CM

## 2019-05-21 DIAGNOSIS — G24.3 CERVICAL DYSTONIA: ICD-10-CM

## 2019-05-21 DIAGNOSIS — G47.33 OSA (OBSTRUCTIVE SLEEP APNEA): ICD-10-CM

## 2019-05-21 DIAGNOSIS — F41.1 GAD (GENERALIZED ANXIETY DISORDER): Primary | ICD-10-CM

## 2019-05-21 DIAGNOSIS — F32.A DEPRESSION, UNSPECIFIED DEPRESSION TYPE: ICD-10-CM

## 2019-05-21 PROCEDURE — 99214 OFFICE O/P EST MOD 30 MIN: CPT | Mod: S$GLB,,, | Performed by: NURSE PRACTITIONER

## 2019-05-21 PROCEDURE — 99214 PR OFFICE/OUTPT VISIT, EST, LEVL IV, 30-39 MIN: ICD-10-PCS | Mod: S$GLB,,, | Performed by: NURSE PRACTITIONER

## 2019-05-21 PROCEDURE — 99999 PR PBB SHADOW E&M-EST. PATIENT-LVL V: CPT | Mod: PBBFAC,,, | Performed by: NURSE PRACTITIONER

## 2019-05-21 PROCEDURE — 99999 PR PBB SHADOW E&M-EST. PATIENT-LVL V: ICD-10-PCS | Mod: PBBFAC,,, | Performed by: NURSE PRACTITIONER

## 2019-05-21 RX ORDER — SUCRALFATE 1 G/1
1 TABLET ORAL
Refills: 1 | COMMUNITY
Start: 2019-05-08

## 2019-05-21 RX ORDER — PROPRANOLOL HYDROCHLORIDE 80 MG/1
80 TABLET ORAL SEE ADMIN INSTRUCTIONS
Qty: 135 TABLET | Refills: 1 | Status: SHIPPED | OUTPATIENT
Start: 2019-05-21 | End: 2019-12-03 | Stop reason: SDUPTHER

## 2019-05-21 RX ORDER — ESCITALOPRAM OXALATE 10 MG/1
10 TABLET ORAL DAILY
Qty: 30 TABLET | Refills: 5 | Status: SHIPPED | OUTPATIENT
Start: 2019-05-21 | End: 2019-12-03 | Stop reason: SDUPTHER

## 2019-05-21 NOTE — PROGRESS NOTES
HPI: Micaela Arvizu is a 63 y.o. female with cervical dystonia with head tremor, multiple level degenerative changes of the L spine, prior lower T spine compression fractures and L2 and L4 compression deformities. She has bilateral  lumbar radicular pain. EMG/NCS of the legs normal 5/2015. She has GERD, HLD, HTN, JOHNNIE, insomnia, osteoporosis, and ROBERTO, as well as a history of asthmatic bronchitis.     She presents today for a follow up visit. Her Lexapro was increased to 15 mg at her last visit, which she was unable to tolerate, due to weight gain. She reduced back to 10 mg, which controls her anxiety well. She was having more situational anxiety at her last visit. She is off of Klonopin now for prn anxiety.     She feels more off balance lately, and has increased lumbar pain, which radiates down her legs. She received lumbar injections with Dr. Wolfe prior, which were helpful.     Head tremor well controlled currently on Propranolol.     She is compliant with CPAP, but has insomnia.     Review of Systems   Constitutional: Negative for fever.   HENT: Negative for nosebleeds.    Eyes: Negative for double vision.   Respiratory: Negative for hemoptysis.    Cardiovascular: Negative for leg swelling.   Gastrointestinal: Negative for blood in stool.   Genitourinary: Negative for hematuria.   Musculoskeletal: Positive for back pain and neck pain.   Skin: Negative for rash.   Neurological: Positive for tremors.   Psychiatric/Behavioral: Negative for depression. The patient is not nervous/anxious and does not have insomnia.      Exam:  Gen Appearance, well developed/nourished in no apparent distress  CV: 2+ distal pulses with no edema or swelling  Neuro:  MS: Awake, alert, oriented to place, person, time, situation. Sustains attention. Recent/remote memory intact, Language is full to spontaneous speech/comprehension. Fund of Knowledge is full  CN: Optic discs are flat with normal vasculature, PERRL, Extraoccular movements and  visual fields are full. Normal facial sensation and strength, Tongue and Palate are midline and strong. Shoulder Shrug symmetric and strong.  Motor: Normal bulk, tone, no abnormal movements in the hands but there is a no-no tremor of the head. 5/5 strength bilateral upper/lower extremities with 1+ reflexes  Sensory: symmetric to temp, and vibration.  Romberg negative  Cerebellar: Finger-nose, Rapid alternating movements intact  Gait: Normal stance, no ataxia    Imagin2015 MRI L spine:   Interval resolution of the edema like signal involving the inferior end plate of L2 which has been replaced with fatty marrow.    Mild spondylosis of the lumbar spine without evidence for significant central canal stenosis or neural foraminal narrowing.    MRI C spine 2016: Multilevel cervical spondylosis with foraminal encroachment greatest from the C3-C5 levels as noted above.    Assessment/Plan: Micaela Arvizu is a 63 y.o. female with multiple level degenerative changes of the L spine, prior lower T spine compression fractures and L2  And L4 compression deformities. She has bilateral  lumbar radicular pain. EMG/NCS of the legs normal 2015. She has essential tremor of the head, which runs in her family. Exam shows essential tremor with cervical dystonia with left torticollis.    I recommend:   1. Continue Lexapro to 10 mg for anxiety and depression. Could not tolerate increase to 15 mg, due to weight gain. Anxiety well controlled.   2. She is in a weight loss program currently, and would like to attend more often; however, increased lumbar pain is interfering with this currently. Dietary changes were discussed. Bariatric surgery not covered by her insurance.   3. Continue Klonopin for anxiety to 0.25 mg prn, #15, given daily reliance prior. Rare use of this currently.   4. Continue Propranolol for essential tremor. She takes 80mg/40mg, as tremor is improved in evening. No worsening of tremor off of Primidone, but tremor much  worse when Propranolol was reduced to 40 mg bid. Tremor worse with anxiety.   6. Botox was ineffective for cervical dystonia prior.  7. Update MRI L-spine, given worsening lumbar complaints and gait imbalance. Pain management facet injections helped relieve pain by 90%-Refer back to [facility] pain management at this time. She is no longer requiring Gabapentin. She also had compression deformities of the L spine, treated by orthopedist. The patient is also being treated for osteoporosis.   8. On Prolia for osteoporosis.     FU 6 months

## 2019-07-17 ENCOUNTER — OFFICE VISIT (OUTPATIENT)
Dept: INTERNAL MEDICINE | Facility: CLINIC | Age: 64
End: 2019-07-17
Payer: COMMERCIAL

## 2019-07-17 VITALS
HEIGHT: 58 IN | SYSTOLIC BLOOD PRESSURE: 124 MMHG | DIASTOLIC BLOOD PRESSURE: 74 MMHG | HEART RATE: 70 BPM | WEIGHT: 273 LBS | OXYGEN SATURATION: 97 % | RESPIRATION RATE: 20 BRPM | BODY MASS INDEX: 57.3 KG/M2

## 2019-07-17 DIAGNOSIS — M79.89 RIGHT LEG SWELLING: ICD-10-CM

## 2019-07-17 DIAGNOSIS — M79.604 RIGHT LEG PAIN: Primary | ICD-10-CM

## 2019-07-17 PROCEDURE — 99999 PR PBB SHADOW E&M-EST. PATIENT-LVL III: CPT | Mod: PBBFAC,,, | Performed by: INTERNAL MEDICINE

## 2019-07-17 PROCEDURE — 99999 PR PBB SHADOW E&M-EST. PATIENT-LVL III: ICD-10-PCS | Mod: PBBFAC,,, | Performed by: INTERNAL MEDICINE

## 2019-07-17 PROCEDURE — 3074F SYST BP LT 130 MM HG: CPT | Mod: CPTII,S$GLB,, | Performed by: INTERNAL MEDICINE

## 2019-07-17 PROCEDURE — 99213 PR OFFICE/OUTPT VISIT, EST, LEVL III, 20-29 MIN: ICD-10-PCS | Mod: S$GLB,,, | Performed by: INTERNAL MEDICINE

## 2019-07-17 PROCEDURE — 3078F DIAST BP <80 MM HG: CPT | Mod: CPTII,S$GLB,, | Performed by: INTERNAL MEDICINE

## 2019-07-17 PROCEDURE — 99213 OFFICE O/P EST LOW 20 MIN: CPT | Mod: S$GLB,,, | Performed by: INTERNAL MEDICINE

## 2019-07-17 PROCEDURE — 3078F PR MOST RECENT DIASTOLIC BLOOD PRESSURE < 80 MM HG: ICD-10-PCS | Mod: CPTII,S$GLB,, | Performed by: INTERNAL MEDICINE

## 2019-07-17 PROCEDURE — 3008F PR BODY MASS INDEX (BMI) DOCUMENTED: ICD-10-PCS | Mod: CPTII,S$GLB,, | Performed by: INTERNAL MEDICINE

## 2019-07-17 PROCEDURE — 3074F PR MOST RECENT SYSTOLIC BLOOD PRESSURE < 130 MM HG: ICD-10-PCS | Mod: CPTII,S$GLB,, | Performed by: INTERNAL MEDICINE

## 2019-07-17 PROCEDURE — 3008F BODY MASS INDEX DOCD: CPT | Mod: CPTII,S$GLB,, | Performed by: INTERNAL MEDICINE

## 2019-07-17 RX ORDER — ESTRADIOL 0.25 MG/.25G
GEL TOPICAL DAILY
COMMUNITY
End: 2020-05-20

## 2019-07-17 RX ORDER — PROGESTERONE 100 MG/1
100 CAPSULE ORAL DAILY
COMMUNITY
End: 2020-05-20

## 2019-07-17 NOTE — PROGRESS NOTES
"Subjective:       Patient ID: Micaela Arvizu is a 63 y.o. female.    Chief Complaint: Knee Pain (R. knee- been going on but getting worse PS:7)      HPI:  Patient is known to me and presents with right knee pain. Pain is radiating from the mid thigh to the calf and feels pain posterior knee. Sx started about 2 weeks ago. Gradually getting worse. No injuries or fall. Worse with walking, getting out of the car. Described as "muscles are pulling". No pain in the knee joint itself. Tried muscle relaxerswithout relief of pain. No numbness or tingling. + swelling but notes of both legs.     Past Medical History:   Diagnosis Date    Arthritis     Asthma     GERD (gastroesophageal reflux disease)     Hiatal hernia     History of colonoscopy with polypectomy     History of esophagogastroduodenoscopy (EGD)     Hyperlipidemia     Hypertension     Lumbar facet arthropathy        Family History   Problem Relation Age of Onset    Hypertension Mother     Hypertension Father     Parkinsonism Father        Social History     Socioeconomic History    Marital status:      Spouse name: Not on file    Number of children: Not on file    Years of education: Not on file    Highest education level: Not on file   Occupational History    Not on file   Social Needs    Financial resource strain: Not on file    Food insecurity:     Worry: Not on file     Inability: Not on file    Transportation needs:     Medical: Not on file     Non-medical: Not on file   Tobacco Use    Smoking status: Never Smoker    Smokeless tobacco: Never Used   Substance and Sexual Activity    Alcohol use: No    Drug use: No    Sexual activity: Not Currently     Comment:    Lifestyle    Physical activity:     Days per week: Not on file     Minutes per session: Not on file    Stress: Not on file   Relationships    Social connections:     Talks on phone: Not on file     Gets together: Not on file     Attends Orthodoxy service: Not on " file     Active member of club or organization: Not on file     Attends meetings of clubs or organizations: Not on file     Relationship status: Not on file   Other Topics Concern    Not on file   Social History Narrative    Not on file       Review of Systems   Constitutional: Positive for activity change and unexpected weight change (gaining weight since starting lexapro). Negative for fatigue and fever.   HENT: Negative for congestion, ear pain, hearing loss, rhinorrhea, sore throat and tinnitus.    Eyes: Negative for pain, discharge, redness and visual disturbance.   Respiratory: Negative for cough, chest tightness, shortness of breath and wheezing.    Cardiovascular: Negative for chest pain, palpitations and leg swelling.   Gastrointestinal: Negative for abdominal pain, constipation, diarrhea, nausea and vomiting.   Endocrine: Negative for polydipsia and polyuria.   Genitourinary: Negative for difficulty urinating, dysuria, frequency, hematuria, menstrual problem, pelvic pain and urgency.   Musculoskeletal: Positive for arthralgias (right leg). Negative for back pain, joint swelling and neck pain.   Skin: Negative for color change, rash and wound.   Neurological: Negative for dizziness, tremors, weakness, light-headedness and headaches.   Psychiatric/Behavioral: Negative for confusion and dysphoric mood.         Objective:      Physical Exam   Constitutional: She is oriented to person, place, and time. She appears well-developed and well-nourished. No distress.   HENT:   Head: Normocephalic and atraumatic.   Right Ear: External ear normal.   Left Ear: External ear normal.   Eyes: Pupils are equal, round, and reactive to light. Conjunctivae and EOM are normal. Right eye exhibits no discharge. Left eye exhibits no discharge.   Neck: Neck supple. No tracheal deviation present.   Cardiovascular: Normal rate and regular rhythm.   No murmur heard.  Pulmonary/Chest: Effort normal and breath sounds normal. No  respiratory distress. She has no wheezes. She has no rales.   Abdominal: Soft. Bowel sounds are normal. She exhibits no distension. There is no tenderness.   Musculoskeletal: She exhibits edema (2+ b/l pitting edema to knees). She exhibits no tenderness (no joint line tenderness).   Neurological: She is alert and oriented to person, place, and time. No cranial nerve deficit.   Skin: Skin is warm and dry.   Psychiatric: She has a normal mood and affect. Her behavior is normal.   Vitals reviewed.      Assessment:       1. Right leg pain    2. Right leg swelling        Plan:       Micaela was seen today for knee pain.    Diagnoses and all orders for this visit:    Right leg pain  -     US Lower Extremity Veins Right; Future    Right leg swelling  -     US Lower Extremity Veins Right; Future      Check for DVT with pain and swelling of right leg. However, swelling is b/l. Pain is unilateral on the right  I wonder if the pulling sensation and pain she feels is the pressure from retaining fluid  Compression stockings  Exercise  Leg elevation  Low Na diet  If no improving will continue work up and consider diuretic  The knee joint itself does not seem to be the source of her pain

## 2019-07-22 ENCOUNTER — HOSPITAL ENCOUNTER (OUTPATIENT)
Dept: RADIOLOGY | Facility: HOSPITAL | Age: 64
Discharge: HOME | End: 2019-07-22
Attending: INTERNAL MEDICINE
Payer: COMMERCIAL

## 2019-07-22 DIAGNOSIS — M79.89 RIGHT LEG SWELLING: ICD-10-CM

## 2019-07-22 DIAGNOSIS — M79.604 RIGHT LEG PAIN: ICD-10-CM

## 2019-07-22 PROCEDURE — 93971 US LOWER EXTREMITY VEINS RIGHT: ICD-10-PCS | Mod: 26,RT,, | Performed by: RADIOLOGY

## 2019-07-22 PROCEDURE — 93971 EXTREMITY STUDY: CPT | Mod: TC,RT

## 2019-07-22 PROCEDURE — 93971 EXTREMITY STUDY: CPT | Mod: 26,RT,, | Performed by: RADIOLOGY

## 2019-07-26 ENCOUNTER — TELEPHONE (OUTPATIENT)
Dept: INTERNAL MEDICINE | Facility: CLINIC | Age: 64
End: 2019-07-26

## 2019-07-26 NOTE — TELEPHONE ENCOUNTER
Last Ketoralac injection 3/17/2019.   Please schedule nurse visit.     Unable to access nurse schedule.

## 2019-07-26 NOTE — TELEPHONE ENCOUNTER
----- Message from Gaviota Rivas sent at 2019  8:56 AM CDT -----  Contact: Shailesh Arvizu  MRN: [id number]  : 1955  PCP: Kinga Robles  Home Phone      [phone]  Work Phone      Not on file.  Mobile          [phone]      MESSAGE:   Requesting appointment today to come in for a Tramadol injection for arthritis pain. States that Dr. Robles has done this before for her.  Please call to advise.    Phone: [phone]

## 2019-07-26 NOTE — TELEPHONE ENCOUNTER
Dr. Robles provided verbal order to administer Toradol 30mg IM on Monday. Patient notified, scheduled.

## 2019-07-29 ENCOUNTER — CLINICAL SUPPORT (OUTPATIENT)
Dept: INTERNAL MEDICINE | Facility: CLINIC | Age: 64
End: 2019-07-29
Payer: COMMERCIAL

## 2019-07-29 DIAGNOSIS — M54.16 BILATERAL LUMBAR RADICULOPATHY: Primary | ICD-10-CM

## 2019-07-29 PROCEDURE — 96372 THER/PROPH/DIAG INJ SC/IM: CPT | Mod: S$GLB,,, | Performed by: INTERNAL MEDICINE

## 2019-07-29 PROCEDURE — 96372 PR INJECTION,THERAP/PROPH/DIAG2ST, IM OR SUBCUT: ICD-10-PCS | Mod: S$GLB,,, | Performed by: INTERNAL MEDICINE

## 2019-07-29 RX ORDER — KETOROLAC TROMETHAMINE 30 MG/ML
30 INJECTION, SOLUTION INTRAMUSCULAR; INTRAVENOUS ONCE
Status: COMPLETED | OUTPATIENT
Start: 2019-07-29 | End: 2019-07-29

## 2019-07-29 RX ADMIN — KETOROLAC TROMETHAMINE 30 MG: 30 INJECTION, SOLUTION INTRAMUSCULAR; INTRAVENOUS at 09:07

## 2019-07-29 NOTE — PROGRESS NOTES
Pt here for toradol 30mg per Dr Robles. Given to [facility]. No adverse reactions. Tolerated well.

## 2019-08-02 ENCOUNTER — TELEPHONE (OUTPATIENT)
Dept: INTERNAL MEDICINE | Facility: CLINIC | Age: 64
End: 2019-08-02

## 2019-08-02 DIAGNOSIS — R60.0 LEG EDEMA: Primary | ICD-10-CM

## 2019-08-02 RX ORDER — FUROSEMIDE 20 MG/1
20 TABLET ORAL DAILY
Qty: 30 TABLET | Refills: 11 | Status: SHIPPED | OUTPATIENT
Start: 2019-08-02 | End: 2019-09-13 | Stop reason: SDUPTHER

## 2019-08-02 NOTE — TELEPHONE ENCOUNTER
She and I had discussed started low dose diuretic for the swelling to see if this would also help the pain. I sent in lasxi 20mg daily. Take once a day and repeat labs in 1 week to check kidney function. If it does help the swelling and it completely goes away then she can stop taking every day and just take as needed.     Why don't we have her see me in 1 week as well to see how the legs are looking.    Labs and meds ordered. Thanks

## 2019-08-02 NOTE — TELEPHONE ENCOUNTER
----- Message from Gaviota Rivas sent at 2019  9:40 AM CDT -----  Contact: Self  Micaela Arvizu  MRN: [id number]  : 1955  PCP: Kinga Robles  Home Phone      [phone]  Work Phone      Not on file.  Mobile          [phone]    MESSAGE:   Was seen earlier in the week for leg pain but she is calling to let Dr. Robles know that she is still hurting.  She has scheduled an appointment to see Dr. Vizcaino but it's not until 8-15-19.    She would like to know if there is anything she can do until then. Please call to advise.    Phone: [phone]

## 2019-08-12 ENCOUNTER — OFFICE VISIT (OUTPATIENT)
Dept: INTERNAL MEDICINE | Facility: CLINIC | Age: 64
End: 2019-08-12
Payer: COMMERCIAL

## 2019-08-12 ENCOUNTER — PATIENT MESSAGE (OUTPATIENT)
Dept: HEPATOLOGY | Facility: HOSPITAL | Age: 64
End: 2019-08-12

## 2019-08-12 ENCOUNTER — LAB VISIT (OUTPATIENT)
Dept: LAB | Facility: HOSPITAL | Age: 64
End: 2019-08-12
Attending: INTERNAL MEDICINE
Payer: COMMERCIAL

## 2019-08-12 VITALS
RESPIRATION RATE: 18 BRPM | SYSTOLIC BLOOD PRESSURE: 118 MMHG | HEART RATE: 72 BPM | DIASTOLIC BLOOD PRESSURE: 64 MMHG | BODY MASS INDEX: 57.8 KG/M2 | HEIGHT: 58 IN | WEIGHT: 275.38 LBS

## 2019-08-12 DIAGNOSIS — R60.0 LEG EDEMA: ICD-10-CM

## 2019-08-12 DIAGNOSIS — R60.0 BILATERAL LEG EDEMA: ICD-10-CM

## 2019-08-12 DIAGNOSIS — R60.0 BILATERAL LEG EDEMA: Primary | ICD-10-CM

## 2019-08-12 DIAGNOSIS — E66.01 MORBID OBESITY WITH BMI OF 50.0-59.9, ADULT: ICD-10-CM

## 2019-08-12 DIAGNOSIS — M54.16 BILATERAL LUMBAR RADICULOPATHY: ICD-10-CM

## 2019-08-12 DIAGNOSIS — M81.0 OSTEOPOROSIS, UNSPECIFIED OSTEOPOROSIS TYPE, UNSPECIFIED PATHOLOGICAL FRACTURE PRESENCE: ICD-10-CM

## 2019-08-12 LAB
ANION GAP SERPL CALC-SCNC: 12 MMOL/L (ref 8–16)
BNP SERPL-MCNC: 118 PG/ML (ref 0–99)
BUN SERPL-MCNC: 19 MG/DL (ref 8–23)
CALCIUM SERPL-MCNC: 9.4 MG/DL (ref 8.7–10.5)
CHLORIDE SERPL-SCNC: 100 MMOL/L (ref 95–110)
CO2 SERPL-SCNC: 28 MMOL/L (ref 23–29)
CREAT SERPL-MCNC: 0.9 MG/DL (ref 0.5–1.4)
EST. GFR  (AFRICAN AMERICAN): >60 ML/MIN/1.73 M^2
EST. GFR  (NON AFRICAN AMERICAN): >60 ML/MIN/1.73 M^2
GLUCOSE SERPL-MCNC: 118 MG/DL (ref 70–110)
POTASSIUM SERPL-SCNC: 4.1 MMOL/L (ref 3.5–5.1)
SODIUM SERPL-SCNC: 140 MMOL/L (ref 136–145)

## 2019-08-12 PROCEDURE — 83880 ASSAY OF NATRIURETIC PEPTIDE: CPT

## 2019-08-12 PROCEDURE — 36415 COLL VENOUS BLD VENIPUNCTURE: CPT

## 2019-08-12 PROCEDURE — 99214 OFFICE O/P EST MOD 30 MIN: CPT | Mod: S$GLB,,, | Performed by: INTERNAL MEDICINE

## 2019-08-12 PROCEDURE — 80048 BASIC METABOLIC PNL TOTAL CA: CPT

## 2019-08-12 PROCEDURE — 99214 PR OFFICE/OUTPT VISIT, EST, LEVL IV, 30-39 MIN: ICD-10-PCS | Mod: S$GLB,,, | Performed by: INTERNAL MEDICINE

## 2019-08-12 PROCEDURE — 3074F PR MOST RECENT SYSTOLIC BLOOD PRESSURE < 130 MM HG: ICD-10-PCS | Mod: CPTII,S$GLB,, | Performed by: INTERNAL MEDICINE

## 2019-08-12 PROCEDURE — 3078F DIAST BP <80 MM HG: CPT | Mod: CPTII,S$GLB,, | Performed by: INTERNAL MEDICINE

## 2019-08-12 PROCEDURE — 3008F PR BODY MASS INDEX (BMI) DOCUMENTED: ICD-10-PCS | Mod: CPTII,S$GLB,, | Performed by: INTERNAL MEDICINE

## 2019-08-12 PROCEDURE — 99999 PR PBB SHADOW E&M-EST. PATIENT-LVL III: ICD-10-PCS | Mod: PBBFAC,,, | Performed by: INTERNAL MEDICINE

## 2019-08-12 PROCEDURE — 3074F SYST BP LT 130 MM HG: CPT | Mod: CPTII,S$GLB,, | Performed by: INTERNAL MEDICINE

## 2019-08-12 PROCEDURE — 3078F PR MOST RECENT DIASTOLIC BLOOD PRESSURE < 80 MM HG: ICD-10-PCS | Mod: CPTII,S$GLB,, | Performed by: INTERNAL MEDICINE

## 2019-08-12 PROCEDURE — 99999 PR PBB SHADOW E&M-EST. PATIENT-LVL III: CPT | Mod: PBBFAC,,, | Performed by: INTERNAL MEDICINE

## 2019-08-12 PROCEDURE — 3008F BODY MASS INDEX DOCD: CPT | Mod: CPTII,S$GLB,, | Performed by: INTERNAL MEDICINE

## 2019-08-12 RX ORDER — TRAMADOL HYDROCHLORIDE 50 MG/1
50 TABLET ORAL EVERY 12 HOURS PRN
Qty: 30 TABLET | Refills: 0 | Status: SHIPPED | OUTPATIENT
Start: 2019-08-12 | End: 2019-08-20 | Stop reason: SDUPTHER

## 2019-08-12 NOTE — PROGRESS NOTES
Subjective:       Patient ID: Micaela Arvizu is a 63 y.o. female.    Chief Complaint: Follow-up (for leg swelling)      HPI:  Patient is known to me and presents for follow up b/l LE edema. We started lasix 1 week ago. She is taking once a day but she is still having swelling and pain. She has noticed increased urination. She denies SOB, HALEY. She is still having the pain in her legs. Worse with walking. US negative for DVT on the right (where she has the greatest pain). She is seeing Dr. Charis lanza (ortho) to discuss her knee pain.     Past Medical History:   Diagnosis Date    Arthritis     Asthma     GERD (gastroesophageal reflux disease)     Hiatal hernia     History of colonoscopy with polypectomy     History of esophagogastroduodenoscopy (EGD)     Hyperlipidemia     Hypertension     Lumbar facet arthropathy        Family History   Problem Relation Age of Onset    Hypertension Mother     Hypertension Father     Parkinsonism Father        Social History     Socioeconomic History    Marital status:      Spouse name: Not on file    Number of children: Not on file    Years of education: Not on file    Highest education level: Not on file   Occupational History    Not on file   Social Needs    Financial resource strain: Not on file    Food insecurity:     Worry: Not on file     Inability: Not on file    Transportation needs:     Medical: Not on file     Non-medical: Not on file   Tobacco Use    Smoking status: Never Smoker    Smokeless tobacco: Never Used   Substance and Sexual Activity    Alcohol use: No    Drug use: No    Sexual activity: Not Currently     Comment:    Lifestyle    Physical activity:     Days per week: Not on file     Minutes per session: Not on file    Stress: Not on file   Relationships    Social connections:     Talks on phone: Not on file     Gets together: Not on file     Attends Pentecostalism service: Not on file     Active member of club or organization:  Not on file     Attends meetings of clubs or organizations: Not on file     Relationship status: Not on file   Other Topics Concern    Not on file   Social History Narrative    Not on file       Review of Systems   Constitutional: Negative for activity change, fatigue, fever and unexpected weight change.   HENT: Negative for congestion, ear pain, hearing loss, rhinorrhea and sore throat.    Eyes: Negative for pain, redness and visual disturbance.   Respiratory: Negative for cough, shortness of breath and wheezing.    Cardiovascular: Positive for leg swelling. Negative for chest pain and palpitations.   Gastrointestinal: Negative for abdominal pain, constipation, diarrhea, nausea and vomiting.   Genitourinary: Negative for dysuria, frequency, pelvic pain and urgency.   Musculoskeletal: Negative for back pain, joint swelling and neck pain.        B/l leg pain from knee to feet   Skin: Negative for color change, rash and wound.   Neurological: Negative for dizziness, tremors, weakness, light-headedness and headaches.         Objective:      Physical Exam   Constitutional: She is oriented to person, place, and time. She appears well-developed and well-nourished. No distress.   HENT:   Head: Normocephalic and atraumatic.   Right Ear: External ear normal.   Left Ear: External ear normal.   Eyes: Pupils are equal, round, and reactive to light. Conjunctivae and EOM are normal. Right eye exhibits no discharge. Left eye exhibits no discharge.   Neck: Neck supple. No tracheal deviation present.   Cardiovascular: Normal rate and regular rhythm.   No murmur heard.  Pulmonary/Chest: Effort normal and breath sounds normal. No respiratory distress. She has no wheezes. She has no rales.   Abdominal: Soft. Bowel sounds are normal. She exhibits no distension. There is no tenderness.   Musculoskeletal: She exhibits edema (2+ b/l pitting edema, tender to touch b/l legs not a/w joint).   Neurological: She is alert and oriented to  person, place, and time. No cranial nerve deficit.   Skin: Skin is warm and dry.   Psychiatric: She has a normal mood and affect. Her behavior is normal.   Vitals reviewed.      Assessment:       1. Bilateral leg edema    2. Bilateral lumbar radiculopathy    3. Morbid obesity with BMI of 50.0-59.9, adult    4. Osteoporosis, unspecified osteoporosis type, unspecified pathological fracture presence        Plan:       Micaela was seen today for follow-up.    Diagnoses and all orders for this visit:    Bilateral leg edema  -     Brain natriuretic peptide; Future  -     Transthoracic Echo (TTE) Complete 2D; Future  No improvement with lasix 20mg  BMP today, if stable increase to 40mg daily  BNP and TTE ordered in abundance of caution to r/o CHF as etiology. She has no other CHF sx such as HALEY, orthopnea, etc  US negative for DVT  I think her edema is the cause of most of her pain but will see Dr. Vizcaino for knees soon    Bilateral lumbar radiculopathy  -     traMADol (ULTRAM) 50 mg tablet; Take 1 tablet (50 mg total) by mouth every 12 (twelve) hours as needed.  Refilled med    Morbid obesity with BMI of 50.0-59.9, adult  -     traMADol (ULTRAM) 50 mg tablet; Take 1 tablet (50 mg total) by mouth every 12 (twelve) hours as needed.  Diet and exercise discussed    Osteoporosis, unspecified osteoporosis type, unspecified pathological fracture presence  -     DXA Bone Density Spine And Hip; Future  Known diagnosis  On prolia per Dr. Clay  Repeat DEXA here and send results to GYN when available      Pre-op eval  Patient has well controlled HTN, HLD, GERD and anxiety. She has no known h/o CAD, CVA/TIA. No CKD, last GFR > 60 on 8/2019. She is on ASA 81mg daily otherwise she is on no blood thinners.      Using Joseph Revised Cardiac Risk Index she is low risk for surgery. From a medical standpoint she can proceed with her scheduled surgery without any further testing.  CAD: no  CHF: no  CVA: no  DM on insulin: no  Cr >2:  no  High risk surgery: no  MET >4: yes  Tobacco: no  EtOH: no     EKG requested by surgery. No acute changes. She can proceed with surgery.     RTC as scheudled and PRN. Will call with labs today

## 2019-08-13 ENCOUNTER — HOSPITAL ENCOUNTER (OUTPATIENT)
Dept: PULMONOLOGY | Facility: HOSPITAL | Age: 64
Discharge: HOME | End: 2019-08-13
Attending: INTERNAL MEDICINE
Payer: COMMERCIAL

## 2019-08-13 DIAGNOSIS — R60.0 BILATERAL LEG EDEMA: ICD-10-CM

## 2019-08-13 LAB
AORTIC ROOT ANNULUS: 3.07 CM
AV INDEX (PROSTH): 0.87
AV MEAN GRADIENT: 2 MMHG
AV PEAK GRADIENT: 4 MMHG
AV VALVE AREA: 3.18 CM2
AV VELOCITY RATIO: 0.86
CV ECHO LV RWT: 0.39 CM
DOP CALC AO PEAK VEL: 0.99 M/S
DOP CALC AO VTI: 24.28 CM
DOP CALC LVOT AREA: 3.7 CM2
DOP CALC LVOT DIAMETER: 2.16 CM
DOP CALC LVOT PEAK VEL: 0.85 M/S
DOP CALC LVOT STROKE VOLUME: 77.24 CM3
DOP CALCLVOT PEAK VEL VTI: 21.09 CM
E WAVE DECELERATION TIME: 178.14 MSEC
E/A RATIO: 0.81
ECHO LV POSTERIOR WALL: 0.96 CM (ref 0.6–1.1)
FRACTIONAL SHORTENING: 24 % (ref 28–44)
INTERVENTRICULAR SEPTUM: 1.15 CM (ref 0.6–1.1)
IVRT: 0.12 MSEC
LA MAJOR: 4.29 CM
LA MINOR: 4.58 CM
LA WIDTH: 3.81 CM
LEFT ATRIUM SIZE: 3.75 CM
LEFT ATRIUM VOLUME: 53.8 CM3
LEFT INTERNAL DIMENSION IN SYSTOLE: 3.72 CM (ref 2.1–4)
LEFT VENTRICLE DIASTOLIC VOLUME: 111.02 ML
LEFT VENTRICLE SYSTOLIC VOLUME: 58.74 ML
LEFT VENTRICULAR INTERNAL DIMENSION IN DIASTOLE: 4.87 CM (ref 3.5–6)
LEFT VENTRICULAR MASS: 187.44 G
MV PEAK A VEL: 1.16 M/S
MV PEAK E VEL: 0.94 M/S
PULM VEIN S/D RATIO: 1.4
PV PEAK D VEL: 0.48 M/S
PV PEAK S VEL: 0.67 M/S
PV PEAK VELOCITY: 0.81 CM/S
RA MAJOR: 3.59 CM
RA PRESSURE: 8 MMHG
RIGHT VENTRICULAR END-DIASTOLIC DIMENSION: 4.05 CM
STJ: 2.75 CM

## 2019-08-13 PROCEDURE — 93306 TTE W/DOPPLER COMPLETE: CPT

## 2019-08-13 PROCEDURE — 93306 TRANSTHORACIC ECHO (TTE) COMPLETE (CUPID ONLY): ICD-10-PCS | Mod: 26,,, | Performed by: INTERNAL MEDICINE

## 2019-08-13 PROCEDURE — 93306 TTE W/DOPPLER COMPLETE: CPT | Mod: 26,,, | Performed by: INTERNAL MEDICINE

## 2019-08-20 ENCOUNTER — PATIENT MESSAGE (OUTPATIENT)
Dept: INTERNAL MEDICINE | Facility: CLINIC | Age: 64
End: 2019-08-20

## 2019-08-20 DIAGNOSIS — M54.16 BILATERAL LUMBAR RADICULOPATHY: ICD-10-CM

## 2019-08-20 DIAGNOSIS — Z01.818 PRE-OP EVALUATION: Primary | ICD-10-CM

## 2019-08-20 DIAGNOSIS — E66.01 MORBID OBESITY WITH BMI OF 50.0-59.9, ADULT: ICD-10-CM

## 2019-08-20 RX ORDER — TRAMADOL HYDROCHLORIDE 50 MG/1
50 TABLET ORAL EVERY 12 HOURS PRN
Qty: 30 TABLET | Refills: 0 | Status: SHIPPED | OUTPATIENT
Start: 2019-08-20 | End: 2019-10-14 | Stop reason: SDUPTHER

## 2019-08-20 NOTE — TELEPHONE ENCOUNTER
Patient has well controlled HTN, HLD, GERD and anxiety. She has no known h/o CAD, CVA/TIA. No CKD, last GFR > 60 on 8/2019. She is on ASA 81mg daily otherwise she is on no blood thinners.     Using Joseph Revised Cardiac Risk Index she is low risk for surgery. From a medical standpoint she can proceed with her scheduled surgery without any further testing.  CAD: no  CHF: no  CVA: no  DM on insulin: no  Cr >2: no  High risk surgery: no  MET >4: yes  Tobacco: no  EtOH: no    EKG requested by surgery so ordered today. Can send results when available. She is to hold ASA 5 days prior to surgery and all other NSAIDs. She can continue all other medications in the perioperative period.

## 2019-08-20 NOTE — TELEPHONE ENCOUNTER
Pt saw you on 08/12/2019. Surgery not scheduled. Dr. Vizcaino said she needs surgery clearance. Do you need to see pt?

## 2019-08-21 ENCOUNTER — HOSPITAL ENCOUNTER (OUTPATIENT)
Dept: RADIOLOGY | Facility: HOSPITAL | Age: 64
Discharge: HOME | End: 2019-08-21
Attending: INTERNAL MEDICINE
Payer: COMMERCIAL

## 2019-08-21 ENCOUNTER — HOSPITAL ENCOUNTER (OUTPATIENT)
Dept: PULMONOLOGY | Facility: HOSPITAL | Age: 64
Discharge: HOME | End: 2019-08-21
Attending: INTERNAL MEDICINE
Payer: COMMERCIAL

## 2019-08-21 DIAGNOSIS — M81.0 OSTEOPOROSIS, UNSPECIFIED OSTEOPOROSIS TYPE, UNSPECIFIED PATHOLOGICAL FRACTURE PRESENCE: ICD-10-CM

## 2019-08-21 DIAGNOSIS — Z01.818 PRE-OP EVALUATION: ICD-10-CM

## 2019-08-21 PROCEDURE — 77080 DXA BONE DENSITY AXIAL: CPT | Mod: TC

## 2019-08-21 PROCEDURE — 93010 ELECTROCARDIOGRAM REPORT: CPT | Mod: ,,, | Performed by: INTERNAL MEDICINE

## 2019-08-21 PROCEDURE — 93010 EKG 12-LEAD: ICD-10-PCS | Mod: ,,, | Performed by: INTERNAL MEDICINE

## 2019-08-21 PROCEDURE — 93005 ELECTROCARDIOGRAM TRACING: CPT

## 2019-08-26 ENCOUNTER — TELEPHONE (OUTPATIENT)
Dept: INTERNAL MEDICINE | Facility: CLINIC | Age: 64
End: 2019-08-26

## 2019-08-26 NOTE — TELEPHONE ENCOUNTER
Can you please addend the patients notes and put that she is cleared for surgery with Dr. Vizcaino.

## 2019-09-04 ENCOUNTER — TELEPHONE (OUTPATIENT)
Dept: INTERNAL MEDICINE | Facility: CLINIC | Age: 64
End: 2019-09-04

## 2019-09-04 NOTE — TELEPHONE ENCOUNTER
----- Message from Bethany Schultz sent at 2019 12:38 PM CDT -----  Contact: Shannon Healy / Billy Vizcaino MD office   Micaela Arvizu  MRN: [id number]  : 1955  PCP: Kinga Robles  Home Phone      [phone]  Work Phone      Not on file.  Mobile          [phone]    MESSAGE:   The office is requesting chest x ray, lab work results, EKG, and clearance report for surgery. The patient is scheduled for a right total knee replacement on 19.    Phone # [phone]  Fax #  [phone]    Pharmacy - [facility] [address] [place] [address]

## 2019-09-13 ENCOUNTER — TELEPHONE (OUTPATIENT)
Dept: INTERNAL MEDICINE | Facility: CLINIC | Age: 64
End: 2019-09-13

## 2019-09-13 ENCOUNTER — PATIENT OUTREACH (OUTPATIENT)
Dept: ADMINISTRATIVE | Facility: HOSPITAL | Age: 64
End: 2019-09-13

## 2019-09-13 RX ORDER — FUROSEMIDE 20 MG/1
40 TABLET ORAL DAILY
Qty: 60 TABLET | Refills: 11 | Status: SHIPPED | OUTPATIENT
Start: 2019-09-13 | End: 2019-10-14 | Stop reason: SDUPTHER

## 2019-09-13 NOTE — TELEPHONE ENCOUNTER
----- Message from Ashley Liu sent at 2019  1:51 PM CDT -----  Contact: Marlon Arvizu  MRN: [id number]  : 1955  PCP: Kinga Robles  Home Phone      [phone]  Work Phone      Not on file.  Mobile          [phone]      MESSAGE:   [facility] is calling to inform Doc that they admitted her to home health, the patient had a right knee replacement, [facility] would like medication verification for the patient, please advise.    Phone:  [phone]

## 2019-09-13 NOTE — TELEPHONE ENCOUNTER
Informed [facility] that we will send in lasix 40mg. Also informed that Dr Robles has never prescribed klonopin for pt and she will not be sending that in.

## 2019-09-26 DIAGNOSIS — I10 ESSENTIAL HYPERTENSION: ICD-10-CM

## 2019-09-26 DIAGNOSIS — E78.00 HYPERCHOLESTEREMIA: ICD-10-CM

## 2019-09-27 RX ORDER — LISINOPRIL AND HYDROCHLOROTHIAZIDE 10; 12.5 MG/1; MG/1
TABLET ORAL
Qty: 90 TABLET | Refills: 4 | Status: SHIPPED | OUTPATIENT
Start: 2019-09-27 | End: 2020-10-19

## 2019-09-27 RX ORDER — ATORVASTATIN CALCIUM 10 MG/1
TABLET, FILM COATED ORAL
Qty: 90 TABLET | Refills: 4 | Status: SHIPPED | OUTPATIENT
Start: 2019-09-27 | End: 2020-10-19

## 2019-10-07 ENCOUNTER — CLINICAL SUPPORT (OUTPATIENT)
Dept: INTERNAL MEDICINE | Facility: CLINIC | Age: 64
End: 2019-10-07
Payer: COMMERCIAL

## 2019-10-07 DIAGNOSIS — R73.01 IFG (IMPAIRED FASTING GLUCOSE): ICD-10-CM

## 2019-10-07 DIAGNOSIS — E66.01 MORBID OBESITY WITH BMI OF 50.0-59.9, ADULT: ICD-10-CM

## 2019-10-07 LAB
ALBUMIN SERPL BCP-MCNC: 3.1 G/DL (ref 3.5–5.2)
ALP SERPL-CCNC: 96 U/L (ref 55–135)
ALT SERPL W/O P-5'-P-CCNC: 15 U/L (ref 10–44)
ANION GAP SERPL CALC-SCNC: 9 MMOL/L (ref 8–16)
AST SERPL-CCNC: 13 U/L (ref 10–40)
BASOPHILS # BLD AUTO: 0.03 K/UL (ref 0–0.2)
BASOPHILS NFR BLD: 0.4 % (ref 0–1.9)
BILIRUB SERPL-MCNC: 0.5 MG/DL (ref 0.1–1)
BUN SERPL-MCNC: 12 MG/DL (ref 8–23)
CALCIUM SERPL-MCNC: 9.1 MG/DL (ref 8.7–10.5)
CHLORIDE SERPL-SCNC: 104 MMOL/L (ref 95–110)
CHOLEST SERPL-MCNC: 139 MG/DL (ref 120–199)
CHOLEST/HDLC SERPL: 2.5 {RATIO} (ref 2–5)
CO2 SERPL-SCNC: 26 MMOL/L (ref 23–29)
CREAT SERPL-MCNC: 0.6 MG/DL (ref 0.5–1.4)
DIFFERENTIAL METHOD: ABNORMAL
EOSINOPHIL # BLD AUTO: 0.3 K/UL (ref 0–0.5)
EOSINOPHIL NFR BLD: 4.2 % (ref 0–8)
ERYTHROCYTE [DISTWIDTH] IN BLOOD BY AUTOMATED COUNT: 19.7 % (ref 11.5–14.5)
EST. GFR  (AFRICAN AMERICAN): >60 ML/MIN/1.73 M^2
EST. GFR  (NON AFRICAN AMERICAN): >60 ML/MIN/1.73 M^2
ESTIMATED AVG GLUCOSE: 140 MG/DL (ref 68–131)
GLUCOSE SERPL-MCNC: 145 MG/DL (ref 70–110)
HBA1C MFR BLD HPLC: 6.5 % (ref 4–5.6)
HCT VFR BLD AUTO: 34.4 % (ref 37–48.5)
HDLC SERPL-MCNC: 56 MG/DL (ref 40–75)
HDLC SERPL: 40.3 % (ref 20–50)
HGB BLD-MCNC: 9.8 G/DL (ref 12–16)
IMM GRANULOCYTES # BLD AUTO: 0.02 K/UL (ref 0–0.04)
IMM GRANULOCYTES NFR BLD AUTO: 0.3 % (ref 0–0.5)
LDLC SERPL CALC-MCNC: 57.6 MG/DL (ref 63–159)
LYMPHOCYTES # BLD AUTO: 1.3 K/UL (ref 1–4.8)
LYMPHOCYTES NFR BLD: 19.3 % (ref 18–48)
MCH RBC QN AUTO: 21.4 PG (ref 27–31)
MCHC RBC AUTO-ENTMCNC: 28.5 G/DL (ref 32–36)
MCV RBC AUTO: 75 FL (ref 82–98)
MONOCYTES # BLD AUTO: 0.5 K/UL (ref 0.3–1)
MONOCYTES NFR BLD: 8.1 % (ref 4–15)
NEUTROPHILS # BLD AUTO: 4.5 K/UL (ref 1.8–7.7)
NEUTROPHILS NFR BLD: 67.7 % (ref 38–73)
NONHDLC SERPL-MCNC: 83 MG/DL
NRBC BLD-RTO: 0 /100 WBC
PLATELET # BLD AUTO: 340 K/UL (ref 150–350)
PMV BLD AUTO: 8.1 FL (ref 9.2–12.9)
POTASSIUM SERPL-SCNC: 4.3 MMOL/L (ref 3.5–5.1)
PROT SERPL-MCNC: 7 G/DL (ref 6–8.4)
RBC # BLD AUTO: 4.58 M/UL (ref 4–5.4)
SODIUM SERPL-SCNC: 139 MMOL/L (ref 136–145)
TRIGL SERPL-MCNC: 127 MG/DL (ref 30–150)
WBC # BLD AUTO: 6.67 K/UL (ref 3.9–12.7)

## 2019-10-07 PROCEDURE — 80061 LIPID PANEL: CPT

## 2019-10-07 PROCEDURE — 83036 HEMOGLOBIN GLYCOSYLATED A1C: CPT

## 2019-10-07 PROCEDURE — 80053 COMPREHEN METABOLIC PANEL: CPT

## 2019-10-07 PROCEDURE — 85025 COMPLETE CBC W/AUTO DIFF WBC: CPT

## 2019-10-07 PROCEDURE — 36415 COLL VENOUS BLD VENIPUNCTURE: CPT | Mod: S$GLB,,, | Performed by: INTERNAL MEDICINE

## 2019-10-07 PROCEDURE — 36415 PR COLLECTION VENOUS BLOOD,VENIPUNCTURE: ICD-10-PCS | Mod: S$GLB,,, | Performed by: INTERNAL MEDICINE

## 2019-10-14 ENCOUNTER — TELEPHONE (OUTPATIENT)
Dept: INTERNAL MEDICINE | Facility: CLINIC | Age: 64
End: 2019-10-14

## 2019-10-14 ENCOUNTER — OFFICE VISIT (OUTPATIENT)
Dept: INTERNAL MEDICINE | Facility: CLINIC | Age: 64
End: 2019-10-14
Payer: COMMERCIAL

## 2019-10-14 VITALS
HEART RATE: 78 BPM | HEIGHT: 58 IN | RESPIRATION RATE: 16 BRPM | DIASTOLIC BLOOD PRESSURE: 80 MMHG | BODY MASS INDEX: 56.69 KG/M2 | WEIGHT: 270.06 LBS | SYSTOLIC BLOOD PRESSURE: 120 MMHG

## 2019-10-14 DIAGNOSIS — M54.16 BILATERAL LUMBAR RADICULOPATHY: ICD-10-CM

## 2019-10-14 DIAGNOSIS — E78.00 HYPERCHOLESTEREMIA: ICD-10-CM

## 2019-10-14 DIAGNOSIS — K64.9 HEMORRHOIDS, UNSPECIFIED HEMORRHOID TYPE: ICD-10-CM

## 2019-10-14 DIAGNOSIS — F41.1 GAD (GENERALIZED ANXIETY DISORDER): ICD-10-CM

## 2019-10-14 DIAGNOSIS — R73.01 IFG (IMPAIRED FASTING GLUCOSE): Primary | ICD-10-CM

## 2019-10-14 DIAGNOSIS — E66.01 MORBID OBESITY WITH BMI OF 50.0-59.9, ADULT: ICD-10-CM

## 2019-10-14 DIAGNOSIS — I10 ESSENTIAL HYPERTENSION: ICD-10-CM

## 2019-10-14 DIAGNOSIS — I87.2 VENOUS INSUFFICIENCY OF BOTH LOWER EXTREMITIES: ICD-10-CM

## 2019-10-14 PROCEDURE — 99999 PR PBB SHADOW E&M-EST. PATIENT-LVL III: ICD-10-PCS | Mod: PBBFAC,,, | Performed by: INTERNAL MEDICINE

## 2019-10-14 PROCEDURE — 90686 FLU VACCINE (QUAD) GREATER THAN OR EQUAL TO 3YO PRESERVATIVE FREE IM: ICD-10-PCS | Mod: S$GLB,,, | Performed by: INTERNAL MEDICINE

## 2019-10-14 PROCEDURE — 3079F DIAST BP 80-89 MM HG: CPT | Mod: CPTII,S$GLB,, | Performed by: INTERNAL MEDICINE

## 2019-10-14 PROCEDURE — 90471 FLU VACCINE (QUAD) GREATER THAN OR EQUAL TO 3YO PRESERVATIVE FREE IM: ICD-10-PCS | Mod: S$GLB,,, | Performed by: INTERNAL MEDICINE

## 2019-10-14 PROCEDURE — 3074F PR MOST RECENT SYSTOLIC BLOOD PRESSURE < 130 MM HG: ICD-10-PCS | Mod: CPTII,S$GLB,, | Performed by: INTERNAL MEDICINE

## 2019-10-14 PROCEDURE — 3008F PR BODY MASS INDEX (BMI) DOCUMENTED: ICD-10-PCS | Mod: CPTII,S$GLB,, | Performed by: INTERNAL MEDICINE

## 2019-10-14 PROCEDURE — 99999 PR PBB SHADOW E&M-EST. PATIENT-LVL III: CPT | Mod: PBBFAC,,, | Performed by: INTERNAL MEDICINE

## 2019-10-14 PROCEDURE — 90471 IMMUNIZATION ADMIN: CPT | Mod: S$GLB,,, | Performed by: INTERNAL MEDICINE

## 2019-10-14 PROCEDURE — 90686 IIV4 VACC NO PRSV 0.5 ML IM: CPT | Mod: S$GLB,,, | Performed by: INTERNAL MEDICINE

## 2019-10-14 PROCEDURE — 99214 PR OFFICE/OUTPT VISIT, EST, LEVL IV, 30-39 MIN: ICD-10-PCS | Mod: 25,S$GLB,, | Performed by: INTERNAL MEDICINE

## 2019-10-14 PROCEDURE — 99214 OFFICE O/P EST MOD 30 MIN: CPT | Mod: 25,S$GLB,, | Performed by: INTERNAL MEDICINE

## 2019-10-14 PROCEDURE — 3074F SYST BP LT 130 MM HG: CPT | Mod: CPTII,S$GLB,, | Performed by: INTERNAL MEDICINE

## 2019-10-14 PROCEDURE — 3079F PR MOST RECENT DIASTOLIC BLOOD PRESSURE 80-89 MM HG: ICD-10-PCS | Mod: CPTII,S$GLB,, | Performed by: INTERNAL MEDICINE

## 2019-10-14 PROCEDURE — 3008F BODY MASS INDEX DOCD: CPT | Mod: CPTII,S$GLB,, | Performed by: INTERNAL MEDICINE

## 2019-10-14 RX ORDER — RISEDRONATE SODIUM 35 MG/1
TABLET, DELAYED RELEASE ORAL
Refills: 8 | COMMUNITY
Start: 2019-09-30 | End: 2020-05-20

## 2019-10-14 RX ORDER — PANTOPRAZOLE SODIUM 40 MG/1
40 TABLET, DELAYED RELEASE ORAL DAILY
COMMUNITY
Start: 2019-08-27 | End: 2020-05-20

## 2019-10-14 RX ORDER — OXYCODONE AND ACETAMINOPHEN 5; 325 MG/1; MG/1
1 TABLET ORAL
Refills: 0 | COMMUNITY
Start: 2019-09-26 | End: 2019-12-10

## 2019-10-14 RX ORDER — CLONAZEPAM 0.5 MG/1
0.5 TABLET ORAL 2 TIMES DAILY PRN
Qty: 30 TABLET | Refills: 0 | Status: SHIPPED | OUTPATIENT
Start: 2019-10-14 | End: 2019-12-10 | Stop reason: SDUPTHER

## 2019-10-14 RX ORDER — TRAMADOL HYDROCHLORIDE 50 MG/1
50 TABLET ORAL EVERY 12 HOURS PRN
Qty: 60 TABLET | Refills: 0 | Status: SHIPPED | OUTPATIENT
Start: 2019-10-14 | End: 2019-12-10 | Stop reason: SDUPTHER

## 2019-10-14 RX ORDER — SULFAMETHOXAZOLE AND TRIMETHOPRIM 800; 160 MG/1; MG/1
1 TABLET ORAL 2 TIMES DAILY
Refills: 0 | COMMUNITY
Start: 2019-10-07 | End: 2019-10-14

## 2019-10-14 RX ORDER — PRAMOXINE HYDROCHLORIDE 10 MG/G
AEROSOL, FOAM TOPICAL 3 TIMES DAILY PRN
Qty: 2 CAN | Refills: 1 | Status: SHIPPED | OUTPATIENT
Start: 2019-10-14 | End: 2019-12-10

## 2019-10-14 RX ORDER — FUROSEMIDE 20 MG/1
40 TABLET ORAL DAILY
Qty: 180 TABLET | Refills: 3 | Status: SHIPPED | OUTPATIENT
Start: 2019-10-14 | End: 2020-09-28

## 2019-10-14 NOTE — PROGRESS NOTES
"Subjective:       Patient ID: Micaela Arvizu is a 64 y.o. female.    Chief Complaint: Follow-up      HPI:  Patient is known to me and presents for follow up HTN, HLD, osteoporosis, GERD and anxiety. Labs from 10/7/19 were personally reviewed and discussed with the patient today.     Chronic low back pain: Has had MRIs in the past. Has seen Dr. Wolfe for injections She uses tramadol for pain management for her arthrits which is working well. Supplementing with tylenol right now as trying not to use tramadol often.  Had stomach ulcer with NSAIDs.     Had right knee replacement Dr. Vizcaino on 9/11/2019. She is doing PT, healing well. Plans to do left knee once she heals. Weaned off Percocoet, hasn't had filled in 4 weeks.     HTN: on lisinopril-HCTZ, lasix. Home BP < 140/90. Denies chest pains, SOB and LE edema.      HLD: on atorvastatin and fish oil. Denies medicatoin side effects     GERD: taking omeprazole daily. She has h/o of esophageal stricture. Had "stretching" with Dr. Bojorquez. Denies abd pain, n/v/d/c.      Anxiety: on  Klonopin PRN, using half tab every night right now, helping with her RLS symptoms. Since I saaw her last neurolgoy started her on lexapro. This is working well for her. Discussed not using Klonopin often with use of tramadol as well.        Osteoporosis: follows with GYN (lorena) for this. Had DEXA done 3/2018 with GYN which showed improvement. Doing Prolia with her but was too expensive so will discuss with her GYN.      Tremor: dad has Parkinson's disease. Tells me she and her sisters all have this. Sees neurology and dx with essential tremor on propranolol; now off primidone.     Past Medical History:   Diagnosis Date    Arthritis     Asthma     GERD (gastroesophageal reflux disease)     Hiatal hernia     History of colonoscopy with polypectomy     History of esophagogastroduodenoscopy (EGD)     Hyperlipidemia     Hypertension     Lumbar facet arthropathy        Family History "   Problem Relation Age of Onset    Hypertension Mother     Hypertension Father     Parkinsonism Father        Social History     Socioeconomic History    Marital status:      Spouse name: Not on file    Number of children: Not on file    Years of education: Not on file    Highest education level: Not on file   Occupational History    Not on file   Social Needs    Financial resource strain: Not on file    Food insecurity:     Worry: Not on file     Inability: Not on file    Transportation needs:     Medical: Not on file     Non-medical: Not on file   Tobacco Use    Smoking status: Never Smoker    Smokeless tobacco: Never Used   Substance and Sexual Activity    Alcohol use: No    Drug use: No    Sexual activity: Not Currently     Comment:    Lifestyle    Physical activity:     Days per week: Not on file     Minutes per session: Not on file    Stress: Not on file   Relationships    Social connections:     Talks on phone: Not on file     Gets together: Not on file     Attends [employer] service: Not on file     Active member of club or organization: Not on file     Attends meetings of clubs or organizations: Not on file     Relationship status: Not on file   Other Topics Concern    Not on file   Social History Narrative    Not on file       Review of Systems   Constitutional: Negative for activity change, fatigue, fever and unexpected weight change.   HENT: Negative for congestion, ear pain, hearing loss, rhinorrhea and sore throat.    Eyes: Negative for redness and visual disturbance.   Respiratory: Negative for cough, shortness of breath and wheezing.    Cardiovascular: Negative for chest pain, palpitations and leg swelling.   Gastrointestinal: Positive for blood in stool (due to hemorrhoid). Negative for abdominal pain, constipation, diarrhea, nausea and vomiting.   Genitourinary: Negative for dysuria, frequency, pelvic pain and urgency.   Musculoskeletal: Positive for arthralgias  (knees b/l, right knee post op). Negative for back pain, joint swelling and neck pain.   Skin: Negative for color change, rash and wound.   Neurological: Negative for dizziness, tremors, weakness, light-headedness and headaches.         Objective:      Physical Exam   Constitutional: She is oriented to person, place, and time. She appears well-developed and well-nourished. No distress.   HENT:   Head: Normocephalic and atraumatic.   Right Ear: External ear normal.   Left Ear: External ear normal.   Eyes: Pupils are equal, round, and reactive to light. Conjunctivae and EOM are normal. Right eye exhibits no discharge. Left eye exhibits no discharge.   Neck: Neck supple. No tracheal deviation present.   Cardiovascular: Normal rate and regular rhythm.   No murmur heard.  Pulmonary/Chest: Effort normal and breath sounds normal. No respiratory distress. She has no wheezes. She has no rales.   Abdominal: Soft. Bowel sounds are normal. She exhibits no distension. There is no tenderness.   Neurological: She is alert and oriented to person, place, and time. No cranial nerve deficit.   Skin: Skin is warm and dry.   Right knee healed surgical scar     Psychiatric: She has a normal mood and affect. Her behavior is normal.   Vitals reviewed.      Assessment:       1. IFG (impaired fasting glucose)    2. Bilateral lumbar radiculopathy    3. Morbid obesity with BMI of 50.0-59.9, adult    4. Essential hypertension    5. Hypercholesteremia    6. Venous insufficiency of both lower extremities    7. JOHNNIE (generalized anxiety disorder)    8. Hemorrhoids, unspecified hemorrhoid type        Plan:       Micaela was seen today for follow-up.    Diagnoses and all orders for this visit:    IFG (impaired fasting glucose)  -     CBC auto differential; Future  -     Comprehensive metabolic panel; Future  -     TSH; Future  -     Lipid panel; Future  -     Hemoglobin A1c; Future  Chronic worsening  A1C up to 6.5%  Long discussion today about diet,  exercise and weight loss.  Will give her 6 months to bring A1C down without medications but if trending up again will start metformi next visit. Patient agrees with plan    Bilateral lumbar radiculopathy  -     traMADol (ULTRAM) 50 mg tablet; Take 1 tablet (50 mg total) by mouth every 12 (twelve) hours as needed. Greater than 7 days quantity medically necessary  Chronic stable   reviewed; off percocet post op for knee    Morbid obesity with BMI of 50.0-59.9, adult  Chronic stable  Spent > 10 mins on diet and exercise cousneling    Essential hypertension  -     CBC auto differential; Future  -     Comprehensive metabolic panel; Future  -     TSH; Future  -     Lipid panel; Future  -     Hemoglobin A1c; Future  Chronic controlled  Continue medications at same dose  Low Na diet  Exercise, weight loss  Check BP and keep log for next visit    Hypercholesteremia  -     CBC auto differential; Future  -     Comprehensive metabolic panel; Future  -     TSH; Future  -     Lipid panel; Future  -     Hemoglobin A1c; Future  Chronic controlled  Cont statin same dose    Venous insufficiency of both lower extremities  Chronic stable  Cont lasix daily  GFR stable    JOHNNIE (generalized anxiety disorder)  -     clonazePAM (KLONOPIN) 0.5 MG tablet; Take 1 tablet (0.5 mg total) by mouth 2 (two) times daily as needed for Anxiety.  Chronic controlled  Cont lexapro daily  Using klonopin more at night now for RLS symptoms    Hemorrhoids, unspecified hemorrhoid type  -     pramoxine 1 % Foam; Place rectally 3 (three) times daily as needed.  New problem  Worsening  Add colace for occasional bleeding    Other orders  -     furosemide (LASIX) 20 MG tablet; Take 2 tablets (40 mg total) by mouth once daily.    Flu vaccine given today    RTC 6 months with labs and PRN

## 2019-10-14 NOTE — TELEPHONE ENCOUNTER
----- Message from Ashley Liu sent at 10/14/2019  8:53 AM CDT -----  Contact: AnnabelDeb Arvizu  MRN: [id number]  : 1955  PCP: Kinga Robles  Home Phone      [phone]  Work Phone      Not on file.  Mobile          [phone]      MESSAGE:   Pharmacy is calling to clarify an RX.  RX name:   clonazePAM (KLONOPIN) 0.5 MG tablet & traMADol (ULTRAM) 50 mg tablet  What do they need to clarify:  Both can be prescribed  Pharmacy name and location:  [facility] in [place]  Comments:       Phone:  [phone]

## 2019-12-03 ENCOUNTER — OFFICE VISIT (OUTPATIENT)
Dept: NEUROLOGY | Facility: CLINIC | Age: 64
End: 2019-12-03
Payer: COMMERCIAL

## 2019-12-03 VITALS
DIASTOLIC BLOOD PRESSURE: 82 MMHG | WEIGHT: 267.88 LBS | HEART RATE: 62 BPM | RESPIRATION RATE: 14 BRPM | HEIGHT: 58 IN | SYSTOLIC BLOOD PRESSURE: 114 MMHG | BODY MASS INDEX: 56.23 KG/M2

## 2019-12-03 DIAGNOSIS — G25.0 ESSENTIAL TREMOR: ICD-10-CM

## 2019-12-03 DIAGNOSIS — I10 ESSENTIAL HYPERTENSION: ICD-10-CM

## 2019-12-03 DIAGNOSIS — M54.50 LUMBAR PAIN: Primary | ICD-10-CM

## 2019-12-03 DIAGNOSIS — G47.33 OSA (OBSTRUCTIVE SLEEP APNEA): ICD-10-CM

## 2019-12-03 DIAGNOSIS — S32.020A COMPRESSION FRACTURE OF L2 VERTEBRA, INITIAL ENCOUNTER: ICD-10-CM

## 2019-12-03 DIAGNOSIS — E78.00 HYPERCHOLESTEREMIA: ICD-10-CM

## 2019-12-03 DIAGNOSIS — M51.9 LUMBAR DISC DISEASE: ICD-10-CM

## 2019-12-03 DIAGNOSIS — F41.1 GAD (GENERALIZED ANXIETY DISORDER): ICD-10-CM

## 2019-12-03 DIAGNOSIS — E66.01 MORBID OBESITY WITH BMI OF 50.0-59.9, ADULT: ICD-10-CM

## 2019-12-03 DIAGNOSIS — F32.A DEPRESSION, UNSPECIFIED DEPRESSION TYPE: ICD-10-CM

## 2019-12-03 DIAGNOSIS — M81.0 OSTEOPOROSIS, UNSPECIFIED OSTEOPOROSIS TYPE, UNSPECIFIED PATHOLOGICAL FRACTURE PRESENCE: ICD-10-CM

## 2019-12-03 DIAGNOSIS — G24.3 CERVICAL DYSTONIA: ICD-10-CM

## 2019-12-03 PROCEDURE — 99999 PR PBB SHADOW E&M-EST. PATIENT-LVL V: CPT | Mod: PBBFAC,,, | Performed by: NURSE PRACTITIONER

## 2019-12-03 PROCEDURE — 99214 OFFICE O/P EST MOD 30 MIN: CPT | Mod: S$GLB,,, | Performed by: NURSE PRACTITIONER

## 2019-12-03 PROCEDURE — 3008F PR BODY MASS INDEX (BMI) DOCUMENTED: ICD-10-PCS | Mod: CPTII,S$GLB,, | Performed by: NURSE PRACTITIONER

## 2019-12-03 PROCEDURE — 3074F PR MOST RECENT SYSTOLIC BLOOD PRESSURE < 130 MM HG: ICD-10-PCS | Mod: CPTII,S$GLB,, | Performed by: NURSE PRACTITIONER

## 2019-12-03 PROCEDURE — 99999 PR PBB SHADOW E&M-EST. PATIENT-LVL V: ICD-10-PCS | Mod: PBBFAC,,, | Performed by: NURSE PRACTITIONER

## 2019-12-03 PROCEDURE — 3074F SYST BP LT 130 MM HG: CPT | Mod: CPTII,S$GLB,, | Performed by: NURSE PRACTITIONER

## 2019-12-03 PROCEDURE — 3079F PR MOST RECENT DIASTOLIC BLOOD PRESSURE 80-89 MM HG: ICD-10-PCS | Mod: CPTII,S$GLB,, | Performed by: NURSE PRACTITIONER

## 2019-12-03 PROCEDURE — 99214 PR OFFICE/OUTPT VISIT, EST, LEVL IV, 30-39 MIN: ICD-10-PCS | Mod: S$GLB,,, | Performed by: NURSE PRACTITIONER

## 2019-12-03 PROCEDURE — 3079F DIAST BP 80-89 MM HG: CPT | Mod: CPTII,S$GLB,, | Performed by: NURSE PRACTITIONER

## 2019-12-03 PROCEDURE — 3008F BODY MASS INDEX DOCD: CPT | Mod: CPTII,S$GLB,, | Performed by: NURSE PRACTITIONER

## 2019-12-03 RX ORDER — ESCITALOPRAM OXALATE 10 MG/1
10 TABLET ORAL DAILY
Qty: 90 TABLET | Refills: 1 | Status: SHIPPED | OUTPATIENT
Start: 2019-12-03 | End: 2020-05-20

## 2019-12-03 RX ORDER — ESCITALOPRAM OXALATE 10 MG/1
10 TABLET ORAL DAILY
Qty: 30 TABLET | Refills: 5 | Status: SHIPPED | OUTPATIENT
Start: 2019-12-03 | End: 2019-12-03 | Stop reason: SDUPTHER

## 2019-12-03 RX ORDER — PROPRANOLOL HYDROCHLORIDE 80 MG/1
80 TABLET ORAL SEE ADMIN INSTRUCTIONS
Qty: 135 TABLET | Refills: 1 | Status: SHIPPED | OUTPATIENT
Start: 2019-12-03 | End: 2020-07-02 | Stop reason: SDUPTHER

## 2019-12-03 NOTE — PROGRESS NOTES
HPI: Micaela Arvizu is a 64 y.o. female with cervical dystonia with head tremor, multiple level degenerative changes of the L spine, prior lower T spine compression fractures and L2 and L4 compression deformities. She has bilateral  lumbar radicular pain. EMG/NCS of the legs normal 5/2015. She has GERD, HLD, HTN, JOHNNIE, insomnia, osteoporosis, and ROBERTO, as well as a history of asthmatic bronchitis.     She presents today for a follow up visit. An MRI L-spine was ordered at her last visit in 5/2019 due to gait imbalance and worsening lumbar complaints. This was not completed. She was also referred back to pain management at [facility] at that time. She did not proceed with MRI L-spine or pain management referral, due to right hip and right knee pain. She consulted with Ortho for this, and she had a right knee replacement. Her L-spine complaints are much improved, but she does have intermittent worsening. Cold weather improves her lumbar pain. She would like to consult with Dr. Daily with pain management to get established with him, in the event that her L-spine pain worsens. She received lumbar injections with Dr. Wolfe prior, which were helpful.     Her anxiety is much improved lately, and she would like to wean her Lexapro further at this time. She takes Klonopin rarely. Last taken around the time of her right knee replacement.     Head tremor well controlled currently on Propranolol.     She is compliant with CPAP, but has insomnia.     Review of Systems   Constitutional: Negative for fever.   HENT: Negative for nosebleeds.    Eyes: Negative for double vision.   Respiratory: Negative for hemoptysis.    Cardiovascular: Negative for leg swelling.   Gastrointestinal: Negative for blood in stool.   Genitourinary: Negative for hematuria.   Musculoskeletal: Negative for back pain and neck pain.   Skin: Negative for rash.   Neurological: Positive for tremors.   Psychiatric/Behavioral: Negative for depression. The patient is  not nervous/anxious and does not have insomnia.      Exam:  Gen Appearance, well developed/nourished in no apparent distress  CV: 2+ distal pulses with no edema or swelling  Neuro:  MS: Awake, alert, oriented to place, person, time, situation. Sustains attention. Recent/remote memory intact, Language is full to spontaneous speech/comprehension. Fund of Knowledge is full  CN: Optic discs are flat with normal vasculature, PERRL, Extraoccular movements and visual fields are full. Normal facial sensation and strength, Tongue and Palate are midline and strong. Shoulder Shrug symmetric and strong.  Motor: Normal bulk, tone, no abnormal movements in the hands but there is a no-no tremor of the head. 5/5 strength bilateral upper/lower extremities with 1+ reflexes  Sensory: symmetric to temp, and vibration.  Romberg negative  Cerebellar: Finger-nose, Rapid alternating movements intact  Gait: Normal stance, no ataxia    Imagin2015 MRI L spine:   Interval resolution of the edema like signal involving the inferior end plate of L2 which has been replaced with fatty marrow.    Mild spondylosis of the lumbar spine without evidence for significant central canal stenosis or neural foraminal narrowing.    MRI C spine 2016: Multilevel cervical spondylosis with foraminal encroachment greatest from the C3-C5 levels as noted above.    Assessment/Plan: Micaela Arvizu is a 64 y.o. female with multiple level degenerative changes of the L spine, prior lower T spine compression fractures and L2  And L4 compression deformities. She has bilateral  lumbar radicular pain. EMG/NCS of the legs normal 2015. She has essential tremor of the head, which runs in her family. Exam shows essential tremor with cervical dystonia with left torticollis.    I recommend:   1. Continue Lexapro to 10 mg for anxiety and depression. She may wean further down to 5 mg, as her anxiety is further improved. Weaning instructions given. Could not tolerate increase  to 15 mg prior, due to weight gain.   2. S/p right knee replacement surgery. Dietary changes were discussed. Bariatric surgery not covered by her insurance.   3. She rarely uses Klonopin per PCP.   4. Continue Propranolol for essential tremor. She takes 80mg/40mg, as tremor is improved in evening. No worsening of tremor off of Primidone, but tremor much worse when Propranolol was reduced to 40 mg bid. Tremor worse with anxiety, but this is improved lately.    6. Botox was ineffective for cervical dystonia prior.  7. L-spine complaints improved with right TKA, but I will refer her back to pain management at [facility] for establishment should she experience a flare in L-spine pain, which does occur at times, even post TKA.   -Pain management facet injections helped relieve pain by 90%-Refer back to [facility] pain management at this time.   -She is no longer requiring Gabapentin. She also had compression deformities of the L spine, treated by orthopedist.   -The patient is also being treated for osteoporosis.     FU 6 months

## 2019-12-03 NOTE — PATIENT INSTRUCTIONS
If you would like to wean Lexapro further,   Take 3/4 of a 10 mg tablet for 2 weeks, then  Take 1/2 of a 10 mg tablet for 2 weeks.

## 2019-12-08 NOTE — H&P (VIEW-ONLY)
[facility]  New Patient H&P    Referring Provider: Adelina Saavedra, N[address]  [address] [place] [address]    Chief Complaint:   Chief Complaint   Patient presents with    Back Pain     History of Present Illness: Micaela Arvizu is a 64 y.o. female with GERD, HTN, Anxiety, ROBERTO, insomnia, HTN, HLD, cervical dystonia w/ head tremor, osteoporosis, thoracic compression fx's, L2 & L4 compression fx's, referred by Adelina Saavedra for lumbar pain.  She previously saw Dr. Wolfe, last on 7/9/15, and was getting injections that provided benefit (B/L L3-5 MBB w/ steroid).     She states she was having very good relief of her back pain from the previous injection for about 4 years.  She had a right knee replacement with Dr. Vizcaino on 9/11/19 that helped her back pain. Planning on doing left knee eventually, likely next year.     Onset: off and on for years   Location: lower lumbar spine bilaterally and over SIJ  Radiation: down both legs to the feet when she stands.   Timing: intermittent  Quality: Aching and Deep  Exacerbating Factors: sitting, standing for more than 20 minutes and walking for more than 30 minutes  Alleviating Factors: laying down, medications and sitting  Associated Symptoms: denies night fever/night sweats, urinary incontinence, bowel incontinence, significant weight loss, significant motor weakness and loss of sensations     Severity: Currently: 4/10   Typical Range: 4-8/10     Exacerbation: 8/10  Pain Disability Index  Family/Home Responsibilities:: 5  Recreation:: 7  Social Activity:: 7  Occupation:: 0  Sexual Behavior:: 0  Self Care:: 6  Life-Support Activities:: 0  Pain Disability Index (PDI): 25    Previous Interventions:  - 6/19/15: B/L L3-5 MBBs w/ kenalog (Tolba) w/ noted 90% relief    Previous Therapies:  PT/OT: no   Surgery: no   Previous Medications:   - NSAIDS: NSAIDs caused stomach ulcer (per Dr. Robles's notes)   - Muscle Relaxants: Klonopin, Flexeril    - TCAs: None  -  SNRIs: None  - Topicals: None  - Anticonvulsants: None   - Opioids: Tramadol, Oxycodone     Current Pain Medications:  1. Tylenol  2. Tramadol    3. Flexeril    Blood Thinners: ASA 81 mg    Full Medication List:    Current Outpatient Medications:     aspirin 81 MG Chew, Take 81 mg by mouth once daily., Disp: , Rfl:     atorvastatin (LIPITOR) 10 MG tablet, TAKE 1 TABLET DAILY, Disp: 90 tablet, Rfl: 4    cholecalciferol, vitamin D3, (VITAMIN D3) 2,000 unit Cap, Take 2 capsules by mouth once daily. , Disp: , Rfl:     clonazePAM (KLONOPIN) 0.5 MG tablet, Take 1 tablet (0.5 mg total) by mouth 2 (two) times daily as needed for Anxiety., Disp: 30 tablet, Rfl: 0    coenzyme Q10 (CO Q-10) 100 mg capsule, Take 100 mg by mouth once daily., Disp: , Rfl:     colesevelam (WELCHOL) 625 mg tablet, Take 1 tablet (625 mg total) by mouth 2 (two) times daily with meals., Disp: 180 tablet, Rfl: 3    escitalopram oxalate (LEXAPRO) 10 MG tablet, Take 1 tablet (10 mg total) by mouth once daily., Disp: 90 tablet, Rfl: 1    estradiol (DIVIGEL) 0.25 mg/0.25 gram (0.1 %) GlPk, Place onto the skin once daily. , Disp: , Rfl:     furosemide (LASIX) 20 MG tablet, Take 2 tablets (40 mg total) by mouth once daily., Disp: 180 tablet, Rfl: 3    GLUC BALL/CHONDRO BALL A/VIT C/MN (GLUCOSAMINE 1500 COMPLEX ORAL), Take 1 tablet by mouth 2 (two) times daily., Disp: , Rfl:     lisinopril-hydrochlorothiazide (PRINZIDE,ZESTORETIC) 10-12.5 mg per tablet, TAKE 1 TABLET DAILY, Disp: 90 tablet, Rfl: 4    multivitamin capsule, Take 1 capsule by mouth once daily.  , Disp: , Rfl:     omega-3 fatty acids-vitamin E (FISH OIL) 1,000 mg Cap, Take 2 tablets by mouth once daily.  , Disp: , Rfl:     omeprazole (PRILOSEC) 20 MG capsule, TAKE 1 CAPSULE DAILY, Disp: 90 capsule, Rfl: 2    pantoprazole (PROTONIX) 40 MG tablet, Take 40 mg by mouth once daily. , Disp: , Rfl:     progesterone (PROMETRIUM) 100 MG capsule, Take 100 mg by mouth once daily., Disp: , Rfl:      propranolol (INDERAL) 80 MG tablet, Take 1 tablet (80 mg total) by mouth As instructed. Take 1 tablet in morning and 1/2 tablet at night., Disp: 135 tablet, Rfl: 1    risedronate 35 mg TbEC, TAKE 1 TABLET BY MOUTH ONCE A WEEK FOR 30 DAYS, Disp: , Rfl: 8    sucralfate (CARAFATE) 1 gram tablet, Take 1 g by mouth 2 (two) times daily. , Disp: , Rfl: 1    traMADol (ULTRAM) 50 mg tablet, Take 1 tablet (50 mg total) by mouth every 12 (twelve) hours as needed. Greater than 7 days quantity medically necessary, Disp: 60 tablet, Rfl: 0    oxyCODONE-acetaminophen (PERCOCET) 5-325 mg per tablet, Take 1 tablet by mouth every 4 to 6 hours as needed., Disp: , Rfl: 0    pramoxine 1 % Foam, Place rectally 3 (three) times daily as needed. (Patient not taking: Reported on 12/9/2019), Disp: 2 Can, Rfl: 1     Review of Systems:  Review of Systems   Constitutional: Negative for fever and weight loss.   HENT: Negative for ear pain and tinnitus.    Eyes: Negative for pain and redness.   Respiratory: Negative for cough and shortness of breath.    Cardiovascular: Negative for chest pain and palpitations.   Gastrointestinal: Negative for constipation and heartburn.   Genitourinary: Negative.         Denies urinary incontinence. Denies urine retention.    Musculoskeletal: Positive for back pain. Negative for neck pain.   Skin: Negative for itching and rash.   Neurological: Negative for tingling, focal weakness and seizures.   Endo/Heme/Allergies: Negative for environmental allergies. Does not bruise/bleed easily.   Psychiatric/Behavioral: Negative for depression. The patient has insomnia. The patient is not nervous/anxious.        Allergies:  No known allergies     Medical History:   has a past medical history of Arthritis, Asthma, GERD (gastroesophageal reflux disease), Hiatal hernia, History of colonoscopy with polypectomy, History of esophagogastroduodenoscopy (EGD), Hyperlipidemia, Hypertension, and Lumbar facet  "arthropathy.    Surgical History:   has a past surgical history that includes Cholecystectomy (2012); Colonoscopy w/ biopsies and polypectomy (2000; 2002; 2007; 2/2012); and Total knee arthroplasty (09/11/2019).    Family History:  family history includes Hypertension in her father and mother; Parkinsonism in her father.    Social History:   reports that she has never smoked. She has never used smokeless tobacco. She reports that she does not drink alcohol or use drugs.    Physical Exam:  /66   Pulse 66   Resp 18   Ht 4' 10" (1.473 m)   Wt 120.4 kg (265 lb 6.9 oz)   SpO2 98%   BMI 55.48 kg/m²   GEN: No acute distress. Calm, comfortable  HENT: Normocephalic, atraumatic, moist mucous membranes  EYE: Anicteric sclera, non-injected.   CV: Non-diaphoretic. Regular Rate. Radial Pulses 2+.  RESP: Breathing comfortably. Chest expansion symmetric.  EXT: No clubbing, cyanosis.   SKIN: Warm, & dry to palpation. No visible rashes or lesions of exposed skin.   PSYCH: Pleasant mood and appropriate affect. Recent and remote memory intact.   GAIT: Independent, normal ambulation  Lumbar Spine Exam:       Inspection: No erythema, bruising. No surgical incisions      Palpation: (+) TTP of lumbar paraspinals & SIJ      ROM:  Limited in flexion, extension, lateral bending.       (+) Facet loading bilaterally      (-) Straight Leg Raise bilaterally      (-) EDVIN bilaterally  Hip Exam:      Inspection: No gross deformity or apparent leg length discrepancy      Palpation: (+) TTP to bilateral greater trochanteric bursas      ROM: Slight limitation in internal rotation, external rotation on the right  Neurologic Exam:     Alert. Speech is fluent and appropriate.     Strength: 5/5 throughout bilateral lower extremities     Sensation: Grossly intact to light touch in bilateral lower extremities     Reflexes: 1+ in b/l patella, achilles     Tone: No abnormality appreciated in bilateral lower extremities     No Clonus     Downgoing " toes on plantar stimulation     (-) Chawla bilaterally    Imaging:  - MRI L-spine 6/3/15:  As compared with the previous examination, the edema like signal at the inferior end plate of L2 has resolved, with Modic type II changes noted.  The edema-like signal appears to have been related to a Schmorl's node of the inferior end plate of L2.  Compression deformity at T11 and T12 is again noted.  The marrow signal is normal without evidence for a marrow replacement process, infection or tumor.  There is minimal fluid in the facet joints on the left at the L4-5 level.  There is disk desiccation throughout the lumbar spine with disk space narrowing.  The conus terminates at T12-L1.  The incidentally visualized soft tissues structures of the abdomen are within normal limits.  At T12-L1, no disk herniation, central canal stenosis or neural foraminal narrowing.  At L1-2, there is a broad-based posterior disk bulge with a superimposed focal central disk protrusion contributing to a most mild central canal stenosis and mild bilateral neural foraminal narrowing.  At L2-3, there is a broad-based posterior disk bulge extends into both neural foramina without significant central stenosis or neural foraminal narrowing.  At L3-4, there is a broad-based posterior disk bulge, ligamentum flavum hypertrophy and facet arthropathy without significant central canal stenosis or neural foraminal narrowing.  At L4-5, there is a broad-based posterior disk bulge ligamentum flavum hypertrophy and facet arthropathy without significant central canal stenosis or neural foraminal narrowing.  There is fluid in the facet joint on the left.  At L5-S1, there is a broad-based posterior disk bulge that extends into both neural foramina with a superimposed central disk protrusion with an annular fissure.  There is also bilateral facet arthropathy.  There is no evidence for significant central   canal stenosis or neural foraminal  narrowing    Labs:  BMP  Lab Results   Component Value Date     10/07/2019    K 4.3 10/07/2019     10/07/2019    CO2 26 10/07/2019    BUN 12 10/07/2019    CREATININE 0.6 10/07/2019    CALCIUM 9.1 10/07/2019    ANIONGAP 9 10/07/2019    ESTGFRAFRICA >60 10/07/2019    EGFRNONAA >60 10/07/2019     Lab Results   Component Value Date    ALT 15 10/07/2019    AST 13 10/07/2019    ALKPHOS 96 10/07/2019    BILITOT 0.5 10/07/2019     Lab Results   Component Value Date     10/07/2019       Assessment:  Micaela Arvizu is a 64 y.o. female with the following diagnoses based on history, exam, and imaging:    Problem List Items Addressed This Visit        Psychiatric    JOHNNIE (generalized anxiety disorder)    Depression       Orthopedic    Lumbalgia    Relevant Orders    Ambulatory consult to Physical Therapy      Other Visit Diagnoses     Lumbar spondylosis    -  Primary    Relevant Orders    Ambulatory consult to Physical Therapy    DDD (degenerative disc disease), lumbar        Relevant Orders    Ambulatory consult to Physical Therapy    Spinal stenosis of lumbar region with neurogenic claudication        Relevant Orders    Ambulatory consult to Physical Therapy          This is a pleasant 64 y.o. lady with chronic low back pain complicated by depression and anxiety that appears multifactorial in nature.  She had previous lumbar medial branch blocks with steroid which provided excellent relief for years.  She does have facetogenic aspects to her pain, but she also has features of lumbar spinal stenosis with neurogenic claudication, and myofascial features.    Treatment Plan: I discussed with the patient the following assessment and recommendations. The following is the plan the patient agreed upon:  - PT/OT/HEP: Refer to PT, external.  - Procedures: Schedule for b/l L4/5, L5/S1 MBBs with steroid. I have explained the risks, benefits, and alternatives of the procedure in detail. The patient voices understanding and  all questions have been answered. The patient agrees to proceed as planned.  - Medications: No changes recommended at this time.  - Imaging: Reviewed. MRI L-spine ordered by Neurology, will assist getting her scheduled for this  - Labs: Reviewed.  Medications are appropriately dosed for current hepatorenal function.    Follow Up: RTC after procedure or as needed.     Deena Daily M.D.  Interventional Pain Medicine / Physical Medicine & Rehabilitation    Disclaimer: This note was partly generated using dictation software which may occasionally result in transcription errors.

## 2019-12-08 NOTE — PROGRESS NOTES
[facility]  New Patient H&P    Referring Provider: Adelina Saavedra, Np  [address] [place] [address]    Chief Complaint:   Chief Complaint   Patient presents with    Back Pain     History of Present Illness: Micaela Arvizu is a 64 y.o. female with GERD, HTN, Anxiety, ROBERTO, insomnia, HTN, HLD, cervical dystonia w/ head tremor, osteoporosis, thoracic compression fx's, L2 & L4 compression fx's, referred by Adelina Saavedra for lumbar pain.  She previously saw Dr. Wolfe, last on 7/9/15, and was getting injections that provided benefit (B/L L3-5 MBB w/ steroid).     She states she was having very good relief of her back pain from the previous injection for about 4 years.  She had a right knee replacement with Dr. Vizcaino on 9/11/19 that helped her back pain. Planning on doing left knee eventually, likely next year.     Onset: off and on for years   Location: lower lumbar spine bilaterally and over SIJ  Radiation: down both legs to the feet when she stands.   Timing: intermittent  Quality: Aching and Deep  Exacerbating Factors: sitting, standing for more than 20 minutes and walking for more than 30 minutes  Alleviating Factors: laying down, medications and sitting  Associated Symptoms: denies night fever/night sweats, urinary incontinence, bowel incontinence, significant weight loss, significant motor weakness and loss of sensations     Severity: Currently: 4/10   Typical Range: 4-8/10     Exacerbation: 8/10  Pain Disability Index  Family/Home Responsibilities:: 5  Recreation:: 7  Social Activity:: 7  Occupation:: 0  Sexual Behavior:: 0  Self Care:: 6  Life-Support Activities:: 0  Pain Disability Index (PDI): 25    Previous Interventions:  - 6/19/15: B/L L3-5 MBBs w/ kenalog (Tolba) w/ noted 90% relief    Previous Therapies:  PT/OT: no   Surgery: no   Previous Medications:   - NSAIDS: NSAIDs caused stomach ulcer (per Dr. Robles's notes)   - Muscle Relaxants: Klonopin, Flexeril    - TCAs: None  -  SNRIs: None  - Topicals: None  - Anticonvulsants: None   - Opioids: Tramadol, Oxycodone     Current Pain Medications:  1. Tylenol  2. Tramadol    3. Flexeril    Blood Thinners: ASA 81 mg    Full Medication List:    Current Outpatient Medications:     aspirin 81 MG Chew, Take 81 mg by mouth once daily., Disp: , Rfl:     atorvastatin (LIPITOR) 10 MG tablet, TAKE 1 TABLET DAILY, Disp: 90 tablet, Rfl: 4    cholecalciferol, vitamin D3, (VITAMIN D3) 2,000 unit Cap, Take 2 capsules by mouth once daily. , Disp: , Rfl:     clonazePAM (KLONOPIN) 0.5 MG tablet, Take 1 tablet (0.5 mg total) by mouth 2 (two) times daily as needed for Anxiety., Disp: 30 tablet, Rfl: 0    coenzyme Q10 (CO Q-10) 100 mg capsule, Take 100 mg by mouth once daily., Disp: , Rfl:     colesevelam (WELCHOL) 625 mg tablet, Take 1 tablet (625 mg total) by mouth 2 (two) times daily with meals., Disp: 180 tablet, Rfl: 3    escitalopram oxalate (LEXAPRO) 10 MG tablet, Take 1 tablet (10 mg total) by mouth once daily., Disp: 90 tablet, Rfl: 1    estradiol (DIVIGEL) 0.25 mg/0.25 gram (0.1 %) GlPk, Place onto the skin once daily. , Disp: , Rfl:     furosemide (LASIX) 20 MG tablet, Take 2 tablets (40 mg total) by mouth once daily., Disp: 180 tablet, Rfl: 3    GLUC BALL/CHONDRO BALL A/VIT C/MN (GLUCOSAMINE 1500 COMPLEX ORAL), Take 1 tablet by mouth 2 (two) times daily., Disp: , Rfl:     lisinopril-hydrochlorothiazide (PRINZIDE,ZESTORETIC) 10-12.5 mg per tablet, TAKE 1 TABLET DAILY, Disp: 90 tablet, Rfl: 4    multivitamin capsule, Take 1 capsule by mouth once daily.  , Disp: , Rfl:     omega-3 fatty acids-vitamin E (FISH OIL) 1,000 mg Cap, Take 2 tablets by mouth once daily.  , Disp: , Rfl:     omeprazole (PRILOSEC) 20 MG capsule, TAKE 1 CAPSULE DAILY, Disp: 90 capsule, Rfl: 2    pantoprazole (PROTONIX) 40 MG tablet, Take 40 mg by mouth once daily. , Disp: , Rfl:     progesterone (PROMETRIUM) 100 MG capsule, Take 100 mg by mouth once daily., Disp: , Rfl:      propranolol (INDERAL) 80 MG tablet, Take 1 tablet (80 mg total) by mouth As instructed. Take 1 tablet in morning and 1/2 tablet at night., Disp: 135 tablet, Rfl: 1    risedronate 35 mg TbEC, TAKE 1 TABLET BY MOUTH ONCE A WEEK FOR 30 DAYS, Disp: , Rfl: 8    sucralfate (CARAFATE) 1 gram tablet, Take 1 g by mouth 2 (two) times daily. , Disp: , Rfl: 1    traMADol (ULTRAM) 50 mg tablet, Take 1 tablet (50 mg total) by mouth every 12 (twelve) hours as needed. Greater than 7 days quantity medically necessary, Disp: 60 tablet, Rfl: 0    oxyCODONE-acetaminophen (PERCOCET) 5-325 mg per tablet, Take 1 tablet by mouth every 4 to 6 hours as needed., Disp: , Rfl: 0    pramoxine 1 % Foam, Place rectally 3 (three) times daily as needed. (Patient not taking: Reported on 12/9/2019), Disp: 2 Can, Rfl: 1     Review of Systems:  Review of Systems   Constitutional: Negative for fever and weight loss.   HENT: Negative for ear pain and tinnitus.    Eyes: Negative for pain and redness.   Respiratory: Negative for cough and shortness of breath.    Cardiovascular: Negative for chest pain and palpitations.   Gastrointestinal: Negative for constipation and heartburn.   Genitourinary: Negative.         Denies urinary incontinence. Denies urine retention.    Musculoskeletal: Positive for back pain. Negative for neck pain.   Skin: Negative for itching and rash.   Neurological: Negative for tingling, focal weakness and seizures.   Endo/Heme/Allergies: Negative for environmental allergies. Does not bruise/bleed easily.   Psychiatric/Behavioral: Negative for depression. The patient has insomnia. The patient is not nervous/anxious.        Allergies:  No known allergies     Medical History:   has a past medical history of Arthritis, Asthma, GERD (gastroesophageal reflux disease), Hiatal hernia, History of colonoscopy with polypectomy, History of esophagogastroduodenoscopy (EGD), Hyperlipidemia, Hypertension, and Lumbar facet  "arthropathy.    Surgical History:   has a past surgical history that includes Cholecystectomy (2012); Colonoscopy w/ biopsies and polypectomy (2000; 2002; 2007; 2/2012); and Total knee arthroplasty (09/11/2019).    Family History:  family history includes Hypertension in her father and mother; Parkinsonism in her father.    Social History:   reports that she has never smoked. She has never used smokeless tobacco. She reports that she does not drink alcohol or use drugs.    Physical Exam:  /66   Pulse 66   Resp 18   Ht 4' 10" (1.473 m)   Wt 120.4 kg (265 lb 6.9 oz)   SpO2 98%   BMI 55.48 kg/m²   GEN: No acute distress. Calm, comfortable  HENT: Normocephalic, atraumatic, moist mucous membranes  EYE: Anicteric sclera, non-injected.   CV: Non-diaphoretic. Regular Rate. Radial Pulses 2+.  RESP: Breathing comfortably. Chest expansion symmetric.  EXT: No clubbing, cyanosis.   SKIN: Warm, & dry to palpation. No visible rashes or lesions of exposed skin.   PSYCH: Pleasant mood and appropriate affect. Recent and remote memory intact.   GAIT: Independent, normal ambulation  Lumbar Spine Exam:       Inspection: No erythema, bruising. No surgical incisions      Palpation: (+) TTP of lumbar paraspinals & SIJ      ROM:  Limited in flexion, extension, lateral bending.       (+) Facet loading bilaterally      (-) Straight Leg Raise bilaterally      (-) EDVIN bilaterally  Hip Exam:      Inspection: No gross deformity or apparent leg length discrepancy      Palpation: (+) TTP to bilateral greater trochanteric bursas      ROM: Slight limitation in internal rotation, external rotation on the right  Neurologic Exam:     Alert. Speech is fluent and appropriate.     Strength: 5/5 throughout bilateral lower extremities     Sensation: Grossly intact to light touch in bilateral lower extremities     Reflexes: 1+ in b/l patella, achilles     Tone: No abnormality appreciated in bilateral lower extremities     No Clonus     Downgoing " toes on plantar stimulation     (-) Chawla bilaterally    Imaging:  - MRI L-spine 6/3/15:  As compared with the previous examination, the edema like signal at the inferior end plate of L2 has resolved, with Modic type II changes noted.  The edema-like signal appears to have been related to a Schmorl's node of the inferior end plate of L2.  Compression deformity at T11 and T12 is again noted.  The marrow signal is normal without evidence for a marrow replacement process, infection or tumor.  There is minimal fluid in the facet joints on the left at the L4-5 level.  There is disk desiccation throughout the lumbar spine with disk space narrowing.  The conus terminates at T12-L1.  The incidentally visualized soft tissues structures of the abdomen are within normal limits.  At T12-L1, no disk herniation, central canal stenosis or neural foraminal narrowing.  At L1-2, there is a broad-based posterior disk bulge with a superimposed focal central disk protrusion contributing to a most mild central canal stenosis and mild bilateral neural foraminal narrowing.  At L2-3, there is a broad-based posterior disk bulge extends into both neural foramina without significant central stenosis or neural foraminal narrowing.  At L3-4, there is a broad-based posterior disk bulge, ligamentum flavum hypertrophy and facet arthropathy without significant central canal stenosis or neural foraminal narrowing.  At L4-5, there is a broad-based posterior disk bulge ligamentum flavum hypertrophy and facet arthropathy without significant central canal stenosis or neural foraminal narrowing.  There is fluid in the facet joint on the left.  At L5-S1, there is a broad-based posterior disk bulge that extends into both neural foramina with a superimposed central disk protrusion with an annular fissure.  There is also bilateral facet arthropathy.  There is no evidence for significant central   canal stenosis or neural foraminal  narrowing    Labs:  BMP  Lab Results   Component Value Date     10/07/2019    K 4.3 10/07/2019     10/07/2019    CO2 26 10/07/2019    BUN 12 10/07/2019    CREATININE 0.6 10/07/2019    CALCIUM 9.1 10/07/2019    ANIONGAP 9 10/07/2019    ESTGFRAFRICA >60 10/07/2019    EGFRNONAA >60 10/07/2019     Lab Results   Component Value Date    ALT 15 10/07/2019    AST 13 10/07/2019    ALKPHOS 96 10/07/2019    BILITOT 0.5 10/07/2019     Lab Results   Component Value Date     10/07/2019       Assessment:  Micaela Arvizu is a 64 y.o. female with the following diagnoses based on history, exam, and imaging:    Problem List Items Addressed This Visit        Psychiatric    JOHNNIE (generalized anxiety disorder)    Depression       Orthopedic    Lumbalgia    Relevant Orders    Ambulatory consult to Physical Therapy      Other Visit Diagnoses     Lumbar spondylosis    -  Primary    Relevant Orders    Ambulatory consult to Physical Therapy    DDD (degenerative disc disease), lumbar        Relevant Orders    Ambulatory consult to Physical Therapy    Spinal stenosis of lumbar region with neurogenic claudication        Relevant Orders    Ambulatory consult to Physical Therapy          This is a pleasant 64 y.o. lady with chronic low back pain complicated by depression and anxiety that appears multifactorial in nature.  She had previous lumbar medial branch blocks with steroid which provided excellent relief for years.  She does have facetogenic aspects to her pain, but she also has features of lumbar spinal stenosis with neurogenic claudication, and myofascial features.    Treatment Plan: I discussed with the patient the following assessment and recommendations. The following is the plan the patient agreed upon:  - PT/OT/HEP: Refer to PT, external.  - Procedures: Schedule for b/l L4/5, L5/S1 MBBs with steroid. I have explained the risks, benefits, and alternatives of the procedure in detail. The patient voices understanding and  all questions have been answered. The patient agrees to proceed as planned.  - Medications: No changes recommended at this time.  - Imaging: Reviewed. MRI L-spine ordered by Neurology, will assist getting her scheduled for this  - Labs: Reviewed.  Medications are appropriately dosed for current hepatorenal function.    Follow Up: RTC after procedure or as needed.     Deena Daily M.D.  Interventional Pain Medicine / Physical Medicine & Rehabilitation    Disclaimer: This note was partly generated using dictation software which may occasionally result in transcription errors.

## 2019-12-09 ENCOUNTER — OFFICE VISIT (OUTPATIENT)
Dept: PAIN MEDICINE | Facility: CLINIC | Age: 64
End: 2019-12-09
Payer: COMMERCIAL

## 2019-12-09 VITALS
HEIGHT: 58 IN | WEIGHT: 265.44 LBS | OXYGEN SATURATION: 98 % | DIASTOLIC BLOOD PRESSURE: 66 MMHG | RESPIRATION RATE: 18 BRPM | BODY MASS INDEX: 55.72 KG/M2 | HEART RATE: 66 BPM | SYSTOLIC BLOOD PRESSURE: 120 MMHG

## 2019-12-09 DIAGNOSIS — M54.41 CHRONIC BILATERAL LOW BACK PAIN WITH BILATERAL SCIATICA: ICD-10-CM

## 2019-12-09 DIAGNOSIS — M48.062 SPINAL STENOSIS OF LUMBAR REGION WITH NEUROGENIC CLAUDICATION: ICD-10-CM

## 2019-12-09 DIAGNOSIS — F32.A DEPRESSION, UNSPECIFIED DEPRESSION TYPE: ICD-10-CM

## 2019-12-09 DIAGNOSIS — M54.42 CHRONIC BILATERAL LOW BACK PAIN WITH BILATERAL SCIATICA: ICD-10-CM

## 2019-12-09 DIAGNOSIS — G89.29 CHRONIC BILATERAL LOW BACK PAIN WITH BILATERAL SCIATICA: ICD-10-CM

## 2019-12-09 DIAGNOSIS — F41.1 GAD (GENERALIZED ANXIETY DISORDER): ICD-10-CM

## 2019-12-09 DIAGNOSIS — M51.36 DDD (DEGENERATIVE DISC DISEASE), LUMBAR: ICD-10-CM

## 2019-12-09 DIAGNOSIS — M47.816 LUMBAR SPONDYLOSIS: Primary | ICD-10-CM

## 2019-12-09 PROCEDURE — 99204 OFFICE O/P NEW MOD 45 MIN: CPT | Mod: S$GLB,,, | Performed by: PHYSICAL MEDICINE & REHABILITATION

## 2019-12-09 PROCEDURE — 99999 PR PBB SHADOW E&M-EST. PATIENT-LVL III: ICD-10-PCS | Mod: PBBFAC,,, | Performed by: PHYSICAL MEDICINE & REHABILITATION

## 2019-12-09 PROCEDURE — 99204 PR OFFICE/OUTPT VISIT, NEW, LEVL IV, 45-59 MIN: ICD-10-PCS | Mod: S$GLB,,, | Performed by: PHYSICAL MEDICINE & REHABILITATION

## 2019-12-09 PROCEDURE — 99999 PR PBB SHADOW E&M-EST. PATIENT-LVL III: CPT | Mod: PBBFAC,,, | Performed by: PHYSICAL MEDICINE & REHABILITATION

## 2019-12-09 NOTE — LETTER
December 9, 2019      Adelina Saavedra NP  [address] [facility] [address]           [facility]  [address] [facility] [address]  Phone: [phone]  Fax: [phone]          Patient: Micaela Arvizu   MR Number: [id number]   YOB: 1955   Date of Visit: 12/9/2019       Dear Adelina Saavedra:    Thank you for referring Micaela Arvizu to me for evaluation. Attached you will find relevant portions of my assessment and plan of care.    If you have questions, please do not hesitate to call me. I look forward to following Micaela Arvizu along with you.    Sincerely,    Deena Daily MD    Enclosure  CC:  No Recipients    If you would like to receive this communication electronically, please contact [email or web address] or [phone] to request more information on [employer] access.    For providers and/or their staff who would like to refer a patient to [facility], please contact us through our one-stop-shop provider referral line, [facility], at [phone].    If you feel you have received this communication in error or would no longer like to receive these types of communications, please e-mail [email or web address]

## 2019-12-09 NOTE — PROGRESS NOTES
Onset: off and on for years   Location: back lower   Radiation: behind legs   Timing: intermittent  Quality: Aching and Deep  Exacerbating Factors: sitting, standing for more than 20 minutes and walking for more than 30 minutes  Alleviating Factors: laying down, medications and sitting  Associated Symptoms: denies {RED FLAGS:50933}    Severity: Currently: 4/10   Typical Range: 4-8/10     Exacerbation: 8/10

## 2019-12-10 ENCOUNTER — OFFICE VISIT (OUTPATIENT)
Dept: INTERNAL MEDICINE | Facility: CLINIC | Age: 64
End: 2019-12-10
Payer: COMMERCIAL

## 2019-12-10 VITALS
BODY MASS INDEX: 55.67 KG/M2 | RESPIRATION RATE: 18 BRPM | WEIGHT: 265.19 LBS | TEMPERATURE: 97 F | DIASTOLIC BLOOD PRESSURE: 62 MMHG | HEART RATE: 68 BPM | OXYGEN SATURATION: 95 % | SYSTOLIC BLOOD PRESSURE: 126 MMHG | HEIGHT: 58 IN

## 2019-12-10 DIAGNOSIS — R05.9 COUGH: ICD-10-CM

## 2019-12-10 DIAGNOSIS — I10 ESSENTIAL HYPERTENSION: ICD-10-CM

## 2019-12-10 DIAGNOSIS — J01.00 ACUTE NON-RECURRENT MAXILLARY SINUSITIS: Primary | ICD-10-CM

## 2019-12-10 DIAGNOSIS — R73.01 IFG (IMPAIRED FASTING GLUCOSE): ICD-10-CM

## 2019-12-10 DIAGNOSIS — M54.16 BILATERAL LUMBAR RADICULOPATHY: ICD-10-CM

## 2019-12-10 DIAGNOSIS — F41.1 GAD (GENERALIZED ANXIETY DISORDER): ICD-10-CM

## 2019-12-10 PROCEDURE — 3074F SYST BP LT 130 MM HG: CPT | Mod: CPTII,S$GLB,, | Performed by: NURSE PRACTITIONER

## 2019-12-10 PROCEDURE — 99213 OFFICE O/P EST LOW 20 MIN: CPT | Mod: 25,S$GLB,, | Performed by: NURSE PRACTITIONER

## 2019-12-10 PROCEDURE — 96372 PR INJECTION,THERAP/PROPH/DIAG2ST, IM OR SUBCUT: ICD-10-PCS | Mod: S$GLB,,, | Performed by: NURSE PRACTITIONER

## 2019-12-10 PROCEDURE — 3078F PR MOST RECENT DIASTOLIC BLOOD PRESSURE < 80 MM HG: ICD-10-PCS | Mod: CPTII,S$GLB,, | Performed by: NURSE PRACTITIONER

## 2019-12-10 PROCEDURE — 3008F BODY MASS INDEX DOCD: CPT | Mod: CPTII,S$GLB,, | Performed by: NURSE PRACTITIONER

## 2019-12-10 PROCEDURE — 3008F PR BODY MASS INDEX (BMI) DOCUMENTED: ICD-10-PCS | Mod: CPTII,S$GLB,, | Performed by: NURSE PRACTITIONER

## 2019-12-10 PROCEDURE — 99999 PR PBB SHADOW E&M-EST. PATIENT-LVL III: ICD-10-PCS | Mod: PBBFAC,,, | Performed by: NURSE PRACTITIONER

## 2019-12-10 PROCEDURE — 99213 PR OFFICE/OUTPT VISIT, EST, LEVL III, 20-29 MIN: ICD-10-PCS | Mod: 25,S$GLB,, | Performed by: NURSE PRACTITIONER

## 2019-12-10 PROCEDURE — 3074F PR MOST RECENT SYSTOLIC BLOOD PRESSURE < 130 MM HG: ICD-10-PCS | Mod: CPTII,S$GLB,, | Performed by: NURSE PRACTITIONER

## 2019-12-10 PROCEDURE — 3078F DIAST BP <80 MM HG: CPT | Mod: CPTII,S$GLB,, | Performed by: NURSE PRACTITIONER

## 2019-12-10 PROCEDURE — 96372 THER/PROPH/DIAG INJ SC/IM: CPT | Mod: S$GLB,,, | Performed by: NURSE PRACTITIONER

## 2019-12-10 PROCEDURE — 99999 PR PBB SHADOW E&M-EST. PATIENT-LVL III: CPT | Mod: PBBFAC,,, | Performed by: NURSE PRACTITIONER

## 2019-12-10 RX ORDER — PROMETHAZINE HYDROCHLORIDE AND CODEINE PHOSPHATE 6.25; 1 MG/5ML; MG/5ML
5 SOLUTION ORAL EVERY 6 HOURS PRN
Qty: 140 ML | Refills: 0 | Status: SHIPPED | OUTPATIENT
Start: 2019-12-10 | End: 2019-12-20

## 2019-12-10 RX ORDER — METHYLPREDNISOLONE ACETATE 80 MG/ML
80 INJECTION, SUSPENSION INTRA-ARTICULAR; INTRALESIONAL; INTRAMUSCULAR; SOFT TISSUE
Status: COMPLETED | OUTPATIENT
Start: 2019-12-10 | End: 2019-12-10

## 2019-12-10 RX ORDER — CLONAZEPAM 0.5 MG/1
0.5 TABLET ORAL 2 TIMES DAILY PRN
Qty: 30 TABLET | Refills: 0 | Status: CANCELLED | OUTPATIENT
Start: 2019-12-10 | End: 2020-01-09

## 2019-12-10 RX ORDER — DOXYCYCLINE 100 MG/1
100 CAPSULE ORAL 2 TIMES DAILY
Qty: 14 CAPSULE | Refills: 0 | Status: SHIPPED | OUTPATIENT
Start: 2019-12-10 | End: 2020-05-20

## 2019-12-10 RX ADMIN — METHYLPREDNISOLONE ACETATE 80 MG: 80 INJECTION, SUSPENSION INTRA-ARTICULAR; INTRALESIONAL; INTRAMUSCULAR; SOFT TISSUE at 02:12

## 2019-12-10 NOTE — PROGRESS NOTES
Subjective:           Patient ID: Micaela Arvizu is a 64 y.o. female.    Chief Complaint: fluid in ears; Cough; and Nasal Congestion    Micaela Arvizu is a 64 y.o. Female, new to me, known to fellow providers;   PMHX  Of HTN, HLD, osteoporosis, GERD and anxiety; presenting with c/o cough, nasal congestion, x 5 days;  also ill with sinus infection- seen at urgent care       Review of Systems   Constitutional: Negative for activity change, appetite change, chills, fatigue, fever and unexpected weight change.   HENT: Positive for ear pain and sinus pain. Negative for congestion, ear discharge, facial swelling, hearing loss, mouth sores, nosebleeds, postnasal drip, sinus pressure, sneezing, sore throat, tinnitus and trouble swallowing.    Eyes: Negative for photophobia, discharge, itching and visual disturbance.   Respiratory: Positive for cough. Negative for apnea, choking, chest tightness, shortness of breath, wheezing and stridor.    Cardiovascular: Negative.  Negative for chest pain, palpitations and leg swelling.   Gastrointestinal: Negative for abdominal distention, abdominal pain, blood in stool, constipation, diarrhea, nausea and vomiting.   Endocrine: Negative for cold intolerance and polyuria.   Genitourinary: Negative for difficulty urinating, dysuria, flank pain and hematuria.   Musculoskeletal: Negative.  Negative for arthralgias, gait problem, joint swelling and myalgias.   Skin: Negative.  Negative for color change, pallor, rash and wound.   Neurological: Negative for dizziness, tremors, seizures, speech difficulty and headaches.   Hematological: Does not bruise/bleed easily.   Psychiatric/Behavioral: Negative for agitation, behavioral problems, confusion, self-injury and sleep disturbance. The patient is not nervous/anxious and is not hyperactive.        Objective:      Physical Exam   Constitutional: She is oriented to person, place, and time. She appears well-developed and well-nourished.   HENT:    Head: Normocephalic.   Nose: Mucosal edema present. Right sinus exhibits maxillary sinus tenderness. Left sinus exhibits maxillary sinus tenderness.   Mouth/Throat: Normal dentition. No oropharyngeal exudate or posterior oropharyngeal edema.   Bilateral TM s with fluid - opaque    Eyes: Pupils are equal, round, and reactive to light.   Neck: Normal range of motion. No JVD present. No tracheal deviation present. No thyromegaly present.   Cardiovascular: Normal rate, regular rhythm and normal heart sounds.   No murmur heard.  Pulmonary/Chest: Effort normal and breath sounds normal. No stridor. No respiratory distress. She has no wheezes. She has no rales. She exhibits no tenderness.   Abdominal: Soft. Bowel sounds are normal. She exhibits no distension.   Musculoskeletal: Normal range of motion. She exhibits no edema, tenderness or deformity.   Neurological: She is alert and oriented to person, place, and time. No cranial nerve deficit. Coordination normal.   Skin: Skin is warm and dry. Capillary refill takes less than 2 seconds. No rash noted. No erythema.   Psychiatric: She has a normal mood and affect. Her behavior is normal. Judgment and thought content normal.   Nursing note and vitals reviewed.      Assessment:       1. Acute non-recurrent maxillary sinusitis    2. Essential hypertension    3. JOHNNIE (generalized anxiety disorder)    4. IFG (impaired fasting glucose)    5. Cough        Plan:   Micaela was seen today for fluid in ears, cough and nasal congestion.    Diagnoses and all orders for this visit:    Acute non-recurrent maxillary sinusitis  -     doxycycline (MONODOX) 100 MG capsule; Take 1 capsule (100 mg total) by mouth 2 (two) times daily.    Essential hypertension    JOHNNIE (generalized anxiety disorder)    IFG (impaired fasting glucose)    Cough  -     methylPREDNISolone acetate injection 80 mg  -     promethazine-codeine 6.25-10 mg/5 ml (PHENERGAN WITH CODEINE) 6.25-10 mg/5 mL syrup; Take 5 mLs by  mouth every 6 (six) hours as needed for Cough.    Other orders  -     Cancel: clonazePAM (KLONOPIN) 0.5 MG tablet; Take 1 tablet (0.5 mg total) by mouth 2 (two) times daily as needed for Anxiety.      Problem List Items Addressed This Visit        Psychiatric    JOHNNIE (generalized anxiety disorder)       Cardiac/Vascular    Essential hypertension       Endocrine    IFG (impaired fasting glucose)      Other Visit Diagnoses     Acute non-recurrent maxillary sinusitis    -  Primary    Relevant Medications    doxycycline (MONODOX) 100 MG capsule    Cough        Relevant Medications    methylPREDNISolone acetate injection 80 mg (Completed)    promethazine-codeine 6.25-10 mg/5 ml (PHENERGAN WITH CODEINE) 6.25-10 mg/5 mL syrup

## 2019-12-10 NOTE — PATIENT INSTRUCTIONS
Supportive care/Symptomatic therapy suggested: rest, increase fluids, gargle prn for sore throat, apply heat to sinuses prn, warm showers, use mist of vaporizer prn, Alternate OTC acetaminophen, ibuprofen, if no allergies.   Antihistamine and cough suppressant of choice,   avoid caffeine    Call or return to clinic prn if these symptoms worsen or fail to improve as anticipated within 3 days       Acute Sinusitis    Acute sinusitis is irritation and swelling of the sinuses. It is usually caused by a viral infection after a common cold. Your doctor can help you find relief.  What is acute sinusitis?  Sinuses are air-filled spaces in the skull behind the face. They are kept moist and clean by a lining of mucosa. Things such as pollen, smoke, and chemical fumes can irritate the mucosa. It can then swell up. As a response to irritation, the mucosa makes more mucus and other fluids. Tiny hairlike cilia cover the mucosa. Cilia help carry mucus toward the opening of the sinus. Too much mucus may cause the cilia to stop working. This blocks the sinus opening. A buildup of fluid in the sinuses then causes pain and pressure. It can also encourage bacteria to grow in the sinuses.  Common symptoms of acute sinusitis  You may have:  · Facial soreness pain  · Headache  · Fever  · Fluid draining in the back of the throat (postnasal drip)  · Congestion  · Drainage that is thick and colored, instead of clear  · Cough  Diagnosing acute sinusitis  Your doctor will ask about your symptoms and health history. He or she will look at your ear, nose, and throat. You usually won't need to have X-rays taken.    The doctor may take a sample of mucus to check for bacteria. If you have sinusitis that keeps coming back, you may need imaging tests such as X-rays or CAT scans. This will help your doctor check for a structural problem that may be causing the infection.  Treating acute sinusitis  Treatment is aimed at unblocking the sinus opening and  helping the cilia work again. You may need to take antihistamine and decongestant medicine. These can reduce inflammation and decrease the amount of fluid your sinuses make. If you have a bacterial infection, you will need to take antibiotic medicine for 10 to 14 days. Take this medicine until it is gone, even if you feel better.  Date Last Reviewed: 10/1/2016  © 8876-8460 [employer]. [address] [place] [address], [place] [address]. All rights reserved. This information is not intended as a substitute for professional medical care. Always follow your healthcare professional's instructions.

## 2019-12-11 RX ORDER — CLONAZEPAM 0.5 MG/1
0.5 TABLET ORAL 2 TIMES DAILY PRN
Qty: 30 TABLET | Refills: 0 | Status: SHIPPED | OUTPATIENT
Start: 2019-12-11 | End: 2020-03-13 | Stop reason: SDUPTHER

## 2019-12-11 RX ORDER — TRAMADOL HYDROCHLORIDE 50 MG/1
50 TABLET ORAL EVERY 12 HOURS PRN
Qty: 60 TABLET | Refills: 0 | Status: SHIPPED | OUTPATIENT
Start: 2019-12-11 | End: 2020-04-13 | Stop reason: SDUPTHER

## 2019-12-13 ENCOUNTER — HOSPITAL ENCOUNTER (OUTPATIENT)
Dept: RADIOLOGY | Facility: HOSPITAL | Age: 64
Discharge: HOME | End: 2019-12-13
Attending: NURSE PRACTITIONER
Payer: COMMERCIAL

## 2019-12-13 DIAGNOSIS — M54.16 BILATERAL LUMBAR RADICULOPATHY: ICD-10-CM

## 2019-12-13 DIAGNOSIS — M51.9 LUMBAR DISC DISEASE: ICD-10-CM

## 2019-12-13 PROCEDURE — 72148 MRI LUMBAR SPINE WITHOUT CONTRAST: ICD-10-PCS | Mod: 26,,, | Performed by: RADIOLOGY

## 2019-12-13 PROCEDURE — 72148 MRI LUMBAR SPINE W/O DYE: CPT | Mod: TC

## 2019-12-13 PROCEDURE — 72148 MRI LUMBAR SPINE W/O DYE: CPT | Mod: 26,,, | Performed by: RADIOLOGY

## 2019-12-20 ENCOUNTER — HOSPITAL ENCOUNTER (OUTPATIENT)
Dept: RADIOLOGY | Facility: HOSPITAL | Age: 64
Discharge: HOME | End: 2019-12-20
Attending: PHYSICAL MEDICINE & REHABILITATION | Admitting: PHYSICAL MEDICINE & REHABILITATION
Payer: COMMERCIAL

## 2019-12-20 ENCOUNTER — HOSPITAL ENCOUNTER (OUTPATIENT)
Facility: HOSPITAL | Age: 64
Discharge: HOME | End: 2019-12-20
Attending: PHYSICAL MEDICINE & REHABILITATION | Admitting: PHYSICAL MEDICINE & REHABILITATION
Payer: COMMERCIAL

## 2019-12-20 VITALS
RESPIRATION RATE: 17 BRPM | SYSTOLIC BLOOD PRESSURE: 119 MMHG | OXYGEN SATURATION: 97 % | HEART RATE: 65 BPM | TEMPERATURE: 97 F | DIASTOLIC BLOOD PRESSURE: 72 MMHG

## 2019-12-20 DIAGNOSIS — G89.29 CHRONIC PAIN: ICD-10-CM

## 2019-12-20 DIAGNOSIS — M47.816 LUMBAR SPONDYLOSIS: ICD-10-CM

## 2019-12-20 DIAGNOSIS — M47.816 LUMBAR SPONDYLOSIS: Primary | ICD-10-CM

## 2019-12-20 PROCEDURE — 64494 INJ PARAVERT F JNT L/S 2 LEV: CPT | Mod: 50 | Performed by: PHYSICAL MEDICINE & REHABILITATION

## 2019-12-20 PROCEDURE — 64494 PR INJ DX/THER AGNT PARAVERT FACET JOINT,IMG GUIDE,LUMBAR/SAC, 2ND LEVEL: ICD-10-PCS | Mod: RT,,, | Performed by: PHYSICAL MEDICINE & REHABILITATION

## 2019-12-20 PROCEDURE — 25000003 PHARM REV CODE 250: Performed by: PHYSICAL MEDICINE & REHABILITATION

## 2019-12-20 PROCEDURE — 64493 INJ PARAVERT F JNT L/S 1 LEV: CPT | Mod: LT,,, | Performed by: PHYSICAL MEDICINE & REHABILITATION

## 2019-12-20 PROCEDURE — 99152 MOD SED SAME PHYS/QHP 5/>YRS: CPT | Mod: ,,, | Performed by: PHYSICAL MEDICINE & REHABILITATION

## 2019-12-20 PROCEDURE — 25500020 PHARM REV CODE 255: Performed by: PHYSICAL MEDICINE & REHABILITATION

## 2019-12-20 PROCEDURE — 63600175 PHARM REV CODE 636 W HCPCS: Performed by: PHYSICAL MEDICINE & REHABILITATION

## 2019-12-20 PROCEDURE — 64494 INJ PARAVERT F JNT L/S 2 LEV: CPT | Mod: LT,,, | Performed by: PHYSICAL MEDICINE & REHABILITATION

## 2019-12-20 PROCEDURE — 99152 PR MOD CONSCIOUS SEDATION, SAME PHYS, 5+ YRS, FIRST 15 MIN: ICD-10-PCS | Mod: ,,, | Performed by: PHYSICAL MEDICINE & REHABILITATION

## 2019-12-20 PROCEDURE — 64493 PR INJ DX/THER AGNT PARAVERT FACET JOINT,IMG GUIDE,LUMBAR/SAC,1ST LVL: ICD-10-PCS | Mod: LT,,, | Performed by: PHYSICAL MEDICINE & REHABILITATION

## 2019-12-20 PROCEDURE — 64493 INJ PARAVERT F JNT L/S 1 LEV: CPT | Mod: 50 | Performed by: PHYSICAL MEDICINE & REHABILITATION

## 2019-12-20 RX ORDER — SODIUM CHLORIDE 9 MG/ML
500 INJECTION, SOLUTION INTRAVENOUS CONTINUOUS
Status: DISCONTINUED | OUTPATIENT
Start: 2019-12-20 | End: 2019-12-20 | Stop reason: HOSPADM

## 2019-12-20 RX ORDER — MIDAZOLAM HYDROCHLORIDE 1 MG/ML
INJECTION, SOLUTION INTRAMUSCULAR; INTRAVENOUS
Status: DISCONTINUED | OUTPATIENT
Start: 2019-12-20 | End: 2019-12-20 | Stop reason: HOSPADM

## 2019-12-20 RX ORDER — BUPIVACAINE HYDROCHLORIDE 2.5 MG/ML
INJECTION, SOLUTION EPIDURAL; INFILTRATION; INTRACAUDAL
Status: DISCONTINUED | OUTPATIENT
Start: 2019-12-20 | End: 2019-12-20 | Stop reason: HOSPADM

## 2019-12-20 RX ORDER — LIDOCAINE HYDROCHLORIDE 10 MG/ML
INJECTION INFILTRATION; PERINEURAL
Status: DISCONTINUED | OUTPATIENT
Start: 2019-12-20 | End: 2019-12-20 | Stop reason: HOSPADM

## 2019-12-20 RX ORDER — METHYLPREDNISOLONE ACETATE 80 MG/ML
INJECTION, SUSPENSION INTRA-ARTICULAR; INTRALESIONAL; INTRAMUSCULAR; SOFT TISSUE
Status: DISCONTINUED | OUTPATIENT
Start: 2019-12-20 | End: 2019-12-20 | Stop reason: HOSPADM

## 2019-12-20 RX ORDER — FENTANYL CITRATE 50 UG/ML
INJECTION, SOLUTION INTRAMUSCULAR; INTRAVENOUS
Status: DISCONTINUED | OUTPATIENT
Start: 2019-12-20 | End: 2019-12-20 | Stop reason: HOSPADM

## 2019-12-20 NOTE — DISCHARGE SUMMARY
[facility]  Discharge Note  Short Stay     Admit Date: 12/20/2019    Discharge Date: 12/20/2019     Attending Physician: Deena Daily M.D.    Diagnoses:  Active Hospital Problems    Diagnosis  POA    Lumbar spondylosis [M47.816]  Yes      Resolved Hospital Problems   No resolved problems to display.     Discharged Condition: Good     Hospital Course: Patient was admitted for an outpatient interventional pain management procedure and tolerated the procedure well with no complications.     Final Diagnoses: Same as principal problem.     Disposition: Home or Self Care     Follow up/Patient Instructions:        Reconciled Medications:     Medication List      CONTINUE taking these medications    aspirin 81 MG Chew  Take 81 mg by mouth once daily.     atorvastatin 10 MG tablet  Commonly known as:  LIPITOR  TAKE 1 TABLET DAILY     clonazePAM 0.5 MG tablet  Commonly known as:  KlonoPIN  Take 1 tablet (0.5 mg total) by mouth 2 (two) times daily as needed for Anxiety.     Co Q-10 100 mg capsule  Generic drug:  coenzyme Q10  Take 100 mg by mouth once daily.     colesevelam 625 mg tablet  Commonly known as:  WELCHOL  Take 1 tablet (625 mg total) by mouth 2 (two) times daily with meals.     doxycycline 100 MG capsule  Commonly known as:  MONODOX  Take 1 capsule (100 mg total) by mouth 2 (two) times daily.     escitalopram oxalate 10 MG tablet  Commonly known as:  LEXAPRO  Take 1 tablet (10 mg total) by mouth once daily.     estradiol 0.25 mg/0.25 gram (0.1 %) Glpk  Commonly known as:  DIVIGEL  Place onto the skin once daily.     Fish Oil 1,000 mg Cap  Generic drug:  omega-3 fatty acids-vitamin E  Take 2 tablets by mouth once daily.     furosemide 20 MG tablet  Commonly known as:  LASIX  Take 2 tablets (40 mg total) by mouth once daily.     GLUCOSAMINE 1500 COMPLEX ORAL  Take 1 tablet by mouth 2 (two) times daily.     lisinopril-hydrochlorothiazide 10-12.5 mg per tablet  Commonly known as:  PRINZIDE,ZESTORETIC  TAKE  1 TABLET DAILY     multivitamin capsule  Take 1 capsule by mouth once daily.     omeprazole 20 MG capsule  Commonly known as:  PRILOSEC  TAKE 1 CAPSULE DAILY     pantoprazole 40 MG tablet  Commonly known as:  PROTONIX  Take 40 mg by mouth once daily.     progesterone 100 MG capsule  Commonly known as:  PROMETRIUM  Take 100 mg by mouth once daily.     promethazine-codeine 6.25-10 mg/5 ml 6.25-10 mg/5 mL syrup  Commonly known as:  PHENERGAN with CODEINE  Take 5 mLs by mouth every 6 (six) hours as needed for Cough.     propranolol 80 MG tablet  Commonly known as:  INDERAL  Take 1 tablet (80 mg total) by mouth As instructed. Take 1 tablet in morning and 1/2 tablet at night.     risedronate 35 mg Tbec  TAKE 1 TABLET BY MOUTH ONCE A WEEK FOR 30 DAYS     sucralfate 1 gram tablet  Commonly known as:  CARAFATE  Take 1 g by mouth 2 (two) times daily.     traMADol 50 mg tablet  Commonly known as:  ULTRAM  Take 1 tablet (50 mg total) by mouth every 12 (twelve) hours as needed. Greater than 7 days quantity medically necessary     Vitamin D3 50 mcg (2,000 unit) Cap  Generic drug:  cholecalciferol (vitamin D3)  Take 2 capsules by mouth once daily.           Discharge Procedure Orders (must include Diet, Follow-up, Activity)   Ice to affected area   Order Comments: Limit to 20 minute intervals or until skin is numb to the touch.     No driving until:   Order Comments: Until following day     Notify your health care provider if you experience any of the following:  temperature >100.4     Notify your health care provider if you experience any of the following:  persistent nausea and vomiting or diarrhea     Notify your health care provider if you experience any of the following:  severe uncontrolled pain     Notify your health care provider if you experience any of the following:  redness, tenderness, or signs of infection (pain, swelling, redness, odor or green/yellow discharge around incision site)     Notify your health care  provider if you experience any of the following:  difficulty breathing or increased cough     Notify your health care provider if you experience any of the following:  severe persistent headache     Notify your health care provider if you experience any of the following:  worsening rash     Notify your health care provider if you experience any of the following:  persistent dizziness, light-headedness, or visual disturbances     Notify your health care provider if you experience any of the following:  increased confusion or weakness     No dressing needed     Shower on day dressing removed (No bath)   Order Comments: Do not soak in bath/pool/hot tub day of procedure. Shower ashley Daily M.D.  Interventional Pain Medicine / Physical Medicine & Rehabilitation

## 2019-12-20 NOTE — INTERVAL H&P NOTE
The patient has been examined and the H&P has been reviewed:        I concur with the findings and no changes have occurred since H&P was written.        Patient cleared for Anesthesia: MAC        Anesthesia/Surgery risks, benefits and alternative options discussed and understood by patient/family.      Active Hospital Problems    Diagnosis  POA    Chronic pain [G89.29]  Yes      Resolved Hospital Problems   No resolved problems to display.

## 2019-12-23 ENCOUNTER — TELEPHONE (OUTPATIENT)
Dept: PAIN MEDICINE | Facility: CLINIC | Age: 64
End: 2019-12-23

## 2019-12-23 NOTE — TELEPHONE ENCOUNTER
Patient is s/p MBB with steroid L4-5 L5-S1. Patient states she has had 100% relief and feels great. She has been able to perform chores in her home that she has not been able to do in a while. She states she was able to get everything done that was left on her holiday to do list with no pain. She also states she has been able to stand longer with no pain.     Patient with RTC as needed.

## 2020-01-10 ENCOUNTER — TELEPHONE (OUTPATIENT)
Dept: INTERNAL MEDICINE | Facility: CLINIC | Age: 65
End: 2020-01-10

## 2020-01-10 NOTE — TELEPHONE ENCOUNTER
----- Message from Bethany Schultz sent at 1/10/2020  1:29 PM CST -----  Contact: self   Micaela Arvizu  MRN: [id number]  : 1955  PCP: Kinga Robles  Home Phone      [phone]  Work Phone      Not on file.  Mobile          [phone]    MESSAGE:   Patient request to speak to nurse to question when she will be due for a 6 month follow up for blood work and PCP appointment.     Phone # [phone]    Pharmacy - [facility] - [facility] [address] [place] [address]

## 2020-01-10 NOTE — TELEPHONE ENCOUNTER
----- Message from Gaviota Rivas sent at 1/10/2020  1:52 PM CST -----  Contact: Self  Micaela Arvizu  MRN: [id number]  : 1955  PCP: Kinga Robles  Home Phone      [phone]  Work Phone      Not on file.  Mobile          [phone]    MESSAGE:   Returning missed call from nurse. Please call.    Phone: [phone]

## 2020-03-13 DIAGNOSIS — F41.1 GAD (GENERALIZED ANXIETY DISORDER): ICD-10-CM

## 2020-03-13 RX ORDER — CLONAZEPAM 0.5 MG/1
0.5 TABLET ORAL 2 TIMES DAILY PRN
Qty: 30 TABLET | Refills: 0 | Status: SHIPPED | OUTPATIENT
Start: 2020-03-13 | End: 2020-04-13 | Stop reason: SDUPTHER

## 2020-04-13 DIAGNOSIS — M54.16 BILATERAL LUMBAR RADICULOPATHY: ICD-10-CM

## 2020-04-13 DIAGNOSIS — F41.1 GAD (GENERALIZED ANXIETY DISORDER): ICD-10-CM

## 2020-04-13 RX ORDER — CLONAZEPAM 0.5 MG/1
0.5 TABLET ORAL 2 TIMES DAILY PRN
Qty: 30 TABLET | Refills: 0 | Status: SHIPPED | OUTPATIENT
Start: 2020-04-13 | End: 2020-06-29 | Stop reason: SDUPTHER

## 2020-04-13 RX ORDER — TRAMADOL HYDROCHLORIDE 50 MG/1
50 TABLET ORAL EVERY 12 HOURS PRN
Qty: 60 TABLET | Refills: 0 | Status: SHIPPED | OUTPATIENT
Start: 2020-04-13 | End: 2020-06-29 | Stop reason: SDUPTHER

## 2020-05-11 ENCOUNTER — TELEPHONE (OUTPATIENT)
Dept: INTERNAL MEDICINE | Facility: CLINIC | Age: 65
End: 2020-05-11

## 2020-05-11 DIAGNOSIS — M81.0 OSTEOPOROSIS, UNSPECIFIED OSTEOPOROSIS TYPE, UNSPECIFIED PATHOLOGICAL FRACTURE PRESENCE: Primary | ICD-10-CM

## 2020-05-11 NOTE — TELEPHONE ENCOUNTER
----- Message from Gaviota Rivas sent at 2020 11:21 AM CDT -----  Contact: Self  Luiz Arvizu  MRN: [id number]  : 1955  PCP: Kinga Robles  Home Phone      [phone]  Work Phone      Not on file.  Mobile          [phone]    Message:    She is going to the hospital to have labs drawn and would like to have a Vitamin D level added to her orders.     Phone: [phone]

## 2020-05-11 NOTE — TELEPHONE ENCOUNTER
Order placed. Attempted to contact pt to inform. Phone picked up, but no one there. When she calls back, please let her know the order is in. She can do all the labs together.

## 2020-05-14 ENCOUNTER — LAB VISIT (OUTPATIENT)
Dept: LAB | Facility: HOSPITAL | Age: 65
End: 2020-05-14
Attending: INTERNAL MEDICINE
Payer: COMMERCIAL

## 2020-05-14 DIAGNOSIS — M81.0 OSTEOPOROSIS, UNSPECIFIED OSTEOPOROSIS TYPE, UNSPECIFIED PATHOLOGICAL FRACTURE PRESENCE: ICD-10-CM

## 2020-05-14 DIAGNOSIS — I10 ESSENTIAL HYPERTENSION: ICD-10-CM

## 2020-05-14 DIAGNOSIS — R73.01 IFG (IMPAIRED FASTING GLUCOSE): ICD-10-CM

## 2020-05-14 DIAGNOSIS — E78.00 HYPERCHOLESTEREMIA: ICD-10-CM

## 2020-05-14 LAB
25(OH)D3+25(OH)D2 SERPL-MCNC: 58 NG/ML (ref 30–96)
ALBUMIN SERPL BCP-MCNC: 3 G/DL (ref 3.5–5.2)
ALP SERPL-CCNC: 82 U/L (ref 55–135)
ALT SERPL W/O P-5'-P-CCNC: 17 U/L (ref 10–44)
ANION GAP SERPL CALC-SCNC: 11 MMOL/L (ref 8–16)
AST SERPL-CCNC: 16 U/L (ref 10–40)
BASOPHILS # BLD AUTO: 0.04 K/UL (ref 0–0.2)
BASOPHILS NFR BLD: 0.5 % (ref 0–1.9)
BILIRUB SERPL-MCNC: 0.4 MG/DL (ref 0.1–1)
BUN SERPL-MCNC: 13 MG/DL (ref 8–23)
CALCIUM SERPL-MCNC: 9 MG/DL (ref 8.7–10.5)
CHLORIDE SERPL-SCNC: 103 MMOL/L (ref 95–110)
CHOLEST SERPL-MCNC: 164 MG/DL (ref 120–199)
CHOLEST/HDLC SERPL: 2.8 {RATIO} (ref 2–5)
CO2 SERPL-SCNC: 26 MMOL/L (ref 23–29)
CREAT SERPL-MCNC: 0.6 MG/DL (ref 0.5–1.4)
DIFFERENTIAL METHOD: ABNORMAL
EOSINOPHIL # BLD AUTO: 0.2 K/UL (ref 0–0.5)
EOSINOPHIL NFR BLD: 2 % (ref 0–8)
ERYTHROCYTE [DISTWIDTH] IN BLOOD BY AUTOMATED COUNT: 15.6 % (ref 11.5–14.5)
EST. GFR  (AFRICAN AMERICAN): >60 ML/MIN/1.73 M^2
EST. GFR  (NON AFRICAN AMERICAN): >60 ML/MIN/1.73 M^2
ESTIMATED AVG GLUCOSE: 154 MG/DL (ref 68–131)
GLUCOSE SERPL-MCNC: 147 MG/DL (ref 70–110)
HBA1C MFR BLD HPLC: 7 % (ref 4–5.6)
HCT VFR BLD AUTO: 39.1 % (ref 37–48.5)
HDLC SERPL-MCNC: 58 MG/DL (ref 40–75)
HDLC SERPL: 35.4 % (ref 20–50)
HGB BLD-MCNC: 11.8 G/DL (ref 12–16)
IMM GRANULOCYTES # BLD AUTO: 0.02 K/UL (ref 0–0.04)
IMM GRANULOCYTES NFR BLD AUTO: 0.3 % (ref 0–0.5)
LDLC SERPL CALC-MCNC: 77 MG/DL (ref 63–159)
LYMPHOCYTES # BLD AUTO: 1.3 K/UL (ref 1–4.8)
LYMPHOCYTES NFR BLD: 17.2 % (ref 18–48)
MCH RBC QN AUTO: 23.9 PG (ref 27–31)
MCHC RBC AUTO-ENTMCNC: 30.2 G/DL (ref 32–36)
MCV RBC AUTO: 79 FL (ref 82–98)
MONOCYTES # BLD AUTO: 0.7 K/UL (ref 0.3–1)
MONOCYTES NFR BLD: 9.1 % (ref 4–15)
NEUTROPHILS # BLD AUTO: 5.4 K/UL (ref 1.8–7.7)
NEUTROPHILS NFR BLD: 70.9 % (ref 38–73)
NONHDLC SERPL-MCNC: 106 MG/DL
NRBC BLD-RTO: 0 /100 WBC
PLATELET # BLD AUTO: 303 K/UL (ref 150–350)
PMV BLD AUTO: 8.3 FL (ref 9.2–12.9)
POTASSIUM SERPL-SCNC: 4 MMOL/L (ref 3.5–5.1)
PROT SERPL-MCNC: 7.3 G/DL (ref 6–8.4)
RBC # BLD AUTO: 4.94 M/UL (ref 4–5.4)
SODIUM SERPL-SCNC: 140 MMOL/L (ref 136–145)
TRIGL SERPL-MCNC: 145 MG/DL (ref 30–150)
TSH SERPL DL<=0.005 MIU/L-ACNC: 1.66 UIU/ML (ref 0.4–4)
WBC # BLD AUTO: 7.61 K/UL (ref 3.9–12.7)

## 2020-05-14 PROCEDURE — 85025 COMPLETE CBC W/AUTO DIFF WBC: CPT

## 2020-05-14 PROCEDURE — 80053 COMPREHEN METABOLIC PANEL: CPT

## 2020-05-14 PROCEDURE — 84443 ASSAY THYROID STIM HORMONE: CPT

## 2020-05-14 PROCEDURE — 80061 LIPID PANEL: CPT

## 2020-05-14 PROCEDURE — 82306 VITAMIN D 25 HYDROXY: CPT

## 2020-05-14 PROCEDURE — 36415 COLL VENOUS BLD VENIPUNCTURE: CPT

## 2020-05-14 PROCEDURE — 83036 HEMOGLOBIN GLYCOSYLATED A1C: CPT

## 2020-05-20 ENCOUNTER — OFFICE VISIT (OUTPATIENT)
Dept: INTERNAL MEDICINE | Facility: CLINIC | Age: 65
End: 2020-05-20
Payer: COMMERCIAL

## 2020-05-20 VITALS
HEART RATE: 64 BPM | BODY MASS INDEX: 57.05 KG/M2 | SYSTOLIC BLOOD PRESSURE: 120 MMHG | DIASTOLIC BLOOD PRESSURE: 82 MMHG | TEMPERATURE: 98 F | HEIGHT: 58 IN | OXYGEN SATURATION: 98 % | RESPIRATION RATE: 18 BRPM | WEIGHT: 271.81 LBS

## 2020-05-20 DIAGNOSIS — M54.16 BILATERAL LUMBAR RADICULOPATHY: ICD-10-CM

## 2020-05-20 DIAGNOSIS — E66.01 MORBID OBESITY WITH BMI OF 50.0-59.9, ADULT: ICD-10-CM

## 2020-05-20 DIAGNOSIS — D50.9 MICROCYTIC ANEMIA: ICD-10-CM

## 2020-05-20 DIAGNOSIS — E78.00 HYPERCHOLESTEREMIA: ICD-10-CM

## 2020-05-20 DIAGNOSIS — G25.0 ESSENTIAL TREMOR: ICD-10-CM

## 2020-05-20 DIAGNOSIS — I87.2 VENOUS INSUFFICIENCY OF BOTH LOWER EXTREMITIES: ICD-10-CM

## 2020-05-20 DIAGNOSIS — F32.A DEPRESSION, UNSPECIFIED DEPRESSION TYPE: ICD-10-CM

## 2020-05-20 DIAGNOSIS — I10 ESSENTIAL HYPERTENSION: ICD-10-CM

## 2020-05-20 DIAGNOSIS — E11.9 TYPE 2 DIABETES MELLITUS WITHOUT COMPLICATION, WITHOUT LONG-TERM CURRENT USE OF INSULIN: Primary | ICD-10-CM

## 2020-05-20 DIAGNOSIS — F41.1 GAD (GENERALIZED ANXIETY DISORDER): ICD-10-CM

## 2020-05-20 PROCEDURE — 3051F HG A1C>EQUAL 7.0%<8.0%: CPT | Mod: CPTII,S$GLB,, | Performed by: INTERNAL MEDICINE

## 2020-05-20 PROCEDURE — 3008F BODY MASS INDEX DOCD: CPT | Mod: CPTII,S$GLB,, | Performed by: INTERNAL MEDICINE

## 2020-05-20 PROCEDURE — 3051F PR MOST RECENT HEMOGLOBIN A1C LEVEL 7.0 - < 8.0%: ICD-10-PCS | Mod: CPTII,S$GLB,, | Performed by: INTERNAL MEDICINE

## 2020-05-20 PROCEDURE — 99215 OFFICE O/P EST HI 40 MIN: CPT | Mod: S$GLB,,, | Performed by: INTERNAL MEDICINE

## 2020-05-20 PROCEDURE — 3079F PR MOST RECENT DIASTOLIC BLOOD PRESSURE 80-89 MM HG: ICD-10-PCS | Mod: CPTII,S$GLB,, | Performed by: INTERNAL MEDICINE

## 2020-05-20 PROCEDURE — 99999 PR PBB SHADOW E&M-EST. PATIENT-LVL III: CPT | Mod: PBBFAC,,, | Performed by: INTERNAL MEDICINE

## 2020-05-20 PROCEDURE — 99999 PR PBB SHADOW E&M-EST. PATIENT-LVL III: ICD-10-PCS | Mod: PBBFAC,,, | Performed by: INTERNAL MEDICINE

## 2020-05-20 PROCEDURE — 3079F DIAST BP 80-89 MM HG: CPT | Mod: CPTII,S$GLB,, | Performed by: INTERNAL MEDICINE

## 2020-05-20 PROCEDURE — 3008F PR BODY MASS INDEX (BMI) DOCUMENTED: ICD-10-PCS | Mod: CPTII,S$GLB,, | Performed by: INTERNAL MEDICINE

## 2020-05-20 PROCEDURE — 3074F SYST BP LT 130 MM HG: CPT | Mod: CPTII,S$GLB,, | Performed by: INTERNAL MEDICINE

## 2020-05-20 PROCEDURE — 3074F PR MOST RECENT SYSTOLIC BLOOD PRESSURE < 130 MM HG: ICD-10-PCS | Mod: CPTII,S$GLB,, | Performed by: INTERNAL MEDICINE

## 2020-05-20 PROCEDURE — 99215 PR OFFICE/OUTPT VISIT, EST, LEVL V, 40-54 MIN: ICD-10-PCS | Mod: S$GLB,,, | Performed by: INTERNAL MEDICINE

## 2020-05-20 RX ORDER — METFORMIN HYDROCHLORIDE 500 MG/1
500 TABLET ORAL
Qty: 90 TABLET | Refills: 3 | Status: SHIPPED | OUTPATIENT
Start: 2020-05-20 | End: 2020-11-20 | Stop reason: SDUPTHER

## 2020-05-20 RX ORDER — LANCETS
EACH MISCELLANEOUS
Qty: 100 EACH | Refills: 1 | Status: SHIPPED | OUTPATIENT
Start: 2020-05-20

## 2020-05-20 RX ORDER — INSULIN PUMP SYRINGE, 3 ML
EACH MISCELLANEOUS
Qty: 1 EACH | Refills: 0 | Status: SHIPPED | OUTPATIENT
Start: 2020-05-20 | End: 2024-02-20

## 2020-05-20 NOTE — PROGRESS NOTES
"Subjective:       Patient ID: Micaela Arvizu is a 64 y.o. female.    Chief Complaint: Follow-up (6 mo)      HPI:  Patient is known to me and presents for follow up HTN, HLD, osteoporosis, GERD and anxiety. Labs from 5/14/2020 were personally reviewed and discussed with the patient today.     Chronic low back pain: Has had MRIs in the past. Has seen Dr. Wolfe for injections She uses tramadol for pain management for her arthrits which is working well. Supplementing with tylenol right now as trying not to use tramadol often.  Had stomach ulcer with NSAIDs.     Had right knee replacement Dr. Vizcaino on 9/11/2019. She is doing PT, healing well. Plans to do left knee once she heals. Weaned off Percocoet, hasn't had filled in 4 weeks.      HTN: on lisinopril-HCTZ, lasix. Home BP < 140/90. Denies chest pains, SOB and LE edema.      HLD: on atorvastatin and fish oil. Denies medicatoin side effects     DM: last A1C 6.5%, today up to 7% which is worsening and truly diagnostic of diabetes. She tried lifestyle modifications without success; gaining weight. Denies numbness/tingling. Does not check blood sugars regularly.     GERD: taking omeprazole daily. She has h/o of esophageal stricture. Had "stretching" with Dr. Bojorquez. Denies abd pain, n/v/d/c.      Anxiety: on  Klonopin PRN, using half tab every night right now, helping with her RLS symptoms. Since I saaw her last neurolgoy started her on lexapro but stopped taking because it made her feel worse. Discussed not using Klonopin often with use of tramadol as well. Overall, mood reported as good        Osteoporosis: follows with GYN (lorena) for this. Had DEXA done 3/2018 with GYN which showed improvement. Doing Prolia with her but was too expensive so will discuss with her GYN.      Tremor: dad has Parkinson's disease. Tells me she and her sisters all have this. Sees neurology and dx with essential tremor on propranolol; now off primidone.   Past Medical History: "   Diagnosis Date    Arthritis     Asthma     GERD (gastroesophageal reflux disease)     Hiatal hernia     History of colonoscopy with polypectomy     History of esophagogastroduodenoscopy (EGD)     Hyperlipidemia     Hypertension     Lumbar facet arthropathy        Family History   Problem Relation Age of Onset    Hypertension Mother     Hypertension Father     Parkinsonism Father        Social History     Socioeconomic History    Marital status:      Spouse name: Not on file    Number of children: Not on file    Years of education: Not on file    Highest education level: Not on file   Occupational History    Not on file   Social Needs    Financial resource strain: Not on file    Food insecurity:     Worry: Not on file     Inability: Not on file    Transportation needs:     Medical: Not on file     Non-medical: Not on file   Tobacco Use    Smoking status: Never Smoker    Smokeless tobacco: Never Used   Substance and Sexual Activity    Alcohol use: No    Drug use: No    Sexual activity: Not Currently     Comment:    Lifestyle    Physical activity:     Days per week: Not on file     Minutes per session: Not on file    Stress: Not on file   Relationships    Social connections:     Talks on phone: Not on file     Gets together: Not on file     Attends Mosque service: Not on file     Active member of club or organization: Not on file     Attends meetings of clubs or organizations: Not on file     Relationship status: Not on file   Other Topics Concern    Not on file   Social History Narrative    Not on file       Review of Systems   Constitutional: Negative for activity change, fatigue, fever and unexpected weight change.   HENT: Negative for congestion, ear pain, hearing loss, rhinorrhea and sore throat.    Eyes: Negative for redness and visual disturbance.   Respiratory: Negative for cough, shortness of breath and wheezing.    Cardiovascular: Negative for chest pain,  palpitations and leg swelling.   Gastrointestinal: Negative for abdominal pain, constipation, diarrhea, nausea and vomiting.   Genitourinary: Negative for dysuria, frequency and urgency.   Musculoskeletal: Positive for arthralgias and neck pain. Negative for back pain and joint swelling.   Skin: Negative for color change, rash and wound.   Neurological: Negative for dizziness, tremors, weakness, light-headedness and headaches.         Objective:      Physical Exam   Constitutional: She is oriented to person, place, and time. She appears well-developed and well-nourished. No distress.   HENT:   Head: Normocephalic and atraumatic.   Right Ear: External ear normal.   Left Ear: External ear normal.   Eyes: Pupils are equal, round, and reactive to light. Conjunctivae and EOM are normal. Right eye exhibits no discharge. Left eye exhibits no discharge.   Neck: Neck supple. No tracheal deviation present. No thyromegaly present.   Cardiovascular: Normal rate and regular rhythm.   No murmur heard.  Pulmonary/Chest: Effort normal and breath sounds normal. No respiratory distress. She has no wheezes. She has no rales.   Abdominal: Soft. Bowel sounds are normal. She exhibits no distension. There is no tenderness.   Neurological: She is alert and oriented to person, place, and time. No cranial nerve deficit.   Skin: Skin is warm and dry.   Psychiatric: She has a normal mood and affect. Her behavior is normal.   Vitals reviewed.      Assessment:       1. Type 2 diabetes mellitus without complication, without long-term current use of insulin    2. Essential hypertension    3. Hypercholesteremia    4. Morbid obesity with BMI of 50.0-59.9, adult    5. Venous insufficiency of both lower extremities    6. JOHNNIE (generalized anxiety disorder)    7. Depression, unspecified depression type    8. Essential tremor    9. Bilateral lumbar radiculopathy    10. Microcytic anemia        Plan:       Micaela was seen today for  follow-up.    Diagnoses and all orders for this visit:    Type 2 diabetes mellitus without complication, without long-term current use of insulin  -     CBC auto differential; Future  -     Comprehensive metabolic panel; Future  -     TSH; Future  -     Lipid Panel; Future  -     Hemoglobin A1C; Future  -     Microalbumin/creatinine urine ratio; Future  -     metFORMIN (GLUCOPHAGE) 500 MG tablet; Take 1 tablet (500 mg total) by mouth daily with breakfast.  -     blood sugar diagnostic Strp; Check blood sugar twice daily  -     lancets Misc; Check blood sugar twice a daily  -     blood-glucose meter (FREESTYLE FREEDOM) kit; Use as instructed  New diagnosis  Start metofmrin 500mg daily  Check sugars and keep log  Spent > 10 mins on diabetic counseling today  1800 mary ADA diet  On ACEi and statin  Weight loss    Essential hypertension  -     CBC auto differential; Future  -     Comprehensive metabolic panel; Future  -     TSH; Future  -     Lipid Panel; Future  Chronic controlled  Continue medications at same dose  Low Na diet  Exercise, weight loss  Check BP and keep log for next visit    Hypercholesteremia  -     CBC auto differential; Future  -     Comprehensive metabolic panel; Future  -     TSH; Future  -     Lipid Panel; Future  Chronic controlled  LDL very near goal, cont statin  Weight loss    Morbid obesity with BMI of 50.0-59.9, adult  -     CBC auto differential; Future  -     Comprehensive metabolic panel; Future  -     TSH; Future  -     Lipid Panel; Future  Spent > 10 mins on diet and exercise counseling today    Venous insufficiency of both lower extremities  Chronic stable  Cont Lasix    JOHNNIE (generalized anxiety disorder)  -     TSH; Future  Chronic stable  Failed Lexapro, side effects  Will continue PRN Klonopin as using sparingly right now (takes half a pill at night only) discussed if I see her refilling often will have to restart SSRI and weaning off Klonpin  Counseled not to take with tramadol,  reports only take tramadol in the AM    Depression, unspecified depression type  -     TSH; Future  Chronic stable  Off lexapro, side effeccts  Mood is reported as good right now, follow closely    Essential tremor  -     CBC auto differential; Future  -     Comprehensive metabolic panel; Future  -     TSH; Future  Chronic stable  Cont meds same dose  Sees neurology as well    Bilateral lumbar radiculopathy  Chronic stable  Cont PRN tramadol, states uses only in the AM  Follow  closely  Discussed not to take with Klonpin  Failed pain management  Weight loss    Microcytic anemia  -     CBC auto differential; Future  -     Iron and TIBC; Future  -     Ferritin; Future  Chronic, worsening  Gives blood often  Check iron levels      RTC 6 months with labs and PRN

## 2020-05-28 ENCOUNTER — PATIENT OUTREACH (OUTPATIENT)
Dept: ADMINISTRATIVE | Facility: OTHER | Age: 65
End: 2020-05-28

## 2020-05-28 DIAGNOSIS — E11.9 DIABETES MELLITUS WITHOUT COMPLICATION: Primary | ICD-10-CM

## 2020-06-12 ENCOUNTER — LAB VISIT (OUTPATIENT)
Dept: LAB | Facility: HOSPITAL | Age: 65
End: 2020-06-12
Attending: NURSE PRACTITIONER
Payer: COMMERCIAL

## 2020-06-12 DIAGNOSIS — K62.5 RECTAL BLEEDING: Primary | ICD-10-CM

## 2020-06-12 LAB
BASOPHILS # BLD AUTO: 0.04 K/UL (ref 0–0.2)
BASOPHILS NFR BLD: 0.5 % (ref 0–1.9)
DIFFERENTIAL METHOD: ABNORMAL
EOSINOPHIL # BLD AUTO: 0.2 K/UL (ref 0–0.5)
EOSINOPHIL NFR BLD: 2.5 % (ref 0–8)
ERYTHROCYTE [DISTWIDTH] IN BLOOD BY AUTOMATED COUNT: 16 % (ref 11.5–14.5)
HCT VFR BLD AUTO: 39.1 % (ref 37–48.5)
HGB BLD-MCNC: 11.8 G/DL (ref 12–16)
IMM GRANULOCYTES # BLD AUTO: 0.02 K/UL (ref 0–0.04)
IMM GRANULOCYTES NFR BLD AUTO: 0.2 % (ref 0–0.5)
LYMPHOCYTES # BLD AUTO: 1.6 K/UL (ref 1–4.8)
LYMPHOCYTES NFR BLD: 18.8 % (ref 18–48)
MCH RBC QN AUTO: 23.6 PG (ref 27–31)
MCHC RBC AUTO-ENTMCNC: 30.2 G/DL (ref 32–36)
MCV RBC AUTO: 78 FL (ref 82–98)
MONOCYTES # BLD AUTO: 0.9 K/UL (ref 0.3–1)
MONOCYTES NFR BLD: 10.3 % (ref 4–15)
NEUTROPHILS # BLD AUTO: 5.8 K/UL (ref 1.8–7.7)
NEUTROPHILS NFR BLD: 67.7 % (ref 38–73)
NRBC BLD-RTO: 0 /100 WBC
PLATELET # BLD AUTO: 311 K/UL (ref 150–350)
PMV BLD AUTO: 8.1 FL (ref 9.2–12.9)
RBC # BLD AUTO: 4.99 M/UL (ref 4–5.4)
WBC # BLD AUTO: 8.48 K/UL (ref 3.9–12.7)

## 2020-06-12 PROCEDURE — 36415 COLL VENOUS BLD VENIPUNCTURE: CPT

## 2020-06-12 PROCEDURE — 85025 COMPLETE CBC W/AUTO DIFF WBC: CPT

## 2020-06-29 ENCOUNTER — PATIENT OUTREACH (OUTPATIENT)
Dept: ADMINISTRATIVE | Facility: OTHER | Age: 65
End: 2020-06-29

## 2020-06-29 DIAGNOSIS — M54.16 BILATERAL LUMBAR RADICULOPATHY: ICD-10-CM

## 2020-06-29 DIAGNOSIS — F41.1 GAD (GENERALIZED ANXIETY DISORDER): ICD-10-CM

## 2020-06-29 RX ORDER — CLONAZEPAM 0.5 MG/1
0.5 TABLET ORAL 2 TIMES DAILY PRN
Qty: 30 TABLET | Refills: 0 | Status: SHIPPED | OUTPATIENT
Start: 2020-06-29 | End: 2020-08-10

## 2020-06-29 RX ORDER — TRAMADOL HYDROCHLORIDE 50 MG/1
50 TABLET ORAL EVERY 12 HOURS PRN
Qty: 60 TABLET | Refills: 0 | Status: SHIPPED | OUTPATIENT
Start: 2020-06-29 | End: 2020-08-10 | Stop reason: SDUPTHER

## 2020-06-29 NOTE — PROGRESS NOTES
Requested updates within Care Everywhere.  Patient's chart was reviewed for overdue CARRIE topics.  Immunizations reconciled.

## 2020-07-02 ENCOUNTER — OFFICE VISIT (OUTPATIENT)
Dept: NEUROLOGY | Facility: CLINIC | Age: 65
End: 2020-07-02
Payer: COMMERCIAL

## 2020-07-02 VITALS
SYSTOLIC BLOOD PRESSURE: 132 MMHG | DIASTOLIC BLOOD PRESSURE: 82 MMHG | HEART RATE: 76 BPM | BODY MASS INDEX: 54.29 KG/M2 | RESPIRATION RATE: 16 BRPM | HEIGHT: 59 IN | TEMPERATURE: 97 F | WEIGHT: 269.31 LBS

## 2020-07-02 DIAGNOSIS — E78.00 HYPERCHOLESTEREMIA: ICD-10-CM

## 2020-07-02 DIAGNOSIS — G25.0 ESSENTIAL TREMOR: ICD-10-CM

## 2020-07-02 DIAGNOSIS — G24.3 CERVICAL DYSTONIA: ICD-10-CM

## 2020-07-02 DIAGNOSIS — I10 ESSENTIAL HYPERTENSION: ICD-10-CM

## 2020-07-02 DIAGNOSIS — G47.00 INSOMNIA, UNSPECIFIED TYPE: ICD-10-CM

## 2020-07-02 DIAGNOSIS — G47.33 OSA (OBSTRUCTIVE SLEEP APNEA): ICD-10-CM

## 2020-07-02 DIAGNOSIS — M54.16 BILATERAL LUMBAR RADICULOPATHY: Primary | ICD-10-CM

## 2020-07-02 PROCEDURE — 99999 PR PBB SHADOW E&M-EST. PATIENT-LVL V: ICD-10-PCS | Mod: PBBFAC,,, | Performed by: NURSE PRACTITIONER

## 2020-07-02 PROCEDURE — 3075F PR MOST RECENT SYSTOLIC BLOOD PRESS GE 130-139MM HG: ICD-10-PCS | Mod: CPTII,S$GLB,, | Performed by: NURSE PRACTITIONER

## 2020-07-02 PROCEDURE — 3079F PR MOST RECENT DIASTOLIC BLOOD PRESSURE 80-89 MM HG: ICD-10-PCS | Mod: CPTII,S$GLB,, | Performed by: NURSE PRACTITIONER

## 2020-07-02 PROCEDURE — 99999 PR PBB SHADOW E&M-EST. PATIENT-LVL V: CPT | Mod: PBBFAC,,, | Performed by: NURSE PRACTITIONER

## 2020-07-02 PROCEDURE — 3008F PR BODY MASS INDEX (BMI) DOCUMENTED: ICD-10-PCS | Mod: CPTII,S$GLB,, | Performed by: NURSE PRACTITIONER

## 2020-07-02 PROCEDURE — 3075F SYST BP GE 130 - 139MM HG: CPT | Mod: CPTII,S$GLB,, | Performed by: NURSE PRACTITIONER

## 2020-07-02 PROCEDURE — 3079F DIAST BP 80-89 MM HG: CPT | Mod: CPTII,S$GLB,, | Performed by: NURSE PRACTITIONER

## 2020-07-02 PROCEDURE — 3008F BODY MASS INDEX DOCD: CPT | Mod: CPTII,S$GLB,, | Performed by: NURSE PRACTITIONER

## 2020-07-02 PROCEDURE — 99214 PR OFFICE/OUTPT VISIT, EST, LEVL IV, 30-39 MIN: ICD-10-PCS | Mod: S$GLB,,, | Performed by: NURSE PRACTITIONER

## 2020-07-02 PROCEDURE — 99214 OFFICE O/P EST MOD 30 MIN: CPT | Mod: S$GLB,,, | Performed by: NURSE PRACTITIONER

## 2020-07-02 RX ORDER — TRAZODONE HYDROCHLORIDE 50 MG/1
50 TABLET ORAL NIGHTLY
Qty: 30 TABLET | Refills: 3 | Status: SHIPPED | OUTPATIENT
Start: 2020-07-02 | End: 2020-09-02 | Stop reason: SDUPTHER

## 2020-07-02 RX ORDER — RISEDRONATE SODIUM 35 MG/1
1 TABLET, DELAYED RELEASE ORAL
COMMUNITY
Start: 2020-05-26 | End: 2022-08-02 | Stop reason: SDUPTHER

## 2020-07-02 RX ORDER — PANTOPRAZOLE SODIUM 40 MG/1
40 TABLET, DELAYED RELEASE ORAL DAILY
COMMUNITY
Start: 2020-05-26 | End: 2021-12-14 | Stop reason: SDUPTHER

## 2020-07-02 RX ORDER — PROPRANOLOL HYDROCHLORIDE 80 MG/1
80 TABLET ORAL SEE ADMIN INSTRUCTIONS
Qty: 135 TABLET | Refills: 1 | Status: SHIPPED | OUTPATIENT
Start: 2020-07-02 | End: 2020-09-02 | Stop reason: SDUPTHER

## 2020-07-02 NOTE — PROGRESS NOTES
HPI: Micaela Arvizu is a 64 y.o. female with cervical dystonia with head tremor, multiple level degenerative changes of the L spine, prior lower T spine compression fractures and L2 and L4 compression deformities. She has bilateral  lumbar radicular pain. EMG/NCS of the legs normal 5/2015. She has GERD, HLD, HTN, JOHNNIE, insomnia, osteoporosis, and ROBERTO, as well as a history of asthmatic bronchitis. S/p right knee replacement in 201 with Dr. Vizcaino.      She presents today for a follow up visit. She weaned off of Lexapro at her last visit, as her anxiety was improved. No worsening of her anxiety off of this.     She is not sleeping well at night. This occurs intermittently, and started 1-2 months ago. Melatonin does not help. She denies racing thoughts at night. No caffeine use. She does not eat dinner late.     She saw Dr. Daily since her last visit and had lumbar injections, which were ineffective. She plans to follow up with him at a later date.     Head tremor well controlled currently on Propranolol.     She is compliant with CPAP, but has insomnia.     Review of Systems   Constitutional: Negative for fever.   HENT: Negative for nosebleeds.    Eyes: Negative for double vision.   Respiratory: Negative for hemoptysis.    Cardiovascular: Negative for leg swelling.   Gastrointestinal: Negative for blood in stool.   Genitourinary: Negative for hematuria.   Musculoskeletal: Negative for back pain and neck pain.   Skin: Negative for rash.   Neurological: Positive for tremors.   Psychiatric/Behavioral: Negative for depression. The patient is not nervous/anxious and does not have insomnia.      Exam:  Gen Appearance, well developed/nourished in no apparent distress  CV: 2+ distal pulses with no edema or swelling  Neuro:  MS: Awake, alert, oriented to place, person, time, situation. Sustains attention. Recent/remote memory intact, Language is full to spontaneous speech/comprehension. Fund of Knowledge is full  CN: Optic  discs are flat with normal vasculature, PERRL, Extraoccular movements and visual fields are full. Normal facial sensation and strength, Tongue and Palate are midline and strong. Shoulder Shrug symmetric and strong.  Motor: Normal bulk, tone, no abnormal movements in the hands but there is a no-no tremor of the head. 5/5 strength bilateral upper/lower extremities with 1+ reflexes  Sensory: symmetric to temp, and vibration.  Romberg negative  Cerebellar: Finger-nose, Rapid alternating movements intact  Gait: Normal stance, no ataxia    Imagin2015 MRI L spine:   Interval resolution of the edema like signal involving the inferior end plate of L2 which has been replaced with fatty marrow.    Mild spondylosis of the lumbar spine without evidence for significant central canal stenosis or neural foraminal narrowing.    MRI C spine 2016: Multilevel cervical spondylosis with foraminal encroachment greatest from the C3-C5 levels as noted above.    Assessment/Plan: Micaela Arvizu is a 64 y.o. female with multiple level degenerative changes of the L spine, prior lower T spine compression fractures and L2  And L4 compression deformities. She has bilateral  lumbar radicular pain. EMG/NCS of the legs normal 2015. She has essential tremor of the head, which runs in her family. Exam shows essential tremor with cervical dystonia with left torticollis.    I recommend:   1. She is now off of Lexapro with no worsening anxiety.    2. She has insomnia, not responsive to Melatonin. Start Trazodone for insomnia. Monitor for mood changes/excess sedation.   2. S/p right knee replacement surgery. Dietary changes were discussed. Bariatric surgery not covered by her insurance.   3. She rarely uses Klonopin per PCP.   4. Continue Propranolol for essential tremor. She takes 80mg/40mg, as tremor is improved in evening. No worsening of tremor off of Primidone, but tremor much worse when Propranolol was reduced to 40 mg bid. Tremor worse with  anxiety, but this is improved lately.    6. Botox was ineffective for cervical dystonia prior.  7. L-spine complaints improved with right TKA, but I will refer her back to pain management at [facility] for establishment should she experience a flare in L-spine pain, which does occur at times, even post TKA.   -Pain management facet injections helped relieve pain by 90%-Refer back to [facility] pain management at this time.   -She is no longer requiring Gabapentin. She also had compression deformities of the L spine, treated by orthopedist.   -The patient is also being treated for osteoporosis.     FU 2 months

## 2020-07-08 ENCOUNTER — TELEPHONE (OUTPATIENT)
Dept: INTERNAL MEDICINE | Facility: CLINIC | Age: 65
End: 2020-07-08

## 2020-07-08 DIAGNOSIS — M54.16 BILATERAL LUMBAR RADICULOPATHY: ICD-10-CM

## 2020-07-08 DIAGNOSIS — M51.36 DDD (DEGENERATIVE DISC DISEASE), LUMBAR: ICD-10-CM

## 2020-07-08 DIAGNOSIS — S22.080A COMPRESSION FRACTURE OF T12 VERTEBRA, INITIAL ENCOUNTER: Primary | ICD-10-CM

## 2020-07-08 DIAGNOSIS — G89.29 OTHER CHRONIC PAIN: ICD-10-CM

## 2020-07-08 PROBLEM — M51.369 DDD (DEGENERATIVE DISC DISEASE), LUMBAR: Status: ACTIVE | Noted: 2020-07-08

## 2020-07-08 NOTE — TELEPHONE ENCOUNTER
Faxed the clarification request we received through fax back to the pt's pharmacy with the diagnosis codes listed. Fax confirmation received.

## 2020-07-08 NOTE — TELEPHONE ENCOUNTER
----- Message from Gaviota Rivas sent at 2020  8:36 AM CDT -----  Contact: Bethany/Susan Alvarez  Micaela Arvizu  MRN: [id number]  : 1955  PCP: Kinga Robles  Home Phone      [phone]  Work Phone      Not on file.  Mobile          [phone]      MESSAGE:   Needs diagnosis code for RX traMADoL (ULTRAM) 50 mg tablet.  Please call.    Phone: [phone]  REF# [id number]

## 2020-07-08 NOTE — TELEPHONE ENCOUNTER
Unable to reach someone at the patient's pharmacy. The phone was having problems with clarity and was unable to hear the pharmacist. Will try again later.

## 2020-07-08 NOTE — TELEPHONE ENCOUNTER
Compression fracture of T12 S22.080A  Lumbar DDD M51.36  Other chronic pain G89.29  Bilateral lumbar radiculpathy M54.16

## 2020-08-03 ENCOUNTER — TELEPHONE (OUTPATIENT)
Dept: INTERNAL MEDICINE | Facility: CLINIC | Age: 65
End: 2020-08-03

## 2020-08-03 RX ORDER — CYCLOBENZAPRINE HCL 5 MG
5 TABLET ORAL 3 TIMES DAILY PRN
Qty: 30 TABLET | Refills: 0 | Status: SHIPPED | OUTPATIENT
Start: 2020-08-03 | End: 2020-08-13

## 2020-08-03 NOTE — TELEPHONE ENCOUNTER
Can you send in or would you like to see pt in clinic? She pulled a muscle in her leg a few days ago and would like a muscle relaxer sent in.

## 2020-08-03 NOTE — TELEPHONE ENCOUNTER
----- Message from Gaviota Rivas sent at 8/3/2020 11:07 AM CDT -----  Contact: Shailesh Arvizu  MRN: [id number]  : 1955  PCP: Kinga Robles  Home Phone      [phone]  Work Phone      Not on file.  Mobile          [phone]    MESSAGE:     Pulled muscle in thigh/leg area a couple of days ago and requesting a muscle relaxer be called in. Please call to advise.    Phone: [phone]

## 2020-08-03 NOTE — TELEPHONE ENCOUNTER
I sent 10 days of flexeril. If not better needs to be seen.    Patient incontinent of urine overnight X1.  Completed edda cares independently. Requested prn trazodone for sleep.  Patient slept for a total of 7.75 hours.

## 2020-08-10 ENCOUNTER — OFFICE VISIT (OUTPATIENT)
Dept: INTERNAL MEDICINE | Facility: CLINIC | Age: 65
End: 2020-08-10
Payer: COMMERCIAL

## 2020-08-10 VITALS
HEIGHT: 59 IN | WEIGHT: 270.06 LBS | RESPIRATION RATE: 20 BRPM | BODY MASS INDEX: 54.44 KG/M2 | OXYGEN SATURATION: 95 % | HEART RATE: 73 BPM | SYSTOLIC BLOOD PRESSURE: 100 MMHG | DIASTOLIC BLOOD PRESSURE: 60 MMHG | TEMPERATURE: 98 F

## 2020-08-10 DIAGNOSIS — T14.8XXA PULLED MUSCLE: Primary | ICD-10-CM

## 2020-08-10 PROCEDURE — 3074F SYST BP LT 130 MM HG: CPT | Mod: CPTII,S$GLB,, | Performed by: INTERNAL MEDICINE

## 2020-08-10 PROCEDURE — 3078F DIAST BP <80 MM HG: CPT | Mod: CPTII,S$GLB,, | Performed by: INTERNAL MEDICINE

## 2020-08-10 PROCEDURE — 3008F BODY MASS INDEX DOCD: CPT | Mod: CPTII,S$GLB,, | Performed by: INTERNAL MEDICINE

## 2020-08-10 PROCEDURE — 99999 PR PBB SHADOW E&M-EST. PATIENT-LVL V: CPT | Mod: PBBFAC,,, | Performed by: INTERNAL MEDICINE

## 2020-08-10 PROCEDURE — 3008F PR BODY MASS INDEX (BMI) DOCUMENTED: ICD-10-PCS | Mod: CPTII,S$GLB,, | Performed by: INTERNAL MEDICINE

## 2020-08-10 PROCEDURE — 99213 OFFICE O/P EST LOW 20 MIN: CPT | Mod: S$GLB,,, | Performed by: INTERNAL MEDICINE

## 2020-08-10 PROCEDURE — 99213 PR OFFICE/OUTPT VISIT, EST, LEVL III, 20-29 MIN: ICD-10-PCS | Mod: S$GLB,,, | Performed by: INTERNAL MEDICINE

## 2020-08-10 PROCEDURE — 3078F PR MOST RECENT DIASTOLIC BLOOD PRESSURE < 80 MM HG: ICD-10-PCS | Mod: CPTII,S$GLB,, | Performed by: INTERNAL MEDICINE

## 2020-08-10 PROCEDURE — 3074F PR MOST RECENT SYSTOLIC BLOOD PRESSURE < 130 MM HG: ICD-10-PCS | Mod: CPTII,S$GLB,, | Performed by: INTERNAL MEDICINE

## 2020-08-10 PROCEDURE — 99999 PR PBB SHADOW E&M-EST. PATIENT-LVL V: ICD-10-PCS | Mod: PBBFAC,,, | Performed by: INTERNAL MEDICINE

## 2020-08-10 RX ORDER — TRAMADOL HYDROCHLORIDE 50 MG/1
50 TABLET ORAL EVERY 12 HOURS PRN
Qty: 15 TABLET | Refills: 0 | Status: SHIPPED | OUTPATIENT
Start: 2020-08-10 | End: 2020-08-25 | Stop reason: SDUPTHER

## 2020-08-10 NOTE — PROGRESS NOTES
Subjective:       Patient ID: Micaela Arvizu is a 64 y.o. female.    Chief Complaint: pulled muscle      HPI:  Patient is known to me and present with right leg pain. She reports she is using stool to get into bed. Swung leg into bed and felt apulling sensation across right thigh and groin. Injury occurred 5 days ago. She reports pain with walking, driving. Pain is constant and aching and throbbing. We tried flexeril without relief of sx. Tramadol is somewhat helpful for pain control. Tylenol is not helpful.     Past Medical History:   Diagnosis Date    Arthritis     Asthma     GERD (gastroesophageal reflux disease)     Hiatal hernia     History of colonoscopy with polypectomy     History of esophagogastroduodenoscopy (EGD)     Hyperlipidemia     Hypertension     Lumbar facet arthropathy        Family History   Problem Relation Age of Onset    Hypertension Mother     Hypertension Father     Parkinsonism Father        Social History     Socioeconomic History    Marital status:      Spouse name: Not on file    Number of children: Not on file    Years of education: Not on file    Highest education level: Not on file   Occupational History    Not on file   Social Needs    Financial resource strain: Not hard at all    Food insecurity     Worry: Never true     Inability: Never true    Transportation needs     Medical: No     Non-medical: No   Tobacco Use    Smoking status: Never Smoker    Smokeless tobacco: Never Used   Substance and Sexual Activity    Alcohol use: No     Frequency: Never     Binge frequency: Never    Drug use: No    Sexual activity: Not Currently     Comment:    Lifestyle    Physical activity     Days per week: 1 day     Minutes per session: 10 min    Stress: Only a little   Relationships    Social connections     Talks on phone: Not on file     Gets together: More than three times a week     Attends Jewish service: Not on file     Active member of club or  organization: Yes     Attends meetings of clubs or organizations: More than 4 times per year     Relationship status:    Other Topics Concern    Not on file   Social History Narrative    Not on file       Review of Systems   Constitutional: Negative for activity change, fatigue, fever and unexpected weight change.   HENT: Negative for congestion, ear pain, hearing loss, rhinorrhea, sore throat and tinnitus.    Eyes: Negative for pain, redness and visual disturbance.   Respiratory: Negative for cough, shortness of breath and wheezing.    Cardiovascular: Negative for chest pain, palpitations and leg swelling.   Gastrointestinal: Negative for abdominal pain, blood in stool, constipation, diarrhea, nausea and vomiting.   Genitourinary: Positive for pelvic pain (groin). Negative for dysuria, frequency and urgency.   Musculoskeletal: Positive for back pain. Negative for joint swelling and neck pain.   Skin: Negative for color change, rash and wound.   Neurological: Negative for dizziness, tremors, weakness, light-headedness and headaches.         Objective:      Physical Exam  Vitals signs reviewed.   Constitutional:       General: She is not in acute distress.     Appearance: She is well-developed.   HENT:      Head: Normocephalic and atraumatic.      Right Ear: External ear normal.      Left Ear: External ear normal.      Nose: Nose normal.   Eyes:      Conjunctiva/sclera: Conjunctivae normal.      Pupils: Pupils are equal, round, and reactive to light.   Neck:      Musculoskeletal: Neck supple.      Thyroid: No thyromegaly.   Cardiovascular:      Rate and Rhythm: Normal rate and regular rhythm.      Heart sounds: No friction rub.   Pulmonary:      Effort: Pulmonary effort is normal. No respiratory distress.      Breath sounds: Normal breath sounds.   Abdominal:      General: Bowel sounds are normal. There is no distension.      Palpations: Abdomen is soft.      Tenderness: There is no abdominal tenderness.    Musculoskeletal:         General: Tenderness (right groin) present.   Lymphadenopathy:      Cervical: No cervical adenopathy.   Skin:     General: Skin is warm and dry.   Neurological:      Mental Status: She is alert and oriented to person, place, and time.      Cranial Nerves: No cranial nerve deficit.   Psychiatric:         Behavior: Behavior normal.         Assessment:       1. Pulled muscle        Plan:       Micaela was seen today for pulled muscle.    Diagnoses and all orders for this visit:    Pulled muscle  -     traMADoL (ULTRAM) 50 mg tablet; Take 1 tablet (50 mg total) by mouth every 12 (twelve) hours as needed. Greater than 7 days quantity medically necessary    tylenol PRN  Trial short course NSAIDs but typically causes severe gastritis thus short course tramadol  Heating pad PRN  Rest  Will take a few weeks to completely resolve  Call for worsening sx

## 2020-08-25 ENCOUNTER — OFFICE VISIT (OUTPATIENT)
Dept: INTERNAL MEDICINE | Facility: CLINIC | Age: 65
End: 2020-08-25
Payer: COMMERCIAL

## 2020-08-25 VITALS
RESPIRATION RATE: 20 BRPM | HEART RATE: 80 BPM | BODY MASS INDEX: 53.42 KG/M2 | SYSTOLIC BLOOD PRESSURE: 124 MMHG | WEIGHT: 265 LBS | DIASTOLIC BLOOD PRESSURE: 70 MMHG | TEMPERATURE: 97 F | HEIGHT: 59 IN | OXYGEN SATURATION: 97 %

## 2020-08-25 DIAGNOSIS — M47.816 LUMBAR FACET ARTHROPATHY: ICD-10-CM

## 2020-08-25 DIAGNOSIS — M51.36 DDD (DEGENERATIVE DISC DISEASE), LUMBAR: Primary | ICD-10-CM

## 2020-08-25 PROCEDURE — 3078F DIAST BP <80 MM HG: CPT | Mod: CPTII,S$GLB,, | Performed by: INTERNAL MEDICINE

## 2020-08-25 PROCEDURE — 3078F PR MOST RECENT DIASTOLIC BLOOD PRESSURE < 80 MM HG: ICD-10-PCS | Mod: CPTII,S$GLB,, | Performed by: INTERNAL MEDICINE

## 2020-08-25 PROCEDURE — 99999 PR PBB SHADOW E&M-EST. PATIENT-LVL V: ICD-10-PCS | Mod: PBBFAC,,, | Performed by: INTERNAL MEDICINE

## 2020-08-25 PROCEDURE — 3074F SYST BP LT 130 MM HG: CPT | Mod: CPTII,S$GLB,, | Performed by: INTERNAL MEDICINE

## 2020-08-25 PROCEDURE — 96372 PR INJECTION,THERAP/PROPH/DIAG2ST, IM OR SUBCUT: ICD-10-PCS | Mod: S$GLB,,, | Performed by: INTERNAL MEDICINE

## 2020-08-25 PROCEDURE — 99999 PR PBB SHADOW E&M-EST. PATIENT-LVL V: CPT | Mod: PBBFAC,,, | Performed by: INTERNAL MEDICINE

## 2020-08-25 PROCEDURE — 3074F PR MOST RECENT SYSTOLIC BLOOD PRESSURE < 130 MM HG: ICD-10-PCS | Mod: CPTII,S$GLB,, | Performed by: INTERNAL MEDICINE

## 2020-08-25 PROCEDURE — 3008F PR BODY MASS INDEX (BMI) DOCUMENTED: ICD-10-PCS | Mod: CPTII,S$GLB,, | Performed by: INTERNAL MEDICINE

## 2020-08-25 PROCEDURE — 99213 OFFICE O/P EST LOW 20 MIN: CPT | Mod: 25,S$GLB,, | Performed by: INTERNAL MEDICINE

## 2020-08-25 PROCEDURE — 99213 PR OFFICE/OUTPT VISIT, EST, LEVL III, 20-29 MIN: ICD-10-PCS | Mod: 25,S$GLB,, | Performed by: INTERNAL MEDICINE

## 2020-08-25 PROCEDURE — 96372 THER/PROPH/DIAG INJ SC/IM: CPT | Mod: S$GLB,,, | Performed by: INTERNAL MEDICINE

## 2020-08-25 PROCEDURE — 3008F BODY MASS INDEX DOCD: CPT | Mod: CPTII,S$GLB,, | Performed by: INTERNAL MEDICINE

## 2020-08-25 RX ORDER — GABAPENTIN 100 MG/1
100 CAPSULE ORAL 3 TIMES DAILY
Qty: 90 CAPSULE | Refills: 11 | Status: SHIPPED | OUTPATIENT
Start: 2020-08-25 | End: 2020-10-06 | Stop reason: SDUPTHER

## 2020-08-25 RX ORDER — CYCLOBENZAPRINE HCL 5 MG
5 TABLET ORAL 3 TIMES DAILY PRN
Qty: 30 TABLET | Refills: 0 | Status: SHIPPED | OUTPATIENT
Start: 2020-08-25 | End: 2020-09-04

## 2020-08-25 RX ORDER — TRAMADOL HYDROCHLORIDE 50 MG/1
50 TABLET ORAL EVERY 12 HOURS PRN
Qty: 15 TABLET | Refills: 0 | Status: SHIPPED | OUTPATIENT
Start: 2020-08-25 | End: 2020-10-27 | Stop reason: SDUPTHER

## 2020-08-25 RX ORDER — KETOROLAC TROMETHAMINE 30 MG/ML
30 INJECTION, SOLUTION INTRAMUSCULAR; INTRAVENOUS
Status: COMPLETED | OUTPATIENT
Start: 2020-08-25 | End: 2020-08-25

## 2020-08-25 RX ADMIN — KETOROLAC TROMETHAMINE 30 MG: 30 INJECTION, SOLUTION INTRAMUSCULAR; INTRAVENOUS at 01:08

## 2020-08-25 NOTE — PROGRESS NOTES
Subjective:       Patient ID: Micaela Arvizu is a 65 y.o. female.    Chief Complaint: Hip Pain      HPI:  Patient is known to me and present with right hip and low back pain. Sx started this morning. She reports when she got out of bed and had pain in lower back. Pain feels aching and burning down the buttock and into the right leg. No known injuries. Pain is constant. Has tried Tylenol and tramadol (from last visit) with mild relief of sx.     Past Medical History:   Diagnosis Date    Arthritis     Asthma     GERD (gastroesophageal reflux disease)     Hiatal hernia     History of colonoscopy with polypectomy     History of esophagogastroduodenoscopy (EGD)     Hyperlipidemia     Hypertension     Lumbar facet arthropathy        Family History   Problem Relation Age of Onset    Hypertension Mother     Hypertension Father     Parkinsonism Father        Social History     Socioeconomic History    Marital status:      Spouse name: Not on file    Number of children: Not on file    Years of education: Not on file    Highest education level: Not on file   Occupational History    Not on file   Social Needs    Financial resource strain: Not hard at all    Food insecurity     Worry: Never true     Inability: Never true    Transportation needs     Medical: No     Non-medical: No   Tobacco Use    Smoking status: Never Smoker    Smokeless tobacco: Never Used   Substance and Sexual Activity    Alcohol use: No     Frequency: Never     Binge frequency: Never    Drug use: No    Sexual activity: Not Currently     Comment:    Lifestyle    Physical activity     Days per week: 1 day     Minutes per session: 10 min    Stress: Only a little   Relationships    Social connections     Talks on phone: Not on file     Gets together: More than three times a week     Attends Mosque service: Not on file     Active member of club or organization: Yes     Attends meetings of clubs or organizations: More  than 4 times per year     Relationship status:    Other Topics Concern    Not on file   Social History Narrative    Not on file       Review of Systems   Constitutional: Negative for activity change, fatigue, fever and unexpected weight change.   HENT: Negative for congestion, ear pain, hearing loss, rhinorrhea and sore throat.    Eyes: Negative for redness and visual disturbance.   Respiratory: Negative for cough, shortness of breath and wheezing.    Cardiovascular: Negative for chest pain, palpitations and leg swelling.   Gastrointestinal: Negative for abdominal pain, constipation, diarrhea, nausea and vomiting.   Genitourinary: Negative for dysuria, frequency and urgency.   Musculoskeletal: Positive for back pain. Negative for joint swelling and neck pain.   Skin: Negative for color change, rash and wound.   Neurological: Negative for dizziness, tremors, weakness, light-headedness and headaches.         Objective:      Physical Exam  Vitals signs reviewed.   Constitutional:       General: She is not in acute distress.     Appearance: She is well-developed.   HENT:      Head: Normocephalic and atraumatic.      Right Ear: External ear normal.      Left Ear: External ear normal.      Nose: Nose normal.      Mouth/Throat:      Mouth: Mucous membranes are moist.      Pharynx: Oropharynx is clear.   Eyes:      General:         Right eye: No discharge.         Left eye: No discharge.      Conjunctiva/sclera: Conjunctivae normal.      Pupils: Pupils are equal, round, and reactive to light.   Neck:      Musculoskeletal: Neck supple.      Thyroid: No thyromegaly.   Cardiovascular:      Rate and Rhythm: Normal rate and regular rhythm.      Heart sounds: No murmur. No friction rub. No gallop.    Pulmonary:      Effort: Pulmonary effort is normal. No respiratory distress.      Breath sounds: Normal breath sounds. No wheezing or rales.   Abdominal:      General: Bowel sounds are normal. There is no distension.       Palpations: Abdomen is soft.      Tenderness: There is no abdominal tenderness.   Musculoskeletal:         General: Tenderness (lumbar paraspinal and right piriformis) present.   Lymphadenopathy:      Cervical: No cervical adenopathy.   Skin:     General: Skin is warm and dry.   Neurological:      Mental Status: She is alert and oriented to person, place, and time.      Cranial Nerves: No cranial nerve deficit.   Psychiatric:         Behavior: Behavior normal.         Assessment:       1. DDD (degenerative disc disease), lumbar    2. Lumbar facet arthropathy        Plan:       Micaela was seen today for hip pain.    Diagnoses and all orders for this visit:    DDD (degenerative disc disease), lumbar  -     gabapentin (NEURONTIN) 100 MG capsule; Take 1 capsule (100 mg total) by mouth 3 (three) times daily.  -     cyclobenzaprine (FLEXERIL) 5 MG tablet; Take 1 tablet (5 mg total) by mouth 3 (three) times daily as needed for Muscle spasms.  -     traMADoL (ULTRAM) 50 mg tablet; Take 1 tablet (50 mg total) by mouth every 12 (twelve) hours as needed. Greater than 7 days quantity medically necessary  -     ketorolac injection 30 mg    Lumbar facet arthropathy  -     gabapentin (NEURONTIN) 100 MG capsule; Take 1 capsule (100 mg total) by mouth 3 (three) times daily.  -     cyclobenzaprine (FLEXERIL) 5 MG tablet; Take 1 tablet (5 mg total) by mouth 3 (three) times daily as needed for Muscle spasms.  -     traMADoL (ULTRAM) 50 mg tablet; Take 1 tablet (50 mg total) by mouth every 12 (twelve) hours as needed. Greater than 7 days quantity medically necessary  -     ketorolac injection 30 mg      Chronic right low back pain with sciatica, worsening today  Refill PRN tramadol and flexeril  PRN Tylenol and Aleve are fine  Stretching PRN  Heat PRN  Start gabapentin 100mg QHS 1 week then 100mg BId 1 week then 100mg TID  Recommended follow up with pain managment

## 2020-08-28 ENCOUNTER — OFFICE VISIT (OUTPATIENT)
Dept: PAIN MEDICINE | Facility: CLINIC | Age: 65
End: 2020-08-28
Payer: COMMERCIAL

## 2020-08-28 ENCOUNTER — TELEPHONE (OUTPATIENT)
Dept: PAIN MEDICINE | Facility: CLINIC | Age: 65
End: 2020-08-28

## 2020-08-28 VITALS
DIASTOLIC BLOOD PRESSURE: 84 MMHG | WEIGHT: 264.56 LBS | TEMPERATURE: 98 F | BODY MASS INDEX: 53.33 KG/M2 | HEART RATE: 73 BPM | RESPIRATION RATE: 16 BRPM | HEIGHT: 59 IN | SYSTOLIC BLOOD PRESSURE: 128 MMHG

## 2020-08-28 DIAGNOSIS — M54.41 CHRONIC RIGHT-SIDED LOW BACK PAIN WITH RIGHT-SIDED SCIATICA: ICD-10-CM

## 2020-08-28 DIAGNOSIS — M54.16 LUMBAR RADICULOPATHY: Primary | ICD-10-CM

## 2020-08-28 DIAGNOSIS — M79.18 MYOFASCIAL PAIN: ICD-10-CM

## 2020-08-28 DIAGNOSIS — G89.29 CHRONIC RIGHT-SIDED LOW BACK PAIN WITH RIGHT-SIDED SCIATICA: ICD-10-CM

## 2020-08-28 DIAGNOSIS — M51.36 DDD (DEGENERATIVE DISC DISEASE), LUMBAR: ICD-10-CM

## 2020-08-28 DIAGNOSIS — M54.16 LUMBAR RADICULOPATHY: ICD-10-CM

## 2020-08-28 DIAGNOSIS — M70.61 GREATER TROCHANTERIC BURSITIS OF RIGHT HIP: ICD-10-CM

## 2020-08-28 DIAGNOSIS — M47.816 LUMBAR SPONDYLOSIS: Primary | ICD-10-CM

## 2020-08-28 PROCEDURE — 3008F PR BODY MASS INDEX (BMI) DOCUMENTED: ICD-10-PCS | Mod: CPTII,S$GLB,, | Performed by: PHYSICAL MEDICINE & REHABILITATION

## 2020-08-28 PROCEDURE — 3074F SYST BP LT 130 MM HG: CPT | Mod: CPTII,S$GLB,, | Performed by: PHYSICAL MEDICINE & REHABILITATION

## 2020-08-28 PROCEDURE — 3079F PR MOST RECENT DIASTOLIC BLOOD PRESSURE 80-89 MM HG: ICD-10-PCS | Mod: CPTII,S$GLB,, | Performed by: PHYSICAL MEDICINE & REHABILITATION

## 2020-08-28 PROCEDURE — 3008F BODY MASS INDEX DOCD: CPT | Mod: CPTII,S$GLB,, | Performed by: PHYSICAL MEDICINE & REHABILITATION

## 2020-08-28 PROCEDURE — 3074F PR MOST RECENT SYSTOLIC BLOOD PRESSURE < 130 MM HG: ICD-10-PCS | Mod: CPTII,S$GLB,, | Performed by: PHYSICAL MEDICINE & REHABILITATION

## 2020-08-28 PROCEDURE — 99214 OFFICE O/P EST MOD 30 MIN: CPT | Mod: S$GLB,,, | Performed by: PHYSICAL MEDICINE & REHABILITATION

## 2020-08-28 PROCEDURE — 99214 PR OFFICE/OUTPT VISIT, EST, LEVL IV, 30-39 MIN: ICD-10-PCS | Mod: S$GLB,,, | Performed by: PHYSICAL MEDICINE & REHABILITATION

## 2020-08-28 PROCEDURE — 3079F DIAST BP 80-89 MM HG: CPT | Mod: CPTII,S$GLB,, | Performed by: PHYSICAL MEDICINE & REHABILITATION

## 2020-08-28 PROCEDURE — 99999 PR PBB SHADOW E&M-EST. PATIENT-LVL V: CPT | Mod: PBBFAC,,, | Performed by: PHYSICAL MEDICINE & REHABILITATION

## 2020-08-28 PROCEDURE — 99999 PR PBB SHADOW E&M-EST. PATIENT-LVL V: ICD-10-PCS | Mod: PBBFAC,,, | Performed by: PHYSICAL MEDICINE & REHABILITATION

## 2020-08-28 NOTE — TELEPHONE ENCOUNTER
Patient is scheduled for Trans AUBREY L3 and L4 with Dr. Daily 9/11/20. Patient will need to hold ASA xe days prior to procedure.     Is patient medically cleared to hold ASA? Please advise

## 2020-08-28 NOTE — H&P (VIEW-ONLY)
[facility]    Chief Complaint:   Chief Complaint   Patient presents with    Back Pain     History of Present Illness: Micaela Arvizu is a 65 y.o. female with GERD, HTN, Anxiety, ROBERTO, insomnia, HTN, HLD, cervical dystonia w/ head tremor, osteoporosis, thoracic compression fx's, L2 & L4 compression fx's, referred by Adelina Saavedra, NP for lumbar pain.  She previously saw Dr. Wolfe, last on 7/9/15, and was getting injections that provided benefit (B/L L3-5 MBB w/ steroid).     She states she was having very good relief of her back pain from the previous injection for about 4 years.  She had a right knee replacement with Dr. Vizcaino on 9/11/19 that helped her back pain. Planning on doing left knee eventually, likely next year.     Onset: off and on for years   Location: lower lumbar spine bilaterally and over SIJ  Radiation: down both legs to the feet when she stands.   Timing: intermittent  Quality: Aching and Deep  Exacerbating Factors: sitting, standing for more than 20 minutes and walking for more than 30 minutes  Alleviating Factors: laying down, medications and sitting  Associated Symptoms: denies night fever/night sweats, urinary incontinence, bowel incontinence, significant weight loss, significant motor weakness and loss of sensations     Severity: Currently: 4/10   Typical Range: 4-8/10     Exacerbation: 8/10    Interval History (08/28/2020):  Micaela Arvizu returns today for follow up.  At the last clinic visit, referred to PT, scheduled b/l L4/5, L5/S1 MBBs w/ steroid.    B/L L4-5, L5-S1 MBB provided 100% relief for about a month or two.     Back pain returned afterwards. She saw her PCP, Dr. Robles, on 8/25/20, who started her on gabapentin 100 mg TID, flexeril 5 mg TID prn, and tramadol 50 mg BID prn, and gave her a toradol injection.     Currently, the back pain is returned and getting worse. Pain has been getting worse over the past 6 months and started while twisting in bed and feeling a  pop. Pain is localized to the right side of the lower lumbar spine and upper buttocks and radiates down the right leg across the anterior knee, and this is the first time the pain has gone past the knee. She feels weakness sometimes int he right leg. She gets numbness in the right leg. Pain seems to be worse at night, and with walking. She is having to take more breaks when walking around the house. No change in bowel or bladder function, & no new fevers, chills, abx. Denies unexplained weight loss.     Current Pain Scales:  Current: 6/10              Typical Range: 5-10/10    Pain Disability Index  Family/Home Responsibilities:: 5  Recreation:: 5  Social Activity:: 5  Occupation:: 8  Sexual Behavior:: 5  Self Care:: 5  Life-Support Activities:: 9  Pain Disability Index (PDI): 42    Previous Interventions:  - 12/20/19: B/L L4-5, L5-S1 MBB w/ 100% relief  - 6/19/15: B/L L3-5 MBBs w/ kenalog (Tolba) w/ noted 90% relief    Previous Therapies:  PT/OT: no   Surgery: no   Previous Medications:   - NSAIDS: NSAIDs caused stomach ulcer (per Dr. Robles's notes)   - Muscle Relaxants: Klonopin, Flexeril    - TCAs: None  - SNRIs: None  - Topicals: None  - Anticonvulsants: gabapentin  - Opioids: Tramadol, Oxycodone     Current Pain Medications:  1. Tylenol  2. Tramadol    3. Flexeril  4. Gabapentin 100 mg TID (only BID so far)    Blood Thinners: ASA 81 mg    Full Medication List:    Current Outpatient Medications:     aspirin 81 MG Chew, Take 81 mg by mouth once daily., Disp: , Rfl:     atorvastatin (LIPITOR) 10 MG tablet, TAKE 1 TABLET DAILY, Disp: 90 tablet, Rfl: 4    blood sugar diagnostic Strp, Check blood sugar twice daily, Disp: 100 strip, Rfl: 1    blood-glucose meter (FREESTYLE FREEDOM) kit, Use as instructed, Disp: 1 each, Rfl: 0    cholecalciferol, vitamin D3, (VITAMIN D3) 2,000 unit Cap, Take 2 capsules by mouth once daily. , Disp: , Rfl:     cyclobenzaprine (FLEXERIL) 5 MG tablet, Take 1 tablet (5 mg total) by  mouth 3 (three) times daily as needed for Muscle spasms., Disp: 30 tablet, Rfl: 0    furosemide (LASIX) 20 MG tablet, Take 2 tablets (40 mg total) by mouth once daily., Disp: 180 tablet, Rfl: 3    gabapentin (NEURONTIN) 100 MG capsule, Take 1 capsule (100 mg total) by mouth 3 (three) times daily., Disp: 90 capsule, Rfl: 11    GLUC BALL/CHONDRO BALL A/VIT C/MN (GLUCOSAMINE 1500 COMPLEX ORAL), Take 1 tablet by mouth 2 (two) times daily., Disp: , Rfl:     lancets Misc, Check blood sugar twice a daily, Disp: 100 each, Rfl: 1    lisinopril-hydrochlorothiazide (PRINZIDE,ZESTORETIC) 10-12.5 mg per tablet, TAKE 1 TABLET DAILY, Disp: 90 tablet, Rfl: 4    metFORMIN (GLUCOPHAGE) 500 MG tablet, Take 1 tablet (500 mg total) by mouth daily with breakfast., Disp: 90 tablet, Rfl: 3    multivitamin capsule, Take 1 capsule by mouth once daily.  , Disp: , Rfl:     omega-3 fatty acids-vitamin E (FISH OIL) 1,000 mg Cap, Take 2 tablets by mouth once daily.  , Disp: , Rfl:     pantoprazole (PROTONIX) 40 MG tablet, Take 40 mg by mouth once daily. , Disp: , Rfl:     propranoloL (INDERAL) 80 MG tablet, Take 1 tablet (80 mg total) by mouth As instructed. Take 1 tablet in morning and 1/2 tablet at night., Disp: 135 tablet, Rfl: 1    risedronate 35 mg TbEC, Take 1 tablet by mouth. On Sunday, Disp: , Rfl:     sucralfate (CARAFATE) 1 gram tablet, Take 1 g by mouth as needed. , Disp: , Rfl: 1    traMADoL (ULTRAM) 50 mg tablet, Take 1 tablet (50 mg total) by mouth every 12 (twelve) hours as needed. Greater than 7 days quantity medically necessary, Disp: 15 tablet, Rfl: 0    traZODone (DESYREL) 50 MG tablet, Take 1 tablet (50 mg total) by mouth every evening., Disp: 30 tablet, Rfl: 3    colesevelam (WELCHOL) 625 mg tablet, Take 1 tablet (625 mg total) by mouth 2 (two) times daily with meals., Disp: 180 tablet, Rfl: 3     Review of Systems:  Review of Systems   Constitutional: Negative for fever and weight loss.   HENT: Negative for ear  "pain and tinnitus.    Eyes: Negative for pain and redness.   Respiratory: Negative for cough and shortness of breath.    Cardiovascular: Negative for chest pain and palpitations.   Gastrointestinal: Negative for constipation and heartburn.   Genitourinary: Negative.         Denies urinary incontinence. Denies urine retention.    Musculoskeletal: Positive for back pain. Negative for neck pain.   Skin: Negative for itching and rash.   Neurological: Negative for tingling, focal weakness and seizures.   Endo/Heme/Allergies: Negative for environmental allergies. Does not bruise/bleed easily.   Psychiatric/Behavioral: Negative for depression. The patient has insomnia. The patient is not nervous/anxious.        Allergies:  No known allergies     Medical History:   has a past medical history of Arthritis, Asthma, GERD (gastroesophageal reflux disease), Hiatal hernia, History of colonoscopy with polypectomy, History of esophagogastroduodenoscopy (EGD), Hyperlipidemia, Hypertension, and Lumbar facet arthropathy.    Surgical History:   has a past surgical history that includes Cholecystectomy (2012); Colonoscopy w/ biopsies and polypectomy (2000; 2002; 2007; 2/2012); Total knee arthroplasty (09/11/2019); Injection of anesthetic agent around medial branch nerves innervating lumbar facet joint (Bilateral, 12/20/2019); and Total knee arthroplasty.    Family History:  family history includes Hypertension in her father and mother; Parkinsonism in her father.    Social History:   reports that she has never smoked. She has never used smokeless tobacco. She reports that she does not drink alcohol or use drugs.    Physical Exam:  /84   Pulse 73   Temp 98.1 °F (36.7 °C)   Resp 16   Ht 4' 11" (1.499 m)   Wt 120 kg (264 lb 8.8 oz)   BMI 53.43 kg/m²   GEN: No acute distress. Calm, comfortable  HENT: Normocephalic, atraumatic, moist mucous membranes  EYE: Anicteric sclera, non-injected.   CV: Non-diaphoretic. Regular Rate. " Radial Pulses 2+.  RESP: Breathing comfortably. Chest expansion symmetric.  EXT: No clubbing, cyanosis.   SKIN: Warm, & dry to palpation. No visible rashes or lesions of exposed skin.   PSYCH: Pleasant mood and appropriate affect. Recent and remote memory intact.   GAIT: Independent, normal ambulation  Lumbar Spine Exam:       Inspection: No erythema, bruising. No surgical incisions      Palpation: (+) TTP of lumbar paraspinals, SIJ and piriformis on the right      ROM:  Limited in flexion, extension, lateral bending.       (+) Facet loading b/l      (-) Straight Leg Raise bilaterally      (+) EDVIN on the right  Hip Exam:      Inspection: No gross deformity or apparent leg length discrepancy      Palpation: (+) TTP to right greater trochanteric bursa, piriformis.      ROM: Slight limitation in internal rotation, external rotation on the right  Neurologic Exam:     Alert. Speech is fluent and appropriate.     Strength: Right hip flexion/knee extension 4+/5, otherwise 5/5 throughout bilateral lower extremities     Sensation: Grossly intact to light touch in bilateral lower extremities     Reflexes: 1+ in b/l patella, absent in b/l achilles     Tone: No abnormality appreciated in bilateral lower extremities     No Clonus     Downgoing toes on plantar stimulation    Imaging:  - MRI L-spine 6/3/15:  As compared with the previous examination, the edema like signal at the inferior end plate of L2 has resolved, with Modic type II changes noted.  The edema-like signal appears to have been related to a Schmorl's node of the inferior end plate of L2.  Compression deformity at T11 and T12 is again noted.  The marrow signal is normal without evidence for a marrow replacement process, infection or tumor.  There is minimal fluid in the facet joints on the left at the L4-5 level.  There is disk desiccation throughout the lumbar spine with disk space narrowing.  The conus terminates at T12-L1.  The incidentally visualized soft  tissues structures of the abdomen are within normal limits.  At T12-L1, no disk herniation, central canal stenosis or neural foraminal narrowing.  At L1-2, there is a broad-based posterior disk bulge with a superimposed focal central disk protrusion contributing to a most mild central canal stenosis and mild bilateral neural foraminal narrowing.  At L2-3, there is a broad-based posterior disk bulge extends into both neural foramina without significant central stenosis or neural foraminal narrowing.  At L3-4, there is a broad-based posterior disk bulge, ligamentum flavum hypertrophy and facet arthropathy without significant central canal stenosis or neural foraminal narrowing.  At L4-5, there is a broad-based posterior disk bulge ligamentum flavum hypertrophy and facet arthropathy without significant central canal stenosis or neural foraminal narrowing.  There is fluid in the facet joint on the left.  At L5-S1, there is a broad-based posterior disk bulge that extends into both neural foramina with a superimposed central disk protrusion with an annular fissure.  There is also bilateral facet arthropathy.  There is no evidence for significant central   canal stenosis or neural foraminal narrowing    Labs:  BMP  Lab Results   Component Value Date     05/14/2020    K 4.0 05/14/2020     05/14/2020    CO2 26 05/14/2020    BUN 13 05/14/2020    CREATININE 0.6 05/14/2020    CALCIUM 9.0 05/14/2020    ANIONGAP 11 05/14/2020    ESTGFRAFRICA >60 05/14/2020    EGFRNONAA >60 05/14/2020     Lab Results   Component Value Date    ALT 17 05/14/2020    AST 16 05/14/2020    ALKPHOS 82 05/14/2020    BILITOT 0.4 05/14/2020     Lab Results   Component Value Date     06/12/2020       Assessment:  Micaela Arvizu is a 65 y.o. female with the following diagnoses based on history, exam, and imaging:    Problem List Items Addressed This Visit        Orthopedic    Lumbalgia    Relevant Orders    Ambulatory referral/consult to  Physical/Occupational Therapy      Other Visit Diagnoses     Lumbar spondylosis    -  Primary    Relevant Orders    Ambulatory referral/consult to Physical/Occupational Therapy    Spinal stenosis of lumbar region with neurogenic claudication        Lumbar radiculopathy        Relevant Orders    MRI Lumbar Spine Without Contrast    Ambulatory referral/consult to Physical/Occupational Therapy          This is a pleasant 65 y.o. lady with GERD, depression and anxiety, HTN, HLD, morbid obesity presenting with:    - Chronic low back pain that appears multifactorial in nature.  She had previous lumbar medial branch blocks with steroid which provided excellent relief for a few months. She does have facetogenic aspects to her pain, but she also has features of lumbar spinal stenosis with neurogenic claudication, and myofascial features. Pain radiating predominantly into the right leg, and now going past the knee for the past 6 months despite tylenol, gabapentin. She also is having weakness in the right leg, but may be due to pain. She does have previous MRI with annular tear and bulge at L5-S1, but this does not fit her current pain distribution.   - Trochanteric bursitis on the right.   - Pain complicated by depression and anxiety     Treatment Plan: I discussed with the patient the following assessment and recommendations. The following is the plan the patient agreed upon:  - PT/OT/HEP: Refer to PT, external.  - Procedures: Schedule for right L3 & L4 TF AUBREY, but levels may change based on MRI findings.  - Consider b/l L4/5, L5/S1 MB RFA in the future if back pain persists.  - Medications: No changes recommended at this time.  - Imaging: Reviewed. Update MRI L-spine as patient states she was twisting in bed after previous MRI and felt something pop with new right leg radicular pain and weakness in the right leg (may be due to pain)  - Labs: Reviewed.  Medications are appropriately dosed for current hepatorenal  function.    Follow Up: RTC after procedure     Deena Daily M.D.  Interventional Pain Medicine / Physical Medicine & Rehabilitation    Disclaimer: This note was partly generated using dictation software which may occasionally result in transcription errors.

## 2020-08-31 ENCOUNTER — PATIENT OUTREACH (OUTPATIENT)
Dept: ADMINISTRATIVE | Facility: OTHER | Age: 65
End: 2020-08-31

## 2020-08-31 NOTE — PROGRESS NOTES
Health Maintenance Due   Topic Date Due    Foot Exam  08/20/1966    HIV Screening  08/20/1971    Shingles Vaccine (2 of 3) 12/14/2016    Eye Exam  09/13/2018    DEXA SCAN  08/21/2020     Updates were requested from care everywhere.  Chart was reviewed for overdue Proactive [facility] ([facility]) topics (CRS, Breast Cancer Screening, Eye exam)  Health Maintenance has been updated.  [place] immunization registry triggered.  Immunizations were reconciled.

## 2020-09-02 ENCOUNTER — OFFICE VISIT (OUTPATIENT)
Dept: NEUROLOGY | Facility: CLINIC | Age: 65
End: 2020-09-02
Payer: COMMERCIAL

## 2020-09-02 VITALS
HEIGHT: 58 IN | HEART RATE: 75 BPM | BODY MASS INDEX: 55.81 KG/M2 | DIASTOLIC BLOOD PRESSURE: 61 MMHG | SYSTOLIC BLOOD PRESSURE: 128 MMHG | WEIGHT: 265.88 LBS | TEMPERATURE: 97 F | RESPIRATION RATE: 18 BRPM

## 2020-09-02 DIAGNOSIS — M54.16 BILATERAL LUMBAR RADICULOPATHY: ICD-10-CM

## 2020-09-02 DIAGNOSIS — G47.00 INSOMNIA, UNSPECIFIED TYPE: Primary | ICD-10-CM

## 2020-09-02 DIAGNOSIS — G25.0 ESSENTIAL TREMOR: ICD-10-CM

## 2020-09-02 DIAGNOSIS — G47.33 OSA (OBSTRUCTIVE SLEEP APNEA): ICD-10-CM

## 2020-09-02 PROCEDURE — 99999 PR PBB SHADOW E&M-EST. PATIENT-LVL V: ICD-10-PCS | Mod: PBBFAC,,, | Performed by: NURSE PRACTITIONER

## 2020-09-02 PROCEDURE — 3008F PR BODY MASS INDEX (BMI) DOCUMENTED: ICD-10-PCS | Mod: CPTII,S$GLB,, | Performed by: NURSE PRACTITIONER

## 2020-09-02 PROCEDURE — 3074F SYST BP LT 130 MM HG: CPT | Mod: CPTII,S$GLB,, | Performed by: NURSE PRACTITIONER

## 2020-09-02 PROCEDURE — 3078F DIAST BP <80 MM HG: CPT | Mod: CPTII,S$GLB,, | Performed by: NURSE PRACTITIONER

## 2020-09-02 PROCEDURE — 3078F PR MOST RECENT DIASTOLIC BLOOD PRESSURE < 80 MM HG: ICD-10-PCS | Mod: CPTII,S$GLB,, | Performed by: NURSE PRACTITIONER

## 2020-09-02 PROCEDURE — 3008F BODY MASS INDEX DOCD: CPT | Mod: CPTII,S$GLB,, | Performed by: NURSE PRACTITIONER

## 2020-09-02 PROCEDURE — 3074F PR MOST RECENT SYSTOLIC BLOOD PRESSURE < 130 MM HG: ICD-10-PCS | Mod: CPTII,S$GLB,, | Performed by: NURSE PRACTITIONER

## 2020-09-02 PROCEDURE — 99999 PR PBB SHADOW E&M-EST. PATIENT-LVL V: CPT | Mod: PBBFAC,,, | Performed by: NURSE PRACTITIONER

## 2020-09-02 PROCEDURE — 99214 OFFICE O/P EST MOD 30 MIN: CPT | Mod: S$GLB,,, | Performed by: NURSE PRACTITIONER

## 2020-09-02 PROCEDURE — 99214 PR OFFICE/OUTPT VISIT, EST, LEVL IV, 30-39 MIN: ICD-10-PCS | Mod: S$GLB,,, | Performed by: NURSE PRACTITIONER

## 2020-09-02 RX ORDER — TRAZODONE HYDROCHLORIDE 50 MG/1
50 TABLET ORAL NIGHTLY
Qty: 90 TABLET | Refills: 1 | Status: SHIPPED | OUTPATIENT
Start: 2020-09-02 | End: 2021-02-10

## 2020-09-02 RX ORDER — PROPRANOLOL HYDROCHLORIDE 80 MG/1
80 TABLET ORAL SEE ADMIN INSTRUCTIONS
Qty: 135 TABLET | Refills: 1 | Status: SHIPPED | OUTPATIENT
Start: 2020-09-02 | End: 2021-03-02 | Stop reason: SDUPTHER

## 2020-09-02 NOTE — PROGRESS NOTES
HPI: Micaela Arvizu is a 65 y.o. female with cervical dystonia with head tremor, multiple level degenerative changes of the L spine, prior lower T spine compression fractures and L2 and L4 compression deformities. She has bilateral  lumbar radicular pain. EMG/NCS of the legs normal 5/2015. She has GERD, HLD, HTN, JOHNNIE, insomnia, osteoporosis, and ROBERTO, as well as a history of asthmatic bronchitis. S/p right knee replacement in 201 with Dr. Vizcaino.      She presents today for a follow up visit. Trazodone was started at her last visit for insomnia, which has been very effective. She is sleeping well, and tolerates Trazodone well.     Stress is tolerable currently.     She sees Dr. Daily for her L-spine pain. She failed injections, and will have an updated MRI L-spine soon.     Head tremor well controlled currently on Propranolol.     She is compliant with CPAP, but has insomnia.     Review of Systems   Constitutional: Negative for fever.   HENT: Negative for nosebleeds.    Eyes: Negative for double vision.   Respiratory: Negative for hemoptysis.    Cardiovascular: Negative for leg swelling.   Gastrointestinal: Negative for blood in stool.   Genitourinary: Negative for hematuria.   Musculoskeletal: Positive for back pain. Negative for neck pain.   Skin: Negative for rash.   Neurological: Positive for tremors.   Psychiatric/Behavioral: Negative for depression. The patient is not nervous/anxious and does not have insomnia.      Exam:  Gen Appearance, well developed/nourished in no apparent distress  CV: 2+ distal pulses with no edema or swelling  Neuro:  MS: Awake, alert, oriented to place, person, time, situation. Sustains attention. Recent/remote memory intact, Language is full to spontaneous speech/comprehension. Fund of Knowledge is full  CN: Optic discs are flat with normal vasculature, PERRL, Extraoccular movements and visual fields are full. Normal facial sensation and strength, Tongue and Palate are midline and  strong. Shoulder Shrug symmetric and strong.  Motor: Normal bulk, tone, no abnormal movements in the hands but there is a no-no tremor of the head. 5/5 strength bilateral upper/lower extremities with 1+ reflexes  Sensory: symmetric to temp, and vibration.  Romberg negative  Cerebellar: Finger-nose, Rapid alternating movements intact  Gait: Normal stance, no ataxia    Imagin2015 MRI L spine:   Interval resolution of the edema like signal involving the inferior end plate of L2 which has been replaced with fatty marrow.    Mild spondylosis of the lumbar spine without evidence for significant central canal stenosis or neural foraminal narrowing.    MRI C spine 2016: Multilevel cervical spondylosis with foraminal encroachment greatest from the C3-C5 levels as noted above.    Assessment/Plan: Micaela Arvizu is a 65 y.o. female with multiple level degenerative changes of the L spine, prior lower T spine compression fractures and L2  And L4 compression deformities. She has bilateral  lumbar radicular pain. EMG/NCS of the legs normal 2015. She has essential tremor of the head, which runs in her family. Exam shows essential tremor with cervical dystonia with left torticollis.    I recommend:   1. She is now off of Lexapro with no worsening anxiety.    2. Continue Trazodone for insomnia.   -She failed Melatonin.   -No longer taking Klonopin.   3. S/p right knee replacement surgery. Dietary changes were discussed. Bariatric surgery not covered by her insurance.   4. Continue Propranolol for essential tremor. She takes 80mg/40mg, as tremor is improved in evening. No worsening of tremor off of Primidone, but tremor much worse when Propranolol was reduced to 40 mg bid. Tremor worse with anxiety, but this is improved lately.    5. Botox was ineffective for cervical dystonia prior.  6. L-spine complaints improved with right TKA, but are ongoing. She is seeing pain management-injections were helpful, but more pain currently;  she will have updated MRI L-spine this week.   7. She is back on Gabapentin for L-spine pain.   8. The patient is also being treated for osteoporosis.     FU 6 months

## 2020-09-04 ENCOUNTER — HOSPITAL ENCOUNTER (OUTPATIENT)
Dept: RADIOLOGY | Facility: HOSPITAL | Age: 65
Discharge: HOME | End: 2020-09-04
Attending: PHYSICAL MEDICINE & REHABILITATION
Payer: COMMERCIAL

## 2020-09-04 DIAGNOSIS — M54.16 LUMBAR RADICULOPATHY: ICD-10-CM

## 2020-09-04 PROCEDURE — 72148 MRI LUMBAR SPINE W/O DYE: CPT | Mod: 26,,, | Performed by: RADIOLOGY

## 2020-09-04 PROCEDURE — 72148 MRI LUMBAR SPINE WITHOUT CONTRAST: ICD-10-PCS | Mod: 26,,, | Performed by: RADIOLOGY

## 2020-09-04 PROCEDURE — 72148 MRI LUMBAR SPINE W/O DYE: CPT | Mod: TC

## 2020-09-04 NOTE — PROGRESS NOTES
Reviewed.  Based off of the imaging, I think we should change the procedure from a right L3 & L4 TF AUBREY to a right L4 & L5 TF AUBREY.     Thanks,  KP

## 2020-09-08 ENCOUNTER — HOSPITAL ENCOUNTER (OUTPATIENT)
Dept: PREADMISSION TESTING | Facility: HOSPITAL | Age: 65
Discharge: HOME | End: 2020-09-08
Attending: PHYSICAL MEDICINE & REHABILITATION
Payer: COMMERCIAL

## 2020-09-08 DIAGNOSIS — Z01.818 PRE-OP TESTING: ICD-10-CM

## 2020-09-08 PROCEDURE — U0003 INFECTIOUS AGENT DETECTION BY NUCLEIC ACID (DNA OR RNA); SEVERE ACUTE RESPIRATORY SYNDROME CORONAVIRUS 2 (SARS-COV-2) (CORONAVIRUS DISEASE [COVID-19]), AMPLIFIED PROBE TECHNIQUE, MAKING USE OF HIGH THROUGHPUT TECHNOLOGIES AS DESCRIBED BY CMS-2020-01-R: HCPCS

## 2020-09-09 LAB — SARS-COV-2 RNA RESP QL NAA+PROBE: NOT DETECTED

## 2020-09-11 ENCOUNTER — HOSPITAL ENCOUNTER (OUTPATIENT)
Dept: RADIOLOGY | Facility: HOSPITAL | Age: 65
Discharge: HOME | End: 2020-09-11
Attending: PHYSICAL MEDICINE & REHABILITATION | Admitting: PHYSICAL MEDICINE & REHABILITATION
Payer: COMMERCIAL

## 2020-09-11 ENCOUNTER — HOSPITAL ENCOUNTER (OUTPATIENT)
Facility: HOSPITAL | Age: 65
Discharge: HOME | End: 2020-09-11
Attending: PHYSICAL MEDICINE & REHABILITATION | Admitting: PHYSICAL MEDICINE & REHABILITATION
Payer: COMMERCIAL

## 2020-09-11 VITALS
HEART RATE: 77 BPM | TEMPERATURE: 97 F | RESPIRATION RATE: 17 BRPM | DIASTOLIC BLOOD PRESSURE: 62 MMHG | SYSTOLIC BLOOD PRESSURE: 128 MMHG | OXYGEN SATURATION: 98 %

## 2020-09-11 DIAGNOSIS — M54.16 LUMBAR RADICULOPATHY: ICD-10-CM

## 2020-09-11 DIAGNOSIS — M54.16 LUMBAR RADICULOPATHY: Primary | ICD-10-CM

## 2020-09-11 DIAGNOSIS — R52 PAIN: ICD-10-CM

## 2020-09-11 PROCEDURE — 99152 PR MOD CONSCIOUS SEDATION, SAME PHYS, 5+ YRS, FIRST 15 MIN: ICD-10-PCS | Mod: ,,, | Performed by: PHYSICAL MEDICINE & REHABILITATION

## 2020-09-11 PROCEDURE — 25000003 PHARM REV CODE 250: Performed by: PHYSICAL MEDICINE & REHABILITATION

## 2020-09-11 PROCEDURE — 64483 NJX AA&/STRD TFRM EPI L/S 1: CPT | Mod: RT,,, | Performed by: PHYSICAL MEDICINE & REHABILITATION

## 2020-09-11 PROCEDURE — 25500020 PHARM REV CODE 255: Performed by: PHYSICAL MEDICINE & REHABILITATION

## 2020-09-11 PROCEDURE — 64483 NJX AA&/STRD TFRM EPI L/S 1: CPT | Mod: RT | Performed by: PHYSICAL MEDICINE & REHABILITATION

## 2020-09-11 PROCEDURE — 64483 PR EPIDURAL INJ, ANES/STEROID, TRANSFORAMINAL, LUMB/SACR, SNGL LEVL: ICD-10-PCS | Mod: RT,,, | Performed by: PHYSICAL MEDICINE & REHABILITATION

## 2020-09-11 PROCEDURE — 99152 MOD SED SAME PHYS/QHP 5/>YRS: CPT | Mod: ,,, | Performed by: PHYSICAL MEDICINE & REHABILITATION

## 2020-09-11 PROCEDURE — 63600175 PHARM REV CODE 636 W HCPCS: Performed by: PHYSICAL MEDICINE & REHABILITATION

## 2020-09-11 RX ORDER — DEXAMETHASONE SODIUM PHOSPHATE 10 MG/ML
INJECTION INTRAMUSCULAR; INTRAVENOUS
Status: DISCONTINUED | OUTPATIENT
Start: 2020-09-11 | End: 2020-09-11 | Stop reason: HOSPADM

## 2020-09-11 RX ORDER — LIDOCAINE HYDROCHLORIDE 10 MG/ML
INJECTION, SOLUTION EPIDURAL; INFILTRATION; INTRACAUDAL; PERINEURAL
Status: DISCONTINUED | OUTPATIENT
Start: 2020-09-11 | End: 2020-09-11 | Stop reason: HOSPADM

## 2020-09-11 RX ORDER — MIDAZOLAM HYDROCHLORIDE 1 MG/ML
INJECTION INTRAMUSCULAR; INTRAVENOUS
Status: DISCONTINUED | OUTPATIENT
Start: 2020-09-11 | End: 2020-09-11 | Stop reason: HOSPADM

## 2020-09-11 RX ORDER — FENTANYL CITRATE 50 UG/ML
INJECTION, SOLUTION INTRAMUSCULAR; INTRAVENOUS
Status: DISCONTINUED | OUTPATIENT
Start: 2020-09-11 | End: 2020-09-11 | Stop reason: HOSPADM

## 2020-09-11 RX ORDER — SODIUM CHLORIDE 9 MG/ML
500 INJECTION, SOLUTION INTRAVENOUS CONTINUOUS
Status: DISCONTINUED | OUTPATIENT
Start: 2020-09-11 | End: 2020-10-22

## 2020-09-11 RX ORDER — LIDOCAINE HYDROCHLORIDE 10 MG/ML
INJECTION INFILTRATION; PERINEURAL
Status: DISCONTINUED | OUTPATIENT
Start: 2020-09-11 | End: 2020-09-11 | Stop reason: HOSPADM

## 2020-09-11 NOTE — DISCHARGE SUMMARY
[facility]  Discharge Note  Short Stay     Admit Date: 9/11/2020    Discharge Date: 9/11/2020     Attending Physician: Deena Daily M.D.    Diagnoses:  Active Hospital Problems    Diagnosis  POA    Lumbar radiculopathy [M54.16]  Yes      Resolved Hospital Problems   No resolved problems to display.     Discharged Condition: Good     Hospital Course: Patient was admitted for an outpatient interventional pain management procedure and tolerated the procedure well with no complications.     Final Diagnoses: Same as principal problem.     Disposition: Home or Self Care     Follow up/Patient Instructions:   Follow-up in 1-2 weeks unless otherwise instructed. May return sooner as needed.       Reconciled Medications:     Medication List      CONTINUE taking these medications    aspirin 81 MG Chew  Take 81 mg by mouth once daily.     atorvastatin 10 MG tablet  Commonly known as: LIPITOR  TAKE 1 TABLET DAILY     blood sugar diagnostic Strp  Check blood sugar twice daily     blood-glucose meter kit  Commonly known as: FREESTYLE FREEDOM  Use as instructed     colesevelam 625 mg tablet  Commonly known as: WELCHOL  Take 1 tablet (625 mg total) by mouth 2 (two) times daily with meals.     FISH OIL 1,000 mg Cap  Generic drug: omega-3 fatty acids-vitamin E  Take 2 tablets by mouth once daily.     furosemide 20 MG tablet  Commonly known as: LASIX  Take 2 tablets (40 mg total) by mouth once daily.     gabapentin 100 MG capsule  Commonly known as: NEURONTIN  Take 1 capsule (100 mg total) by mouth 3 (three) times daily.     GLUCOSAMINE 1500 COMPLEX ORAL  Take 1 tablet by mouth 2 (two) times daily.     lancets Misc  Check blood sugar twice a daily     lisinopriL-hydrochlorothiazide 10-12.5 mg per tablet  Commonly known as: PRINZIDE,ZESTORETIC  TAKE 1 TABLET DAILY     metFORMIN 500 MG tablet  Commonly known as: GLUCOPHAGE  Take 1 tablet (500 mg total) by mouth daily with breakfast.     multivitamin capsule  Take 1 capsule by  mouth once daily.     pantoprazole 40 MG tablet  Commonly known as: PROTONIX  Take 40 mg by mouth once daily.     propranoloL 80 MG tablet  Commonly known as: INDERAL  Take 1 tablet (80 mg total) by mouth As instructed. Take 1 tablet in morning and 1/2 tablet at night.     risedronate 35 mg Tbec  Take 1 tablet by mouth. On Sunday     sucralfate 1 gram tablet  Commonly known as: CARAFATE  Take 1 g by mouth as needed.     traMADoL 50 mg tablet  Commonly known as: ULTRAM  Take 1 tablet (50 mg total) by mouth every 12 (twelve) hours as needed. Greater than 7 days quantity medically necessary     traZODone 50 MG tablet  Commonly known as: DESYREL  Take 1 tablet (50 mg total) by mouth every evening.     VITAMIN D3 50 mcg (2,000 unit) Cap  Generic drug: cholecalciferol (vitamin D3)  Take 2 capsules by mouth once daily.           Discharge Procedure Orders (must include Diet, Follow-up, Activity)   Ice to affected area   Order Comments: Limit to 20 minute intervals or until skin is numb to the touch.     No driving until:   Order Comments: Until following day     Notify your health care provider if you experience any of the following:  temperature >100.4     Notify your health care provider if you experience any of the following:  persistent nausea and vomiting or diarrhea     Notify your health care provider if you experience any of the following:  severe uncontrolled pain     Notify your health care provider if you experience any of the following:  redness, tenderness, or signs of infection (pain, swelling, redness, odor or green/yellow discharge around incision site)     Notify your health care provider if you experience any of the following:  difficulty breathing or increased cough     Notify your health care provider if you experience any of the following:  severe persistent headache     Notify your health care provider if you experience any of the following:  worsening rash     Notify your health care provider if you  experience any of the following:  persistent dizziness, light-headedness, or visual disturbances     Notify your health care provider if you experience any of the following:  increased confusion or weakness     No dressing needed     Shower on day dressing removed (No bath)   Order Comments: Do not soak in bath/pool/hot tub day of procedure. Shower ashley Daily M.D.  Interventional Pain Medicine / Physical Medicine & Rehabilitation

## 2020-09-11 NOTE — INTERVAL H&P NOTE
The patient has been examined and the H&P has been reviewed:     I concur with the findings and changes have been noted since the H&P was written: Based off of the imaging, I think we should change the procedure from a right L3 & L4 TF AUBREY to a right L4 & L5 TF AUBREY.     The risks, benefits and alternative options to the procedure were discussed and understood by the patient/family.

## 2020-09-11 NOTE — OP NOTE
Lumbar Transforaminal Epidural Steroid Injection under fluoroscopic guidance  I have reviewed the patient's medications, allergies and relevant histories prior to the procedure and no contraindications have been identified. The risks, benefits and alternatives to the procedure were discussed with the patient, and all questions regarding the procedure were answered to the patient's satisfaction. I personally obtained Micaela's consent prior to the start of the procedure and the signed consent can be found in the patient's chart.                                                         Time-out was taken to identify patient, procedure, laterality, and allergies prior to starting the procedure.       Date of Service: 09/11/2020  Procedure: right L4 & L5 transforaminal epidural steroid injection under fluoroscopy  Pre-Operative Diagnosis: Lumbar Radiculopathy  Post-Operative Diagnosis: Lumbar Radiculopathy    Physician: Deena Daily M.D.  Assistants: None    Medications Injected:  Preservative-free dexamethasone 10 mg/mL & 5 mL of Xylocaine 1% MPF (3 mL injected per site).   Local Anesthetic: Xylocaine 1% 10 mL.   Sedation Medications:  Versed 2 mg IV, Fentanyl 50 mcg IV. Under my direct observation, intravenous moderate sedation was administered during the course of this procedure, with continuous hemodynamic monitoring including blood pressure, EKG, and pulse oximetry. Please see RN documentation of total intraservice time for moderate sedation.    Procedural Technique:   Laying in a prone position, the patient was prepped and draped in the usual sterile fashion using ChloraPrep and fenestrated drape. The vertebral foramen of interest stated above was determined under fluoroscopic guidance.  Local anesthetic was given by raising a wheel and going down to the hub of a 25-gauge 1.5 inch needle.  The 7 inch 22-gauge spinal needle was introduced towards the inferior-medial aspect transverse process of each above named  nerve root levels.  The needle was walked medially then hinged into the neural foramen of the levels stated above.  After negative aspiration, 2-3 mL of Omnipaque was injected to confirm appropriate placement and that there was no vascular runoff.  The medication was then injected slowly. The procedure was repeated on the contralateral side. Needles were removed and bandages were applied to the areas.     Estimated Blood Loss:  None.  Complications:  None.     Disposition: The patient tolerated the procedure well. Vital signs remained stable throughout the procedure. The patient was taken to the recovery area where written discharge instructions for the procedure were given.     Follow-Up: We will see the patient back in two weeks or the patient may call to inform of status. The patient was discharged in a stable condition.

## 2020-10-01 ENCOUNTER — OFFICE VISIT (OUTPATIENT)
Dept: PAIN MEDICINE | Facility: CLINIC | Age: 65
End: 2020-10-01
Payer: COMMERCIAL

## 2020-10-01 DIAGNOSIS — M51.36 DDD (DEGENERATIVE DISC DISEASE), LUMBAR: ICD-10-CM

## 2020-10-01 DIAGNOSIS — M54.16 LUMBAR RADICULOPATHY: Primary | ICD-10-CM

## 2020-10-01 DIAGNOSIS — M47.816 LUMBAR SPONDYLOSIS: ICD-10-CM

## 2020-10-01 PROCEDURE — 99213 PR OFFICE/OUTPT VISIT, EST, LEVL III, 20-29 MIN: ICD-10-PCS | Mod: 95,,, | Performed by: PHYSICAL MEDICINE & REHABILITATION

## 2020-10-01 PROCEDURE — 99213 OFFICE O/P EST LOW 20 MIN: CPT | Mod: 95,,, | Performed by: PHYSICAL MEDICINE & REHABILITATION

## 2020-10-01 NOTE — H&P (VIEW-ONLY)
Pain Medicine   Telemedicine Virtual Visit    The patient location is: [place]  The chief complaint leading to consultation is: Back pain  Visit type: Virtual visit with synchronous audio and video  Total time spent with patient: 8 mins  Each patient to whom he or she provides medical services by telemedicine is:  (1) informed of the relationship between the physician and patient and the respective role of any other health care provider with respect to management of the patient; and (2) notified that he or she may decline to receive medical services by telemedicine and may withdraw from such care at any time.    Notes: Micaela Arvizu is scheduled for a visit, but due to current Corona Virus Pandemic and goal of limiting patient and community exposure, a virtual visit is being performed in an attempt to limit risks of viral transmission.       Chief Complaint:   No chief complaint on file.    History of Present Illness: Micaela Arvizu is a 65 y.o. female with GERD, HTN, Anxiety, ROBERTO, insomnia, HTN, HLD, cervical dystonia w/ head tremor, osteoporosis, thoracic compression fx's, L2 & L4 compression fx's, referred by Adelina Saavedra, NP for lumbar pain.  She previously saw Dr. Wolfe, last on 7/9/15, and was getting injections that provided benefit (B/L L3-5 MBB w/ steroid).     She states she was having very good relief of her back pain from the previous injection for about 4 years.  She had a right knee replacement with Dr. Vizcaino on 9/11/19 that helped her back pain. Planning on doing left knee eventually, likely next year.     Onset: off and on for years   Location: lower lumbar spine bilaterally and over SIJ  Radiation: down both legs to the feet when she stands.   Timing: intermittent  Quality: Aching and Deep  Exacerbating Factors: sitting, standing for more than 20 minutes and walking for more than 30 minutes  Alleviating Factors: laying down, medications and sitting  Associated Symptoms: denies night  fever/night sweats, urinary incontinence, bowel incontinence, significant weight loss, significant motor weakness and loss of sensations     Severity: Currently: 4/10   Typical Range: 4-8/10     Exacerbation: 8/10    Interval History (10/01/2020):  Micaela Arvizu returns today for follow up.  At the last clinic visit, referred to PT, scheduled right L3 & L4 TF AUBREY, updated lumbar MRI    Based off of MRI, procedure changed to right L4 & L5 TF AUBREY which provided 100% relief of her leg pain. She is still having some of the back pain that she had before.      PT has not started because she is feeling much better.    Currently, the back is somewhat improved and leg pain is much improved.  She is still having some back pain.     The back pain is unchanged in character or location. Denies any changes in bowel or bladder function. Denies any new weakness or new numbness since the most recent visit. Denies any fevers or recent infections/antibiotics. Denies any unexplained weight loss.     Current Pain Scales:  Current: 0/10              Typical Range: 0-5/10         Previous Interventions:  - 9/11/20: Right L4 & L5 TF AUBREY w/ 100% relief.   - 12/20/19: B/L L4-5, L5-S1 MBB w/ 100% relief  - 6/19/15: B/L L3-5 MBBs w/ kenalog (Tolba) w/ noted 90% relief    Previous Therapies:  PT/OT: no  Surgery: no   Previous Medications:   - NSAIDS: NSAIDs caused stomach ulcer (per Dr. Robles's notes)   - Muscle Relaxants: Klonopin, Flexeril    - TCAs: None  - SNRIs: None  - Topicals: None  - Anticonvulsants: gabapentin  - Opioids: Tramadol, Oxycodone     Current Pain Medications:  1. Tylenol  2. Tramadol 50 mg BID   3. Flexeril  4. Gabapentin 100 mg TID (only BID so far)    Blood Thinners: ASA 81 mg    Full Medication List:    Current Outpatient Medications:     aspirin 81 MG Chew, Take 81 mg by mouth once daily., Disp: , Rfl:     atorvastatin (LIPITOR) 10 MG tablet, TAKE 1 TABLET DAILY, Disp: 90 tablet, Rfl: 4    blood sugar diagnostic  Strp, Check blood sugar twice daily, Disp: 100 strip, Rfl: 1    blood-glucose meter (FREESTYLE FREEDOM) kit, Use as instructed, Disp: 1 each, Rfl: 0    cholecalciferol, vitamin D3, (VITAMIN D3) 2,000 unit Cap, Take 2 capsules by mouth once daily. , Disp: , Rfl:     colesevelam (WELCHOL) 625 mg tablet, Take 1 tablet (625 mg total) by mouth 2 (two) times daily with meals., Disp: 180 tablet, Rfl: 3    furosemide (LASIX) 20 MG tablet, TAKE 2 TABLETS ONCE DAILY, Disp: 180 tablet, Rfl: 3    gabapentin (NEURONTIN) 100 MG capsule, Take 1 capsule (100 mg total) by mouth 3 (three) times daily., Disp: 90 capsule, Rfl: 11    GLUC BALL/CHONDRO BALL A/VIT C/MN (GLUCOSAMINE 1500 COMPLEX ORAL), Take 1 tablet by mouth 2 (two) times daily., Disp: , Rfl:     lancets Misc, Check blood sugar twice a daily, Disp: 100 each, Rfl: 1    lisinopril-hydrochlorothiazide (PRINZIDE,ZESTORETIC) 10-12.5 mg per tablet, TAKE 1 TABLET DAILY, Disp: 90 tablet, Rfl: 4    metFORMIN (GLUCOPHAGE) 500 MG tablet, Take 1 tablet (500 mg total) by mouth daily with breakfast., Disp: 90 tablet, Rfl: 3    multivitamin capsule, Take 1 capsule by mouth once daily.  , Disp: , Rfl:     omega-3 fatty acids-vitamin E (FISH OIL) 1,000 mg Cap, Take 2 tablets by mouth once daily.  , Disp: , Rfl:     pantoprazole (PROTONIX) 40 MG tablet, Take 40 mg by mouth once daily. , Disp: , Rfl:     propranoloL (INDERAL) 80 MG tablet, Take 1 tablet (80 mg total) by mouth As instructed. Take 1 tablet in morning and 1/2 tablet at night., Disp: 135 tablet, Rfl: 1    risedronate 35 mg TbEC, Take 1 tablet by mouth. On Sunday, Disp: , Rfl:     sucralfate (CARAFATE) 1 gram tablet, Take 1 g by mouth as needed. , Disp: , Rfl: 1    traMADoL (ULTRAM) 50 mg tablet, Take 1 tablet (50 mg total) by mouth every 12 (twelve) hours as needed. Greater than 7 days quantity medically necessary, Disp: 15 tablet, Rfl: 0    traZODone (DESYREL) 50 MG tablet, Take 1 tablet (50 mg total) by mouth  every evening., Disp: 90 tablet, Rfl: 1  No current facility-administered medications for this visit.     Facility-Administered Medications Ordered in Other Visits:     0.9%  NaCl infusion, 500 mL, Intravenous, Continuous, Deena Daily MD     Review of Systems:  Review of Systems   Constitutional: Negative for fever and weight loss.   HENT: Negative for ear pain and tinnitus.    Eyes: Negative for pain and redness.   Respiratory: Negative for cough and shortness of breath.    Cardiovascular: Negative for chest pain and palpitations.   Gastrointestinal: Negative for constipation and heartburn.   Genitourinary: Negative.         Denies urinary incontinence. Denies urine retention.    Musculoskeletal: Positive for back pain. Negative for neck pain.   Skin: Negative for itching and rash.   Neurological: Negative for tingling, focal weakness and seizures.   Endo/Heme/Allergies: Negative for environmental allergies. Does not bruise/bleed easily.   Psychiatric/Behavioral: Negative for depression. The patient has insomnia. The patient is not nervous/anxious.        Allergies:  No known allergies     Medical History:   has a past medical history of Arthritis, Asthma, GERD (gastroesophageal reflux disease), Hiatal hernia, History of colonoscopy with polypectomy, History of esophagogastroduodenoscopy (EGD), Hyperlipidemia, Hypertension, and Lumbar facet arthropathy.    Surgical History:   has a past surgical history that includes Cholecystectomy (2012); Colonoscopy w/ biopsies and polypectomy (2000; 2002; 2007; 2/2012); Total knee arthroplasty (09/11/2019); Injection of anesthetic agent around medial branch nerves innervating lumbar facet joint (Bilateral, 12/20/2019); Total knee arthroplasty; and Transforaminal epidural injection of steroid (Right, 9/11/2020).    Family History:  family history includes Hypertension in her father and mother; Parkinsonism in her father.    Social History:   reports that she has never  smoked. She has never used smokeless tobacco. She reports that she does not drink alcohol or use drugs.    Physical Exam:  There were no vitals taken for this visit.  GEN: No acute distress. Calm, comfortable  HENT: Normocephalic, atraumatic, moist mucous membranes  EYE: Anicteric sclera, non-injected  CV: Non-diaphoretic.  CHEST: Breathing comfortably. Chest expansion symmetric  EXT: No clubbing, cyanosis.   Psych: Mood and affect are appropriate    Imaging:  - MRI L-spine 9/4/20:  There are old compression fractures of T11 and T12.  Similar findings present on the previous study.  No evidence of an acute fracture.  No congenital spinal stenosis.  Visualized portion of the spinal cord appears normal and terminates at approximately the L1 level.  At the T12-L1 level there is a minimal right paracentral disc-osteophyte with mild effacement along the anterior thecal sac.  No significant spinal canal or foraminal encroachment.  At the L1-L2 level there is a broad-based and left paracentral disc protrusion with mild effacement along the anterior thecal sac.  Mild facet degenerative changes.  Mild spinal canal and foraminal encroachment.  At the L2-L3 and L3-L4 levels there are minimal disc bulges with effacement along the anterior margin of the thecal sac.  There are mild-to-moderate bilateral facet degenerative changes without evidence of significant spinal canal or foraminal encroachment.  At the L4-L5 level there is questionable minimal anterolisthesis related to advanced bilateral facet degenerative changes and prominence of the ligamentum flavum.  There is a pseudo disc bulge.  There is mild bilateral foraminal encroachment.  No spinal canal encroachment.  At the L5-S1 level there is a minimal degenerative disc bulge with effacement along the anterior margin of the thecal sac.  There are moderate bilateral facet degenerative changes with prominence of the ligamentum flavum.  There is mild bilateral foraminal  encroachment    Labs:  BMP  Lab Results   Component Value Date     05/14/2020    K 4.0 05/14/2020     05/14/2020    CO2 26 05/14/2020    BUN 13 05/14/2020    CREATININE 0.6 05/14/2020    CALCIUM 9.0 05/14/2020    ANIONGAP 11 05/14/2020    ESTGFRAFRICA >60 05/14/2020    EGFRNONAA >60 05/14/2020     Lab Results   Component Value Date    ALT 17 05/14/2020    AST 16 05/14/2020    ALKPHOS 82 05/14/2020    BILITOT 0.4 05/14/2020     Lab Results   Component Value Date     06/12/2020       Assessment:  Micaela Arvizu is a 65 y.o. female with the following diagnoses based on history, exam, and imaging:    Problem List Items Addressed This Visit        Neuro    Lumbar radiculopathy - Primary    DDD (degenerative disc disease), lumbar      Other Visit Diagnoses     Lumbar spondylosis              This is a pleasant 65 y.o. lady with GERD, depression and anxiety, HTN, HLD, morbid obesity presenting with:    - Chronic low back pain that appears multifactorial in nature.  She had previous lumbar medial branch blocks with steroid which provided excellent relief for a few months, and 2/2 MBB with 100% relief. She does have facetogenic aspects to her pain, but she also has features of lumbar spinal stenosis with neurogenic claudication, and myofascial features. She developed radicular leg pain that resolved with right L4 & L5 TF AUBREY. She does have previous MRI with annular tear and bulge at L5-S1, but this does not fit her current pain distribution.   - Trochanteric bursitis on the right.   - Pain complicated by depression and anxiety     Treatment Plan: I discussed with the patient the following assessment and recommendations. The following is the plan the patient agreed upon:  - PT/OT/HEP: Consider re-referral to PT in the future  - Procedures: May repeat right L4 & L5 TF AUBREY in future  - Consider b/l L4/5, L5/S1 MB RFA in the future if back pain persists.  - Medications: No changes recommended at this time.  -  Imaging: Reviewed.   - Labs: Reviewed.  Medications are appropriately dosed for current hepatorenal function.    Follow Up: RTC as needed.     Deena Daily M.D.  Interventional Pain Medicine / Physical Medicine & Rehabilitation    Disclaimer: This note was partly generated using dictation software which may occasionally result in transcription errors.

## 2020-10-05 ENCOUNTER — PATIENT MESSAGE (OUTPATIENT)
Dept: ADMINISTRATIVE | Facility: HOSPITAL | Age: 65
End: 2020-10-05

## 2020-10-06 ENCOUNTER — TELEPHONE (OUTPATIENT)
Dept: PAIN MEDICINE | Facility: CLINIC | Age: 65
End: 2020-10-06

## 2020-10-06 DIAGNOSIS — M51.36 DDD (DEGENERATIVE DISC DISEASE), LUMBAR: ICD-10-CM

## 2020-10-06 DIAGNOSIS — M47.816 LUMBAR FACET ARTHROPATHY: ICD-10-CM

## 2020-10-06 DIAGNOSIS — M47.816 LUMBAR SPONDYLOSIS: Primary | ICD-10-CM

## 2020-10-06 RX ORDER — GABAPENTIN 100 MG/1
100 CAPSULE ORAL 3 TIMES DAILY
Qty: 90 CAPSULE | Refills: 11 | Status: SHIPPED | OUTPATIENT
Start: 2020-10-06 | End: 2021-05-21 | Stop reason: SDUPTHER

## 2020-10-06 NOTE — TELEPHONE ENCOUNTER
"Patient states that she is ready to move forward with the burning of the nerve discussed at the [facility] on 10/1/20.    "Procedures: May repeat right L4 & L5 TF AUBREY in future  - Consider b/l L4/5, L5/S1 MB RFA in the future if back pain persists."    Please advise.   "

## 2020-10-06 NOTE — TELEPHONE ENCOUNTER
Pt requesting gabapentin to go to [facility]. Script was sent to [facility]. Order pended with correct pharmacy.

## 2020-10-06 NOTE — TELEPHONE ENCOUNTER
Per Dr. Daily,  Please clarify if she is having just the back pain or the back and leg pain. If it is just back pain that has returned, schedule for right (or whichever side is worse) then left RFA. If it is back and leg pain, please schedule for repeat AUBREY.     Thanks,   KP     Message text     Patient states that this is returned back pain with no leg pain. She states that she was barely able to walk earlier today due to the pain but after sitting and resting a while she could move around with minimal pain.      Patient will need to hold ASA x3 days prior to procedure. She will begin holding 10/20/20. Understanding was voiced.

## 2020-10-06 NOTE — TELEPHONE ENCOUNTER
----- Message from Deb Nieto MA sent at 10/5/2020  4:14 PM CDT -----  Contact: Patient  Micaela Arvizu  MRN: [id number]  : 1955  PCP: Kinga Robles  Home Phone      [phone]  Work Phone      Not on file.  Mobile          [phone]      MESSAGE:  Has some questions about her procedure.         Phone: [phone]

## 2020-10-12 ENCOUNTER — TELEPHONE (OUTPATIENT)
Dept: INTERNAL MEDICINE | Facility: CLINIC | Age: 65
End: 2020-10-12

## 2020-10-12 NOTE — TELEPHONE ENCOUNTER
Spoke to pt. Explained that it is only certain manufacturers and certain lot numbers from those manufacturers that is affected. I instructed her to call pharmacy and they can let her know if the meds she was dispensed is affected by the recall.

## 2020-10-12 NOTE — TELEPHONE ENCOUNTER
----- Message from Erika Frances sent at 10/12/2020 12:39 PM CDT -----  Micaela Arvizu  MRN: [id number]  : 1955  PCP: Kinga Robles  Home Phone      [phone]  Work Phone      Not on file.  Mobile          [phone]      MESSAGE:   Patient saw there is a recall for the medication metFORMIN (GLUCOPHAGE) 500 MG tablet and it said that it can cause cancer. Would like to talk to a nurse regarding this to see if she should still be taking this medication.     [phone]

## 2020-10-20 ENCOUNTER — HOSPITAL ENCOUNTER (OUTPATIENT)
Dept: PREADMISSION TESTING | Facility: HOSPITAL | Age: 65
Discharge: HOME | End: 2020-10-20
Attending: PHYSICAL MEDICINE & REHABILITATION
Payer: COMMERCIAL

## 2020-10-20 DIAGNOSIS — Z01.818 PREOP TESTING: ICD-10-CM

## 2020-10-20 LAB — SARS-COV-2 RNA RESP QL NAA+PROBE: NOT DETECTED

## 2020-10-20 PROCEDURE — U0003 INFECTIOUS AGENT DETECTION BY NUCLEIC ACID (DNA OR RNA); SEVERE ACUTE RESPIRATORY SYNDROME CORONAVIRUS 2 (SARS-COV-2) (CORONAVIRUS DISEASE [COVID-19]), AMPLIFIED PROBE TECHNIQUE, MAKING USE OF HIGH THROUGHPUT TECHNOLOGIES AS DESCRIBED BY CMS-2020-01-R: HCPCS

## 2020-10-22 RX ORDER — SODIUM CHLORIDE 9 MG/ML
500 INJECTION, SOLUTION INTRAVENOUS CONTINUOUS
Status: CANCELLED | OUTPATIENT
Start: 2020-10-22

## 2020-10-23 ENCOUNTER — HOSPITAL ENCOUNTER (OUTPATIENT)
Facility: HOSPITAL | Age: 65
Discharge: HOME | End: 2020-10-23
Attending: PHYSICAL MEDICINE & REHABILITATION | Admitting: PHYSICAL MEDICINE & REHABILITATION
Payer: COMMERCIAL

## 2020-10-23 ENCOUNTER — HOSPITAL ENCOUNTER (OUTPATIENT)
Dept: RADIOLOGY | Facility: HOSPITAL | Age: 65
Discharge: HOME | End: 2020-10-23
Attending: PHYSICAL MEDICINE & REHABILITATION | Admitting: PHYSICAL MEDICINE & REHABILITATION
Payer: COMMERCIAL

## 2020-10-23 VITALS
DIASTOLIC BLOOD PRESSURE: 59 MMHG | RESPIRATION RATE: 16 BRPM | OXYGEN SATURATION: 98 % | SYSTOLIC BLOOD PRESSURE: 120 MMHG | HEART RATE: 80 BPM | TEMPERATURE: 97 F

## 2020-10-23 DIAGNOSIS — M47.816 LUMBAR FACET ARTHROPATHY: ICD-10-CM

## 2020-10-23 DIAGNOSIS — M47.816 LUMBAR SPONDYLOSIS: Primary | ICD-10-CM

## 2020-10-23 DIAGNOSIS — R52 PAIN: ICD-10-CM

## 2020-10-23 DIAGNOSIS — M47.816 LUMBAR SPONDYLOSIS: ICD-10-CM

## 2020-10-23 LAB — POCT GLUCOSE: 156 MG/DL (ref 70–110)

## 2020-10-23 PROCEDURE — 27201423 OPTIME MED/SURG SUP & DEVICES STERILE SUPPLY: Performed by: PHYSICAL MEDICINE & REHABILITATION

## 2020-10-23 PROCEDURE — 64636 DESTROY L/S FACET JNT ADDL: CPT | Mod: RT | Performed by: PHYSICAL MEDICINE & REHABILITATION

## 2020-10-23 PROCEDURE — 64636 DESTROY L/S FACET JNT ADDL: CPT | Mod: RT,,, | Performed by: PHYSICAL MEDICINE & REHABILITATION

## 2020-10-23 PROCEDURE — 82962 GLUCOSE BLOOD TEST: CPT | Performed by: PHYSICAL MEDICINE & REHABILITATION

## 2020-10-23 PROCEDURE — 64635 DESTROY LUMB/SAC FACET JNT: CPT | Mod: RT | Performed by: PHYSICAL MEDICINE & REHABILITATION

## 2020-10-23 PROCEDURE — 63600175 PHARM REV CODE 636 W HCPCS: Performed by: PHYSICAL MEDICINE & REHABILITATION

## 2020-10-23 PROCEDURE — 25000003 PHARM REV CODE 250: Performed by: PHYSICAL MEDICINE & REHABILITATION

## 2020-10-23 PROCEDURE — 64636 PR DESTROY L/S FACET JNT ADDL: ICD-10-PCS | Mod: RT,,, | Performed by: PHYSICAL MEDICINE & REHABILITATION

## 2020-10-23 PROCEDURE — 64635 PR DESTROY LUMB/SAC FACET JNT: ICD-10-PCS | Mod: RT,,, | Performed by: PHYSICAL MEDICINE & REHABILITATION

## 2020-10-23 PROCEDURE — 64635 DESTROY LUMB/SAC FACET JNT: CPT | Mod: RT,,, | Performed by: PHYSICAL MEDICINE & REHABILITATION

## 2020-10-23 RX ORDER — BUPIVACAINE HYDROCHLORIDE 2.5 MG/ML
INJECTION, SOLUTION EPIDURAL; INFILTRATION; INTRACAUDAL
Status: DISCONTINUED | OUTPATIENT
Start: 2020-10-23 | End: 2020-10-23 | Stop reason: HOSPADM

## 2020-10-23 RX ORDER — FENTANYL CITRATE 50 UG/ML
INJECTION, SOLUTION INTRAMUSCULAR; INTRAVENOUS
Status: DISCONTINUED | OUTPATIENT
Start: 2020-10-23 | End: 2020-10-23 | Stop reason: HOSPADM

## 2020-10-23 RX ORDER — MIDAZOLAM HYDROCHLORIDE 1 MG/ML
INJECTION INTRAMUSCULAR; INTRAVENOUS
Status: DISCONTINUED | OUTPATIENT
Start: 2020-10-23 | End: 2020-10-23 | Stop reason: HOSPADM

## 2020-10-23 RX ORDER — LIDOCAINE HYDROCHLORIDE 20 MG/ML
INJECTION, SOLUTION EPIDURAL; INFILTRATION; INTRACAUDAL; PERINEURAL
Status: DISCONTINUED | OUTPATIENT
Start: 2020-10-23 | End: 2020-10-23 | Stop reason: HOSPADM

## 2020-10-23 RX ORDER — LIDOCAINE HYDROCHLORIDE 10 MG/ML
INJECTION INFILTRATION; PERINEURAL
Status: DISCONTINUED | OUTPATIENT
Start: 2020-10-23 | End: 2020-10-23 | Stop reason: HOSPADM

## 2020-10-23 RX ORDER — METHYLPREDNISOLONE ACETATE 40 MG/ML
INJECTION, SUSPENSION INTRA-ARTICULAR; INTRALESIONAL; INTRAMUSCULAR; SOFT TISSUE
Status: DISCONTINUED | OUTPATIENT
Start: 2020-10-23 | End: 2020-10-23 | Stop reason: HOSPADM

## 2020-10-23 NOTE — INTERVAL H&P NOTE
The patient has been examined and the H&P has been reviewed:     I concur with the findings and changes have been noted since the H&P was written: axial back pain returned and will proceed with RFA.     The risks, benefits and alternative options to the procedure were discussed and understood by the patient/family.

## 2020-10-23 NOTE — DISCHARGE SUMMARY
[facility]  Discharge Note  Short Stay     Admit Date: 10/23/2020    Discharge Date: 10/23/2020     Attending Physician: Deena Daily M.D.    Diagnoses:  Active Hospital Problems    Diagnosis  POA    Lumbar spondylosis [M47.816]  Yes      Resolved Hospital Problems   No resolved problems to display.     Discharged Condition: Good     Hospital Course: Patient was admitted for an outpatient interventional pain management procedure and tolerated the procedure well with no complications.     Final Diagnoses: Same as principal problem.     Disposition: Home or Self Care     Follow up/Patient Instructions:   Follow-up in 1-2 weeks unless otherwise instructed. May return sooner as needed.       Reconciled Medications:     Medication List      CONTINUE taking these medications    aspirin 81 MG Chew  Take 81 mg by mouth once daily.     atorvastatin 10 MG tablet  Commonly known as: LIPITOR  TAKE 1 TABLET DAILY     blood sugar diagnostic Strp  Check blood sugar twice daily     blood-glucose meter kit  Commonly known as: FREESTYLE FREEDOM  Use as instructed     colesevelam 625 mg tablet  Commonly known as: WELCHOL  Take 1 tablet (625 mg total) by mouth 2 (two) times daily with meals.     FISH OIL 1,000 mg Cap  Generic drug: omega-3 fatty acids-vitamin E  Take 2 tablets by mouth once daily.     furosemide 20 MG tablet  Commonly known as: LASIX  TAKE 2 TABLETS ONCE DAILY     gabapentin 100 MG capsule  Commonly known as: NEURONTIN  Take 1 capsule (100 mg total) by mouth 3 (three) times daily.     GLUCOSAMINE 1500 COMPLEX ORAL  Take 1 tablet by mouth 2 (two) times daily.     lancets Misc  Check blood sugar twice a daily     lisinopriL-hydrochlorothiazide 10-12.5 mg per tablet  Commonly known as: PRINZIDE,ZESTORETIC  TAKE 1 TABLET DAILY     metFORMIN 500 MG tablet  Commonly known as: GLUCOPHAGE  Take 1 tablet (500 mg total) by mouth daily with breakfast.     multivitamin capsule  Take 1 capsule by mouth once daily.      pantoprazole 40 MG tablet  Commonly known as: PROTONIX  Take 40 mg by mouth once daily.     propranoloL 80 MG tablet  Commonly known as: INDERAL  Take 1 tablet (80 mg total) by mouth As instructed. Take 1 tablet in morning and 1/2 tablet at night.     risedronate 35 mg Tbec  Take 1 tablet by mouth. On Sunday     sucralfate 1 gram tablet  Commonly known as: CARAFATE  Take 1 g by mouth as needed.     traMADoL 50 mg tablet  Commonly known as: ULTRAM  Take 1 tablet (50 mg total) by mouth every 12 (twelve) hours as needed. Greater than 7 days quantity medically necessary     traZODone 50 MG tablet  Commonly known as: DESYREL  Take 1 tablet (50 mg total) by mouth every evening.     VITAMIN D3 50 mcg (2,000 unit) Cap  Generic drug: cholecalciferol (vitamin D3)  Take 2 capsules by mouth once daily.           Discharge Procedure Orders (must include Diet, Follow-up, Activity)   Ice to affected area   Order Comments: Limit to 20 minute intervals or until skin is numb to the touch.     No driving until:   Order Comments: Until following day     Notify your health care provider if you experience any of the following:  temperature >100.4     Notify your health care provider if you experience any of the following:  persistent nausea and vomiting or diarrhea     Notify your health care provider if you experience any of the following:  severe uncontrolled pain     Notify your health care provider if you experience any of the following:  redness, tenderness, or signs of infection (pain, swelling, redness, odor or green/yellow discharge around incision site)     Notify your health care provider if you experience any of the following:  difficulty breathing or increased cough     Notify your health care provider if you experience any of the following:  severe persistent headache     Notify your health care provider if you experience any of the following:  worsening rash     Notify your health care provider if you experience any of  the following:  persistent dizziness, light-headedness, or visual disturbances     Notify your health care provider if you experience any of the following:  increased confusion or weakness     No dressing needed     Shower on day dressing removed (No bath)   Order Comments: Do not soak in bath/pool/hot tub day of procedure. Shower ashley Daily M.D.  Interventional Pain Medicine / Physical Medicine & Rehabilitation

## 2020-10-23 NOTE — OP NOTE
Lumbar Medial Branch Radiofrequency Ablation Under Fluoroscopic Guidance:  I have reviewed the patient's medications, allergies and relevant histories prior to the procedure and no contraindications have been identified. The risks, benefits and alternatives to the procedure were discussed with the patient, and all questions regarding the procedure were answered to the patient's satisfaction. I personally obtained Micaela's consent prior to the start of the procedure and the signed consent can be found in the patient's chart.                                                         Time-out was taken to identify patient, procedure, laterality, and allergies prior to starting the procedure.       Date of Service: 10/23/2020  Procedure: Right L4-5, L5-S1 medial branch radiofrequency ablation  Pre-Operative Diagnosis: Lumbar Spondylosis  Post-Operative Diagnosis: Lumbar Spondylosis    Physician: Deena Daily M.D.  Assistants: None    Medications Injected:  Lidocaine 2% MPF. Of that, 1.5 mL injected per level prior to ablation. Depo-Medrol 40 mg/mL and 2 mL Bupivacaine 0.25%. Of that, 1 mL injected per level after the ablation.  Local Anesthetic: Xylocaine 1% 10 mL    Sedation Medications: Versed 2 mg, Fentanyl 50 mcg. Under my direct observation, intravenous moderate sedation was administered during the course of this procedure, with continuous hemodynamic monitoring including blood pressure, EKG, and pulse oximetry. Please see RN documentation of total intraservice time for moderate sedation.    Procedural Technique:   Laying in a prone position, the patient was prepped and draped in the usual sterile fashion using ChloraPrep and fenestrated drape.  The level was determined under fluoroscopic guidance.  Local anesthetic was given by going down to the hub of the 25-gauge 1.5 in needle and raising a wheel.  The 20-gauge needle was introduced to the anatomic local of the medial branch at the junction of the superior  articular process & transverse process utilizing live fluoroscopy. Motor stimulation performed to confirm no motor nerve ablation takes place up to 2 Volt 2Hz.  Lidocaine 2% MPF was then injected slowly to anesthetize the area.  Ablation then done per level utilizing [employer] radiofrequency generator setting of 80°C for 90 seconds.  Following the ablation, 1 mL of the Depo-Medrol and Bupivacaine per site. The needle was removed and bandage applied to the area.    Estimated Blood Loss:  None.  Complications:  None.     Disposition: The patient tolerated the procedure well, and there were no apparent complications. Vital signs remained stable throughout the procedure. The patient was taken to the recovery area and monitored. The patient was supplied with written discharge instructions for the procedure. If helpful, we can repeat as needed. The patient was discharged in a stable condition.    Follow-Up: We will see the patient back in 1-2 weeks or the patient may call to inform of status.

## 2020-10-27 ENCOUNTER — PATIENT OUTREACH (OUTPATIENT)
Dept: ADMINISTRATIVE | Facility: OTHER | Age: 65
End: 2020-10-27

## 2020-10-27 NOTE — PROGRESS NOTES
Health Maintenance Due   Topic Date Due    Foot Exam  08/20/1966    HIV Screening  08/20/1971    Eye Exam  09/13/2018    DEXA SCAN  08/21/2020     Updates were requested from care everywhere.  Chart was reviewed for overdue Proactive [facility] Encounters ([facility]) topics (CRS, Breast Cancer Screening, Eye exam)  Health Maintenance has been updated.

## 2020-10-29 ENCOUNTER — OFFICE VISIT (OUTPATIENT)
Dept: PAIN MEDICINE | Facility: CLINIC | Age: 65
End: 2020-10-29
Payer: COMMERCIAL

## 2020-10-29 DIAGNOSIS — M54.16 LUMBAR RADICULOPATHY: ICD-10-CM

## 2020-10-29 DIAGNOSIS — M47.816 LUMBAR SPONDYLOSIS: Primary | ICD-10-CM

## 2020-10-29 DIAGNOSIS — G89.29 CHRONIC BILATERAL LOW BACK PAIN WITH RIGHT-SIDED SCIATICA: ICD-10-CM

## 2020-10-29 DIAGNOSIS — M54.41 CHRONIC BILATERAL LOW BACK PAIN WITH RIGHT-SIDED SCIATICA: ICD-10-CM

## 2020-10-29 DIAGNOSIS — M51.36 DDD (DEGENERATIVE DISC DISEASE), LUMBAR: ICD-10-CM

## 2020-10-29 PROCEDURE — 99213 OFFICE O/P EST LOW 20 MIN: CPT | Mod: 95,,, | Performed by: PHYSICAL MEDICINE & REHABILITATION

## 2020-10-29 PROCEDURE — 99213 PR OFFICE/OUTPT VISIT, EST, LEVL III, 20-29 MIN: ICD-10-PCS | Mod: 95,,, | Performed by: PHYSICAL MEDICINE & REHABILITATION

## 2020-10-29 NOTE — PROGRESS NOTES
Pain Medicine   Telemedicine Virtual Visit    The patient location is: [place]  The chief complaint leading to consultation is: Back pain  Visit type: Virtual visit with synchronous audio and video  Total time spent with patient: 7 mins  Each patient to whom he or she provides medical services by telemedicine is:  (1) informed of the relationship between the physician and patient and the respective role of any other health care provider with respect to management of the patient; and (2) notified that he or she may decline to receive medical services by telemedicine and may withdraw from such care at any time.    Video visit was attempted, but there was technical difficulties, so I called the patient to perform an audio visit.       Chief Complaint:   No chief complaint on file.    History of Present Illness: Micaela Arvizu is a 65 y.o. female with GERD, HTN, Anxiety, ROBERTO, insomnia, HTN, HLD, cervical dystonia w/ head tremor, osteoporosis, thoracic compression fx's, L2 & L4 compression fx's, referred by Adelina Saavedra, NP for lumbar pain.  She previously saw Dr. Wolfe, last on 7/9/15, and was getting injections that provided benefit (B/L L3-5 MBB w/ steroid).     She states she was having very good relief of her back pain from the previous injection for about 4 years.  She had a right knee replacement with Dr. Vizcaino on 9/11/19 that helped her back pain. Planning on doing left knee eventually, likely next year.     Onset: off and on for years   Location: lower lumbar spine bilaterally and over SIJ  Radiation: down both legs to the feet when she stands.   Timing: intermittent  Quality: Aching and Deep  Exacerbating Factors: sitting, standing for more than 20 minutes and walking for more than 30 minutes  Alleviating Factors: laying down, medications and sitting  Associated Symptoms: denies night fever/night sweats, urinary incontinence, bowel incontinence, significant weight loss, significant motor weakness and  loss of sensations     Severity: Currently: 4/10   Typical Range: 4-8/10     Exacerbation: 8/10    Interval History (10/29/2020):  Micaela Arvizu returns today for follow up.  At the last clinic visit, was considering bilateral L3-5 MB RFA if pain returns/persists. She ended up calling us with recurrent pain, so we scheduled her RFA.    Right L3-5 MB RFA provided nearly 100 relief, but she does get a little soreness at night.     Currently, the back pain is improved.  No change in the location or quality of the pain since the most recent visit is reported.  No significant interval events or traumas. No change in bowel or bladder function, & no new weakness or numbness is reported. No new musculoskeletal pain complaints.    She is doing well and doesn't think she needs the left side done currently.     Current Pain Scales:  Current: 1/10                       Previous Interventions:  - 10/23/20: Right L3-5 MB RFA w/ 95% relief of the right low back  - 9/11/20: Right L4 & L5 TF AUBREY w/ 100% relief.   - 12/20/19: B/L L4-5, L5-S1 MBB w/ 100% relief  - 6/19/15: B/L L3-5 MBBs w/ kenalog (Tolba) w/ noted 90% relief    Previous Therapies:  PT/OT: no  Surgery: no   Previous Medications:   - NSAIDS: NSAIDs caused stomach ulcer (per Dr. Robles's notes)   - Muscle Relaxants: Klonopin, Flexeril    - TCAs: None  - SNRIs: None  - Topicals: None  - Anticonvulsants: gabapentin  - Opioids: Tramadol, Oxycodone     Current Pain Medications:  1. Tylenol  2. Tramadol 50 mg BID   3. Flexeril  4. Gabapentin 100 mg TID (only BID so far)    Blood Thinners: ASA 81 mg    Full Medication List:    Current Outpatient Medications:     aspirin 81 MG Chew, Take 81 mg by mouth once daily., Disp: , Rfl:     atorvastatin (LIPITOR) 10 MG tablet, TAKE 1 TABLET DAILY, Disp: 90 tablet, Rfl: 3    blood sugar diagnostic Strp, Check blood sugar twice daily, Disp: 100 strip, Rfl: 1    blood-glucose meter (FREESTYLE FREEDOM) kit, Use as instructed, Disp: 1  each, Rfl: 0    cholecalciferol, vitamin D3, (VITAMIN D3) 2,000 unit Cap, Take 2 capsules by mouth once daily. , Disp: , Rfl:     colesevelam (WELCHOL) 625 mg tablet, Take 1 tablet (625 mg total) by mouth 2 (two) times daily with meals., Disp: 180 tablet, Rfl: 3    furosemide (LASIX) 20 MG tablet, TAKE 2 TABLETS ONCE DAILY, Disp: 180 tablet, Rfl: 3    gabapentin (NEURONTIN) 100 MG capsule, Take 1 capsule (100 mg total) by mouth 3 (three) times daily., Disp: 90 capsule, Rfl: 11    GLUC BALL/CHONDRO BALL A/VIT C/MN (GLUCOSAMINE 1500 COMPLEX ORAL), Take 1 tablet by mouth 2 (two) times daily., Disp: , Rfl:     lancets Misc, Check blood sugar twice a daily, Disp: 100 each, Rfl: 1    lisinopriL-hydrochlorothiazide (PRINZIDE,ZESTORETIC) 10-12.5 mg per tablet, TAKE 1 TABLET DAILY, Disp: 90 tablet, Rfl: 3    metFORMIN (GLUCOPHAGE) 500 MG tablet, Take 1 tablet (500 mg total) by mouth daily with breakfast., Disp: 90 tablet, Rfl: 3    multivitamin capsule, Take 1 capsule by mouth once daily.  , Disp: , Rfl:     omega-3 fatty acids-vitamin E (FISH OIL) 1,000 mg Cap, Take 2 tablets by mouth once daily.  , Disp: , Rfl:     pantoprazole (PROTONIX) 40 MG tablet, Take 40 mg by mouth once daily. , Disp: , Rfl:     propranoloL (INDERAL) 80 MG tablet, Take 1 tablet (80 mg total) by mouth As instructed. Take 1 tablet in morning and 1/2 tablet at night., Disp: 135 tablet, Rfl: 1    risedronate 35 mg TbEC, Take 1 tablet by mouth. On Sunday, Disp: , Rfl:     sucralfate (CARAFATE) 1 gram tablet, Take 1 g by mouth as needed. , Disp: , Rfl: 1    traMADoL (ULTRAM) 50 mg tablet, Take 1 tablet (50 mg total) by mouth every 12 (twelve) hours as needed. Greater than 7 days quantity medically necessary, Disp: 15 tablet, Rfl: 0    traZODone (DESYREL) 50 MG tablet, Take 1 tablet (50 mg total) by mouth every evening., Disp: 90 tablet, Rfl: 1     Review of Systems:  Review of Systems   Constitutional: Negative for fever and weight loss.    HENT: Negative for ear pain and tinnitus.    Eyes: Negative for pain and redness.   Respiratory: Negative for cough and shortness of breath.    Cardiovascular: Negative for chest pain and palpitations.   Gastrointestinal: Negative for constipation and heartburn.   Genitourinary: Negative.         Denies urinary incontinence. Denies urine retention.    Musculoskeletal: Positive for back pain. Negative for neck pain.   Skin: Negative for itching and rash.   Neurological: Negative for tingling, focal weakness and seizures.   Endo/Heme/Allergies: Negative for environmental allergies. Does not bruise/bleed easily.   Psychiatric/Behavioral: Negative for depression. The patient has insomnia. The patient is not nervous/anxious.        Allergies:  No known allergies     Medical History:   has a past medical history of Arthritis, Asthma, GERD (gastroesophageal reflux disease), Hiatal hernia, History of colonoscopy with polypectomy, History of esophagogastroduodenoscopy (EGD), Hyperlipidemia, Hypertension, and Lumbar facet arthropathy.    Surgical History:   has a past surgical history that includes Cholecystectomy (2012); Colonoscopy w/ biopsies and polypectomy (2000; 2002; 2007; 2/2012); Total knee arthroplasty (09/11/2019); Injection of anesthetic agent around medial branch nerves innervating lumbar facet joint (Bilateral, 12/20/2019); Total knee arthroplasty; Transforaminal epidural injection of steroid (Right, 9/11/2020); and Radiofrequency ablation of lumbar medial branch nerve at single level (Right, 10/23/2020).    Family History:  family history includes Hypertension in her father and mother; Parkinsonism in her father.    Social History:   reports that she has never smoked. She has never used smokeless tobacco. She reports that she does not drink alcohol or use drugs.    Physical Exam:  There were no vitals taken for this visit.  Audio visit    Imaging:  - MRI L-spine 9/4/20:  There are old compression fractures of  T11 and T12.  Similar findings present on the previous study.  No evidence of an acute fracture.  No congenital spinal stenosis.  Visualized portion of the spinal cord appears normal and terminates at approximately the L1 level.  At the T12-L1 level there is a minimal right paracentral disc-osteophyte with mild effacement along the anterior thecal sac.  No significant spinal canal or foraminal encroachment.  At the L1-L2 level there is a broad-based and left paracentral disc protrusion with mild effacement along the anterior thecal sac.  Mild facet degenerative changes.  Mild spinal canal and foraminal encroachment.  At the L2-L3 and L3-L4 levels there are minimal disc bulges with effacement along the anterior margin of the thecal sac.  There are mild-to-moderate bilateral facet degenerative changes without evidence of significant spinal canal or foraminal encroachment.  At the L4-L5 level there is questionable minimal anterolisthesis related to advanced bilateral facet degenerative changes and prominence of the ligamentum flavum.  There is a pseudo disc bulge.  There is mild bilateral foraminal encroachment.  No spinal canal encroachment.  At the L5-S1 level there is a minimal degenerative disc bulge with effacement along the anterior margin of the thecal sac.  There are moderate bilateral facet degenerative changes with prominence of the ligamentum flavum.  There is mild bilateral foraminal encroachment    Labs:  BMP  Lab Results   Component Value Date     05/14/2020    K 4.0 05/14/2020     05/14/2020    CO2 26 05/14/2020    BUN 13 05/14/2020    CREATININE 0.6 05/14/2020    CALCIUM 9.0 05/14/2020    ANIONGAP 11 05/14/2020    ESTGFRAFRICA >60 05/14/2020    EGFRNONAA >60 05/14/2020     Lab Results   Component Value Date    ALT 17 05/14/2020    AST 16 05/14/2020    ALKPHOS 82 05/14/2020    BILITOT 0.4 05/14/2020     Lab Results   Component Value Date     06/12/2020       Assessment:  Micaela Arvizu  is a 65 y.o. female with the following diagnoses based on history, exam, and imaging:    Problem List Items Addressed This Visit        Neuro    Lumbar spondylosis - Primary    Lumbar radiculopathy    DDD (degenerative disc disease), lumbar       Orthopedic    Lumbalgia          This is a pleasant 65 y.o. lady with GERD, depression and anxiety, HTN, HLD, morbid obesity presenting with:    - Chronic low back pain that appears multifactorial in nature.  Back pain is multifactorial. She does have facetogenic aspects to her pain, but she also has features of lumbar spinal stenosis with neurogenic claudication, and myofascial features. She developed radicular leg pain that resolved with right L4 & L5 TF AUBREY. She is now doing much better after right L3-5 MB RFA.   - Trochanteric bursitis on the right.   - Pain complicated by depression and anxiety     Treatment Plan: I discussed with the patient the following assessment and recommendations. The following is the plan the patient agreed upon:  - PT/OT/HEP: Consider re-referral to PT in the future  - Procedures: May repeat right L4 & L5 TF AUBREY in future  - Hold any further RFA for now, but can repeat right in the future of schedule left L4/5, L5/S1 MB RFA if back pain worsens.   - Medications: No changes recommended at this time.  - Imaging: Reviewed.   - Labs: Reviewed.  Medications are appropriately dosed for current hepatorenal function.    Follow Up: RTC as needed.     Deena Daily M.D.  Interventional Pain Medicine / Physical Medicine & Rehabilitation    Disclaimer: This note was partly generated using dictation software which may occasionally result in transcription errors.

## 2020-11-11 ENCOUNTER — LAB VISIT (OUTPATIENT)
Dept: LAB | Facility: HOSPITAL | Age: 65
End: 2020-11-11
Attending: INTERNAL MEDICINE
Payer: COMMERCIAL

## 2020-11-11 DIAGNOSIS — E78.00 HYPERCHOLESTEREMIA: ICD-10-CM

## 2020-11-11 DIAGNOSIS — F32.A DEPRESSION, UNSPECIFIED DEPRESSION TYPE: ICD-10-CM

## 2020-11-11 DIAGNOSIS — D50.9 MICROCYTIC ANEMIA: ICD-10-CM

## 2020-11-11 DIAGNOSIS — G25.0 ESSENTIAL TREMOR: ICD-10-CM

## 2020-11-11 DIAGNOSIS — I10 ESSENTIAL HYPERTENSION: ICD-10-CM

## 2020-11-11 DIAGNOSIS — F41.1 GAD (GENERALIZED ANXIETY DISORDER): ICD-10-CM

## 2020-11-11 DIAGNOSIS — E66.01 MORBID OBESITY WITH BMI OF 50.0-59.9, ADULT: ICD-10-CM

## 2020-11-11 DIAGNOSIS — E11.9 TYPE 2 DIABETES MELLITUS WITHOUT COMPLICATION, WITHOUT LONG-TERM CURRENT USE OF INSULIN: ICD-10-CM

## 2020-11-11 LAB
ALBUMIN SERPL BCP-MCNC: 3.1 G/DL (ref 3.5–5.2)
ALBUMIN/CREAT UR: 6.5 UG/MG (ref 0–30)
ALP SERPL-CCNC: 88 U/L (ref 55–135)
ALT SERPL W/O P-5'-P-CCNC: 20 U/L (ref 10–44)
ANION GAP SERPL CALC-SCNC: 12 MMOL/L (ref 8–16)
AST SERPL-CCNC: 18 U/L (ref 10–40)
BASOPHILS # BLD AUTO: 0.05 K/UL (ref 0–0.2)
BASOPHILS NFR BLD: 0.7 % (ref 0–1.9)
BILIRUB SERPL-MCNC: 0.4 MG/DL (ref 0.1–1)
BUN SERPL-MCNC: 13 MG/DL (ref 8–23)
CALCIUM SERPL-MCNC: 9 MG/DL (ref 8.7–10.5)
CHLORIDE SERPL-SCNC: 102 MMOL/L (ref 95–110)
CHOLEST SERPL-MCNC: 211 MG/DL (ref 120–199)
CHOLEST/HDLC SERPL: 3.5 {RATIO} (ref 2–5)
CO2 SERPL-SCNC: 26 MMOL/L (ref 23–29)
CREAT SERPL-MCNC: 0.7 MG/DL (ref 0.5–1.4)
CREAT UR-MCNC: 123.9 MG/DL (ref 15–325)
DIFFERENTIAL METHOD: ABNORMAL
EOSINOPHIL # BLD AUTO: 0.2 K/UL (ref 0–0.5)
EOSINOPHIL NFR BLD: 2.2 % (ref 0–8)
ERYTHROCYTE [DISTWIDTH] IN BLOOD BY AUTOMATED COUNT: 17.7 % (ref 11.5–14.5)
EST. GFR  (AFRICAN AMERICAN): >60 ML/MIN/1.73 M^2
EST. GFR  (NON AFRICAN AMERICAN): >60 ML/MIN/1.73 M^2
ESTIMATED AVG GLUCOSE: 151 MG/DL (ref 68–131)
FERRITIN SERPL-MCNC: 13 NG/ML (ref 20–300)
GLUCOSE SERPL-MCNC: 145 MG/DL (ref 70–110)
HBA1C MFR BLD HPLC: 6.9 % (ref 4–5.6)
HCT VFR BLD AUTO: 36 % (ref 37–48.5)
HDLC SERPL-MCNC: 60 MG/DL (ref 40–75)
HDLC SERPL: 28.4 % (ref 20–50)
HGB BLD-MCNC: 10.3 G/DL (ref 12–16)
IMM GRANULOCYTES # BLD AUTO: 0.02 K/UL (ref 0–0.04)
IMM GRANULOCYTES NFR BLD AUTO: 0.3 % (ref 0–0.5)
IRON SERPL-MCNC: 66 UG/DL (ref 30–160)
LDLC SERPL CALC-MCNC: 122 MG/DL (ref 63–159)
LYMPHOCYTES # BLD AUTO: 1.1 K/UL (ref 1–4.8)
LYMPHOCYTES NFR BLD: 14.6 % (ref 18–48)
MCH RBC QN AUTO: 21.7 PG (ref 27–31)
MCHC RBC AUTO-ENTMCNC: 28.6 G/DL (ref 32–36)
MCV RBC AUTO: 76 FL (ref 82–98)
MICROALBUMIN UR DL<=1MG/L-MCNC: 8 UG/ML
MONOCYTES # BLD AUTO: 0.6 K/UL (ref 0.3–1)
MONOCYTES NFR BLD: 8.1 % (ref 4–15)
NEUTROPHILS # BLD AUTO: 5.7 K/UL (ref 1.8–7.7)
NEUTROPHILS NFR BLD: 74.1 % (ref 38–73)
NONHDLC SERPL-MCNC: 151 MG/DL
NRBC BLD-RTO: 0 /100 WBC
PLATELET # BLD AUTO: 347 K/UL (ref 150–350)
PMV BLD AUTO: 7.9 FL (ref 9.2–12.9)
POTASSIUM SERPL-SCNC: 4 MMOL/L (ref 3.5–5.1)
PROT SERPL-MCNC: 7.4 G/DL (ref 6–8.4)
RBC # BLD AUTO: 4.74 M/UL (ref 4–5.4)
SATURATED IRON: 14 % (ref 20–50)
SODIUM SERPL-SCNC: 140 MMOL/L (ref 136–145)
TOTAL IRON BINDING CAPACITY: 474 UG/DL (ref 250–450)
TRANSFERRIN SERPL-MCNC: 320 MG/DL (ref 200–375)
TRIGL SERPL-MCNC: 145 MG/DL (ref 30–150)
TSH SERPL DL<=0.005 MIU/L-ACNC: 2.17 UIU/ML (ref 0.4–4)
WBC # BLD AUTO: 7.69 K/UL (ref 3.9–12.7)

## 2020-11-11 PROCEDURE — 83540 ASSAY OF IRON: CPT

## 2020-11-11 PROCEDURE — 85025 COMPLETE CBC W/AUTO DIFF WBC: CPT

## 2020-11-11 PROCEDURE — 84466 ASSAY OF TRANSFERRIN: CPT

## 2020-11-11 PROCEDURE — 36415 COLL VENOUS BLD VENIPUNCTURE: CPT

## 2020-11-11 PROCEDURE — 80061 LIPID PANEL: CPT

## 2020-11-11 PROCEDURE — 82728 ASSAY OF FERRITIN: CPT

## 2020-11-11 PROCEDURE — 84443 ASSAY THYROID STIM HORMONE: CPT

## 2020-11-11 PROCEDURE — 80053 COMPREHEN METABOLIC PANEL: CPT

## 2020-11-11 PROCEDURE — 82043 UR ALBUMIN QUANTITATIVE: CPT

## 2020-11-11 PROCEDURE — 83036 HEMOGLOBIN GLYCOSYLATED A1C: CPT

## 2020-11-20 ENCOUNTER — OFFICE VISIT (OUTPATIENT)
Dept: INTERNAL MEDICINE | Facility: CLINIC | Age: 65
End: 2020-11-20
Payer: COMMERCIAL

## 2020-11-20 VITALS
RESPIRATION RATE: 17 BRPM | HEIGHT: 58 IN | HEART RATE: 75 BPM | WEIGHT: 266.31 LBS | TEMPERATURE: 98 F | SYSTOLIC BLOOD PRESSURE: 119 MMHG | OXYGEN SATURATION: 97 % | BODY MASS INDEX: 55.9 KG/M2 | DIASTOLIC BLOOD PRESSURE: 72 MMHG

## 2020-11-20 DIAGNOSIS — G47.00 INSOMNIA, UNSPECIFIED TYPE: ICD-10-CM

## 2020-11-20 DIAGNOSIS — E66.01 MORBID OBESITY WITH BMI OF 50.0-59.9, ADULT: ICD-10-CM

## 2020-11-20 DIAGNOSIS — F41.1 GAD (GENERALIZED ANXIETY DISORDER): ICD-10-CM

## 2020-11-20 DIAGNOSIS — Z78.0 POSTMENOPAUSAL: ICD-10-CM

## 2020-11-20 DIAGNOSIS — G25.0 ESSENTIAL TREMOR: ICD-10-CM

## 2020-11-20 DIAGNOSIS — E11.9 TYPE 2 DIABETES MELLITUS WITHOUT COMPLICATION, WITHOUT LONG-TERM CURRENT USE OF INSULIN: ICD-10-CM

## 2020-11-20 DIAGNOSIS — I10 ESSENTIAL HYPERTENSION: Primary | ICD-10-CM

## 2020-11-20 DIAGNOSIS — D50.9 IRON DEFICIENCY ANEMIA, UNSPECIFIED IRON DEFICIENCY ANEMIA TYPE: ICD-10-CM

## 2020-11-20 DIAGNOSIS — E78.00 HYPERCHOLESTEREMIA: ICD-10-CM

## 2020-11-20 PROCEDURE — 3044F PR MOST RECENT HEMOGLOBIN A1C LEVEL <7.0%: ICD-10-PCS | Mod: CPTII,S$GLB,, | Performed by: INTERNAL MEDICINE

## 2020-11-20 PROCEDURE — 99999 PR PBB SHADOW E&M-EST. PATIENT-LVL V: CPT | Mod: PBBFAC,,, | Performed by: INTERNAL MEDICINE

## 2020-11-20 PROCEDURE — 3078F DIAST BP <80 MM HG: CPT | Mod: CPTII,S$GLB,, | Performed by: INTERNAL MEDICINE

## 2020-11-20 PROCEDURE — 1126F PR PAIN SEVERITY QUANTIFIED, NO PAIN PRESENT: ICD-10-PCS | Mod: S$GLB,,, | Performed by: INTERNAL MEDICINE

## 2020-11-20 PROCEDURE — 3044F HG A1C LEVEL LT 7.0%: CPT | Mod: CPTII,S$GLB,, | Performed by: INTERNAL MEDICINE

## 2020-11-20 PROCEDURE — 3074F SYST BP LT 130 MM HG: CPT | Mod: CPTII,S$GLB,, | Performed by: INTERNAL MEDICINE

## 2020-11-20 PROCEDURE — 99999 PR PBB SHADOW E&M-EST. PATIENT-LVL V: ICD-10-PCS | Mod: PBBFAC,,, | Performed by: INTERNAL MEDICINE

## 2020-11-20 PROCEDURE — 1126F AMNT PAIN NOTED NONE PRSNT: CPT | Mod: S$GLB,,, | Performed by: INTERNAL MEDICINE

## 2020-11-20 PROCEDURE — 3074F PR MOST RECENT SYSTOLIC BLOOD PRESSURE < 130 MM HG: ICD-10-PCS | Mod: CPTII,S$GLB,, | Performed by: INTERNAL MEDICINE

## 2020-11-20 PROCEDURE — 3008F PR BODY MASS INDEX (BMI) DOCUMENTED: ICD-10-PCS | Mod: CPTII,S$GLB,, | Performed by: INTERNAL MEDICINE

## 2020-11-20 PROCEDURE — 3008F BODY MASS INDEX DOCD: CPT | Mod: CPTII,S$GLB,, | Performed by: INTERNAL MEDICINE

## 2020-11-20 PROCEDURE — 99214 PR OFFICE/OUTPT VISIT, EST, LEVL IV, 30-39 MIN: ICD-10-PCS | Mod: S$GLB,,, | Performed by: INTERNAL MEDICINE

## 2020-11-20 PROCEDURE — 3078F PR MOST RECENT DIASTOLIC BLOOD PRESSURE < 80 MM HG: ICD-10-PCS | Mod: CPTII,S$GLB,, | Performed by: INTERNAL MEDICINE

## 2020-11-20 PROCEDURE — 99214 OFFICE O/P EST MOD 30 MIN: CPT | Mod: S$GLB,,, | Performed by: INTERNAL MEDICINE

## 2020-11-20 RX ORDER — FERROUS SULFATE 325(65) MG
325 TABLET ORAL
Qty: 90 TABLET | Refills: 3 | Status: SHIPPED | OUTPATIENT
Start: 2020-11-20 | End: 2021-03-02

## 2020-11-20 RX ORDER — METFORMIN HYDROCHLORIDE 500 MG/1
500 TABLET ORAL 2 TIMES DAILY WITH MEALS
Qty: 180 TABLET | Refills: 3 | Status: SHIPPED | OUTPATIENT
Start: 2020-11-20 | End: 2021-12-14 | Stop reason: SDUPTHER

## 2020-11-20 RX ORDER — ATORVASTATIN CALCIUM 40 MG/1
40 TABLET, FILM COATED ORAL DAILY
Qty: 90 TABLET | Refills: 3 | Status: SHIPPED | OUTPATIENT
Start: 2020-11-20 | End: 2022-01-25 | Stop reason: SDUPTHER

## 2020-11-20 NOTE — PROGRESS NOTES
"Subjective:       Patient ID: Micaela Arvizu is a 65 y.o. female.    Chief Complaint: Follow-up (6 mo)      HPI:  Patient is known to me and presents for follow up HTN, HLD, osteoporosis, GERD and anxiety. Labs from 11/11/2020 were personally reviewed and discussed with the patient today.     Chronic low back pain: Has had MRIs in the past. Has seen Dr. Daily for injections She uses tramadol for pain management for her arthrits which is working well. Supplementing with tylenol right now as trying not to use tramadol often.  Had stomach ulcer with NSAIDs.     HTN: on lisinopril-HCTZ, lasix. Home BP < 140/90. Denies chest pains, SOB and LE edema.      HLD: on atorvastatin and fish oil. Denies medicatoin side effects. LDL trended up to 122 today.     DM: last A1C 6.9% from 7%. On metformin 500mg daily. She tried lifestyle modifications without success; gaining weight. Denies numbness/tingling. Does not check blood sugars regularly.    Microalb: 11/2020  Foot exam: due next visit  Eye exam: done 11/2020    GERD: taking omeprazole daily. She has h/o of esophageal stricture. Had "stretching" with Dr. Boojrquez. Denies abd pain, n/v/d/c.      Anxiety: on  Klonopin PRN, using half tab every night right now, helping with her RLS symptoms. Since I saaw her last neurolgoy started her on lexapro but stopped taking because it made her feel worse. Discussed not using Klonopin often with use of tramadol as well. Overall, mood reported as good        Osteoporosis: follows with GYN (lorena) for this. Had DEXA done 3/2018 with GYN which showed improvement. Doing Prolia with her but was too expensive so will discuss with her GYN.      Tremor: dad has Parkinson's disease. Tells me she and her sisters all have this. Sees neurology and dx with essential tremor on propranolol; now off primidone.     Past Medical History:   Diagnosis Date    Arthritis     Asthma     GERD (gastroesophageal reflux disease)     Hiatal hernia     History " of colonoscopy with polypectomy     History of esophagogastroduodenoscopy (EGD)     Hyperlipidemia     Hypertension     Lumbar facet arthropathy        Family History   Problem Relation Age of Onset    Hypertension Mother     Hypertension Father     Parkinsonism Father        Social History     Socioeconomic History    Marital status:      Spouse name: Not on file    Number of children: Not on file    Years of education: Not on file    Highest education level: Not on file   Occupational History    Not on file   Social Needs    Financial resource strain: Not hard at all    Food insecurity     Worry: Never true     Inability: Never true    Transportation needs     Medical: No     Non-medical: No   Tobacco Use    Smoking status: Never Smoker    Smokeless tobacco: Never Used   Substance and Sexual Activity    Alcohol use: No     Frequency: Never     Binge frequency: Never    Drug use: No    Sexual activity: Not Currently     Comment:    Lifestyle    Physical activity     Days per week: 1 day     Minutes per session: 10 min    Stress: Only a little   Relationships    Social connections     Talks on phone: Not on file     Gets together: More than three times a week     Attends Alevism service: Not on file     Active member of club or organization: Yes     Attends meetings of clubs or organizations: More than 4 times per year     Relationship status:    Other Topics Concern    Not on file   Social History Narrative    Not on file       Review of Systems   Constitutional: Negative for activity change, fatigue, fever and unexpected weight change.   HENT: Negative for congestion, ear pain, hearing loss, rhinorrhea and sore throat.    Eyes: Negative for redness and visual disturbance.   Respiratory: Negative for cough, shortness of breath and wheezing.    Cardiovascular: Negative for chest pain, palpitations and leg swelling.   Gastrointestinal: Negative for abdominal pain,  constipation, diarrhea, nausea and vomiting.   Genitourinary: Negative for dysuria, frequency, pelvic pain and urgency.   Musculoskeletal: Negative for back pain, joint swelling and neck pain.   Skin: Negative for color change, rash and wound.   Neurological: Negative for dizziness, tremors, weakness, light-headedness and headaches.         Objective:      Physical Exam  Vitals signs reviewed.   Constitutional:       General: She is not in acute distress.     Appearance: She is well-developed.   HENT:      Head: Normocephalic and atraumatic.      Right Ear: External ear normal.      Left Ear: External ear normal.      Nose: Nose normal.      Mouth/Throat:      Mouth: Mucous membranes are moist.      Pharynx: Oropharynx is clear.   Eyes:      General:         Right eye: No discharge.         Left eye: No discharge.      Extraocular Movements: Extraocular movements intact.      Pupils: Pupils are equal, round, and reactive to light.   Neck:      Musculoskeletal: Neck supple.      Thyroid: No thyromegaly.   Cardiovascular:      Rate and Rhythm: Normal rate and regular rhythm.      Heart sounds: No murmur. No friction rub. No gallop.    Pulmonary:      Effort: Pulmonary effort is normal. No respiratory distress.      Breath sounds: Normal breath sounds. No wheezing or rales.   Abdominal:      General: Bowel sounds are normal. There is no distension.      Palpations: Abdomen is soft.      Tenderness: There is no abdominal tenderness.   Lymphadenopathy:      Cervical: No cervical adenopathy.   Skin:     General: Skin is warm and dry.   Neurological:      Mental Status: She is alert and oriented to person, place, and time.      Cranial Nerves: No cranial nerve deficit.   Psychiatric:         Behavior: Behavior normal.         Assessment:       1. Essential hypertension    2. Postmenopausal    3. Hypercholesteremia    4. Type 2 diabetes mellitus without complication, without long-term current use of insulin    5. Morbid  obesity with BMI of 50.0-59.9, adult    6. JOHNNIE (generalized anxiety disorder)    7. Essential tremor    8. Insomnia, unspecified type    9. Iron deficiency anemia, unspecified iron deficiency anemia type        Plan:       Micaela was seen today for follow-up.    Diagnoses and all orders for this visit:    Essential hypertension  -     CBC Auto Differential; Future  -     Comprehensive Metabolic Panel; Future  -     TSH; Future  -     Lipid Panel; Future  -     Hemoglobin A1C; Future  -     Microalbumin/Creatinine Ratio, Urine; Future  Chronic controlled  Continue medications at same dose  Low Na diet  Exercise, weight loss  Check BP and keep log for next visit    Postmenopausal  -     DXA Bone Density Spine And Hip; Future  Update DEXA    Hypercholesteremia  -     atorvastatin (LIPITOR) 40 MG tablet; Take 1 tablet (40 mg total) by mouth once daily.  -     CBC Auto Differential; Future  -     Comprehensive Metabolic Panel; Future  -     TSH; Future  -     Lipid Panel; Future  -     Hemoglobin A1C; Future  -     Microalbumin/Creatinine Ratio, Urine; Future  Chronic uncontrolled  Increase atorvastatin from 10 to 40mg  Diet, exercise,weight loss discussed    Type 2 diabetes mellitus without complication, without long-term current use of insulin  -     metFORMIN (GLUCOPHAGE) 500 MG tablet; Take 1 tablet (500 mg total) by mouth 2 (two) times daily with meals.  -     atorvastatin (LIPITOR) 40 MG tablet; Take 1 tablet (40 mg total) by mouth once daily.  -     CBC Auto Differential; Future  -     Comprehensive Metabolic Panel; Future  -     TSH; Future  -     Lipid Panel; Future  -     Hemoglobin A1C; Future  -     Microalbumin/Creatinine Ratio, Urine; Future  Chronic uncontrolled  Increase metformin to 500mg BID from daily  1800 mary ADA diet  Check sugars and keep log    Morbid obesity with BMI of 50.0-59.9, adult  -     CBC Auto Differential; Future  -     Comprehensive Metabolic Panel; Future  -     TSH; Future  -      Lipid Panel; Future  -     Hemoglobin A1C; Future  -     Microalbumin/Creatinine Ratio, Urine; Future  Diet, exercise, weightloss counseling today    JOHNNIE (generalized anxiety disorder)  chrnoic stable  Cont meds same dose    Essential tremor  Chronic stable  Cont meds same dose    Insomnia, unspecified type  Chronic stable  Cont meds same dose    Iron deficiency anemia, unspecified iron deficiency anemia type  -     ferrous sulfate (FEOSOL) 325 mg (65 mg iron) Tab tablet; Take 1 tablet (325 mg total) by mouth daily with breakfast.  -     CBC Auto Differential; Future  -     Ferritin; Future  -     Iron and TIBC; Future  New problem  Start Fe supplements  Fe rich foods  Repeat labs next visit  Weight loss      RTC 6 months with labs and PRN

## 2020-11-30 ENCOUNTER — HOSPITAL ENCOUNTER (OUTPATIENT)
Dept: RADIOLOGY | Facility: HOSPITAL | Age: 65
Discharge: HOME | End: 2020-11-30
Attending: INTERNAL MEDICINE
Payer: COMMERCIAL

## 2020-11-30 DIAGNOSIS — Z78.0 POSTMENOPAUSAL: ICD-10-CM

## 2020-11-30 PROCEDURE — 77080 DEXA BONE DENSITY SPINE HIP: ICD-10-PCS | Mod: 26,,, | Performed by: RADIOLOGY

## 2020-11-30 PROCEDURE — 77080 DXA BONE DENSITY AXIAL: CPT | Mod: TC

## 2020-11-30 PROCEDURE — 77080 DXA BONE DENSITY AXIAL: CPT | Mod: 26,,, | Performed by: RADIOLOGY

## 2020-12-10 ENCOUNTER — PATIENT MESSAGE (OUTPATIENT)
Dept: INTERNAL MEDICINE | Facility: CLINIC | Age: 65
End: 2020-12-10

## 2020-12-22 ENCOUNTER — PATIENT MESSAGE (OUTPATIENT)
Dept: INTERNAL MEDICINE | Facility: CLINIC | Age: 65
End: 2020-12-22

## 2021-01-04 ENCOUNTER — PATIENT MESSAGE (OUTPATIENT)
Dept: ADMINISTRATIVE | Facility: HOSPITAL | Age: 66
End: 2021-01-04

## 2021-01-28 DIAGNOSIS — F41.1 GAD (GENERALIZED ANXIETY DISORDER): ICD-10-CM

## 2021-01-28 DIAGNOSIS — F32.A DEPRESSION, UNSPECIFIED DEPRESSION TYPE: ICD-10-CM

## 2021-01-29 RX ORDER — ESCITALOPRAM OXALATE 10 MG/1
TABLET ORAL
Qty: 90 TABLET | Refills: 3 | OUTPATIENT
Start: 2021-01-29

## 2021-02-24 ENCOUNTER — PATIENT OUTREACH (OUTPATIENT)
Dept: ADMINISTRATIVE | Facility: OTHER | Age: 66
End: 2021-02-24

## 2021-02-24 ENCOUNTER — IMMUNIZATION (OUTPATIENT)
Dept: FAMILY MEDICINE | Facility: CLINIC | Age: 66
End: 2021-02-24
Payer: COMMERCIAL

## 2021-02-24 DIAGNOSIS — Z23 NEED FOR VACCINATION: Primary | ICD-10-CM

## 2021-02-24 PROCEDURE — 91300 COVID-19, MRNA, LNP-S, PF, 30 MCG/0.3 ML DOSE VACCINE: CPT | Mod: PBBFAC | Performed by: FAMILY MEDICINE

## 2021-03-02 ENCOUNTER — OFFICE VISIT (OUTPATIENT)
Dept: NEUROLOGY | Facility: CLINIC | Age: 66
End: 2021-03-02
Payer: COMMERCIAL

## 2021-03-02 VITALS
WEIGHT: 270.63 LBS | HEART RATE: 73 BPM | HEIGHT: 58 IN | SYSTOLIC BLOOD PRESSURE: 115 MMHG | RESPIRATION RATE: 20 BRPM | TEMPERATURE: 97 F | DIASTOLIC BLOOD PRESSURE: 67 MMHG | BODY MASS INDEX: 56.81 KG/M2

## 2021-03-02 DIAGNOSIS — I10 ESSENTIAL HYPERTENSION: ICD-10-CM

## 2021-03-02 DIAGNOSIS — M54.16 LUMBAR RADICULOPATHY: ICD-10-CM

## 2021-03-02 DIAGNOSIS — E78.00 HYPERCHOLESTEREMIA: ICD-10-CM

## 2021-03-02 DIAGNOSIS — G24.3 CERVICAL DYSTONIA: ICD-10-CM

## 2021-03-02 DIAGNOSIS — G47.00 INSOMNIA, UNSPECIFIED TYPE: ICD-10-CM

## 2021-03-02 DIAGNOSIS — E11.9 TYPE 2 DIABETES MELLITUS WITHOUT COMPLICATION, WITHOUT LONG-TERM CURRENT USE OF INSULIN: ICD-10-CM

## 2021-03-02 DIAGNOSIS — G25.0 ESSENTIAL TREMOR: Primary | ICD-10-CM

## 2021-03-02 DIAGNOSIS — Z87.09 HISTORY OF BRONCHITIS: ICD-10-CM

## 2021-03-02 DIAGNOSIS — F32.A DEPRESSION, UNSPECIFIED DEPRESSION TYPE: ICD-10-CM

## 2021-03-02 DIAGNOSIS — G47.33 OSA (OBSTRUCTIVE SLEEP APNEA): ICD-10-CM

## 2021-03-02 DIAGNOSIS — F41.1 GAD (GENERALIZED ANXIETY DISORDER): ICD-10-CM

## 2021-03-02 PROBLEM — R52 PAIN: Status: RESOLVED | Noted: 2020-09-11 | Resolved: 2021-03-02

## 2021-03-02 PROCEDURE — 3074F SYST BP LT 130 MM HG: CPT | Mod: CPTII,S$GLB,, | Performed by: NURSE PRACTITIONER

## 2021-03-02 PROCEDURE — 3078F PR MOST RECENT DIASTOLIC BLOOD PRESSURE < 80 MM HG: ICD-10-PCS | Mod: CPTII,S$GLB,, | Performed by: NURSE PRACTITIONER

## 2021-03-02 PROCEDURE — 3008F PR BODY MASS INDEX (BMI) DOCUMENTED: ICD-10-PCS | Mod: CPTII,S$GLB,, | Performed by: NURSE PRACTITIONER

## 2021-03-02 PROCEDURE — 99999 PR PBB SHADOW E&M-EST. PATIENT-LVL IV: ICD-10-PCS | Mod: PBBFAC,,, | Performed by: NURSE PRACTITIONER

## 2021-03-02 PROCEDURE — 3074F PR MOST RECENT SYSTOLIC BLOOD PRESSURE < 130 MM HG: ICD-10-PCS | Mod: CPTII,S$GLB,, | Performed by: NURSE PRACTITIONER

## 2021-03-02 PROCEDURE — 3078F DIAST BP <80 MM HG: CPT | Mod: CPTII,S$GLB,, | Performed by: NURSE PRACTITIONER

## 2021-03-02 PROCEDURE — 3044F PR MOST RECENT HEMOGLOBIN A1C LEVEL <7.0%: ICD-10-PCS | Mod: CPTII,S$GLB,, | Performed by: NURSE PRACTITIONER

## 2021-03-02 PROCEDURE — 99214 OFFICE O/P EST MOD 30 MIN: CPT | Mod: S$GLB,,, | Performed by: NURSE PRACTITIONER

## 2021-03-02 PROCEDURE — 99999 PR PBB SHADOW E&M-EST. PATIENT-LVL IV: CPT | Mod: PBBFAC,,, | Performed by: NURSE PRACTITIONER

## 2021-03-02 PROCEDURE — 1126F AMNT PAIN NOTED NONE PRSNT: CPT | Mod: S$GLB,,, | Performed by: NURSE PRACTITIONER

## 2021-03-02 PROCEDURE — 3008F BODY MASS INDEX DOCD: CPT | Mod: CPTII,S$GLB,, | Performed by: NURSE PRACTITIONER

## 2021-03-02 PROCEDURE — 99214 PR OFFICE/OUTPT VISIT, EST, LEVL IV, 30-39 MIN: ICD-10-PCS | Mod: S$GLB,,, | Performed by: NURSE PRACTITIONER

## 2021-03-02 PROCEDURE — 3044F HG A1C LEVEL LT 7.0%: CPT | Mod: CPTII,S$GLB,, | Performed by: NURSE PRACTITIONER

## 2021-03-02 PROCEDURE — 1126F PR PAIN SEVERITY QUANTIFIED, NO PAIN PRESENT: ICD-10-PCS | Mod: S$GLB,,, | Performed by: NURSE PRACTITIONER

## 2021-03-02 RX ORDER — PROPRANOLOL HYDROCHLORIDE 80 MG/1
80 TABLET ORAL 2 TIMES DAILY
Qty: 180 TABLET | Refills: 1 | Status: SHIPPED | OUTPATIENT
Start: 2021-03-02 | End: 2021-08-11 | Stop reason: SDUPTHER

## 2021-03-02 RX ORDER — TRAZODONE HYDROCHLORIDE 50 MG/1
50 TABLET ORAL NIGHTLY
Qty: 90 TABLET | Refills: 1 | Status: SHIPPED | OUTPATIENT
Start: 2021-03-02 | End: 2021-08-11 | Stop reason: SDUPTHER

## 2021-03-10 DIAGNOSIS — Z12.31 OTHER SCREENING MAMMOGRAM: ICD-10-CM

## 2021-03-17 ENCOUNTER — IMMUNIZATION (OUTPATIENT)
Dept: FAMILY MEDICINE | Facility: CLINIC | Age: 66
End: 2021-03-17
Payer: COMMERCIAL

## 2021-03-17 DIAGNOSIS — Z23 NEED FOR VACCINATION: Primary | ICD-10-CM

## 2021-03-17 PROCEDURE — 0002A COVID-19, MRNA, LNP-S, PF, 30 MCG/0.3 ML DOSE VACCINE: CPT | Mod: PBBFAC | Performed by: FAMILY MEDICINE

## 2021-03-17 PROCEDURE — 91300 COVID-19, MRNA, LNP-S, PF, 30 MCG/0.3 ML DOSE VACCINE: CPT | Mod: PBBFAC | Performed by: FAMILY MEDICINE

## 2021-04-05 ENCOUNTER — PATIENT MESSAGE (OUTPATIENT)
Dept: ADMINISTRATIVE | Facility: HOSPITAL | Age: 66
End: 2021-04-05

## 2021-05-17 ENCOUNTER — CLINICAL SUPPORT (OUTPATIENT)
Dept: INTERNAL MEDICINE | Facility: CLINIC | Age: 66
End: 2021-05-17
Payer: COMMERCIAL

## 2021-05-17 DIAGNOSIS — D50.9 IRON DEFICIENCY ANEMIA, UNSPECIFIED IRON DEFICIENCY ANEMIA TYPE: ICD-10-CM

## 2021-05-17 DIAGNOSIS — I10 ESSENTIAL HYPERTENSION: ICD-10-CM

## 2021-05-17 DIAGNOSIS — E78.00 HYPERCHOLESTEREMIA: ICD-10-CM

## 2021-05-17 DIAGNOSIS — E11.9 TYPE 2 DIABETES MELLITUS WITHOUT COMPLICATION, WITHOUT LONG-TERM CURRENT USE OF INSULIN: ICD-10-CM

## 2021-05-17 DIAGNOSIS — E66.01 MORBID OBESITY WITH BMI OF 50.0-59.9, ADULT: ICD-10-CM

## 2021-05-17 LAB
ALBUMIN SERPL BCP-MCNC: 3 G/DL (ref 3.5–5.2)
ALBUMIN/CREAT UR: 6.4 UG/MG (ref 0–30)
ALP SERPL-CCNC: 83 U/L (ref 55–135)
ALT SERPL W/O P-5'-P-CCNC: 19 U/L (ref 10–44)
ANION GAP SERPL CALC-SCNC: 11 MMOL/L (ref 8–16)
AST SERPL-CCNC: 17 U/L (ref 10–40)
BASOPHILS # BLD AUTO: 0.04 K/UL (ref 0–0.2)
BASOPHILS NFR BLD: 0.5 % (ref 0–1.9)
BILIRUB SERPL-MCNC: 0.4 MG/DL (ref 0.1–1)
BUN SERPL-MCNC: 18 MG/DL (ref 8–23)
CALCIUM SERPL-MCNC: 8.9 MG/DL (ref 8.7–10.5)
CHLORIDE SERPL-SCNC: 104 MMOL/L (ref 95–110)
CHOLEST SERPL-MCNC: 147 MG/DL (ref 120–199)
CHOLEST/HDLC SERPL: 2.5 {RATIO} (ref 2–5)
CO2 SERPL-SCNC: 24 MMOL/L (ref 23–29)
CREAT SERPL-MCNC: 0.6 MG/DL (ref 0.5–1.4)
CREAT UR-MCNC: 62.9 MG/DL (ref 15–325)
DIFFERENTIAL METHOD: ABNORMAL
EOSINOPHIL # BLD AUTO: 0.2 K/UL (ref 0–0.5)
EOSINOPHIL NFR BLD: 2.6 % (ref 0–8)
ERYTHROCYTE [DISTWIDTH] IN BLOOD BY AUTOMATED COUNT: 16.3 % (ref 11.5–14.5)
EST. GFR  (AFRICAN AMERICAN): >60 ML/MIN/1.73 M^2
EST. GFR  (NON AFRICAN AMERICAN): >60 ML/MIN/1.73 M^2
ESTIMATED AVG GLUCOSE: 151 MG/DL (ref 68–131)
FERRITIN SERPL-MCNC: 10 NG/ML (ref 20–300)
GLUCOSE SERPL-MCNC: 142 MG/DL (ref 70–110)
HBA1C MFR BLD: 6.9 % (ref 4–5.6)
HCT VFR BLD AUTO: 38.4 % (ref 37–48.5)
HDLC SERPL-MCNC: 60 MG/DL (ref 40–75)
HDLC SERPL: 40.8 % (ref 20–50)
HGB BLD-MCNC: 11.2 G/DL (ref 12–16)
IMM GRANULOCYTES # BLD AUTO: 0.01 K/UL (ref 0–0.04)
IMM GRANULOCYTES NFR BLD AUTO: 0.1 % (ref 0–0.5)
IRON SERPL-MCNC: 27 UG/DL (ref 30–160)
LDLC SERPL CALC-MCNC: 62.6 MG/DL (ref 63–159)
LYMPHOCYTES # BLD AUTO: 1.5 K/UL (ref 1–4.8)
LYMPHOCYTES NFR BLD: 19.9 % (ref 18–48)
MCH RBC QN AUTO: 23.7 PG (ref 27–31)
MCHC RBC AUTO-ENTMCNC: 29.2 G/DL (ref 32–36)
MCV RBC AUTO: 81 FL (ref 82–98)
MICROALBUMIN UR DL<=1MG/L-MCNC: 4 UG/ML
MONOCYTES # BLD AUTO: 0.7 K/UL (ref 0.3–1)
MONOCYTES NFR BLD: 9 % (ref 4–15)
NEUTROPHILS # BLD AUTO: 5.3 K/UL (ref 1.8–7.7)
NEUTROPHILS NFR BLD: 67.9 % (ref 38–73)
NONHDLC SERPL-MCNC: 87 MG/DL
NRBC BLD-RTO: 0 /100 WBC
PLATELET # BLD AUTO: 324 K/UL (ref 150–450)
PMV BLD AUTO: 8.5 FL (ref 9.2–12.9)
POTASSIUM SERPL-SCNC: 4.2 MMOL/L (ref 3.5–5.1)
PROT SERPL-MCNC: 7.3 G/DL (ref 6–8.4)
RBC # BLD AUTO: 4.73 M/UL (ref 4–5.4)
SATURATED IRON: 6 % (ref 20–50)
SODIUM SERPL-SCNC: 139 MMOL/L (ref 136–145)
TOTAL IRON BINDING CAPACITY: 469 UG/DL (ref 250–450)
TRANSFERRIN SERPL-MCNC: 317 MG/DL (ref 200–375)
TRIGL SERPL-MCNC: 122 MG/DL (ref 30–150)
TSH SERPL DL<=0.005 MIU/L-ACNC: 1.95 UIU/ML (ref 0.4–4)
WBC # BLD AUTO: 7.74 K/UL (ref 3.9–12.7)

## 2021-05-17 PROCEDURE — 85025 COMPLETE CBC W/AUTO DIFF WBC: CPT | Performed by: INTERNAL MEDICINE

## 2021-05-17 PROCEDURE — 80053 COMPREHEN METABOLIC PANEL: CPT | Performed by: INTERNAL MEDICINE

## 2021-05-17 PROCEDURE — 36415 COLL VENOUS BLD VENIPUNCTURE: CPT | Mod: S$GLB,,, | Performed by: INTERNAL MEDICINE

## 2021-05-17 PROCEDURE — 36415 PR COLLECTION VENOUS BLOOD,VENIPUNCTURE: ICD-10-PCS | Mod: S$GLB,,, | Performed by: INTERNAL MEDICINE

## 2021-05-17 PROCEDURE — 82570 ASSAY OF URINE CREATININE: CPT | Performed by: INTERNAL MEDICINE

## 2021-05-17 PROCEDURE — 83036 HEMOGLOBIN GLYCOSYLATED A1C: CPT | Performed by: INTERNAL MEDICINE

## 2021-05-17 PROCEDURE — 82043 UR ALBUMIN QUANTITATIVE: CPT | Performed by: INTERNAL MEDICINE

## 2021-05-17 PROCEDURE — 83540 ASSAY OF IRON: CPT | Performed by: INTERNAL MEDICINE

## 2021-05-17 PROCEDURE — 84443 ASSAY THYROID STIM HORMONE: CPT | Performed by: INTERNAL MEDICINE

## 2021-05-17 PROCEDURE — 82728 ASSAY OF FERRITIN: CPT | Performed by: INTERNAL MEDICINE

## 2021-05-17 PROCEDURE — 80061 LIPID PANEL: CPT | Performed by: INTERNAL MEDICINE

## 2021-05-21 ENCOUNTER — OFFICE VISIT (OUTPATIENT)
Dept: INTERNAL MEDICINE | Facility: CLINIC | Age: 66
End: 2021-05-21
Payer: COMMERCIAL

## 2021-05-21 VITALS
SYSTOLIC BLOOD PRESSURE: 122 MMHG | OXYGEN SATURATION: 95 % | HEART RATE: 69 BPM | HEIGHT: 58 IN | DIASTOLIC BLOOD PRESSURE: 78 MMHG | BODY MASS INDEX: 57.33 KG/M2 | WEIGHT: 273.13 LBS | RESPIRATION RATE: 18 BRPM

## 2021-05-21 DIAGNOSIS — M51.36 DDD (DEGENERATIVE DISC DISEASE), LUMBAR: ICD-10-CM

## 2021-05-21 DIAGNOSIS — G25.0 ESSENTIAL TREMOR: ICD-10-CM

## 2021-05-21 DIAGNOSIS — D50.9 IRON DEFICIENCY ANEMIA, UNSPECIFIED IRON DEFICIENCY ANEMIA TYPE: ICD-10-CM

## 2021-05-21 DIAGNOSIS — E66.01 MORBID OBESITY WITH BMI OF 50.0-59.9, ADULT: ICD-10-CM

## 2021-05-21 DIAGNOSIS — E78.00 HYPERCHOLESTEREMIA: ICD-10-CM

## 2021-05-21 DIAGNOSIS — M47.816 LUMBAR FACET ARTHROPATHY: ICD-10-CM

## 2021-05-21 DIAGNOSIS — M54.16 LUMBAR RADICULOPATHY: ICD-10-CM

## 2021-05-21 DIAGNOSIS — E11.9 TYPE 2 DIABETES MELLITUS WITHOUT COMPLICATION, WITHOUT LONG-TERM CURRENT USE OF INSULIN: Primary | ICD-10-CM

## 2021-05-21 DIAGNOSIS — I10 ESSENTIAL HYPERTENSION: ICD-10-CM

## 2021-05-21 PROCEDURE — 3044F HG A1C LEVEL LT 7.0%: CPT | Mod: CPTII,S$GLB,, | Performed by: INTERNAL MEDICINE

## 2021-05-21 PROCEDURE — 3078F DIAST BP <80 MM HG: CPT | Mod: CPTII,S$GLB,, | Performed by: INTERNAL MEDICINE

## 2021-05-21 PROCEDURE — 99999 PR PBB SHADOW E&M-EST. PATIENT-LVL V: ICD-10-PCS | Mod: PBBFAC,,, | Performed by: INTERNAL MEDICINE

## 2021-05-21 PROCEDURE — 3008F PR BODY MASS INDEX (BMI) DOCUMENTED: ICD-10-PCS | Mod: CPTII,S$GLB,, | Performed by: INTERNAL MEDICINE

## 2021-05-21 PROCEDURE — 3044F PR MOST RECENT HEMOGLOBIN A1C LEVEL <7.0%: ICD-10-PCS | Mod: CPTII,S$GLB,, | Performed by: INTERNAL MEDICINE

## 2021-05-21 PROCEDURE — 1126F AMNT PAIN NOTED NONE PRSNT: CPT | Mod: S$GLB,,, | Performed by: INTERNAL MEDICINE

## 2021-05-21 PROCEDURE — 3008F BODY MASS INDEX DOCD: CPT | Mod: CPTII,S$GLB,, | Performed by: INTERNAL MEDICINE

## 2021-05-21 PROCEDURE — 99999 PR PBB SHADOW E&M-EST. PATIENT-LVL V: CPT | Mod: PBBFAC,,, | Performed by: INTERNAL MEDICINE

## 2021-05-21 PROCEDURE — 99214 OFFICE O/P EST MOD 30 MIN: CPT | Mod: S$GLB,,, | Performed by: INTERNAL MEDICINE

## 2021-05-21 PROCEDURE — 3074F SYST BP LT 130 MM HG: CPT | Mod: CPTII,S$GLB,, | Performed by: INTERNAL MEDICINE

## 2021-05-21 PROCEDURE — 3074F PR MOST RECENT SYSTOLIC BLOOD PRESSURE < 130 MM HG: ICD-10-PCS | Mod: CPTII,S$GLB,, | Performed by: INTERNAL MEDICINE

## 2021-05-21 PROCEDURE — 99214 PR OFFICE/OUTPT VISIT, EST, LEVL IV, 30-39 MIN: ICD-10-PCS | Mod: S$GLB,,, | Performed by: INTERNAL MEDICINE

## 2021-05-21 PROCEDURE — 3078F PR MOST RECENT DIASTOLIC BLOOD PRESSURE < 80 MM HG: ICD-10-PCS | Mod: CPTII,S$GLB,, | Performed by: INTERNAL MEDICINE

## 2021-05-21 PROCEDURE — 1126F PR PAIN SEVERITY QUANTIFIED, NO PAIN PRESENT: ICD-10-PCS | Mod: S$GLB,,, | Performed by: INTERNAL MEDICINE

## 2021-05-21 RX ORDER — GABAPENTIN 100 MG/1
CAPSULE ORAL
Qty: 150 CAPSULE | Refills: 11 | Status: SHIPPED | OUTPATIENT
Start: 2021-05-21 | End: 2022-02-15 | Stop reason: SDUPTHER

## 2021-07-07 ENCOUNTER — PATIENT MESSAGE (OUTPATIENT)
Dept: ADMINISTRATIVE | Facility: HOSPITAL | Age: 66
End: 2021-07-07

## 2021-08-11 ENCOUNTER — OFFICE VISIT (OUTPATIENT)
Dept: NEUROLOGY | Facility: CLINIC | Age: 66
End: 2021-08-11
Payer: MEDICARE

## 2021-08-11 VITALS
SYSTOLIC BLOOD PRESSURE: 126 MMHG | DIASTOLIC BLOOD PRESSURE: 72 MMHG | BODY MASS INDEX: 58.54 KG/M2 | HEIGHT: 58 IN | HEART RATE: 78 BPM | WEIGHT: 278.88 LBS | RESPIRATION RATE: 14 BRPM

## 2021-08-11 DIAGNOSIS — G47.00 INSOMNIA, UNSPECIFIED TYPE: ICD-10-CM

## 2021-08-11 DIAGNOSIS — M54.16 LUMBAR RADICULOPATHY: ICD-10-CM

## 2021-08-11 DIAGNOSIS — G25.0 ESSENTIAL TREMOR: Primary | ICD-10-CM

## 2021-08-11 DIAGNOSIS — F32.A DEPRESSION, UNSPECIFIED DEPRESSION TYPE: ICD-10-CM

## 2021-08-11 DIAGNOSIS — G47.33 OSA (OBSTRUCTIVE SLEEP APNEA): ICD-10-CM

## 2021-08-11 DIAGNOSIS — G24.3 CERVICAL DYSTONIA: ICD-10-CM

## 2021-08-11 DIAGNOSIS — F41.1 GAD (GENERALIZED ANXIETY DISORDER): ICD-10-CM

## 2021-08-11 PROCEDURE — 99214 OFFICE O/P EST MOD 30 MIN: CPT | Mod: S$PBB | Performed by: NURSE PRACTITIONER

## 2021-08-11 PROCEDURE — 99214 OFFICE O/P EST MOD 30 MIN: CPT | Mod: PBBFAC | Performed by: NURSE PRACTITIONER

## 2021-08-11 PROCEDURE — 99999 PR STA SHADOW: CPT | Mod: PBBFAC,,, | Performed by: NURSE PRACTITIONER

## 2021-08-11 PROCEDURE — 99999 PR PBB SHADOW E&M-EST. PATIENT-LVL IV: ICD-10-PCS | Mod: PBBFAC,,, | Performed by: NURSE PRACTITIONER

## 2021-08-11 PROCEDURE — 99999 PR PBB SHADOW E&M-EST. PATIENT-LVL IV: CPT | Mod: PBBFAC,,, | Performed by: NURSE PRACTITIONER

## 2021-08-11 RX ORDER — TRAZODONE HYDROCHLORIDE 50 MG/1
50 TABLET ORAL NIGHTLY
Qty: 30 TABLET | Refills: 5 | Status: SHIPPED | OUTPATIENT
Start: 2021-08-11 | End: 2022-02-15 | Stop reason: SDUPTHER

## 2021-08-11 RX ORDER — PROPRANOLOL HYDROCHLORIDE 80 MG/1
80 TABLET ORAL 2 TIMES DAILY
Qty: 180 TABLET | Refills: 1 | Status: CANCELLED | OUTPATIENT
Start: 2021-08-11 | End: 2022-08-11

## 2021-08-11 RX ORDER — PROPRANOLOL HYDROCHLORIDE 40 MG/1
40 TABLET ORAL 2 TIMES DAILY
Qty: 180 TABLET | Refills: 1 | Status: SHIPPED | OUTPATIENT
Start: 2021-08-11 | End: 2021-08-11 | Stop reason: SDUPTHER

## 2021-08-11 RX ORDER — PROPRANOLOL HYDROCHLORIDE 40 MG/1
40 TABLET ORAL 2 TIMES DAILY
Qty: 60 TABLET | Refills: 5 | Status: SHIPPED | OUTPATIENT
Start: 2021-08-11 | End: 2022-02-15 | Stop reason: SDUPTHER

## 2021-08-11 RX ORDER — TRAZODONE HYDROCHLORIDE 50 MG/1
50 TABLET ORAL NIGHTLY
Qty: 90 TABLET | Refills: 1 | Status: SHIPPED | OUTPATIENT
Start: 2021-08-11 | End: 2021-08-11 | Stop reason: SDUPTHER

## 2021-08-23 ENCOUNTER — PATIENT MESSAGE (OUTPATIENT)
Dept: INTERNAL MEDICINE | Facility: CLINIC | Age: 66
End: 2021-08-23

## 2021-10-07 LAB
LEFT EYE DM RETINOPATHY: POSITIVE
RIGHT EYE DM RETINOPATHY: POSITIVE

## 2021-10-13 ENCOUNTER — IMMUNIZATION (OUTPATIENT)
Dept: PRIMARY CARE CLINIC | Facility: CLINIC | Age: 66
End: 2021-10-13
Payer: MEDICARE

## 2021-10-13 DIAGNOSIS — Z23 NEED FOR VACCINATION: Primary | ICD-10-CM

## 2021-10-13 PROCEDURE — 0003A COVID-19, MRNA, LNP-S, PF, 30 MCG/0.3 ML DOSE VACCINE: CPT | Mod: CV19,PBBFAC | Performed by: INTERNAL MEDICINE

## 2021-10-13 PROCEDURE — 91300 COVID-19, MRNA, LNP-S, PF, 30 MCG/0.3 ML DOSE VACCINE: CPT | Mod: PBBFAC | Performed by: INTERNAL MEDICINE

## 2021-11-12 ENCOUNTER — LAB VISIT (OUTPATIENT)
Dept: INTERNAL MEDICINE | Facility: CLINIC | Age: 66
End: 2021-11-12
Payer: MEDICARE

## 2021-11-12 DIAGNOSIS — I10 ESSENTIAL HYPERTENSION: ICD-10-CM

## 2021-11-12 DIAGNOSIS — E66.01 MORBID OBESITY WITH BMI OF 50.0-59.9, ADULT: ICD-10-CM

## 2021-11-12 DIAGNOSIS — E78.00 HYPERCHOLESTEREMIA: ICD-10-CM

## 2021-11-12 DIAGNOSIS — G25.0 ESSENTIAL TREMOR: ICD-10-CM

## 2021-11-12 DIAGNOSIS — D50.9 IRON DEFICIENCY ANEMIA, UNSPECIFIED IRON DEFICIENCY ANEMIA TYPE: ICD-10-CM

## 2021-11-12 DIAGNOSIS — E11.9 TYPE 2 DIABETES MELLITUS WITHOUT COMPLICATION, WITHOUT LONG-TERM CURRENT USE OF INSULIN: ICD-10-CM

## 2021-11-12 LAB
ALBUMIN SERPL BCP-MCNC: 3.1 G/DL (ref 3.5–5.2)
ALP SERPL-CCNC: 76 U/L (ref 55–135)
ALT SERPL W/O P-5'-P-CCNC: 24 U/L (ref 10–44)
ANION GAP SERPL CALC-SCNC: 13 MMOL/L (ref 8–16)
AST SERPL-CCNC: 26 U/L (ref 10–40)
BASOPHILS # BLD AUTO: 0.06 K/UL (ref 0–0.2)
BASOPHILS NFR BLD: 0.7 % (ref 0–1.9)
BILIRUB SERPL-MCNC: 0.4 MG/DL (ref 0.1–1)
BUN SERPL-MCNC: 18 MG/DL (ref 8–23)
CALCIUM SERPL-MCNC: 9.8 MG/DL (ref 8.7–10.5)
CHLORIDE SERPL-SCNC: 102 MMOL/L (ref 95–110)
CHOLEST SERPL-MCNC: 146 MG/DL (ref 120–199)
CHOLEST/HDLC SERPL: 2.9 {RATIO} (ref 2–5)
CO2 SERPL-SCNC: 23 MMOL/L (ref 23–29)
CREAT SERPL-MCNC: 0.6 MG/DL (ref 0.5–1.4)
DIFFERENTIAL METHOD: ABNORMAL
EOSINOPHIL # BLD AUTO: 0.2 K/UL (ref 0–0.5)
EOSINOPHIL NFR BLD: 2.5 % (ref 0–8)
ERYTHROCYTE [DISTWIDTH] IN BLOOD BY AUTOMATED COUNT: 14.6 % (ref 11.5–14.5)
EST. GFR  (AFRICAN AMERICAN): >60 ML/MIN/1.73 M^2
EST. GFR  (NON AFRICAN AMERICAN): >60 ML/MIN/1.73 M^2
ESTIMATED AVG GLUCOSE: 157 MG/DL (ref 68–131)
FERRITIN SERPL-MCNC: 49 NG/ML (ref 20–300)
GLUCOSE SERPL-MCNC: 155 MG/DL (ref 70–110)
HBA1C MFR BLD: 7.1 % (ref 4–5.6)
HCT VFR BLD AUTO: 40.9 % (ref 37–48.5)
HDLC SERPL-MCNC: 50 MG/DL (ref 40–75)
HDLC SERPL: 34.2 % (ref 20–50)
HGB BLD-MCNC: 12.8 G/DL (ref 12–16)
IMM GRANULOCYTES # BLD AUTO: 0.02 K/UL (ref 0–0.04)
IMM GRANULOCYTES NFR BLD AUTO: 0.2 % (ref 0–0.5)
IRON SERPL-MCNC: 30 UG/DL (ref 30–160)
LDLC SERPL CALC-MCNC: 66 MG/DL (ref 63–159)
LYMPHOCYTES # BLD AUTO: 1.7 K/UL (ref 1–4.8)
LYMPHOCYTES NFR BLD: 20 % (ref 18–48)
MCH RBC QN AUTO: 27.2 PG (ref 27–31)
MCHC RBC AUTO-ENTMCNC: 31.3 G/DL (ref 32–36)
MCV RBC AUTO: 87 FL (ref 82–98)
MONOCYTES # BLD AUTO: 0.6 K/UL (ref 0.3–1)
MONOCYTES NFR BLD: 6.4 % (ref 4–15)
NEUTROPHILS # BLD AUTO: 6.1 K/UL (ref 1.8–7.7)
NEUTROPHILS NFR BLD: 70.2 % (ref 38–73)
NONHDLC SERPL-MCNC: 96 MG/DL
NRBC BLD-RTO: 0 /100 WBC
PLATELET # BLD AUTO: 291 K/UL (ref 150–450)
PMV BLD AUTO: 8.6 FL (ref 9.2–12.9)
POTASSIUM SERPL-SCNC: 4.2 MMOL/L (ref 3.5–5.1)
PROT SERPL-MCNC: 7.4 G/DL (ref 6–8.4)
RBC # BLD AUTO: 4.71 M/UL (ref 4–5.4)
SATURATED IRON: 8 % (ref 20–50)
SODIUM SERPL-SCNC: 138 MMOL/L (ref 136–145)
TOTAL IRON BINDING CAPACITY: 400 UG/DL (ref 250–450)
TRANSFERRIN SERPL-MCNC: 270 MG/DL (ref 200–375)
TRIGL SERPL-MCNC: 150 MG/DL (ref 30–150)
TSH SERPL DL<=0.005 MIU/L-ACNC: 2.57 UIU/ML (ref 0.4–4)
WBC # BLD AUTO: 8.66 K/UL (ref 3.9–12.7)

## 2021-11-12 PROCEDURE — 84443 ASSAY THYROID STIM HORMONE: CPT | Performed by: INTERNAL MEDICINE

## 2021-11-12 PROCEDURE — 85025 COMPLETE CBC W/AUTO DIFF WBC: CPT | Performed by: INTERNAL MEDICINE

## 2021-11-12 PROCEDURE — 83036 HEMOGLOBIN GLYCOSYLATED A1C: CPT | Performed by: INTERNAL MEDICINE

## 2021-11-12 PROCEDURE — 80053 COMPREHEN METABOLIC PANEL: CPT | Performed by: INTERNAL MEDICINE

## 2021-11-12 PROCEDURE — 36415 COLL VENOUS BLD VENIPUNCTURE: CPT | Mod: PBBFAC

## 2021-11-12 PROCEDURE — 82728 ASSAY OF FERRITIN: CPT | Performed by: INTERNAL MEDICINE

## 2021-11-12 PROCEDURE — 99999 PR STA SHADOW: CPT | Mod: PBBFAC,,,

## 2021-11-12 PROCEDURE — 84466 ASSAY OF TRANSFERRIN: CPT | Performed by: INTERNAL MEDICINE

## 2021-11-12 PROCEDURE — 80061 LIPID PANEL: CPT | Performed by: INTERNAL MEDICINE

## 2021-11-12 PROCEDURE — 99999 PR STA SHADOW: ICD-10-PCS | Mod: PBBFAC,,,

## 2021-11-17 ENCOUNTER — OFFICE VISIT (OUTPATIENT)
Dept: INTERNAL MEDICINE | Facility: CLINIC | Age: 66
End: 2021-11-17
Payer: MEDICARE

## 2021-11-17 VITALS
DIASTOLIC BLOOD PRESSURE: 62 MMHG | WEIGHT: 271.81 LBS | OXYGEN SATURATION: 98 % | HEART RATE: 71 BPM | RESPIRATION RATE: 18 BRPM | SYSTOLIC BLOOD PRESSURE: 104 MMHG | BODY MASS INDEX: 57.05 KG/M2 | HEIGHT: 58 IN

## 2021-11-17 DIAGNOSIS — I10 ESSENTIAL HYPERTENSION: Primary | ICD-10-CM

## 2021-11-17 DIAGNOSIS — G25.0 ESSENTIAL TREMOR: ICD-10-CM

## 2021-11-17 DIAGNOSIS — E11.9 TYPE 2 DIABETES MELLITUS WITHOUT COMPLICATION, WITHOUT LONG-TERM CURRENT USE OF INSULIN: ICD-10-CM

## 2021-11-17 DIAGNOSIS — G47.33 OSA (OBSTRUCTIVE SLEEP APNEA): ICD-10-CM

## 2021-11-17 DIAGNOSIS — E66.01 MORBID OBESITY WITH BMI OF 50.0-59.9, ADULT: ICD-10-CM

## 2021-11-17 DIAGNOSIS — M54.16 LUMBAR RADICULOPATHY: ICD-10-CM

## 2021-11-17 DIAGNOSIS — E78.00 HYPERCHOLESTEREMIA: ICD-10-CM

## 2021-11-17 DIAGNOSIS — I87.2 VENOUS INSUFFICIENCY OF BOTH LOWER EXTREMITIES: ICD-10-CM

## 2021-11-17 PROCEDURE — 99214 OFFICE O/P EST MOD 30 MIN: CPT | Mod: S$PBB | Performed by: INTERNAL MEDICINE

## 2021-11-17 PROCEDURE — 99999 PR PBB SHADOW E&M-EST. PATIENT-LVL V: CPT | Mod: PBBFAC,,, | Performed by: INTERNAL MEDICINE

## 2021-11-17 PROCEDURE — 99215 OFFICE O/P EST HI 40 MIN: CPT | Mod: PBBFAC | Performed by: INTERNAL MEDICINE

## 2021-11-17 PROCEDURE — 99999 PR STA SHADOW: CPT | Mod: PBBFAC,,, | Performed by: INTERNAL MEDICINE

## 2021-11-17 PROCEDURE — 99999 PR PBB SHADOW E&M-EST. PATIENT-LVL V: ICD-10-PCS | Mod: PBBFAC,,, | Performed by: INTERNAL MEDICINE

## 2021-12-13 ENCOUNTER — PATIENT MESSAGE (OUTPATIENT)
Dept: INTERNAL MEDICINE | Facility: CLINIC | Age: 66
End: 2021-12-13
Payer: MEDICARE

## 2021-12-13 DIAGNOSIS — I10 ESSENTIAL HYPERTENSION: ICD-10-CM

## 2021-12-13 DIAGNOSIS — E11.9 TYPE 2 DIABETES MELLITUS WITHOUT COMPLICATION, WITHOUT LONG-TERM CURRENT USE OF INSULIN: ICD-10-CM

## 2021-12-14 ENCOUNTER — PATIENT MESSAGE (OUTPATIENT)
Dept: INTERNAL MEDICINE | Facility: CLINIC | Age: 66
End: 2021-12-14
Payer: MEDICARE

## 2021-12-14 RX ORDER — METFORMIN HYDROCHLORIDE 500 MG/1
500 TABLET ORAL 2 TIMES DAILY WITH MEALS
Qty: 180 TABLET | Refills: 3 | Status: SHIPPED | OUTPATIENT
Start: 2021-12-14 | End: 2022-06-14

## 2021-12-14 RX ORDER — PANTOPRAZOLE SODIUM 40 MG/1
40 TABLET, DELAYED RELEASE ORAL DAILY
Qty: 90 TABLET | Refills: 3 | Status: SHIPPED | OUTPATIENT
Start: 2021-12-14 | End: 2022-12-13

## 2021-12-14 RX ORDER — LISINOPRIL AND HYDROCHLOROTHIAZIDE 10; 12.5 MG/1; MG/1
1 TABLET ORAL DAILY
Qty: 90 TABLET | Refills: 3 | Status: SHIPPED | OUTPATIENT
Start: 2021-12-14 | End: 2022-12-13

## 2022-01-16 ENCOUNTER — PATIENT MESSAGE (OUTPATIENT)
Dept: INTERNAL MEDICINE | Facility: CLINIC | Age: 67
End: 2022-01-16
Payer: MEDICARE

## 2022-01-16 DIAGNOSIS — U07.1 COVID-19: Primary | ICD-10-CM

## 2022-01-18 ENCOUNTER — LAB VISIT (OUTPATIENT)
Dept: FAMILY MEDICINE | Facility: CLINIC | Age: 67
End: 2022-01-18

## 2022-01-18 ENCOUNTER — PATIENT MESSAGE (OUTPATIENT)
Dept: INTERNAL MEDICINE | Facility: CLINIC | Age: 67
End: 2022-01-18
Payer: MEDICARE

## 2022-01-18 DIAGNOSIS — Z11.52 ENCOUNTER FOR SCREENING FOR COVID-19: Primary | ICD-10-CM

## 2022-01-18 LAB
CTP QC/QA: YES
SARS-COV-2 AG RESP QL IA.RAPID: POSITIVE

## 2022-01-18 PROCEDURE — 87811 SARS-COV-2 COVID19 W/OPTIC: CPT | Mod: S$GLB,,, | Performed by: INTERNAL MEDICINE

## 2022-01-18 PROCEDURE — 87811 SARS CORONAVIRUS 2 ANTIGEN POCT, MANUAL READ: ICD-10-PCS | Mod: S$GLB,,, | Performed by: INTERNAL MEDICINE

## 2022-01-18 NOTE — PROGRESS NOTES
Instructions for Patients with Confirmed or Suspected COVID-19    If you are awaiting your test result, you will either be called or it will be released to the patient portal.  If you have any questions about your test, please visit [email or web address] or call our COVID-19 information line at [phone].      Please isolate yourself at home.  You may leave home and/or return to work once the following conditions are met:    If you have symptoms and tested positive:   More than 5 days since symptoms first appeared AND   More than 24 hours fever free without medications AND       symptoms have improved   · For five days after ending isolation, masks are required.    If you had no symptoms but tested positive:   More than 5 days since the date of the first positive test. If you develop symptoms, then use the guidelines above  · For five days after ending isolation, masks are required.      Testing is not recommended if you are symptom free after completing isolation.

## 2022-01-19 ENCOUNTER — INFUSION (OUTPATIENT)
Dept: INFECTIOUS DISEASES | Facility: HOSPITAL | Age: 67
End: 2022-01-19
Attending: INTERNAL MEDICINE
Payer: MEDICARE

## 2022-01-19 ENCOUNTER — NURSE TRIAGE (OUTPATIENT)
Dept: ADMINISTRATIVE | Facility: CLINIC | Age: 67
End: 2022-01-19
Payer: MEDICARE

## 2022-01-19 VITALS
DIASTOLIC BLOOD PRESSURE: 76 MMHG | HEART RATE: 69 BPM | OXYGEN SATURATION: 98 % | SYSTOLIC BLOOD PRESSURE: 112 MMHG | TEMPERATURE: 97 F | RESPIRATION RATE: 20 BRPM

## 2022-01-19 DIAGNOSIS — U07.1 COVID-19: ICD-10-CM

## 2022-01-19 PROCEDURE — M0243 CASIRIVI AND IMDEVI INFUSION: HCPCS

## 2022-01-19 PROCEDURE — 63600175 PHARM REV CODE 636 W HCPCS

## 2022-01-19 RX ORDER — DIPHENHYDRAMINE HYDROCHLORIDE 50 MG/ML
25 INJECTION INTRAMUSCULAR; INTRAVENOUS ONCE AS NEEDED
Status: DISCONTINUED | OUTPATIENT
Start: 2022-01-19 | End: 2022-02-15

## 2022-01-19 RX ORDER — EPINEPHRINE 0.3 MG/.3ML
0.3 INJECTION SUBCUTANEOUS
Status: DISCONTINUED | OUTPATIENT
Start: 2022-01-19 | End: 2022-02-15

## 2022-01-19 RX ORDER — SODIUM CHLORIDE 0.9 % (FLUSH) 0.9 %
10 SYRINGE (ML) INJECTION
Status: DISCONTINUED | OUTPATIENT
Start: 2022-01-19 | End: 2022-02-15

## 2022-01-19 RX ORDER — ONDANSETRON 4 MG/1
4 TABLET, ORALLY DISINTEGRATING ORAL ONCE AS NEEDED
Status: DISCONTINUED | OUTPATIENT
Start: 2022-01-19 | End: 2022-02-15

## 2022-01-19 RX ORDER — ACETAMINOPHEN 325 MG/1
650 TABLET ORAL ONCE AS NEEDED
Status: DISCONTINUED | OUTPATIENT
Start: 2022-01-19 | End: 2022-02-15

## 2022-01-19 RX ORDER — ALBUTEROL SULFATE 90 UG/1
2 AEROSOL, METERED RESPIRATORY (INHALATION)
Status: DISCONTINUED | OUTPATIENT
Start: 2022-01-19 | End: 2022-02-15

## 2022-01-19 NOTE — PATIENT INSTRUCTIONS
FACT SHEET FOR PATIENTS, PARENTS AND CAREGIVERS   EMERGENCY USE AUTHORIZATION (EUA) OF REGEN-COVTM   (casirivimab and imdevimab) FOR CORONAVIRUS DISEASE 2019 (COVID-19)     You are being given a medicine called REGEN-COV (casirivimab and imdevimab) for the treatment or post-exposure prevention of coronavirus disease 2019 (COVID-19). SARS-CoV-2 is the virus that causes COVID-19. This Fact Sheet contains information to help you understand the potential risks and potential benefits of taking REGEN-COV.   Receiving REGEN-COV may benefit certain people with COVID-19 and may help prevent certain people who have been exposed to someone who is infected with SARS-CoV-2 from getting SARS-CoV-2 infection, or may prevent certain people who are at high risk of exposure to someone who is infected with SARS-CoV-2 from getting SARS-CoV-2 infection.   Read this Fact Sheet for information about REGEN-COV. Talk to your healthcare provider if you have questions. It is your choice to receive REGEN-COV or stop at any time.     WHAT IS COVID-19?   COVID-19 is caused by a virus called a coronavirus, SARS-CoV-2. People can get COVID-19 through contact with another person who has the virus.   COVID-19 illnesses have ranged from very mild (including some with no reported symptoms) to severe, including illness resulting in death. While information so far suggests that most COVID-19 illness is mild, serious illness can happen and may cause some of your other medical conditions to become worse. People of all ages with severe, long-lasting (chronic) medical conditions like heart disease, lung disease, and diabetes, for example, and other conditions including obesity, seem to be at higher risk of being hospitalized for COVID-19. Older age, with or without other conditions, also places people at higher risk of being hospitalized for COVID-19.     WHAT ARE THE SYMPTOMS OF COVID-19?   The symptoms of COVID-19 include fever, cough, and shortness of  breath, which may appear 2 to 14 days after exposure. Serious illness including breathing problems can occur and may cause your other medical conditions to become worse.     WHAT IS REGEN-COV (casirivimab and imdevimab)?   REGEN-COV is an investigational medicine used in adults and adolescents (12 years of age and older who weigh at least 88 pounds (40 kg)) who are at high risk for severe COVID-19, including hospitalization or death for:    treatment of mild to moderate symptoms of COVID-19    post-exposure prevention of COVID-19 in persons who are: o not fully vaccinated against COVID-19 (Individuals are considered to be fully vaccinated 2 weeks after their second vaccine dose in a 2-dose series [such as the Pfizer or Moderna vaccines], or 2 weeks after a single-dose vaccine [such as [employer]'s Marianne vaccine]), or,   o are not expected to build up enough of an immune response to the complete COVID-19 vaccination (for example, someone with immunocompromising conditions, including someone who is taking immunosuppressive medications), and- have been exposed to someone who is infected with SARS-CoV-2. Close contact with someone who is infected with SARS-CoV-2 is defined as being within 6 feet for a total of 15 minutes or more, providing care at home to someone who is sick, having direct physical contact with the person (hugging or kissing, for example), sharing eating or drinking utensils, or being exposed to respiratory droplets from an infected person (sneezing or coughing, for example). For additional details, go to https://www.cdc.gov/coronavirus/2019-ncov/if-you-are-sick/quarantine.html, or   - someone who is at high risk of being exposed to someone who is infected with SARS-CoV-2 because of occurrence of SARS-CoV-2 infection in other individuals in the same institutional setting (for example, as nursing homes, prisons,). REGEN-COV is investigational because it is still being studied. There is limited  "information known about the safety and effectiveness of using REGEN-COV to treat people with COVID-19 or to prevent COVID-19 in people who are at high risk of being exposed to someone who is infected with SARS-CoV-2. REGEN-COV is not authorized for pre-exposure prophylaxis for prevention of COVID-19.   The FDA has authorized the emergency use of REGEN-COV for the treatment of COVID-19 and the post-exposure prevention of COVID-19 under an Emergency Use Authorization (EUA). For more information on EUA, see the "What is an Emergency Use Authorization (EUA)?" section at the end of this Fact Sheet.     WHO SHOULD NOT TAKE REGEN-COV?   Do not take REGEN-COV if you have had a severe allergic reaction to REGEN-COV.     WHAT SHOULD I TELL MY HEALTH CARE PROVIDER BEFORE I RECEIVE REGEN-COV?   Tell your healthcare provider about all of your medical conditions, including if you:    Have any allergies    Have had a severe allergic reaction including anaphylaxis to REGEN-COV previously    Have received a COVID-19 vaccine.    Have any serious illnesses    Are pregnant or plan to become pregnant    Are breastfeeding or plan to breastfeed    Are taking any medications (prescription, over-the-counter, vitamins, and herbal products)     HOW WILL I RECEIVE REGEN-COV (casirivimab and imdevimab)?    REGEN-COV consists of two investigational medicines, casirivimab and imdevimab, given together at the same time through a vein (intravenous or IV) or injected in the tissue just under the skin (subcutaneous injections). Your healthcare provider will determine the most appropriate way for you to be given REGEN-COV.    Treatment: If you are receiving an intravenous infusion, the infusion will take 20 to 50 minutes or longer. Your healthcare provider will determine the duration of your infusion. o If your healthcare provider determines that you are unable to receive REGEN-COV as an intravenous infusion which would lead to a delay in " treatment, then as an alternative, REGEN-COV can be given in the form of subcutaneous injections. If you are receiving subcutaneous injections, your dose will be provided as multiple injections given in separate locations around the same time.    Post-exposure prevention: If you are receiving subcutaneous injections, your dose will be provided as multiple injections given in separate locations around the same time. If you are receiving an intravenous infusion, the infusion will take 20 to 50 minutes or longer. o After the initial dose, if your healthcare provider determines that you need to receive additional doses of REGEN-COV for ongoing protection, the additional intravenous or subcutaneous doses would be administered monthly.     WHAT ARE THE IMPORTANT POSSIBLE SIDE EFFECTS OF REGEN-COV (casirivimab and imdevimab)?   Possible side effects of REGEN-COV are:    Allergic reactions. Allergic reactions can happen during and after infusion or injection of REGEN-COV. Tell your healthcare provider right away or seek immediate medical attention if you get any of the following signs and symptoms of allergic reactions: fever, chills, nausea, headache, shortness of breath, low or high blood pressure, rapid or slow heart rate, chest discomfort or pain, weakness, confusion, feeling tired, wheezing, swelling of your lips, face, or throat, rash including hives, itching, muscle aches, feeling faint, dizziness and sweating. These reactions may be severe or life threatening.    Worsening symptoms after treatment: You may experience new or worsening symptoms after infusion or injection, including fever, difficulty breathing, rapid or slow heart rate, tiredness, weakness or confusion. If these symptoms occur, contact your healthcare provider or seek immediate medical attention as some of these symptoms have required hospitalization. It is unknown if these symptoms are related to treatment or are due to the progression of  COVID-19. The side effects of getting any medicine by vein may include brief pain, bleeding, bruising of the skin, soreness, swelling, and possible infection at the infusion site. The side effects of getting any medicine by subcutaneous injection may include pain, bruising of the skin, soreness, swelling, and possible infection at the injection site.   These are not all the possible side effects of REGEN-COV. Not a lot of people have been given REGEN-COV. Serious and unexpected side effects may happen. REGEN-COV is still being studied so it is possible that all of the risks are not known at this time.   It is possible that REGEN-COV could interfere with your body's own ability to fight off a future infection of SARS-CoV-2. Similarly, REGEN-COV may reduce your body's immune response to a vaccine for SARS-CoV-2. Specific studies have not been conducted to address these possible risks. Talk to your healthcare provider if you have any questions.    WHAT OTHER TREATMENT CHOICES ARE THERE?   Like REGEN-COV (casirivimab and imdevimab), FDA may allow for the emergency use of other medicines to treat people with COVID-19. Go to https://www.fda.gov/emergency-preparedness-and-response/mcm-legal-regulatory-and-policy-framework/emergency-use-authorization for information on the emergency use of other medicines that are not approved by FDA that are used to treat people with COVID-19. Your healthcare provider may talk with you about clinical trials you may be eligible for.   It is your choice to be treated or not to be treated with REGEN-COV. Should you decide not to receive REGEN-COV or stop it at any time, it will not change your standard medical care.     WHAT OTHER PREVENTION CHOICES ARE THERE?   Vaccines to prevent COVID-19 are also available under Emergency Use Authorization. Use of REGEN-COV does not replace vaccination against COVID-19. REGEN-COV is not authorized for pre-exposure prophylaxis for prevention of COVID-19.      WHAT IF I AM PREGNANT OR BREASTFEEDING?   There is limited experience using REGEN-COV (casirivimab and imdevimab) in pregnant women or breastfeeding mothers. For a mother and unborn baby, the benefit of receiving REGEN-COV may be greater than the risk of using the product. If you are pregnant or breastfeeding, discuss your options and specific situation with your healthcare provider.     HOW DO I REPORT SIDE EFFECTSWITH REGEN-COV (casirivimab and imdevimab)?   Tell your healthcare provider right away if you have any side effect that bothers you or does not go away.   Report side effects to FDA MedWatch at www.fda.gov/medwatch or call 4-460-GGG-1537 or call 1-802.368.4115.     HOW CAN I LEARN MORE?    Ask your health care provider.    Visit [email or web address]    Visit https://www.zbpal86crffeqfkvigstnuysqv.nih.gov/    Contact your local or state public health department.     WHAT IS AN EMERGENCY USE AUTHORIZATION (EUA)?   The [place] FDA has made REGEN-COV (casirivimab and imdevimab) available under an emergency access mechanism called an EUA. The EUA is supported by a [place] of Health and Human Service (HHS) declaration that circumstances exist to justify the emergency use of drugs and biological products during the COVID-19 pandemic. REGEN-COV has not undergone the same type of review as an FDA-approved product. In issuing an EUA under the COVID-19 public health emergency, the FDA must determine, among other things, that based on the totality of scientific evidence available, it is reasonable to believe that the product may be effective for diagnosing, treating, or preventing COVID-19, or a serious or life-threatening disease or condition caused by COVID-19; that the known and potential benefits of the product, when used to diagnose, treat, or prevent such disease or condition, outweigh the known and potential risks of such product; and that there are no adequate, approved and available alternatives.  All of these criteria must be met to allow for the medicine to be used in the treatment of COVID-19 or prevention of COVID-19 during the COVID-19 pandemic.   The EUA for REGEN-COV is in effect for the duration of the COVID-19 declaration justifying emergency use of these products, unless terminated or revoked (after which the products may no longer be used).     Manufactured by:   [employer]   [address] [place] [id number]   ©2021 [employer] All rights reserved.   Revised: 07/2021

## 2022-01-19 NOTE — TELEPHONE ENCOUNTER
See below note. Please reach out to patient. If she is SOB (especially at rest) or home pulse ox < 92% she needs to be evaluated in ER.

## 2022-01-19 NOTE — PROGRESS NOTES
1400  Tolerated infusion well  No reaction noted.  Dc to home in stable condition.  Instructed to return to ER for worsening.

## 2022-01-19 NOTE — TELEPHONE ENCOUNTER
[facility] patient, calling with new onset sob with talking, began this am.  She got some relief from the breathing treatment.  I advised she go in to the ED or C for evaluation.  Will message Dr. Robles.  We also discussed otc acetaminophen and max dosage, and needing to add up the amount of acetaminophen in other products as well, not to exceed 4000 mg daily; she verbalized understanding.  Reason for Disposition   MILD difficulty breathing (e.g., minimal/no SOB at rest, SOB with walking, pulse <100)     Pt denies any significant sob with exertion, but does get sob with talking    Additional Information   Negative: SEVERE difficulty breathing (e.g., struggling for each breath, speaks in single words)   Negative: Difficult to awaken or acting confused (e.g., disoriented, slurred speech)   Negative: Bluish (or gray) lips or face now   Negative: Shock suspected (e.g., cold/pale/clammy skin, too weak to stand, low BP, rapid pulse)   Negative: Sounds like a life-threatening emergency to the triager   Negative: [1] COVID-19 exposure AND [2] NO symptoms   Negative: COVID-19 vaccine reaction suspected (e.g., fever, headache, muscle aches) occurring 1 to 3 days after getting vaccine   Negative: COVID-19 vaccine, questions about   Negative: [1] Lives with someone known to have influenza (flu test positive) AND [2] flu-like symptoms (e.g., cough, runny nose, sore throat, SOB; with or without fever)   Negative: [1] Adult with possible COVID-19 symptoms AND [2] triager concerned about severity of symptoms or other causes   Negative: COVID-19 and breastfeeding, questions about   Negative: SEVERE or constant chest pain or pressure (Exception: mild central chest pain, present only when coughing)   Negative: MODERATE difficulty breathing (e.g., speaks in phrases, SOB even at rest, pulse 100-120)   Negative: Headache and stiff neck (can't touch chin to chest)    Protocols used: CORONAVIRUS (COVID-19) DIAGNOSED OR  [id number]

## 2022-01-24 ENCOUNTER — PATIENT MESSAGE (OUTPATIENT)
Dept: INTERNAL MEDICINE | Facility: CLINIC | Age: 67
End: 2022-01-24
Payer: MEDICARE

## 2022-01-24 RX ORDER — BENZONATATE 200 MG/1
200 CAPSULE ORAL 3 TIMES DAILY PRN
Qty: 30 CAPSULE | Refills: 0 | Status: SHIPPED | OUTPATIENT
Start: 2022-01-24 | End: 2022-02-03

## 2022-01-25 ENCOUNTER — PATIENT MESSAGE (OUTPATIENT)
Dept: INTERNAL MEDICINE | Facility: CLINIC | Age: 67
End: 2022-01-25
Payer: MEDICARE

## 2022-01-25 DIAGNOSIS — E78.00 HYPERCHOLESTEREMIA: ICD-10-CM

## 2022-01-25 DIAGNOSIS — E11.9 TYPE 2 DIABETES MELLITUS WITHOUT COMPLICATION, WITHOUT LONG-TERM CURRENT USE OF INSULIN: ICD-10-CM

## 2022-01-25 DIAGNOSIS — E11.319 DIABETIC RETINOPATHY OF BOTH EYES ASSOCIATED WITH TYPE 2 DIABETES MELLITUS, MACULAR EDEMA PRESENCE UNSPECIFIED, UNSPECIFIED RETINOPATHY SEVERITY: Primary | ICD-10-CM

## 2022-01-25 RX ORDER — ATORVASTATIN CALCIUM 40 MG/1
40 TABLET, FILM COATED ORAL DAILY
Qty: 90 TABLET | Refills: 3 | Status: SHIPPED | OUTPATIENT
Start: 2022-01-25 | End: 2023-01-23

## 2022-01-25 NOTE — TELEPHONE ENCOUNTER
No new care gaps identified.  Powered by [employer]. Reference number: [id number].   1/25/2022 10:45:50 AM CST

## 2022-02-15 ENCOUNTER — OFFICE VISIT (OUTPATIENT)
Dept: NEUROLOGY | Facility: CLINIC | Age: 67
End: 2022-02-15
Payer: MEDICARE

## 2022-02-15 VITALS
SYSTOLIC BLOOD PRESSURE: 140 MMHG | RESPIRATION RATE: 22 BRPM | WEIGHT: 271.19 LBS | OXYGEN SATURATION: 98 % | HEIGHT: 63 IN | BODY MASS INDEX: 48.05 KG/M2 | HEART RATE: 99 BPM | DIASTOLIC BLOOD PRESSURE: 86 MMHG

## 2022-02-15 DIAGNOSIS — G24.3 CERVICAL DYSTONIA: ICD-10-CM

## 2022-02-15 DIAGNOSIS — M62.838 MUSCLE SPASM: ICD-10-CM

## 2022-02-15 DIAGNOSIS — M51.36 DDD (DEGENERATIVE DISC DISEASE), LUMBAR: ICD-10-CM

## 2022-02-15 DIAGNOSIS — G47.33 OSA (OBSTRUCTIVE SLEEP APNEA): ICD-10-CM

## 2022-02-15 DIAGNOSIS — G47.00 INSOMNIA, UNSPECIFIED TYPE: ICD-10-CM

## 2022-02-15 DIAGNOSIS — G25.0 ESSENTIAL TREMOR: Primary | ICD-10-CM

## 2022-02-15 DIAGNOSIS — M47.816 LUMBAR FACET ARTHROPATHY: ICD-10-CM

## 2022-02-15 PROCEDURE — 99999 PR PBB SHADOW E&M-EST. PATIENT-LVL V: ICD-10-PCS | Mod: PBBFAC,,, | Performed by: NURSE PRACTITIONER

## 2022-02-15 PROCEDURE — 99214 OFFICE O/P EST MOD 30 MIN: CPT | Mod: S$PBB | Performed by: NURSE PRACTITIONER

## 2022-02-15 PROCEDURE — 99215 OFFICE O/P EST HI 40 MIN: CPT | Mod: PBBFAC | Performed by: NURSE PRACTITIONER

## 2022-02-15 PROCEDURE — 99999 PR STA SHADOW: CPT | Mod: PBBFAC,,, | Performed by: NURSE PRACTITIONER

## 2022-02-15 PROCEDURE — 99999 PR PBB SHADOW E&M-EST. PATIENT-LVL V: CPT | Mod: PBBFAC,,, | Performed by: NURSE PRACTITIONER

## 2022-02-15 RX ORDER — TRAZODONE HYDROCHLORIDE 50 MG/1
50 TABLET ORAL NIGHTLY
Qty: 30 TABLET | Refills: 5 | Status: SHIPPED | OUTPATIENT
Start: 2022-02-15 | End: 2022-08-02 | Stop reason: SDUPTHER

## 2022-02-15 RX ORDER — TIZANIDINE 4 MG/1
4 TABLET ORAL NIGHTLY PRN
Qty: 30 TABLET | Refills: 5 | Status: SHIPPED | OUTPATIENT
Start: 2022-02-15 | End: 2022-02-25

## 2022-02-15 RX ORDER — GABAPENTIN 100 MG/1
100 CAPSULE ORAL 3 TIMES DAILY
Qty: 90 CAPSULE | Refills: 5 | Status: SHIPPED | OUTPATIENT
Start: 2022-02-15 | End: 2022-08-02 | Stop reason: SDUPTHER

## 2022-02-15 RX ORDER — PROPRANOLOL HYDROCHLORIDE 40 MG/1
40 TABLET ORAL 2 TIMES DAILY
Qty: 60 TABLET | Refills: 5 | Status: SHIPPED | OUTPATIENT
Start: 2022-02-15 | End: 2022-08-02 | Stop reason: SDUPTHER

## 2022-02-15 NOTE — PATIENT INSTRUCTIONS
Send me a portal message in 2 weeks and let me know how your tremor has responded to adding a mid -day dose of Gabapentin.     Also let me know how your neck tension is responding to the Tizanidine.

## 2022-02-15 NOTE — PROGRESS NOTES
HPI: Micaela Arvizu is a 66 y.o. female with cervical dystonia with head tremor, multiple level degenerative changes of the L spine, prior lower T spine compression fractures and L2 and L4 compression deformities. She has bilateral  lumbar radicular pain. EMG/NCS of the legs normal 5/2015. She has GERD, HLD, HTN, JOHNNIE, insomnia, osteoporosis, and ROBERTO, as well as a history of asthmatic bronchitis. S/p right knee replacement in 201 with Dr. Vizcaino.      She presents today for a follow up visit. She feels that her head tremor is somewhat worse lately. She has more neck tension currently. She continues on Propranolol, and she also takes Gabapentin 100 mg bid for lumbar pain.     Trazodone continues to be effective for her insomnia, but she has some difficulty sleeping with intermittently increased L-spine/right hip complaints.     Denies anxiety right now, but she has some friends living with her currently, which has added some stress.     She saw Dr. Daily for her L-spine pain prior.     She is compliant with CPAP, but her machine was recalled, and she is waiting for a replacement.      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     Review of Systems   Constitutional: Negative for fever.   HENT: Negative for nosebleeds.    Eyes: Negative for double vision.   Respiratory: Negative for hemoptysis.    Cardiovascular: Negative for leg swelling.   Gastrointestinal: Negative for blood in stool.   Genitourinary: Negative for hematuria.   Musculoskeletal: Positive for back pain. Negative for neck pain.   Skin: Negative for rash.   Neurological: Positive for tremors.   Psychiatric/Behavioral: Negative for depression. The patient is not nervous/anxious and does not have insomnia.      Exam:  Gen Appearance, well developed/nourished in no apparent distress  CV: 2+ distal pulses with no edema or swelling  Neuro:  MS: Awake, alert, oriented to place, person, time, situation. Sustains attention. Recent/remote memory  intact, Language is full to spontaneous speech/comprehension. Fund of Knowledge is full  CN: Optic discs are flat with normal vasculature, PERRL, Extraoccular movements and visual fields are full. Normal facial sensation and strength, Tongue and Palate are midline and strong. Shoulder Shrug symmetric and strong.  Motor: Normal bulk, tone, no abnormal movements in the hands but there is a tremor of the head. 5/5 strength bilateral upper/lower extremities with 1+ reflexes  Sensory: symmetric to temp, and vibration.  Romberg negative  Cerebellar: Finger-nose, Rapid alternating movements intact  Gait: Normal stance, no ataxia    Imagin2015 MRI L spine:   Interval resolution of the edema like signal involving the inferior end plate of L2 which has been replaced with fatty marrow.    Mild spondylosis of the lumbar spine without evidence for significant central canal stenosis or neural foraminal narrowing.    MRI C spine 2016: Multilevel cervical spondylosis with foraminal encroachment greatest from the C3-C5 levels as noted above.    Assessment/Plan: Micaela Arvizu is a 66 y.o. female with multiple level degenerative changes of the L spine, prior lower T spine compression fractures and L2  And L4 compression deformities. She has bilateral  lumbar radicular pain. EMG/NCS of the legs normal 2015. She has essential tremor of the head, which runs in her family. Exam shows essential tremor with cervical dystonia with left torticollis.    I recommend:   1. She is off of Lexapro with no worsening anxiety.      2. Continue Trazodone for insomnia.   -She failed Melatonin.   -No longer taking Klonopin.     3. S/p right knee replacement surgery. Dietary changes were discussed. Bariatric surgery not covered by her insurance.     4. Continue Propranolol for essential tremor at 40 mg bid. Increase back to 80 mg bid was ineffective. Tremor reportedly worse on reduced dose from 80 mg to 40 mg prior.   -No worsening of tremor off  of Primidone.  -Tremor worse with anxiety, but this is improved lately.    -denies history of asthma-clarified prior records with her. She had episodic bronchitis prior.     Increase her Gabapentin from 100 mg bid to 100 mg tid, in an attempt to help with head tremor.     5. Botox was ineffective for cervical dystonia prior.    -add Tizanidine for neck tension. This may help her head tremor as well.     6. L-spine complaints improved with right TKA, but are ongoing. She is seeing pain management. She failed her last injections. Advised her to follow up with pain management to discuss ongoing complaints.     7. She is back on Gabapentin for L-spine pain.     8. The patient is also being treated for osteoporosis.     9. Weight loss is difficult at this time, as she stays home to avoid Covid infection.     FU 3 months/asked her to notify me via patient portal on respond to med changes.

## 2022-03-06 ENCOUNTER — PATIENT MESSAGE (OUTPATIENT)
Dept: NEUROLOGY | Facility: CLINIC | Age: 67
End: 2022-03-06
Payer: MEDICARE

## 2022-04-26 ENCOUNTER — PATIENT MESSAGE (OUTPATIENT)
Dept: INTERNAL MEDICINE | Facility: CLINIC | Age: 67
End: 2022-04-26
Payer: MEDICARE

## 2022-04-26 RX ORDER — FUROSEMIDE 20 MG/1
40 TABLET ORAL DAILY
Qty: 180 TABLET | Refills: 3 | Status: SHIPPED | OUTPATIENT
Start: 2022-04-26 | End: 2023-08-25

## 2022-04-26 NOTE — TELEPHONE ENCOUNTER
Care Due:                  Date            Visit Type   Department     Provider  --------------------------------------------------------------------------------                                EP -                              PRIMARY      [facility]  Last Visit: 11-      CARE ([facility])   MEDICINE II    Kinga Robles                               -                              PRIMARY      [facility]  Next Visit: 05-      CARE ([facility])   MEDICINE II    Kinga Robles                                                            Last  Test          Frequency    Reason                     Performed    Due Date  --------------------------------------------------------------------------------    HBA1C.......  6 months...  metFORMIN................  11- 05-    Powered by [employer] by [facility]. Reference number: [id number].   4/26/2022 12:40:05 PM CDT

## 2022-05-27 ENCOUNTER — OFFICE VISIT (OUTPATIENT)
Dept: FAMILY MEDICINE | Facility: CLINIC | Age: 67
End: 2022-05-27
Payer: MEDICARE

## 2022-05-27 VITALS
RESPIRATION RATE: 18 BRPM | HEART RATE: 70 BPM | DIASTOLIC BLOOD PRESSURE: 80 MMHG | SYSTOLIC BLOOD PRESSURE: 128 MMHG | TEMPERATURE: 98 F | BODY MASS INDEX: 56.32 KG/M2 | WEIGHT: 268.31 LBS | HEIGHT: 58 IN | OXYGEN SATURATION: 95 %

## 2022-05-27 DIAGNOSIS — J40 BRONCHITIS: ICD-10-CM

## 2022-05-27 DIAGNOSIS — J32.9 SINUSITIS, UNSPECIFIED CHRONICITY, UNSPECIFIED LOCATION: ICD-10-CM

## 2022-05-27 DIAGNOSIS — G47.33 OSA (OBSTRUCTIVE SLEEP APNEA): Primary | ICD-10-CM

## 2022-05-27 PROCEDURE — 99213 OFFICE O/P EST LOW 20 MIN: CPT | Mod: S$PBB | Performed by: FAMILY MEDICINE

## 2022-05-27 PROCEDURE — 99999 PR STA SHADOW: ICD-10-PCS | Mod: PBBFAC,,,

## 2022-05-27 PROCEDURE — 99999 PR STA SHADOW: CPT | Mod: PBBFAC,,, | Performed by: FAMILY MEDICINE

## 2022-05-27 PROCEDURE — 99999 PR PBB SHADOW E&M-EST. PATIENT-LVL IV: CPT | Mod: PBBFAC,,, | Performed by: FAMILY MEDICINE

## 2022-05-27 PROCEDURE — 99999 PR STA SHADOW: CPT | Mod: PBBFAC,,,

## 2022-05-27 PROCEDURE — 96372 THER/PROPH/DIAG INJ SC/IM: CPT | Mod: PBBFAC

## 2022-05-27 PROCEDURE — 99999 PR PBB SHADOW E&M-EST. PATIENT-LVL IV: ICD-10-PCS | Mod: PBBFAC,,, | Performed by: FAMILY MEDICINE

## 2022-05-27 PROCEDURE — 99214 OFFICE O/P EST MOD 30 MIN: CPT | Mod: PBBFAC | Performed by: FAMILY MEDICINE

## 2022-05-27 RX ORDER — CLINDAMYCIN PHOSPHATE 150 MG/ML
600 INJECTION, SOLUTION INTRAVENOUS
Status: COMPLETED | OUTPATIENT
Start: 2022-05-27 | End: 2022-05-27

## 2022-05-27 RX ORDER — DOXYCYCLINE HYCLATE 100 MG
100 TABLET ORAL 2 TIMES DAILY
Qty: 20 TABLET | Refills: 0 | Status: SHIPPED | OUTPATIENT
Start: 2022-05-27 | End: 2022-06-06

## 2022-05-27 RX ORDER — COLESEVELAM 180 1/1
625 TABLET ORAL 2 TIMES DAILY WITH MEALS
Qty: 180 TABLET | Refills: 3 | Status: SHIPPED | OUTPATIENT
Start: 2022-05-27 | End: 2024-03-18

## 2022-05-27 RX ORDER — PROMETHAZINE HYDROCHLORIDE AND DEXTROMETHORPHAN HYDROBROMIDE 6.25; 15 MG/5ML; MG/5ML
5 SYRUP ORAL EVERY 6 HOURS PRN
Qty: 150 ML | Refills: 3 | Status: SHIPPED | OUTPATIENT
Start: 2022-05-27 | End: 2022-06-06

## 2022-05-27 RX ORDER — IPRATROPIUM BROMIDE AND ALBUTEROL SULFATE 2.5; .5 MG/3ML; MG/3ML
3 SOLUTION RESPIRATORY (INHALATION) EVERY 6 HOURS PRN
Qty: 75 ML | Refills: 2 | Status: SHIPPED | OUTPATIENT
Start: 2022-05-27 | End: 2022-05-30 | Stop reason: SDUPTHER

## 2022-05-27 RX ORDER — TRIAMCINOLONE ACETONIDE 40 MG/ML
40 INJECTION, SUSPENSION INTRA-ARTICULAR; INTRAMUSCULAR
Status: COMPLETED | OUTPATIENT
Start: 2022-05-27 | End: 2022-05-27

## 2022-05-27 RX ADMIN — CLINDAMYCIN PHOSPHATE 600 MG: 150 INJECTION, SOLUTION INTRAVENOUS at 10:05

## 2022-05-27 RX ADMIN — TRIAMCINOLONE ACETONIDE 40 MG: 40 INJECTION, SUSPENSION INTRA-ARTICULAR; INTRAMUSCULAR at 10:05

## 2022-05-27 NOTE — PROGRESS NOTES
Subjective:       Patient ID: Micaela Arvizu is a 66 y.o. female.    Chief Complaint: Sinus Problem (X 1 wk - with cough)    Pt is a 66 y.o. female who presents for check up for   Sinusitis, unspecified chronicity, unspecified location  Bronchitis. Doing well on current meds. Denies any side effects. Prevention is up to date.    Review of Systems   Constitutional: Positive for fatigue. Negative for appetite change, chills and fever.   HENT: Positive for congestion, postnasal drip, rhinorrhea, sinus pressure and sore throat. Negative for ear discharge and ear pain.    Eyes: Negative for pain, discharge, itching and visual disturbance.   Respiratory: Positive for cough and wheezing. Negative for choking, chest tightness and shortness of breath.         With coughing and increased activity   Cardiovascular: Negative for chest pain, palpitations and leg swelling.   Gastrointestinal: Negative for abdominal pain, constipation, diarrhea, nausea and vomiting.   Musculoskeletal: Negative for arthralgias, joint swelling, myalgias and neck stiffness.   Skin: Negative for rash and wound.   Neurological: Negative for dizziness, syncope, weakness, light-headedness, numbness and headaches.       Objective:      Physical Exam  Vitals reviewed.   Constitutional:       Appearance: Normal appearance. She is well-developed.   HENT:      Head: Normocephalic and atraumatic.      Right Ear: Tympanic membrane and external ear normal.      Left Ear: Tympanic membrane and external ear normal.      Nose: Mucosal edema and rhinorrhea present.      Mouth/Throat:      Pharynx: Uvula midline.   Eyes:      Conjunctiva/sclera: Conjunctivae normal.   Neck:      Thyroid: No thyromegaly.      Trachea: Trachea normal.   Cardiovascular:      Rate and Rhythm: Normal rate and regular rhythm.      Heart sounds: Normal heart sounds, S1 normal and S2 normal. No murmur heard.  Pulmonary:      Effort: Pulmonary effort is normal. No respiratory distress.       Comments: Coarse cough  Abdominal:      Palpations: Abdomen is soft.      Tenderness: There is no abdominal tenderness.   Musculoskeletal:         General: Normal range of motion.      Cervical back: Normal range of motion and neck supple.   Lymphadenopathy:      Cervical: No cervical adenopathy.   Skin:     General: Skin is warm and dry.   Neurological:      Mental Status: She is alert.   Psychiatric:         Speech: Speech normal.         Assessment:       1. ROBERTO (obstructive sleep apnea)    2. Sinusitis, unspecified chronicity, unspecified location    3. Bronchitis        Plan:   Micaela was seen today for sinus problem.    Diagnoses and all orders for this visit:    ROBERTO (obstructive sleep apnea)    Sinusitis, unspecified chronicity, unspecified location    Bronchitis    Other orders  -     triamcinolone acetonide injection 40 mg  -     clindamycin injection 600 mg  -     doxycycline (VIBRA-TABS) 100 MG tablet; Take 1 tablet (100 mg total) by mouth 2 (two) times daily. for 10 days  -     promethazine-dextromethorphan (PROMETHAZINE-DM) 6.25-15 mg/5 mL Syrp; Take 5 mLs by mouth every 6 (six) hours as needed.  -     albuterol-ipratropium (DUO-NEB) 2.5 mg-0.5 mg/3 mL nebulizer solution; Take 3 mLs by nebulization every 6 (six) hours as needed for Wheezing. Rescue

## 2022-05-30 DIAGNOSIS — G47.33 OSA (OBSTRUCTIVE SLEEP APNEA): Primary | ICD-10-CM

## 2022-05-30 RX ORDER — IPRATROPIUM BROMIDE AND ALBUTEROL SULFATE 2.5; .5 MG/3ML; MG/3ML
3 SOLUTION RESPIRATORY (INHALATION) EVERY 6 HOURS PRN
Qty: 75 ML | Refills: 2 | Status: SHIPPED | OUTPATIENT
Start: 2022-05-30 | End: 2022-07-11

## 2022-05-31 NOTE — PATIENT INSTRUCTIONS
Reduce Propranolol now. Monitor for any worsening of head tremor.   Check blood pressure twice a day until next visit, and bring a BP log with you to your next visit.     Wean Primidone by taking 1/2 tablet at night for 1 week, then stop. Monitor for any worsening of the head tremor with this.         
Name band;

## 2022-06-07 ENCOUNTER — LAB VISIT (OUTPATIENT)
Dept: LAB | Facility: HOSPITAL | Age: 67
End: 2022-06-07
Attending: INTERNAL MEDICINE
Payer: MEDICARE

## 2022-06-07 DIAGNOSIS — I87.2 VENOUS INSUFFICIENCY OF BOTH LOWER EXTREMITIES: ICD-10-CM

## 2022-06-07 DIAGNOSIS — E66.01 MORBID OBESITY WITH BMI OF 50.0-59.9, ADULT: ICD-10-CM

## 2022-06-07 DIAGNOSIS — I10 ESSENTIAL HYPERTENSION: ICD-10-CM

## 2022-06-07 DIAGNOSIS — E78.00 HYPERCHOLESTEREMIA: ICD-10-CM

## 2022-06-07 DIAGNOSIS — E11.9 TYPE 2 DIABETES MELLITUS WITHOUT COMPLICATION, WITHOUT LONG-TERM CURRENT USE OF INSULIN: ICD-10-CM

## 2022-06-07 LAB
ALBUMIN SERPL BCP-MCNC: 3.1 G/DL (ref 3.5–5.2)
ALP SERPL-CCNC: 72 U/L (ref 55–135)
ALT SERPL W/O P-5'-P-CCNC: 28 U/L (ref 10–44)
ANION GAP SERPL CALC-SCNC: 14 MMOL/L (ref 8–16)
AST SERPL-CCNC: 23 U/L (ref 10–40)
BASOPHILS # BLD AUTO: 0.07 K/UL (ref 0–0.2)
BASOPHILS NFR BLD: 0.7 % (ref 0–1.9)
BILIRUB SERPL-MCNC: 0.5 MG/DL (ref 0.1–1)
BUN SERPL-MCNC: 16 MG/DL (ref 8–23)
CALCIUM SERPL-MCNC: 9.6 MG/DL (ref 8.7–10.5)
CHLORIDE SERPL-SCNC: 97 MMOL/L (ref 95–110)
CHOLEST SERPL-MCNC: 146 MG/DL (ref 120–199)
CHOLEST/HDLC SERPL: 2.7 {RATIO} (ref 2–5)
CO2 SERPL-SCNC: 28 MMOL/L (ref 23–29)
CREAT SERPL-MCNC: 0.7 MG/DL (ref 0.5–1.4)
DIFFERENTIAL METHOD: ABNORMAL
EOSINOPHIL # BLD AUTO: 0.2 K/UL (ref 0–0.5)
EOSINOPHIL NFR BLD: 2 % (ref 0–8)
ERYTHROCYTE [DISTWIDTH] IN BLOOD BY AUTOMATED COUNT: 14.6 % (ref 11.5–14.5)
EST. GFR  (AFRICAN AMERICAN): >60 ML/MIN/1.73 M^2
EST. GFR  (NON AFRICAN AMERICAN): >60 ML/MIN/1.73 M^2
ESTIMATED AVG GLUCOSE: 166 MG/DL (ref 68–131)
GLUCOSE SERPL-MCNC: 166 MG/DL (ref 70–110)
HBA1C MFR BLD: 7.4 % (ref 4–5.6)
HCT VFR BLD AUTO: 44.2 % (ref 37–48.5)
HDLC SERPL-MCNC: 55 MG/DL (ref 40–75)
HDLC SERPL: 37.7 % (ref 20–50)
HGB BLD-MCNC: 13.7 G/DL (ref 12–16)
IMM GRANULOCYTES # BLD AUTO: 0.02 K/UL (ref 0–0.04)
IMM GRANULOCYTES NFR BLD AUTO: 0.2 % (ref 0–0.5)
LDLC SERPL CALC-MCNC: 68.4 MG/DL (ref 63–159)
LYMPHOCYTES # BLD AUTO: 1.5 K/UL (ref 1–4.8)
LYMPHOCYTES NFR BLD: 15.3 % (ref 18–48)
MCH RBC QN AUTO: 26.7 PG (ref 27–31)
MCHC RBC AUTO-ENTMCNC: 31 G/DL (ref 32–36)
MCV RBC AUTO: 86 FL (ref 82–98)
MONOCYTES # BLD AUTO: 0.6 K/UL (ref 0.3–1)
MONOCYTES NFR BLD: 6.6 % (ref 4–15)
NEUTROPHILS # BLD AUTO: 7.1 K/UL (ref 1.8–7.7)
NEUTROPHILS NFR BLD: 75.2 % (ref 38–73)
NONHDLC SERPL-MCNC: 91 MG/DL
NRBC BLD-RTO: 0 /100 WBC
PLATELET # BLD AUTO: 282 K/UL (ref 150–450)
PMV BLD AUTO: 8.1 FL (ref 9.2–12.9)
POTASSIUM SERPL-SCNC: 4.1 MMOL/L (ref 3.5–5.1)
PROT SERPL-MCNC: 7.4 G/DL (ref 6–8.4)
RBC # BLD AUTO: 5.13 M/UL (ref 4–5.4)
SODIUM SERPL-SCNC: 139 MMOL/L (ref 136–145)
TRIGL SERPL-MCNC: 113 MG/DL (ref 30–150)
TSH SERPL DL<=0.005 MIU/L-ACNC: 2.19 UIU/ML (ref 0.4–4)
WBC # BLD AUTO: 9.5 K/UL (ref 3.9–12.7)

## 2022-06-07 PROCEDURE — 83036 HEMOGLOBIN GLYCOSYLATED A1C: CPT | Performed by: INTERNAL MEDICINE

## 2022-06-07 PROCEDURE — 80053 COMPREHEN METABOLIC PANEL: CPT | Performed by: INTERNAL MEDICINE

## 2022-06-07 PROCEDURE — 36415 COLL VENOUS BLD VENIPUNCTURE: CPT | Performed by: INTERNAL MEDICINE

## 2022-06-07 PROCEDURE — 85025 COMPLETE CBC W/AUTO DIFF WBC: CPT | Performed by: INTERNAL MEDICINE

## 2022-06-07 PROCEDURE — 84443 ASSAY THYROID STIM HORMONE: CPT | Performed by: INTERNAL MEDICINE

## 2022-06-07 PROCEDURE — 80061 LIPID PANEL: CPT | Performed by: INTERNAL MEDICINE

## 2022-06-14 ENCOUNTER — TELEPHONE (OUTPATIENT)
Dept: INTERNAL MEDICINE | Facility: CLINIC | Age: 67
End: 2022-06-14

## 2022-06-14 ENCOUNTER — OFFICE VISIT (OUTPATIENT)
Dept: INTERNAL MEDICINE | Facility: CLINIC | Age: 67
End: 2022-06-14
Payer: MEDICARE

## 2022-06-14 VITALS
RESPIRATION RATE: 16 BRPM | SYSTOLIC BLOOD PRESSURE: 132 MMHG | WEIGHT: 261.94 LBS | DIASTOLIC BLOOD PRESSURE: 80 MMHG | BODY MASS INDEX: 54.98 KG/M2 | HEIGHT: 58 IN | HEART RATE: 66 BPM | OXYGEN SATURATION: 95 %

## 2022-06-14 DIAGNOSIS — E11.9 TYPE 2 DIABETES MELLITUS WITHOUT COMPLICATION, WITHOUT LONG-TERM CURRENT USE OF INSULIN: ICD-10-CM

## 2022-06-14 DIAGNOSIS — M81.0 OSTEOPOROSIS, UNSPECIFIED OSTEOPOROSIS TYPE, UNSPECIFIED PATHOLOGICAL FRACTURE PRESENCE: ICD-10-CM

## 2022-06-14 DIAGNOSIS — I10 ESSENTIAL HYPERTENSION: Primary | ICD-10-CM

## 2022-06-14 DIAGNOSIS — E78.00 HYPERCHOLESTEREMIA: ICD-10-CM

## 2022-06-14 DIAGNOSIS — D50.9 IRON DEFICIENCY ANEMIA, UNSPECIFIED IRON DEFICIENCY ANEMIA TYPE: ICD-10-CM

## 2022-06-14 DIAGNOSIS — G25.0 ESSENTIAL TREMOR: ICD-10-CM

## 2022-06-14 DIAGNOSIS — I87.2 VENOUS INSUFFICIENCY OF BOTH LOWER EXTREMITIES: ICD-10-CM

## 2022-06-14 DIAGNOSIS — E66.01 MORBID OBESITY WITH BMI OF 50.0-59.9, ADULT: ICD-10-CM

## 2022-06-14 DIAGNOSIS — G47.33 OSA (OBSTRUCTIVE SLEEP APNEA): ICD-10-CM

## 2022-06-14 DIAGNOSIS — G47.00 INSOMNIA, UNSPECIFIED TYPE: ICD-10-CM

## 2022-06-14 DIAGNOSIS — M51.36 DDD (DEGENERATIVE DISC DISEASE), LUMBAR: ICD-10-CM

## 2022-06-14 PROBLEM — J32.9 SINUSITIS: Status: RESOLVED | Noted: 2022-05-27 | Resolved: 2022-06-14

## 2022-06-14 PROCEDURE — 99215 OFFICE O/P EST HI 40 MIN: CPT | Mod: PBBFAC | Performed by: INTERNAL MEDICINE

## 2022-06-14 PROCEDURE — 99999 PR PBB SHADOW E&M-EST. PATIENT-LVL V: ICD-10-PCS | Mod: PBBFAC,,, | Performed by: INTERNAL MEDICINE

## 2022-06-14 PROCEDURE — 99999 PR PBB SHADOW E&M-EST. PATIENT-LVL V: CPT | Mod: PBBFAC,,, | Performed by: INTERNAL MEDICINE

## 2022-06-14 PROCEDURE — 99999 PR STA SHADOW: CPT | Mod: PBBFAC,,, | Performed by: INTERNAL MEDICINE

## 2022-06-14 PROCEDURE — 99215 OFFICE O/P EST HI 40 MIN: CPT | Mod: S$PBB | Performed by: INTERNAL MEDICINE

## 2022-06-14 RX ORDER — METFORMIN HYDROCHLORIDE 1000 MG/1
1000 TABLET ORAL 2 TIMES DAILY WITH MEALS
Qty: 180 TABLET | Refills: 3 | Status: SHIPPED | OUTPATIENT
Start: 2022-06-14 | End: 2023-04-24

## 2022-06-14 NOTE — PROGRESS NOTES
"Subjective:       Patient ID: Micaela Arvizu is a 66 y.o. female.    Chief Complaint: Follow-up      HPI:  Patient is known to me and presents for follow up HTN, HLD, osteoporosis, GERD and anxiety. Labs from 6/7/22 were personally reviewed and discussed with the patient today.     Chronic low back pain: Has had MRIs in the past. Has seen Dr. Daily for injections She uses tramadol for pain management for her arthrits which is working well. Supplementing with tylenol right now as trying not to use tramadol often.  Had stomach ulcer with NSAIDs.   Also taking gabapentin 100mg BID.     HTN: on lisinopril-HCTZ, lasix. Home BP < 140/90. Denies chest pains, SOB and LE edema.      HLD: on atorvastatin and fish oil. Denies medicatoin side effects. LDL at goal < 70.      DM: last A1C 7.4%, trending up. On metformin 500mg BID. she has really started focusing on her diet. Less carbs and smaller portions but no significant weight loss.   Denies numbness/tingling. Does not check blood sugars regularly.    Microalb: 5/2021  Foot exam: due next visit  Eye exam: done 11/2020     GERD: taking omeprazole daily. She has h/o of esophageal stricture. Had "stretching" with Dr. Bojorquez. Denies abd pain, n/v/d/c.      Anxiety/insomnia: on trazodone 50mg at night. Working well. was on  Klonopin PRN, no recent fills on this.       Osteoporosis: follows with GYN (lorena) for this. Had DEXA done 3/2018 with GYN which showed improvement. Doing Prolia with her but was too expensive so will discuss with her GYN--now on risedronate.      Tremor: dad has Parkinson's disease. Tells me she and her sisters all have this. Sees neurology and dx with essential tremor on propranolol; now off primidone.     Past Medical History:   Diagnosis Date    Arthritis     Asthma     GERD (gastroesophageal reflux disease)     Hiatal hernia     History of colonoscopy with polypectomy     History of esophagogastroduodenoscopy (EGD)     Hyperlipidemia     " Hypertension     Lumbar facet arthropathy     Type 2 diabetes mellitus without complication, without long-term current use of insulin        Family History   Problem Relation Age of Onset    Hypertension Mother     Hypertension Father     Parkinsonism Father        Social History     Socioeconomic History    Marital status:    Tobacco Use    Smoking status: Never Smoker    Smokeless tobacco: Never Used   Substance and Sexual Activity    Alcohol use: No    Drug use: No    Sexual activity: Not Currently     Comment:        Review of Systems   Constitutional: Negative for activity change, fatigue, fever and unexpected weight change.   HENT: Negative for congestion, ear pain, hearing loss, rhinorrhea, sore throat and tinnitus.    Eyes: Negative for pain, redness and visual disturbance.   Respiratory: Negative for cough, shortness of breath and wheezing.    Cardiovascular: Negative for chest pain, palpitations and leg swelling.   Gastrointestinal: Negative for abdominal pain, blood in stool, constipation, diarrhea, nausea and vomiting.   Genitourinary: Negative for dysuria, frequency, pelvic pain and urgency.   Musculoskeletal: Positive for back pain. Negative for joint swelling and neck pain.   Skin: Negative for color change, rash and wound.   Neurological: Positive for tremors. Negative for dizziness, weakness, light-headedness and headaches.         Objective:      Physical Exam  Vitals reviewed.   Constitutional:       General: She is not in acute distress.     Appearance: She is well-developed. She is obese.   HENT:      Head: Normocephalic and atraumatic.      Right Ear: External ear normal.      Left Ear: External ear normal.      Nose: Nose normal.   Eyes:      General:         Right eye: No discharge.         Left eye: No discharge.      Extraocular Movements: Extraocular movements intact.      Conjunctiva/sclera: Conjunctivae normal.      Pupils: Pupils are equal, round, and reactive  to light.   Neck:      Thyroid: No thyromegaly.   Cardiovascular:      Rate and Rhythm: Normal rate and regular rhythm.      Heart sounds: No murmur heard.  Pulmonary:      Effort: Pulmonary effort is normal. No respiratory distress.      Breath sounds: Normal breath sounds. No wheezing or rales.   Abdominal:      General: Bowel sounds are normal. There is no distension.      Palpations: Abdomen is soft.      Tenderness: There is no abdominal tenderness.   Skin:     General: Skin is warm and dry.   Neurological:      Mental Status: She is alert and oriented to person, place, and time.      Cranial Nerves: No cranial nerve deficit.   Psychiatric:         Behavior: Behavior normal.         Thought Content: Thought content normal.         Assessment:       1. Essential hypertension    2. Hypercholesteremia    3. Venous insufficiency of both lower extremities    4. Iron deficiency anemia, unspecified iron deficiency anemia type    5. Type 2 diabetes mellitus without complication, without long-term current use of insulin    6. Morbid obesity with BMI of 50.0-59.9, adult    7. ROBERTO (obstructive sleep apnea)    8. Osteoporosis, unspecified osteoporosis type, unspecified pathological fracture presence    9. Insomnia, unspecified type    10. Essential tremor    11. DDD (degenerative disc disease), lumbar        Plan:       Micaela was seen today for follow-up.    Diagnoses and all orders for this visit:    Essential hypertension  -     CBC Auto Differential; Future  -     Comprehensive Metabolic Panel; Future  -     Lipid Panel; Future  -     Microalbumin/Creatinine Ratio, Urine; Future  -     Hemoglobin A1C; Future  Chronic stable  Continue medications at same dose  Low Na diet  Exercise, weight loss  Check BP and keep log for next visit    Hypercholesteremia  -     CBC Auto Differential; Future  -     Comprehensive Metabolic Panel; Future  -     Lipid Panel; Future  -     Microalbumin/Creatinine Ratio, Urine; Future  -      Hemoglobin A1C; Future  Chronic stable  Cont statin same dose  Diet, exercise, weight loss    Venous insufficiency of both lower extremities  -     CBC Auto Differential; Future  -     Comprehensive Metabolic Panel; Future  -     Lipid Panel; Future  -     Microalbumin/Creatinine Ratio, Urine; Future  -     Hemoglobin A1C; Future  Chronic stable  No worsening sx  Leg elevation, compression, weight loss    Iron deficiency anemia, unspecified iron deficiency anemia type  -     CBC Auto Differential; Future  -     Comprehensive Metabolic Panel; Future  -     Lipid Panel; Future  -     Iron and TIBC; Future  -     Ferritin; Future  Chronic stable  H/H normal  Follow labs    Type 2 diabetes mellitus without complication, without long-term current use of insulin  -     CBC Auto Differential; Future  -     Comprehensive Metabolic Panel; Future  -     Lipid Panel; Future  -     Microalbumin/Creatinine Ratio, Urine; Future  -     Hemoglobin A1C; Future  -     semaglutide (OZEMPIC) 0.25 mg or 0.5 mg(2 mg/1.5 mL) pen injector; Inject 0.25 mg into the skin every 7 days.  Chronic, worsening  Cont metformin   Add ozempic for A1C control and weight loss effect  Check sugars and keep log    Morbid obesity with BMI of 50.0-59.9, adult  -     CBC Auto Differential; Future  -     Comprehensive Metabolic Panel; Future  -     Lipid Panel; Future  -     Microalbumin/Creatinine Ratio, Urine; Future  -     Hemoglobin A1C; Future  -     semaglutide (OZEMPIC) 0.25 mg or 0.5 mg(2 mg/1.5 mL) pen injector; Inject 0.25 mg into the skin every 7 days.  Spent > 10 mins on diet and exercise counseling  Add ozempic --side effects discussed    ROBERTO (obstructive sleep apnea)  Chronic  Cont CPAP    Osteoporosis, unspecified osteoporosis type, unspecified pathological fracture presence  -     Comprehensive Metabolic Panel; Future  -     DXA Bone Density Spine And Hip; Future  Chronic stable  Cont current treatment    Insomnia, unspecified type  Chronic  stable  Cont trazodone    Essential tremor  Chronic stable  Cont propranolol    DDD (degenerative disc disease), lumbar  Chronic stable  Cont current treatment    RTC 6 months with labs and PRN

## 2022-06-14 NOTE — LETTER
AUTHORIZATION FOR RELEASE OF   CONFIDENTIAL INFORMATION    Dear Dr. Renae,    We are seeing Micaela Arvizu, date of birth 1955, in the clinic at [facility]. Kinga Robles MD is the patient's PCP. Micaela Arvizu has an outstanding lab/procedure at the time we reviewed her chart. In order to help keep her health information updated, she has authorized us to request the following medical record(s):        ( X )  MAMMOGRAM                                      (  )  COLONOSCOPY      ( X )  PAP SMEAR                                          (  )  OUTSIDE LAB RESULTS     (X  )  DEXA SCAN                                          (  )  EYE EXAM            (  )  FOOT EXAM                                          (  )  ENTIRE RECORD     (  )  OUTSIDE IMMUNIZATIONS                 (  )  _______________         Please fax records to [facility], Sarah Hotard Knight, MD, [phone]     If you have any questions, please contact Emma Ruiz MA at [phone].           Patient Name: Micaela Arvizu  : 1955  Patient Phone #: [phone]

## 2022-06-14 NOTE — TELEPHONE ENCOUNTER
----- Message from Bethany Schultz sent at 2022  3:28 PM CDT -----  Regarding: Request to speak to a nurse  Contact: self  Micaela Arvizu  MRN: [id number]  : 1955  PCP: Kinga Robles  Home Phone      [phone]  Work Phone      Not on file.  Mobile          [phone]      MESSAGE:   Request to speak to a nurse regarding questions concerning medication prescribed during PCP appointment on 22  Rx - semaglutide (OZEMPIC) 0.25 mg or 0.5 mg(2 mg/1.5 mL) pen injector  Dr. Robles instructed patient to call the office if the medication cost was to expensive.   The cost of the medication is $330.00 with her insurance.   Ms Arvizu stated PCP said she may increase current medication because of the cost of the medication. Rx - metFORMIN (GLUCOPHAGE) 500 MG tablet    Phone #  [phone]    Pharmacy - [place] [id number]

## 2022-07-08 NOTE — PROGRESS NOTES
Pain Medicine     Chief Complaint:   Chief Complaint   Patient presents with    Back Pain     History of Present Illness: Micaela Arvizu is a 66 y.o. female with GERD, HTN, Anxiety, ROBERTO, insomnia, HTN, HLD, cervical dystonia w/ head tremor, osteoporosis, thoracic compression fx's, L2 & L4 compression fx's, referred by Adelina Saavedra NP for lumbar pain.  She previously saw Dr. Wolfe, last on 7/9/15, and was getting injections that provided benefit (B/L L3-5 MBB w/ steroid).     She states she was having very good relief of her back pain from the previous injection for about 4 years.  She had a right knee replacement with Dr. Vizcaino on 9/11/19 that helped her back pain. Planning on doing left knee eventually, likely next year.     Onset: off and on for years   Location: lower lumbar spine bilaterally and over SIJ  Radiation: down both legs to the feet when she stands.   Timing: intermittent  Quality: Aching and Deep  Exacerbating Factors: sitting, standing for more than 20 minutes and walking for more than 30 minutes  Alleviating Factors: laying down, medications and sitting  Associated Symptoms: denies night fever/night sweats, urinary incontinence, bowel incontinence, significant weight loss, significant motor weakness and loss of sensations     Severity: Currently: 4/10   Typical Range: 4-8/10     Exacerbation: 8/10    Interval History (07/11/2022):  Micaela Arvizu returns today for follow up.  At the last clinic visit, she was doing well after RFA.    Currently, the back pain is worse.  The pain is in the bilateral low back and radiates down both legs all the way to the feet.  Pain is worse with any type of activity.  She also localizes a primary aspect of the pain over the right lateral hip.  She does feel weaker in her legs, but denies any new numbness.  She denies any changes in bowel or bladder function.    Current Pain Scales:  Current: 7/10              Typical Range: 3-8/10                    Pain  Disability Index  Family/Home Responsibilities:: 4  Recreation:: 5  Social Activity:: 5  Occupation:: 7  Sexual Behavior:: 6  Self Care:: 1  Life-Support Activities:: 1  Pain Disability Index (PDI): 29      Previous Interventions:  - 10/23/20: Right L3-5 MB RFA w/ 95% relief of the right low back  - 9/11/20: Right L4 & L5 TF AUBREY w/ 100% relief.   - 12/20/19: B/L L4-5, L5-S1 MBB w/ 100% relief  - 6/19/15: B/L L3-5 MBBs w/ kenalog (Tolba) w/ noted 90% relief    Previous Therapies:  PT/OT: no  Surgery: no   Previous Medications:   - NSAIDS: NSAIDs caused stomach ulcer (per Dr. Robles's notes)   - Muscle Relaxants: Klonopin, Flexeril    - TCAs: None  - SNRIs: None  - Topicals: None  - Anticonvulsants: gabapentin  - Opioids: Tramadol, Oxycodone     Current Pain Medications:  1. Tylenol  2. Gabapentin 100 mg TID (only BID so far)    Blood Thinners: ASA 81 mg    Full Medication List:    Current Outpatient Medications:     aspirin 81 MG Chew, Take 81 mg by mouth once daily., Disp: , Rfl:     atorvastatin (LIPITOR) 40 MG tablet, Take 1 tablet (40 mg total) by mouth once daily., Disp: 90 tablet, Rfl: 3    blood sugar diagnostic Strp, Check blood sugar twice daily, Disp: 100 strip, Rfl: 1    blood-glucose meter (FREESTYLE FREEDOM) kit, Use as instructed, Disp: 1 each, Rfl: 0    cholecalciferol, vitamin D3, (VITAMIN D3) 50 mcg (2,000 unit) Cap, Take 2 capsules by mouth once daily. , Disp: , Rfl:     colesevelam (WELCHOL) 625 mg tablet, Take 1 tablet (625 mg total) by mouth 2 (two) times daily with meals., Disp: 180 tablet, Rfl: 3    furosemide (LASIX) 20 MG tablet, Take 2 tablets (40 mg total) by mouth once daily., Disp: 180 tablet, Rfl: 3    gabapentin (NEURONTIN) 100 MG capsule, Take 1 capsule (100 mg total) by mouth 3 (three) times daily., Disp: 90 capsule, Rfl: 5    GLUC BALL/CHONDRO BALL A/VIT C/MN (GLUCOSAMINE 1500 COMPLEX ORAL), Take 1 tablet by mouth 2 (two) times daily., Disp: , Rfl:     lancets Misc, Check blood  sugar twice a daily, Disp: 100 each, Rfl: 1    lisinopriL-hydrochlorothiazide (PRINZIDE,ZESTORETIC) 10-12.5 mg per tablet, Take 1 tablet by mouth once daily., Disp: 90 tablet, Rfl: 3    metFORMIN (GLUCOPHAGE) 1000 MG tablet, Take 1 tablet (1,000 mg total) by mouth 2 (two) times daily with meals., Disp: 180 tablet, Rfl: 3    multivitamin capsule, Take 1 capsule by mouth once daily., Disp: , Rfl:     omega-3 fatty acids-vitamin E 1,000 mg Cap, Take 2 tablets by mouth once daily., Disp: , Rfl:     pantoprazole (PROTONIX) 40 MG tablet, Take 1 tablet (40 mg total) by mouth once daily., Disp: 90 tablet, Rfl: 3    propranoloL (INDERAL) 40 MG tablet, Take 1 tablet (40 mg total) by mouth 2 (two) times a day., Disp: 60 tablet, Rfl: 5    risedronate 35 mg TbEC, Take 1 tablet by mouth. On Sunday, Disp: , Rfl:     sucralfate (CARAFATE) 1 gram tablet, Take 1 g by mouth as needed. , Disp: , Rfl: 1    traZODone (DESYREL) 50 MG tablet, Take 1 tablet (50 mg total) by mouth every evening., Disp: 30 tablet, Rfl: 5    albuterol-ipratropium (DUO-NEB) 2.5 mg-0.5 mg/3 mL nebulizer solution, Take 3 mLs by nebulization every 6 (six) hours as needed for Wheezing. Rescue (Patient not taking: Reported on 7/11/2022), Disp: 75 mL, Rfl: 2    baclofen (LIORESAL) 10 MG tablet, Take 1 tablet (10 mg total) by mouth 3 (three) times daily as needed (back pain)., Disp: 90 tablet, Rfl: 2     Review of Systems:  Review of Systems   Constitutional: Negative for fever and weight loss.   HENT: Negative for ear pain and tinnitus.    Eyes: Negative for pain and redness.   Respiratory: Negative for cough and shortness of breath.    Cardiovascular: Negative for chest pain and palpitations.   Gastrointestinal: Negative for constipation and heartburn.   Genitourinary: Negative.         Denies urinary incontinence. Denies urine retention.    Musculoskeletal: Positive for back pain. Negative for neck pain.   Skin: Negative for itching and rash.  "  Neurological: Negative for tingling, focal weakness and seizures.   Endo/Heme/Allergies: Negative for environmental allergies. Does not bruise/bleed easily.   Psychiatric/Behavioral: Negative for depression. The patient has insomnia. The patient is not nervous/anxious.        Allergies:  No known allergies     Medical History:   has a past medical history of Arthritis, Asthma, GERD (gastroesophageal reflux disease), Hiatal hernia, History of colonoscopy with polypectomy, History of esophagogastroduodenoscopy (EGD), Hyperlipidemia, Hypertension, Lumbar facet arthropathy, and Type 2 diabetes mellitus without complication, without long-term current use of insulin.    Surgical History:   has a past surgical history that includes Cholecystectomy (2012); Colonoscopy w/ biopsies and polypectomy (2000; 2002; 2007; 2/2012); Total knee arthroplasty (09/11/2019); Injection of anesthetic agent around medial branch nerves innervating lumbar facet joint (Bilateral, 12/20/2019); Total knee arthroplasty; Transforaminal epidural injection of steroid (Right, 9/11/2020); and Radiofrequency ablation of lumbar medial branch nerve at single level (Right, 10/23/2020).    Family History:  family history includes Hypertension in her father and mother; Parkinsonism in her father.    Social History:   reports that she has never smoked. She has never used smokeless tobacco. She reports that she does not drink alcohol and does not use drugs.    Physical Exam:  /64   Pulse 79   Resp 16   Ht 4' 10" (1.473 m)   Wt 119.9 kg (264 lb 5.3 oz)   SpO2 (!) 94%   BMI 55.25 kg/m²   GEN: No acute distress. Calm, comfortable  HENT: Normocephalic, atraumatic, moist mucous membranes  EYE: Anicteric sclera, non-injected.   CV: Non-diaphoretic. Regular Rate. Radial Pulses 2+.  RESP: Breathing comfortably. Chest expansion symmetric.  EXT: No clubbing, cyanosis.   SKIN: Warm, & dry to palpation. No visible rashes or lesions of exposed skin.   PSYCH: " Pleasant mood and appropriate affect. Recent and remote memory intact.   GAIT: Independent, normal ambulation  Lumbar Spine Exam:       Inspection: No erythema, bruising. No surgical incisions      Palpation: (+) TTP of lumbar paraspinals, SIJ and piriformis on the right      ROM:  Limited in flexion, extension, lateral bending.       (+) Facet loading b/l      (-) Straight Leg Raise bilaterally      (+) EDVIN on the right  Hip Exam:      Inspection: No gross deformity or apparent leg length discrepancy      Palpation: (+) TTP to right greater trochanteric bursa, piriformis.      ROM: Slight limitation in internal rotation, external rotation on the right  Neurologic Exam:     Alert. Speech is fluent and appropriate.     Strength: bilateral hip flexion/knee extension 4/5, otherwise 5/5 throughout bilateral lower extremities     Sensation: Grossly intact to light touch in bilateral lower extremities     Reflexes: 1+ in b/l patella, absent in b/l achilles     Tone: No abnormality appreciated in bilateral lower extremities     No Clonus     Downgoing toes on plantar stimulation    Imaging:  - MRI L-spine 9/4/20:  There are old compression fractures of T11 and T12.  Similar findings present on the previous study.  No evidence of an acute fracture.  No congenital spinal stenosis.  Visualized portion of the spinal cord appears normal and terminates at approximately the L1 level.  At the T12-L1 level there is a minimal right paracentral disc-osteophyte with mild effacement along the anterior thecal sac.  No significant spinal canal or foraminal encroachment.  At the L1-L2 level there is a broad-based and left paracentral disc protrusion with mild effacement along the anterior thecal sac.  Mild facet degenerative changes.  Mild spinal canal and foraminal encroachment.  At the L2-L3 and L3-L4 levels there are minimal disc bulges with effacement along the anterior margin of the thecal sac.  There are mild-to-moderate bilateral  facet degenerative changes without evidence of significant spinal canal or foraminal encroachment.  At the L4-L5 level there is questionable minimal anterolisthesis related to advanced bilateral facet degenerative changes and prominence of the ligamentum flavum.  There is a pseudo disc bulge.  There is mild bilateral foraminal encroachment.  No spinal canal encroachment.  At the L5-S1 level there is a minimal degenerative disc bulge with effacement along the anterior margin of the thecal sac.  There are moderate bilateral facet degenerative changes with prominence of the ligamentum flavum.  There is mild bilateral foraminal encroachment    Labs:  BMP  Lab Results   Component Value Date     06/07/2022    K 4.1 06/07/2022    CL 97 06/07/2022    CO2 28 06/07/2022    BUN 16 06/07/2022    CREATININE 0.7 06/07/2022    CALCIUM 9.6 06/07/2022    ANIONGAP 14 06/07/2022    ESTGFRAFRICA >60 06/07/2022    EGFRNONAA >60 06/07/2022     Lab Results   Component Value Date    ALT 28 06/07/2022    AST 23 06/07/2022    ALKPHOS 72 06/07/2022    BILITOT 0.5 06/07/2022     Lab Results   Component Value Date     06/07/2022       Assessment:  Micaela Arvizu is a 66 y.o. female with the following diagnoses based on history, exam, and imaging:    Problem List Items Addressed This Visit        Neuro    Lumbar spondylosis - Primary    Relevant Medications    baclofen (LIORESAL) 10 MG tablet    Lumbar radiculopathy    Relevant Medications    baclofen (LIORESAL) 10 MG tablet       Orthopedic    Lumbalgia    Relevant Medications    baclofen (LIORESAL) 10 MG tablet    Other Relevant Orders    Ambulatory referral/consult to Physical/Occupational Therapy      Other Visit Diagnoses     Greater trochanteric bursitis of right hip        Relevant Medications    baclofen (LIORESAL) 10 MG tablet    Other Relevant Orders    Ambulatory referral/consult to Physical/Occupational Therapy    Bilateral leg weakness        Relevant Orders     Ambulatory referral/consult to Physical/Occupational Therapy          This is a pleasant 66 y.o. lady with GERD, depression and anxiety, HTN, HLD, morbid obesity presenting with:    - Chronic low back pain that appears multifactorial in nature.  Back pain is multifactorial. She does have facetogenic aspects to her pain, but she also has features of lumbar spinal stenosis with neurogenic claudication, and myofascial features.   - Trochanteric bursitis on the right.   - Pain complicated by depression and anxiety     Treatment Plan: I discussed with the patient the following assessment and recommendations. The following is the plan the patient agreed upon:  - PT/OT/HEP: Referral to PT   - Procedures: Performed right GTB CSI today in clinic   - Consider caudal AUBREY if not tolerating PT or no improvement after PT.    - Can repeat right L3-5 MB RFA in the future of schedule left L4/5, L5/S1 MB RFA  - Medications: Trial baclofen 10 mg TID prn.   - Imaging: Reviewed.   - Labs: Reviewed.  Medications are appropriately dosed for current hepatorenal function.    Follow Up: RTC in 2-3 months or sooner as needed.     Deena Daily M.D.  Interventional Pain Medicine / Physical Medicine & Rehabilitation    Disclaimer: This note was partly generated using dictation software which may occasionally result in transcription errors.

## 2022-07-11 ENCOUNTER — OFFICE VISIT (OUTPATIENT)
Dept: PAIN MEDICINE | Facility: CLINIC | Age: 67
End: 2022-07-11
Payer: MEDICARE

## 2022-07-11 VITALS
WEIGHT: 264.31 LBS | BODY MASS INDEX: 55.48 KG/M2 | HEART RATE: 79 BPM | OXYGEN SATURATION: 94 % | SYSTOLIC BLOOD PRESSURE: 112 MMHG | HEIGHT: 58 IN | DIASTOLIC BLOOD PRESSURE: 64 MMHG | RESPIRATION RATE: 16 BRPM

## 2022-07-11 DIAGNOSIS — M54.16 LUMBAR RADICULOPATHY: ICD-10-CM

## 2022-07-11 DIAGNOSIS — G89.29 CHRONIC BILATERAL LOW BACK PAIN WITH BILATERAL SCIATICA: ICD-10-CM

## 2022-07-11 DIAGNOSIS — M47.816 LUMBAR SPONDYLOSIS: Primary | ICD-10-CM

## 2022-07-11 DIAGNOSIS — R29.898 BILATERAL LEG WEAKNESS: ICD-10-CM

## 2022-07-11 DIAGNOSIS — M54.42 CHRONIC BILATERAL LOW BACK PAIN WITH BILATERAL SCIATICA: ICD-10-CM

## 2022-07-11 DIAGNOSIS — M70.61 GREATER TROCHANTERIC BURSITIS OF RIGHT HIP: ICD-10-CM

## 2022-07-11 DIAGNOSIS — M54.41 CHRONIC BILATERAL LOW BACK PAIN WITH BILATERAL SCIATICA: ICD-10-CM

## 2022-07-11 PROCEDURE — 99999 LARGE JOINT ASPIRATION/INJECTION: R GREATER TROCHANTERIC BURSA: CPT | Mod: PBBFAC,,, | Performed by: PHYSICAL MEDICINE & REHABILITATION

## 2022-07-11 PROCEDURE — 99215 OFFICE O/P EST HI 40 MIN: CPT | Mod: PBBFAC,25 | Performed by: PHYSICAL MEDICINE & REHABILITATION

## 2022-07-11 PROCEDURE — 99999 PR STA SHADOW: ICD-10-PCS | Mod: PBBFAC,,, | Performed by: PHYSICAL MEDICINE & REHABILITATION

## 2022-07-11 PROCEDURE — 99999 PR STA SHADOW: CPT | Mod: PBBFAC,,, | Performed by: PHYSICAL MEDICINE & REHABILITATION

## 2022-07-11 PROCEDURE — 99999 PR PBB SHADOW E&M-EST. PATIENT-LVL V: CPT | Mod: PBBFAC,,, | Performed by: PHYSICAL MEDICINE & REHABILITATION

## 2022-07-11 PROCEDURE — 99214 OFFICE O/P EST MOD 30 MIN: CPT | Mod: S$PBB | Performed by: PHYSICAL MEDICINE & REHABILITATION

## 2022-07-11 PROCEDURE — 20610 DRAIN/INJ JOINT/BURSA W/O US: CPT | Mod: PBBFAC | Performed by: PHYSICAL MEDICINE & REHABILITATION

## 2022-07-11 RX ORDER — METHYLPREDNISOLONE ACETATE 40 MG/ML
40 INJECTION, SUSPENSION INTRA-ARTICULAR; INTRALESIONAL; INTRAMUSCULAR; SOFT TISSUE
Status: DISCONTINUED | OUTPATIENT
Start: 2022-07-11 | End: 2022-07-11 | Stop reason: HOSPADM

## 2022-07-11 RX ORDER — LIDOCAINE HYDROCHLORIDE 10 MG/ML
2 INJECTION, SOLUTION EPIDURAL; INFILTRATION; INTRACAUDAL; PERINEURAL
Status: DISCONTINUED | OUTPATIENT
Start: 2022-07-11 | End: 2022-07-11 | Stop reason: HOSPADM

## 2022-07-11 RX ORDER — BACLOFEN 10 MG/1
10 TABLET ORAL 3 TIMES DAILY PRN
Qty: 90 TABLET | Refills: 2 | Status: SHIPPED | OUTPATIENT
Start: 2022-07-11 | End: 2023-03-20

## 2022-07-11 RX ADMIN — METHYLPREDNISOLONE ACETATE 40 MG: 40 INJECTION, SUSPENSION INTRA-ARTICULAR; INTRALESIONAL; INTRAMUSCULAR; INTRASYNOVIAL; SOFT TISSUE at 02:07

## 2022-07-11 RX ADMIN — LIDOCAINE HYDROCHLORIDE 2 ML: 10 INJECTION, SOLUTION EPIDURAL; INFILTRATION; INTRACAUDAL; PERINEURAL at 02:07

## 2022-07-11 NOTE — PROCEDURES
Large Joint Aspiration/Injection: R greater trochanteric bursa    Date/Time: 7/11/2022 2:00 PM  Performed by: Deena Daily MD  Authorized by: Deena Daily MD     Consent Done?:  Yes (Written)  Indications:  Pain  Site marked: the procedure site was marked    Timeout: prior to procedure the correct patient, procedure, and site was verified    Prep: patient was prepped and draped in usual sterile fashion    Local anesthesia used?: No      Details:  Needle Size:  22 G  Ultrasonic Guidance for needle placement?: No    Approach:  Lateral  Location:  Hip  Site:  R greater trochanteric bursa  Medications:  40 mg methylPREDNISolone acetate 40 mg/mL; 2 mL LIDOcaine (PF) 10 mg/ml (1%) 10 mg/mL (1 %)  Patient tolerance:  Patient tolerated the procedure well with no immediate complications

## 2022-07-11 NOTE — PATIENT INSTRUCTIONS
We are starting baclofen 10 mg for pain. Start with 1 tablet at night for 1 week as needed. Then you may increase to 1 tablet in the day and 1 tablet at night as needed for 1 week. Then you may increase to 1 tablet up to 3 times per day. Do not stop suddenly, as it may cause withdrawal. Main side effect of this medication is sedation.

## 2022-08-02 ENCOUNTER — OFFICE VISIT (OUTPATIENT)
Dept: NEUROLOGY | Facility: CLINIC | Age: 67
End: 2022-08-02
Payer: MEDICARE

## 2022-08-02 VITALS
RESPIRATION RATE: 16 BRPM | BODY MASS INDEX: 55.07 KG/M2 | DIASTOLIC BLOOD PRESSURE: 80 MMHG | WEIGHT: 262.38 LBS | HEIGHT: 58 IN | HEART RATE: 66 BPM | SYSTOLIC BLOOD PRESSURE: 124 MMHG

## 2022-08-02 DIAGNOSIS — E11.9 TYPE 2 DIABETES MELLITUS WITHOUT COMPLICATION, WITHOUT LONG-TERM CURRENT USE OF INSULIN: ICD-10-CM

## 2022-08-02 DIAGNOSIS — G47.00 INSOMNIA, UNSPECIFIED TYPE: ICD-10-CM

## 2022-08-02 DIAGNOSIS — G25.0 ESSENTIAL TREMOR: ICD-10-CM

## 2022-08-02 DIAGNOSIS — G24.3 CERVICAL DYSTONIA: Primary | ICD-10-CM

## 2022-08-02 DIAGNOSIS — G47.33 OSA (OBSTRUCTIVE SLEEP APNEA): ICD-10-CM

## 2022-08-02 DIAGNOSIS — M51.36 DDD (DEGENERATIVE DISC DISEASE), LUMBAR: ICD-10-CM

## 2022-08-02 DIAGNOSIS — M54.2 NECK PAIN: ICD-10-CM

## 2022-08-02 DIAGNOSIS — M25.561 CHRONIC PAIN OF RIGHT KNEE: ICD-10-CM

## 2022-08-02 DIAGNOSIS — F32.A DEPRESSION, UNSPECIFIED DEPRESSION TYPE: ICD-10-CM

## 2022-08-02 DIAGNOSIS — M47.816 LUMBAR FACET ARTHROPATHY: ICD-10-CM

## 2022-08-02 DIAGNOSIS — M54.16 LUMBAR RADICULOPATHY: ICD-10-CM

## 2022-08-02 DIAGNOSIS — G89.29 CHRONIC PAIN OF RIGHT KNEE: ICD-10-CM

## 2022-08-02 PROCEDURE — 99999 PR STA SHADOW: CPT | Mod: PBBFAC,,, | Performed by: NURSE PRACTITIONER

## 2022-08-02 PROCEDURE — 99999 PR STA SHADOW: ICD-10-PCS | Mod: PBBFAC,,, | Performed by: NURSE PRACTITIONER

## 2022-08-02 PROCEDURE — 99215 OFFICE O/P EST HI 40 MIN: CPT | Mod: PBBFAC | Performed by: NURSE PRACTITIONER

## 2022-08-02 PROCEDURE — 99999 PR PBB SHADOW E&M-EST. PATIENT-LVL V: CPT | Mod: PBBFAC,,, | Performed by: NURSE PRACTITIONER

## 2022-08-02 PROCEDURE — 99214 OFFICE O/P EST MOD 30 MIN: CPT | Mod: S$PBB | Performed by: NURSE PRACTITIONER

## 2022-08-02 RX ORDER — RISEDRONATE SODIUM 35 MG/1
35 TABLET, FILM COATED ORAL
COMMUNITY
Start: 2022-06-15

## 2022-08-02 RX ORDER — GABAPENTIN 100 MG/1
100 CAPSULE ORAL 3 TIMES DAILY
Qty: 270 CAPSULE | Refills: 1 | Status: SHIPPED | OUTPATIENT
Start: 2022-08-02 | End: 2022-12-13 | Stop reason: SDUPTHER

## 2022-08-02 RX ORDER — TRAZODONE HYDROCHLORIDE 50 MG/1
50 TABLET ORAL NIGHTLY
Qty: 90 TABLET | Refills: 1 | Status: SHIPPED | OUTPATIENT
Start: 2022-08-02 | End: 2023-02-02 | Stop reason: SDUPTHER

## 2022-08-02 RX ORDER — PROPRANOLOL HYDROCHLORIDE 40 MG/1
40 TABLET ORAL 2 TIMES DAILY
Qty: 180 TABLET | Refills: 1 | Status: SHIPPED | OUTPATIENT
Start: 2022-08-02 | End: 2023-02-02 | Stop reason: SDUPTHER

## 2022-08-02 NOTE — PROGRESS NOTES
HPI: Micaela Arvizu is a 66 y.o. female with cervical dystonia with head tremor, multiple level degenerative changes of the L spine, prior lower T spine compression fractures and L2 and L4 compression deformities. She has bilateral  lumbar radicular pain. EMG/NCS of the legs normal 5/2015. She has GERD, HLD, HTN, JOHNNIE, insomnia, osteoporosis, and ROBERTO, as well as a history of asthmatic bronchitis. S/p right knee replacement in 201 with Dr. Vizcaino.      She presents today for a follow up visit. Her Gabapentin was increased at her last visit, given complaint of worsening head tremor. She was also placed on Baclofen for neck/lumbar complaints per pain management. Her head tremor has improved. Neck tension is greatly improved. She continues on Propranolol for her tremor as well.     She is going to PT for her lumbar and hip complaints. She saw Dr. Daily/pain management for her lumbar complaints, who suggested PT. She is also receiving PT for her neck tension, which is helpful.     She would like to add PT for reduced ROM to the right leg since her knee replacement. She also needs formal orders to continue PT for her neck tension.     Trazodone continues to be effective for her insomnia.     Anxiety improved since she no longer has friends living with her post [place]. No depression currently.     She is compliant with CPAP, but her machine was recalled, and she is waiting for a replacement.      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     Review of Systems   Constitutional: Negative for fever.   HENT: Negative for nosebleeds.    Eyes: Negative for double vision.   Respiratory: Negative for hemoptysis.    Cardiovascular: Negative for leg swelling.   Gastrointestinal: Negative for blood in stool.   Genitourinary: Negative for hematuria.   Musculoskeletal: Positive for back pain and neck pain.   Skin: Negative for rash.   Neurological: Positive for tremors.   Psychiatric/Behavioral: Negative for  depression. The patient is not nervous/anxious and does not have insomnia.      Exam:  Gen Appearance, well developed/nourished in no apparent distress  CV: 2+ distal pulses with no edema or swelling  Neuro:  MS: Awake, alert, oriented to place, person, time, situation. Sustains attention. Recent/remote memory intact, Language is full to spontaneous speech/comprehension. Fund of Knowledge is full  CN: Optic discs are flat with normal vasculature, PERRL, Extraoccular movements and visual fields are full. Normal facial sensation and strength, Tongue and Palate are midline and strong. Shoulder Shrug symmetric and strong.  Motor: Normal bulk, tone, no abnormal movements in the hands, no head tremor noted today. 5/5 strength bilateral upper/lower extremities with 1+ reflexes  Sensory: symmetric to temp, and vibration.  Romberg negative  Cerebellar: Finger-nose, Rapid alternating movements intact  Gait: Normal stance, no ataxia    Imagin2015 MRI L spine:   Interval resolution of the edema like signal involving the inferior end plate of L2 which has been replaced with fatty marrow.    Mild spondylosis of the lumbar spine without evidence for significant central canal stenosis or neural foraminal narrowing.    MRI C spine 2016: Multilevel cervical spondylosis with foraminal encroachment greatest from the C3-C5 levels as noted above.    Assessment/Plan: Micaela Arvizu is a 66 y.o. female with multiple level degenerative changes of the L spine, prior lower T spine compression fractures and L2  And L4 compression deformities. She has bilateral  lumbar radicular pain. EMG/NCS of the legs normal 2015. She has essential tremor of the head, which runs in her family. Exam shows essential tremor with cervical dystonia with left torticollis.    I recommend:   1. She is off of Lexapro with no worsening anxiety.      2. Continue Trazodone for insomnia.   -She failed Melatonin.   -No longer taking Klonopin.     3. S/p right knee  replacement surgery. Dietary changes were discussed. Bariatric surgery not covered by her insurance.     4. Continue Propranolol for essential tremor at 40 mg bid. Increase back to 80 mg bid was ineffective. Tremor reportedly worse on reduced dose from 80 mg to 40 mg prior.   -No worsening of tremor off of Primidone.  -Tremor worse with anxiety, but this is improved lately.    -denies history of asthma-clarified prior records with her. She had episodic bronchitis prior.   Continue Gabapentin from 100 mg bid to 100 mg tid. Increased frequency has helped her head tremor, in conjunction with reducing her neck tension with Baclofen and PT.     5. Botox was ineffective for cervical dystonia prior.    -add order for PT for neck tension and right knee reduced ROM/pain post knee replacement to her PT for lumbar pain.     6. L-spine complaints improved with right TKA, but are ongoing. She is seeing pain management. She failed her last injections. Advised her to follow up with pain management to discuss ongoing complaints. PT was suggested by Dr. Daily, which she has started.     7. She is still on Gabapentin for L-spine pain.     8. The patient is also being treated for osteoporosis.     9. Weight loss is difficult at this time, due to MSK complaints.    10. She is pending bilateral cataract surgery.     FU 6 months

## 2022-08-15 ENCOUNTER — HOSPITAL ENCOUNTER (OUTPATIENT)
Dept: RADIOLOGY | Facility: HOSPITAL | Age: 67
Discharge: HOME | End: 2022-08-15
Attending: INTERNAL MEDICINE
Payer: MEDICARE

## 2022-08-15 ENCOUNTER — TELEPHONE (OUTPATIENT)
Dept: PAIN MEDICINE | Facility: CLINIC | Age: 67
End: 2022-08-15
Payer: MEDICARE

## 2022-08-15 DIAGNOSIS — M81.0 OSTEOPOROSIS, UNSPECIFIED OSTEOPOROSIS TYPE, UNSPECIFIED PATHOLOGICAL FRACTURE PRESENCE: ICD-10-CM

## 2022-08-15 PROCEDURE — 77080 DXA BONE DENSITY AXIAL: CPT | Mod: TC

## 2022-08-15 NOTE — TELEPHONE ENCOUNTER
Per Dr. Daily:  She can take tizanidine and baclofen, but they can both make each other more sedating and she may get more side effects potentially.

## 2022-08-15 NOTE — TELEPHONE ENCOUNTER
Spoke with patient. States she had burning in her throat after taking Baclofen. Advised per Dr. Daily to drink Baclofen with plenty of water and to take by itself, voiced understanding.     States she was also prescribed Tizanidine from another provider and is requesting to know if can be taken together.      Patient also request Hip xray to be ordered due to PT advised at last visit. Advised that she would need an appointment to discuss. Scheduled. Voiced understanding.     Please advise.

## 2022-08-15 NOTE — TELEPHONE ENCOUNTER
----- Message from Paloma Morrison sent at 8/15/2022  2:16 PM CDT -----  Contact: self  Micaela Arvizu  MRN: [id number]  : 1955  PCP: Kinga Robles  Home Phone      [phone]  Work Phone  [phone]     Mobile               MESSAGE: Patient is requesting an xray of  right hip and the medication LIORESAL is making her throat burn        Phone [phone]          Yes

## 2022-09-06 ENCOUNTER — OFFICE VISIT (OUTPATIENT)
Dept: PAIN MEDICINE | Facility: CLINIC | Age: 67
End: 2022-09-06
Payer: MEDICARE

## 2022-09-06 ENCOUNTER — TELEPHONE (OUTPATIENT)
Dept: PAIN MEDICINE | Facility: CLINIC | Age: 67
End: 2022-09-06

## 2022-09-06 VITALS
WEIGHT: 265.75 LBS | BODY MASS INDEX: 55.78 KG/M2 | HEIGHT: 58 IN | SYSTOLIC BLOOD PRESSURE: 126 MMHG | DIASTOLIC BLOOD PRESSURE: 78 MMHG | HEART RATE: 68 BPM

## 2022-09-06 DIAGNOSIS — M54.50 CHRONIC BILATERAL LOW BACK PAIN WITHOUT SCIATICA: ICD-10-CM

## 2022-09-06 DIAGNOSIS — G89.29 CHRONIC BILATERAL LOW BACK PAIN WITHOUT SCIATICA: ICD-10-CM

## 2022-09-06 DIAGNOSIS — M47.816 LUMBAR SPONDYLOSIS: Primary | ICD-10-CM

## 2022-09-06 DIAGNOSIS — M70.61 GREATER TROCHANTERIC BURSITIS OF RIGHT HIP: ICD-10-CM

## 2022-09-06 PROCEDURE — 99999 PR PBB SHADOW E&M-EST. PATIENT-LVL IV: CPT | Mod: PBBFAC,,, | Performed by: PHYSICAL MEDICINE & REHABILITATION

## 2022-09-06 PROCEDURE — 99999 PR STA SHADOW: CPT | Mod: PBBFAC,,, | Performed by: PHYSICAL MEDICINE & REHABILITATION

## 2022-09-06 PROCEDURE — 99214 OFFICE O/P EST MOD 30 MIN: CPT | Mod: S$PBB | Performed by: PHYSICAL MEDICINE & REHABILITATION

## 2022-09-06 PROCEDURE — 99214 OFFICE O/P EST MOD 30 MIN: CPT | Mod: PBBFAC | Performed by: PHYSICAL MEDICINE & REHABILITATION

## 2022-09-06 PROCEDURE — 99999 PR PBB SHADOW E&M-EST. PATIENT-LVL IV: ICD-10-PCS | Mod: PBBFAC,,, | Performed by: PHYSICAL MEDICINE & REHABILITATION

## 2022-09-06 NOTE — TELEPHONE ENCOUNTER
Right L4-5, L5-S1 MB RFA no steroid scheduled 09/23/2022. Patient has had Covid vaccinations. Advised that pre admit would contact patient with time of procedure. Advised patient to contact office if she starts any type of antibiotics or new blood thinners. Voiced understanding.       Left L4-5, L5-S1 MB RFA no steroid scheduled 10/07/2022. Patient has had Covid vaccinations. Advised that pre admit would contact patient with time of procedure. Advised patient to contact office if she starts any type of antibiotics or new blood thinners. Voiced understanding.

## 2022-09-06 NOTE — H&P (VIEW-ONLY)
Pain Medicine     Chief Complaint:   Chief Complaint   Patient presents with    Hip Pain    Leg Pain     Pt reports throat burning with new medication        History of Present Illness: Micaela Arvizu is a 67 y.o. female with GERD, HTN, Anxiety, ROBERTO, insomnia, HTN, HLD, cervical dystonia w/ head tremor, osteoporosis, thoracic compression fx's, L2 & L4 compression fx's, referred by Adelina Saavedra NP for lumbar pain.  She previously saw Dr. Wolfe, last on 7/9/15, and was getting injections that provided benefit (B/L L3-5 MBB w/ steroid).     She states she was having very good relief of her back pain from the previous injection for about 4 years.  She had a right knee replacement with Dr. Vizcaino on 9/11/19 that helped her back pain. Planning on doing left knee eventually, likely next year.     Onset: off and on for years   Location: lower lumbar spine bilaterally and over SIJ  Radiation: down both legs to the feet when she stands.   Timing: intermittent  Quality: Aching and Deep  Exacerbating Factors: sitting, standing for more than 20 minutes and walking for more than 30 minutes  Alleviating Factors: laying down, medications and sitting  Associated Symptoms: denies night fever/night sweats, urinary incontinence, bowel incontinence, significant weight loss, significant motor weakness and loss of sensations     Severity: Currently: 4/10   Typical Range: 4-8/10     Exacerbation: 8/10    Interval History (07/11/2022):  Micaela Arvizu returns today for follow up.  At the last clinic visit, she was doing well after RFA.    Currently, the back pain is worse.  The pain is in the bilateral low back and radiates down both legs all the way to the feet.  Pain is worse with any type of activity.  She also localizes a primary aspect of the pain over the right lateral hip.  She does feel weaker in her legs, but denies any new numbness.  She denies any changes in bowel or bladder function.    Current Pain Scales:  Current:  7/10              Typical Range: 3-8/10    Interval History (09/06/2022):  Micaela Arvizu returns today for follow up.  At the last clinic visit, referred to PT, started baclofen 10 mg TID prn, and performed right GTB.     Right trochanteric bursa corticosteroid injection provided relief. Baclofen had initially caused throat burning, but this subsided and the medication seems to be helping and has also helped her tremor. PT helped and she is doing her HEP.     Currently, the hip and back pain is stable.  She feels pain in the bilateral low back with radiation to the right lateral hip mostly, but nothing further the leg.  She denies any new weakness or numbness. She Denies bowel or bladder changes.    Current Pain Scales:  Current: 2/10              Typical Range: 2-7/10      Pain Disability Index  Family/Home Responsibilities:: 3  Recreation:: 5  Social Activity:: 5  Occupation:: 4  Sexual Behavior:: 6  Self Care:: 3  Life-Support Activities:: 2  Pain Disability Index (PDI): 28      Previous Interventions:  - 10/23/20: Right L3-5 MB RFA w/ 95% relief of the right low back  - 9/11/20: Right L4 & L5 TF AUBREY w/ 100% relief.   - 12/20/19: B/L L4-5, L5-S1 MBB w/ 100% relief  - 6/19/15: B/L L3-5 MBBs w/ kenalog (Tolba) w/ noted 90% relief    Previous Therapies:  PT/OT: yes  Surgery: no   Previous Medications:   - NSAIDS: NSAIDs caused stomach ulcer (per Dr. Robles's notes)   - Muscle Relaxants: Klonopin, Flexeril    - TCAs: None  - SNRIs: None  - Topicals: None  - Anticonvulsants: gabapentin  - Opioids: Tramadol, Oxycodone     Current Pain Medications:  Tylenol  Gabapentin 100 mg TID (only BID so far)  Baclofen 10 mg TID PRN (mostly taking at night because makes her tired)    Blood Thinners: ASA 81 mg    Full Medication List:    Current Outpatient Medications:     aspirin 81 MG Chew, Take 81 mg by mouth once daily., Disp: , Rfl:     atorvastatin (LIPITOR) 40 MG tablet, Take 1 tablet (40 mg total) by mouth once daily., Disp:  90 tablet, Rfl: 3    baclofen (LIORESAL) 10 MG tablet, Take 1 tablet (10 mg total) by mouth 3 (three) times daily as needed (back pain)., Disp: 90 tablet, Rfl: 2    blood sugar diagnostic Strp, Check blood sugar twice daily, Disp: 100 strip, Rfl: 1    blood-glucose meter (FREESTYLE FREEDOM) kit, Use as instructed, Disp: 1 each, Rfl: 0    cholecalciferol, vitamin D3, (VITAMIN D3) 50 mcg (2,000 unit) Cap, Take 2 capsules by mouth once daily. , Disp: , Rfl:     colesevelam (WELCHOL) 625 mg tablet, Take 1 tablet (625 mg total) by mouth 2 (two) times daily with meals., Disp: 180 tablet, Rfl: 3    furosemide (LASIX) 20 MG tablet, Take 2 tablets (40 mg total) by mouth once daily., Disp: 180 tablet, Rfl: 3    gabapentin (NEURONTIN) 100 MG capsule, Take 1 capsule (100 mg total) by mouth 3 (three) times daily., Disp: 270 capsule, Rfl: 1    GLUC BALL/CHONDRO BALL A/VIT C/MN (GLUCOSAMINE 1500 COMPLEX ORAL), Take 1 tablet by mouth 2 (two) times daily., Disp: , Rfl:     lancets Misc, Check blood sugar twice a daily, Disp: 100 each, Rfl: 1    lisinopriL-hydrochlorothiazide (PRINZIDE,ZESTORETIC) 10-12.5 mg per tablet, Take 1 tablet by mouth once daily., Disp: 90 tablet, Rfl: 3    metFORMIN (GLUCOPHAGE) 1000 MG tablet, Take 1 tablet (1,000 mg total) by mouth 2 (two) times daily with meals., Disp: 180 tablet, Rfl: 3    multivitamin capsule, Take 1 capsule by mouth once daily., Disp: , Rfl:     omega-3 fatty acids-vitamin E 1,000 mg Cap, Take 2 tablets by mouth once daily., Disp: , Rfl:     pantoprazole (PROTONIX) 40 MG tablet, Take 1 tablet (40 mg total) by mouth once daily., Disp: 90 tablet, Rfl: 3    propranoloL (INDERAL) 40 MG tablet, Take 1 tablet (40 mg total) by mouth 2 (two) times a day., Disp: 180 tablet, Rfl: 1    risedronate (ACTONEL) 35 MG tablet, Take 35 mg by mouth every 7 days., Disp: , Rfl:     sucralfate (CARAFATE) 1 gram tablet, Take 1 g by mouth as needed. , Disp: , Rfl: 1    traZODone (DESYREL) 50 MG tablet, Take 1  tablet (50 mg total) by mouth every evening., Disp: 90 tablet, Rfl: 1     Review of Systems:  Review of Systems   Constitutional:  Negative for fever and weight loss.   HENT:  Negative for ear pain and tinnitus.    Eyes:  Negative for pain and redness.   Respiratory:  Negative for cough and shortness of breath.    Cardiovascular:  Negative for chest pain and palpitations.   Gastrointestinal:  Negative for constipation and heartburn.   Genitourinary: Negative.         Denies urinary incontinence. Denies urine retention.    Musculoskeletal:  Positive for back pain. Negative for neck pain.   Skin:  Negative for itching and rash.   Neurological:  Negative for tingling, focal weakness and seizures.   Endo/Heme/Allergies:  Negative for environmental allergies. Does not bruise/bleed easily.   Psychiatric/Behavioral:  Negative for depression. The patient has insomnia. The patient is not nervous/anxious.      Allergies:  No known allergies     Medical History:   has a past medical history of Arthritis, Asthma, GERD (gastroesophageal reflux disease), Hiatal hernia, History of colonoscopy with polypectomy, History of esophagogastroduodenoscopy (EGD), Hyperlipidemia, Hypertension, Lumbar facet arthropathy, and Type 2 diabetes mellitus without complication, without long-term current use of insulin.    Surgical History:   has a past surgical history that includes Cholecystectomy (2012); Colonoscopy w/ biopsies and polypectomy (2000; 2002; 2007; 2/2012); Total knee arthroplasty (09/11/2019); Injection of anesthetic agent around medial branch nerves innervating lumbar facet joint (Bilateral, 12/20/2019); Total knee arthroplasty; Transforaminal epidural injection of steroid (Right, 9/11/2020); and Radiofrequency ablation of lumbar medial branch nerve at single level (Right, 10/23/2020).    Family History:  family history includes Hypertension in her father and mother; Parkinsonism in her father.    Social History:   reports that she  "has never smoked. She has never used smokeless tobacco. She reports that she does not drink alcohol and does not use drugs.    Physical Exam:  /78   Pulse 68   Ht 4' 10" (1.473 m)   Wt 120.5 kg (265 lb 12.2 oz)   BMI 55.54 kg/m²   GEN: No acute distress. Calm, comfortable  HENT: Normocephalic, atraumatic, moist mucous membranes  EYE: Anicteric sclera, non-injected.   CV: Non-diaphoretic. Regular Rate. Radial Pulses 2+.  RESP: Breathing comfortably. Chest expansion symmetric.  EXT: No clubbing, cyanosis.   SKIN: Warm, & dry to palpation. No visible rashes or lesions of exposed skin.   PSYCH: Pleasant mood and appropriate affect. Recent and remote memory intact.   GAIT: Independent, normal ambulation  Lumbar Spine Exam:       Inspection: No erythema, bruising. No surgical incisions      Palpation: (+) TTP of lumbar paraspinals, SIJ and piriformis on the right      ROM:  Limited in flexion, extension, lateral bending.       (+) Facet loading b/l      (-) Straight Leg Raise bilaterally      (+) EDVIN on the right  Hip Exam:      Inspection: No gross deformity or apparent leg length discrepancy      Palpation: (+) TTP to right greater trochanteric bursa, piriformis.      ROM: Slight limitation in internal rotation, external rotation on the right  Neurologic Exam:     Alert. Speech is fluent and appropriate.     Strength: bilateral hip flexion/knee extension 4/5, otherwise 5/5 throughout bilateral lower extremities     Sensation: Grossly intact to light touch in bilateral lower extremities     Reflexes: 1+ in b/l patella, absent in b/l achilles     Tone: No abnormality appreciated in bilateral lower extremities     No Clonus     Downgoing toes on plantar stimulation    Imaging:  - MRI L-spine 9/4/20:  There are old compression fractures of T11 and T12.  Similar findings present on the previous study.  No evidence of an acute fracture.  No congenital spinal stenosis.  Visualized portion of the spinal cord appears " normal and terminates at approximately the L1 level.  At the T12-L1 level there is a minimal right paracentral disc-osteophyte with mild effacement along the anterior thecal sac.  No significant spinal canal or foraminal encroachment.  At the L1-L2 level there is a broad-based and left paracentral disc protrusion with mild effacement along the anterior thecal sac.  Mild facet degenerative changes.  Mild spinal canal and foraminal encroachment.  At the L2-L3 and L3-L4 levels there are minimal disc bulges with effacement along the anterior margin of the thecal sac.  There are mild-to-moderate bilateral facet degenerative changes without evidence of significant spinal canal or foraminal encroachment.  At the L4-L5 level there is questionable minimal anterolisthesis related to advanced bilateral facet degenerative changes and prominence of the ligamentum flavum.  There is a pseudo disc bulge.  There is mild bilateral foraminal encroachment.  No spinal canal encroachment.  At the L5-S1 level there is a minimal degenerative disc bulge with effacement along the anterior margin of the thecal sac.  There are moderate bilateral facet degenerative changes with prominence of the ligamentum flavum.  There is mild bilateral foraminal encroachment    Labs:  BMP  Lab Results   Component Value Date     06/07/2022    K 4.1 06/07/2022    CL 97 06/07/2022    CO2 28 06/07/2022    BUN 16 06/07/2022    CREATININE 0.7 06/07/2022    CALCIUM 9.6 06/07/2022    ANIONGAP 14 06/07/2022    ESTGFRAFRICA >60 06/07/2022    EGFRNONAA >60 06/07/2022     Lab Results   Component Value Date    ALT 28 06/07/2022    AST 23 06/07/2022    ALKPHOS 72 06/07/2022    BILITOT 0.5 06/07/2022     Lab Results   Component Value Date     06/07/2022       Assessment:  Micaela Arvizu is a 67 y.o. female with the following diagnoses based on history, exam, and imaging:    Problem List Items Addressed This Visit          Neuro    Lumbar spondylosis - Primary        Orthopedic    Lumbalgia     Other Visit Diagnoses       Greater trochanteric bursitis of right hip                This is a pleasant 67 y.o. lady with GERD, depression and anxiety, HTN, HLD, morbid obesity presenting with:    - Chronic low back pain that appears multifactorial in nature.She does have facetogenic aspects to her pain, but she also has features of lumbar spinal stenosis with neurogenic claudication, and myofascial features.   - Trochanteric bursitis on the right.   - Pain complicated by depression and anxiety     Treatment Plan: I discussed with the patient the following assessment and recommendations. The following is the plan the patient agreed upon:  - PT/OT/HEP: Cont HEP  - Procedures: Schedule right L3-5 MB RFA and left L4/5, L5/S1 MB RFA. The procedure risks, benefits, and possible complications were discussed with the patient including nerve damage, infection, bleeding, and paresis.   - Medications: Cont baclofen 10 mg TID prn.   - Imaging: Reviewed.   - Labs: Reviewed.  Medications are appropriately dosed for current hepatorenal function.    Follow Up: RTC in 3-4 weeks after RFA or sooner as needed.     Deena Daily M.D.  Interventional Pain Medicine / Physical Medicine & Rehabilitation    Disclaimer: This note was partly generated using dictation software which may occasionally result in transcription errors.

## 2022-09-16 ENCOUNTER — TELEPHONE (OUTPATIENT)
Dept: INTERNAL MEDICINE | Facility: CLINIC | Age: 67
End: 2022-09-16
Payer: MEDICARE

## 2022-09-16 NOTE — TELEPHONE ENCOUNTER
----- Message from Moraima Brady sent at 2022  3:02 PM CDT -----  Contact: Pt  Micaela Arvizu  MRN: [id number]  : 1955  PCP: Kinga Robles  Home Phone      [phone]  Work Phone      Not on file.  Mobile          [phone]      MESSAGE: pt states she is having cataract surgery Oct 5th and needs clearance from her PCP      [phone]

## 2022-09-20 ENCOUNTER — TELEPHONE (OUTPATIENT)
Dept: PAIN MEDICINE | Facility: CLINIC | Age: 67
End: 2022-09-20
Payer: MEDICARE

## 2022-09-20 NOTE — TELEPHONE ENCOUNTER
----- Message from Paloma Morrison sent at 2022  9:12 AM CDT -----  Contact: self  Micaela Arvizu  MRN: [id number]  : 1955  PCP: Kinga Robles  Home Phone      [phone]  Work Phone      Not on file.  Mobile          [phone]      MESSAGE: Having question and concern about her Oct.  appt she is having eye surgery and will have to re/s    Phone [phone]

## 2022-09-22 LAB
LEFT EYE DM RETINOPATHY: POSITIVE
RIGHT EYE DM RETINOPATHY: POSITIVE

## 2022-09-23 ENCOUNTER — HOSPITAL ENCOUNTER (OUTPATIENT)
Facility: HOSPITAL | Age: 67
Discharge: HOME | End: 2022-09-23
Attending: PHYSICAL MEDICINE & REHABILITATION | Admitting: PHYSICAL MEDICINE & REHABILITATION
Payer: MEDICARE

## 2022-09-23 ENCOUNTER — HOSPITAL ENCOUNTER (OUTPATIENT)
Dept: RADIOLOGY | Facility: HOSPITAL | Age: 67
Discharge: HOME | End: 2022-09-23
Attending: PHYSICAL MEDICINE & REHABILITATION
Payer: MEDICARE

## 2022-09-23 VITALS
HEART RATE: 77 BPM | OXYGEN SATURATION: 98 % | SYSTOLIC BLOOD PRESSURE: 121 MMHG | DIASTOLIC BLOOD PRESSURE: 61 MMHG | TEMPERATURE: 97 F | RESPIRATION RATE: 16 BRPM

## 2022-09-23 DIAGNOSIS — M47.816 LUMBAR SPONDYLOSIS: Primary | ICD-10-CM

## 2022-09-23 DIAGNOSIS — M47.816 LUMBAR SPONDYLOSIS: ICD-10-CM

## 2022-09-23 DIAGNOSIS — R52 PAIN: ICD-10-CM

## 2022-09-23 LAB — GLUCOSE SERPL-MCNC: 191 MG/DL (ref 70–110)

## 2022-09-23 PROCEDURE — 64636 DESTROY L/S FACET JNT ADDL: CPT | Mod: RT | Performed by: PHYSICAL MEDICINE & REHABILITATION

## 2022-09-23 PROCEDURE — 64635 DESTROY LUMB/SAC FACET JNT: CPT | Mod: RT | Performed by: PHYSICAL MEDICINE & REHABILITATION

## 2022-09-23 PROCEDURE — 64635 PR DESTROY LUMB/SAC FACET JNT: ICD-10-PCS | Mod: RT,,, | Performed by: PHYSICAL MEDICINE & REHABILITATION

## 2022-09-23 PROCEDURE — 25000003 PHARM REV CODE 250: Performed by: PHYSICAL MEDICINE & REHABILITATION

## 2022-09-23 PROCEDURE — 64636 PR DESTROY L/S FACET JNT ADDL: ICD-10-PCS | Mod: RT,,, | Performed by: PHYSICAL MEDICINE & REHABILITATION

## 2022-09-23 PROCEDURE — 27201423 OPTIME MED/SURG SUP & DEVICES STERILE SUPPLY: Performed by: PHYSICAL MEDICINE & REHABILITATION

## 2022-09-23 PROCEDURE — 63600175 PHARM REV CODE 636 W HCPCS: Performed by: PHYSICAL MEDICINE & REHABILITATION

## 2022-09-23 PROCEDURE — 64635 DESTROY LUMB/SAC FACET JNT: CPT | Mod: RT,,, | Performed by: PHYSICAL MEDICINE & REHABILITATION

## 2022-09-23 PROCEDURE — 64636 DESTROY L/S FACET JNT ADDL: CPT | Mod: RT,,, | Performed by: PHYSICAL MEDICINE & REHABILITATION

## 2022-09-23 PROCEDURE — 82962 GLUCOSE BLOOD TEST: CPT | Performed by: PHYSICAL MEDICINE & REHABILITATION

## 2022-09-23 RX ORDER — FENTANYL CITRATE 50 UG/ML
INJECTION, SOLUTION INTRAMUSCULAR; INTRAVENOUS
Status: DISCONTINUED | OUTPATIENT
Start: 2022-09-23 | End: 2022-09-23 | Stop reason: HOSPADM

## 2022-09-23 RX ORDER — LIDOCAINE HYDROCHLORIDE 20 MG/ML
INJECTION, SOLUTION EPIDURAL; INFILTRATION; INTRACAUDAL; PERINEURAL
Status: DISCONTINUED
Start: 2022-09-23 | End: 2022-09-23 | Stop reason: HOSPADM

## 2022-09-23 RX ORDER — BUPIVACAINE HYDROCHLORIDE 2.5 MG/ML
INJECTION, SOLUTION EPIDURAL; INFILTRATION; INTRACAUDAL
Status: DISCONTINUED | OUTPATIENT
Start: 2022-09-23 | End: 2022-09-23 | Stop reason: HOSPADM

## 2022-09-23 RX ORDER — MIDAZOLAM HYDROCHLORIDE 1 MG/ML
INJECTION, SOLUTION INTRAMUSCULAR; INTRAVENOUS
Status: DISCONTINUED | OUTPATIENT
Start: 2022-09-23 | End: 2022-09-23 | Stop reason: HOSPADM

## 2022-09-23 RX ORDER — METHYLPREDNISOLONE ACETATE 40 MG/ML
INJECTION, SUSPENSION INTRA-ARTICULAR; INTRALESIONAL; INTRAMUSCULAR; SOFT TISSUE
Status: DISCONTINUED
Start: 2022-09-23 | End: 2022-09-23 | Stop reason: WASHOUT

## 2022-09-23 RX ORDER — LIDOCAINE HYDROCHLORIDE 10 MG/ML
INJECTION INFILTRATION; PERINEURAL
Status: DISCONTINUED
Start: 2022-09-23 | End: 2022-09-23 | Stop reason: HOSPADM

## 2022-09-23 RX ORDER — BUPIVACAINE HYDROCHLORIDE 2.5 MG/ML
INJECTION, SOLUTION EPIDURAL; INFILTRATION; INTRACAUDAL
Status: DISCONTINUED
Start: 2022-09-23 | End: 2022-09-23 | Stop reason: HOSPADM

## 2022-09-23 RX ORDER — LIDOCAINE HYDROCHLORIDE 10 MG/ML
INJECTION INFILTRATION; PERINEURAL
Status: DISCONTINUED | OUTPATIENT
Start: 2022-09-23 | End: 2022-09-23 | Stop reason: HOSPADM

## 2022-09-23 RX ORDER — LIDOCAINE HYDROCHLORIDE 20 MG/ML
INJECTION, SOLUTION EPIDURAL; INFILTRATION; INTRACAUDAL; PERINEURAL
Status: DISCONTINUED | OUTPATIENT
Start: 2022-09-23 | End: 2022-09-23 | Stop reason: HOSPADM

## 2022-09-23 RX ORDER — FENTANYL CITRATE 50 UG/ML
INJECTION, SOLUTION INTRAMUSCULAR; INTRAVENOUS
Status: DISCONTINUED
Start: 2022-09-23 | End: 2022-09-23 | Stop reason: HOSPADM

## 2022-09-23 RX ORDER — SODIUM CHLORIDE 9 MG/ML
500 INJECTION, SOLUTION INTRAVENOUS CONTINUOUS
Status: DISCONTINUED | OUTPATIENT
Start: 2022-09-23 | End: 2022-09-23 | Stop reason: HOSPADM

## 2022-09-23 RX ORDER — MIDAZOLAM HYDROCHLORIDE 1 MG/ML
INJECTION INTRAMUSCULAR; INTRAVENOUS
Status: DISCONTINUED
Start: 2022-09-23 | End: 2022-09-23 | Stop reason: HOSPADM

## 2022-09-23 NOTE — DISCHARGE SUMMARY
[facility]  Discharge Note  Short Stay     Admit Date: 9/23/2022    Discharge Date: 9/23/2022     Attending Physician: Deena Daily M.D.    Diagnoses:  Active Hospital Problems    Diagnosis  POA    *Lumbar spondylosis [M47.816]  Yes      Resolved Hospital Problems   No resolved problems to display.     Discharged Condition: Good     Hospital Course: Patient was admitted for an outpatient interventional pain management procedure and tolerated the procedure well with no complications.     Final Diagnoses: Same as principal problem.     Disposition: Home or Self Care     Follow up/Patient Instructions:   Follow-up in 1-2 weeks unless otherwise instructed. May return sooner as needed.       Reconciled Medications:     Medication List        CONTINUE taking these medications      aspirin 81 MG Chew  Take 81 mg by mouth once daily.     atorvastatin 40 MG tablet  Commonly known as: LIPITOR  Take 1 tablet (40 mg total) by mouth once daily.     baclofen 10 MG tablet  Commonly known as: LIORESAL  Take 1 tablet (10 mg total) by mouth 3 (three) times daily as needed (back pain).     blood sugar diagnostic Strp  Check blood sugar twice daily     blood-glucose meter kit  Commonly known as: FREESTYLE FREEDOM  Use as instructed     cholecalciferol (vitamin D3) 50 mcg (2,000 unit) Cap capsule  Commonly known as: VITAMIN D3  Take 2 capsules by mouth once daily.     colesevelam 625 mg tablet  Commonly known as: WELCHOL  Take 1 tablet (625 mg total) by mouth 2 (two) times daily with meals.     furosemide 20 MG tablet  Commonly known as: LASIX  Take 2 tablets (40 mg total) by mouth once daily.     gabapentin 100 MG capsule  Commonly known as: NEURONTIN  Take 1 capsule (100 mg total) by mouth 3 (three) times daily.     GLUCOSAMINE 1500 COMPLEX ORAL  Take 1 tablet by mouth 2 (two) times daily.     lancets Misc  Check blood sugar twice a daily     lisinopriL-hydrochlorothiazide 10-12.5 mg per tablet  Commonly known as:  PRINZIDE,ZESTORETIC  Take 1 tablet by mouth once daily.     metFORMIN 1000 MG tablet  Commonly known as: GLUCOPHAGE  Take 1 tablet (1,000 mg total) by mouth 2 (two) times daily with meals.     multivitamin capsule  Take 1 capsule by mouth once daily.     omega-3 fatty acids-vitamin E 1,000 mg Cap  Take 2 tablets by mouth once daily.     pantoprazole 40 MG tablet  Commonly known as: PROTONIX  Take 1 tablet (40 mg total) by mouth once daily.     propranoloL 40 MG tablet  Commonly known as: INDERAL  Take 1 tablet (40 mg total) by mouth 2 (two) times a day.     risedronate 35 MG tablet  Commonly known as: ACTONEL  Take 35 mg by mouth every 7 days.     sucralfate 1 gram tablet  Commonly known as: CARAFATE  Take 1 g by mouth as needed.     traZODone 50 MG tablet  Commonly known as: DESYREL  Take 1 tablet (50 mg total) by mouth every evening.             Discharge Procedure Orders (must include Diet, Follow-up, Activity)   Ice to affected area   Order Comments: 20 minutes of ice or until area numb to the touch if area is sore 2-3 times per day as needed     No driving until:   Order Comments: Until following day     No dressing needed     Notify your health care provider if you experience any of the following:  temperature >100.4     Notify your health care provider if you experience any of the following:  persistent nausea and vomiting or diarrhea     Notify your health care provider if you experience any of the following:  severe uncontrolled pain     Notify your health care provider if you experience any of the following:  redness, tenderness, or signs of infection (pain, swelling, redness, odor or green/yellow discharge around incision site)     Notify your health care provider if you experience any of the following:  difficulty breathing or increased cough     Notify your health care provider if you experience any of the following:  severe persistent headache     Notify your health care provider if you experience any  of the following:  worsening rash     Notify your health care provider if you experience any of the following:  persistent dizziness, light-headedness, or visual disturbances     Notify your health care provider if you experience any of the following:  increased confusion or weakness     Shower on day dressing removed (No bath)       Deena Daily M.D.  Interventional Pain Medicine / Physical Medicine & Rehabilitation

## 2022-09-23 NOTE — DISCHARGE INSTRUCTIONS
DIET: You may resume your normal diet today.    BATHING: You may resume your normal bathing.          You may shower, no hot water directly on site for 24 hours.    DRESSING: You may remove your bandage today.    ACTIVITY LEVEL: You may resume your normal activities 24 hours after your  procedure.    If you have received sedation or an anesthetic, you may feel sleepy for several hours. Rest until you are more awake. Gradually resume your normal activities tomorrow.    If you have received sedation or an anesthetic, do not drive or operate heavy machinery for at least 24 hours.    MEDICATION: You may resume your normal medications today.    You will receive instructions for any pain prescriptions. Pain medications should be taken only as directed.    SPECIAL INSTRUCTIONS: No heat to the injection site for 24 hours including: bath or shower, heating pad, moist heat, hot tubs.    Use ice pack to injection site for any pain or discomfort. Apply ice pack to 20 minutes then remove for 20 minutes before re-applying to site.    WHEN TO CALL DOCTOR: Redness or swelling around injection site    Fever of 101F    Drainage (pus) from the injection site    For any continuous bleeding (some dried blood over the incision is normal).    FOLLOW UP: Follow up phone call will be made by office.    FOR EMERGENCIES: If any unusual problems or difficulties occur during clinic hours, call [phone] or [phone].

## 2022-09-23 NOTE — OP NOTE
Lumbar Medial Branch Radiofrequency Ablation Under Fluoroscopic Guidance:  I have reviewed the patient's medications, allergies and relevant histories prior to the procedure and no contraindications have been identified. The risks, benefits and alternatives to the procedure were discussed with the patient, and all questions regarding the procedure were answered to the patient's satisfaction. I personally obtained Micaela's consent prior to the start of the procedure and the signed consent can be found in the patient's chart.                                                         Time-out was taken to identify patient, procedure, laterality, and allergies prior to starting the procedure.       Date of Service: 09/23/2022  Procedure: Right L4-5, L5-S1 medial branch radiofrequency ablation  Pre-Operative Diagnosis: Lumbar Spondylosis  Post-Operative Diagnosis: Lumbar Spondylosis    Physician: Deena Daily M.D.  Assistants: None    Medications Injected:  Lidocaine 2% MPF. Of that, 1.5mL injected per level prior to ablation. Bupivacaine 0.25%. Of that, 1 mL injected per level after the ablation.  Local Anesthetic: Xylocaine 1% 10 mL    Sedation Medications: Versed 2 mg, Fentanyl 75 mcg. Under my direct observation, intravenous moderate sedation was administered during the course of this procedure, with continuous hemodynamic monitoring including blood pressure, EKG, and pulse oximetry. Please see RN documentation of total intraservice time for moderate sedation.    Procedural Technique:   Laying in a prone position, the patient was prepped and draped in the usual sterile fashion using ChloraPrep and fenestrated drape.  The level was determined under fluoroscopic guidance.  Local anesthetic was given by going down to the hub of the 25-gauge 1.5 in needle and raising a wheel.  The 20-gauge needle was introduced to the anatomic local of the medial branch at the junction of the superior articular process & transverse  process utilizing live fluoroscopy. Motor stimulation performed to confirm no motor nerve ablation takes place up to 2 Volt 2Hz.  Lidocaine 2% MPF was then injected slowly to anesthetize the area.  Ablation then done per level utilizing Piqqual radiofrequency generator setting of 80°C for 90 seconds.  Following the ablation, 1 mL of the Depo-Medrol and Bupivacaine per site. The needle was removed and bandage applied to the area.    Estimated Blood Loss:  None.  Complications:  None.     Disposition: The patient tolerated the procedure well, and there were no apparent complications. Vital signs remained stable throughout the procedure. The patient was taken to the recovery area and monitored. The patient was supplied with written discharge instructions for the procedure. If helpful, we can repeat as needed. The patient was discharged in a stable condition.    Follow-Up: We will see the patient back in 1-2 weeks or the patient may call to inform of status.

## 2022-09-25 ENCOUNTER — PATIENT OUTREACH (OUTPATIENT)
Dept: ADMINISTRATIVE | Facility: HOSPITAL | Age: 67
End: 2022-09-25
Payer: MEDICARE

## 2022-09-26 ENCOUNTER — OFFICE VISIT (OUTPATIENT)
Dept: INTERNAL MEDICINE | Facility: CLINIC | Age: 67
End: 2022-09-26
Payer: MEDICARE

## 2022-09-26 VITALS
DIASTOLIC BLOOD PRESSURE: 80 MMHG | HEIGHT: 58 IN | RESPIRATION RATE: 18 BRPM | HEART RATE: 78 BPM | BODY MASS INDEX: 55.81 KG/M2 | SYSTOLIC BLOOD PRESSURE: 130 MMHG | OXYGEN SATURATION: 94 % | WEIGHT: 265.88 LBS

## 2022-09-26 DIAGNOSIS — Z01.818 PRE-OP EXAM: Primary | ICD-10-CM

## 2022-09-26 DIAGNOSIS — Z23 NEED FOR VACCINATION: ICD-10-CM

## 2022-09-26 PROCEDURE — 99214 OFFICE O/P EST MOD 30 MIN: CPT | Mod: PBBFAC,PN,25 | Performed by: NURSE PRACTITIONER

## 2022-09-26 PROCEDURE — G0008 ADMIN INFLUENZA VIRUS VAC: HCPCS | Mod: PBBFAC,PN

## 2022-09-26 PROCEDURE — 99999 PR PBB SHADOW E&M-EST. PATIENT-LVL IV: ICD-10-PCS | Mod: PBBFAC,,, | Performed by: NURSE PRACTITIONER

## 2022-09-26 PROCEDURE — 99999 PR PBB SHADOW E&M-EST. PATIENT-LVL IV: CPT | Mod: PBBFAC,,, | Performed by: NURSE PRACTITIONER

## 2022-09-26 PROCEDURE — 99214 OFFICE O/P EST MOD 30 MIN: CPT | Mod: S$PBB,,, | Performed by: NURSE PRACTITIONER

## 2022-09-26 PROCEDURE — 99214 PR OFFICE/OUTPT VISIT, EST, LEVL IV, 30-39 MIN: ICD-10-PCS | Mod: S$PBB,,, | Performed by: NURSE PRACTITIONER

## 2022-09-26 NOTE — PROGRESS NOTES
Subjective:       Patient ID: Micaela Arvizu is a 67 y.o. female.    Chief Complaint: surgery clearance    Patient is known, to me and presents with   Chief Complaint   Patient presents with    surgery clearance   .  Denies chest pain and shortness of breath.  Patient presents with surgery clearance for left cataract. Needs flu shot  HPI  Review of Systems   Constitutional:  Negative for activity change, appetite change, fatigue, fever and unexpected weight change.   HENT:  Negative for congestion, ear discharge, ear pain, hearing loss, postnasal drip and tinnitus.    Eyes:  Positive for visual disturbance. Negative for photophobia and pain.   Respiratory:  Negative for cough, shortness of breath, wheezing and stridor.    Cardiovascular:  Negative for chest pain, palpitations and leg swelling.   Gastrointestinal:  Negative for abdominal distention.   Genitourinary:  Negative for difficulty urinating, dysuria, frequency, hematuria and urgency.   Musculoskeletal:  Negative for arthralgias, back pain, gait problem, joint swelling and neck pain.   Skin: Negative.    Neurological:  Negative for dizziness, seizures, syncope, weakness, light-headedness, numbness and headaches.   Hematological:  Negative for adenopathy. Does not bruise/bleed easily.   Psychiatric/Behavioral:  Negative for behavioral problems, confusion, dysphoric mood, hallucinations, sleep disturbance and suicidal ideas. The patient is not nervous/anxious.      Objective:      Physical Exam  Constitutional:       General: She is not in acute distress.     Appearance: She is well-developed.   HENT:      Head: Normocephalic and atraumatic.      Right Ear: Tympanic membrane and external ear normal.      Left Ear: Tympanic membrane and external ear normal.      Nose: Nose normal.      Mouth/Throat:      Mouth: Mucous membranes are moist.      Pharynx: No oropharyngeal exudate.   Eyes:      General: No scleral icterus.        Right eye: No discharge.          Left eye: No discharge.      Conjunctiva/sclera: Conjunctivae normal.      Pupils: Pupils are equal, round, and reactive to light.   Neck:      Thyroid: No thyromegaly.      Vascular: No JVD.   Cardiovascular:      Rate and Rhythm: Normal rate and regular rhythm.      Heart sounds: Normal heart sounds. No murmur heard.    No friction rub. No gallop.   Pulmonary:      Effort: Pulmonary effort is normal. No respiratory distress.      Breath sounds: Normal breath sounds. No stridor. No wheezing or rales.   Chest:      Chest wall: No tenderness.   Abdominal:      General: Bowel sounds are normal. There is no distension.      Palpations: Abdomen is soft. There is no mass.      Tenderness: There is no abdominal tenderness. There is no right CVA tenderness, left CVA tenderness, guarding or rebound.      Hernia: No hernia is present.   Musculoskeletal:         General: No swelling, tenderness, deformity or signs of injury. Normal range of motion.      Cervical back: Normal range of motion and neck supple.      Right lower leg: No edema.      Left lower leg: No edema.   Lymphadenopathy:      Cervical: No cervical adenopathy.   Skin:     General: Skin is warm and dry.      Capillary Refill: Capillary refill takes less than 2 seconds.      Coloration: Skin is not jaundiced or pale.      Findings: No bruising, erythema, lesion or rash.   Neurological:      General: No focal deficit present.      Mental Status: She is alert and oriented to person, place, and time.      Cranial Nerves: No cranial nerve deficit.      Sensory: No sensory deficit.      Motor: No weakness or abnormal muscle tone.      Coordination: Coordination normal.      Gait: Gait normal.      Deep Tendon Reflexes: Reflexes are normal and symmetric. Reflexes normal.   Psychiatric:         Mood and Affect: Mood normal.         Behavior: Behavior normal.         Thought Content: Thought content normal.         Judgment: Judgment normal.       Assessment:       1.  "Pre-op exam    2. Need for vaccination          Plan:   Micaela was seen today for surgery clearance.    Diagnoses and all orders for this visit:    Pre-op exam    Need for vaccination  -     Influenza (FLUAD) - Quadrivalent (Adjuvanted) *Preferred* (65+) (PF)    "This note will not be shared with the patient."  Clear for sx  Rt c as scheduled   "

## 2022-09-27 ENCOUNTER — PATIENT MESSAGE (OUTPATIENT)
Dept: HEPATOLOGY | Facility: HOSPITAL | Age: 67
End: 2022-09-27
Payer: MEDICARE

## 2022-09-27 PROBLEM — E11.319 TYPE 2 DIABETES MELLITUS WITH RETINOPATHY OF BOTH EYES, WITHOUT LONG-TERM CURRENT USE OF INSULIN: Status: ACTIVE | Noted: 2017-09-22

## 2022-09-30 ENCOUNTER — HOSPITAL ENCOUNTER (OUTPATIENT)
Dept: RADIOLOGY | Facility: HOSPITAL | Age: 67
Discharge: HOME | End: 2022-09-30
Attending: PHYSICAL MEDICINE & REHABILITATION
Payer: MEDICARE

## 2022-09-30 ENCOUNTER — HOSPITAL ENCOUNTER (OUTPATIENT)
Facility: HOSPITAL | Age: 67
Discharge: HOME | End: 2022-09-30
Attending: PHYSICAL MEDICINE & REHABILITATION | Admitting: PHYSICAL MEDICINE & REHABILITATION
Payer: MEDICARE

## 2022-09-30 VITALS
SYSTOLIC BLOOD PRESSURE: 118 MMHG | DIASTOLIC BLOOD PRESSURE: 58 MMHG | OXYGEN SATURATION: 95 % | HEART RATE: 64 BPM | RESPIRATION RATE: 18 BRPM

## 2022-09-30 DIAGNOSIS — M47.816 LUMBAR SPONDYLOSIS: ICD-10-CM

## 2022-09-30 DIAGNOSIS — R52 PAIN: ICD-10-CM

## 2022-09-30 DIAGNOSIS — M47.816 LUMBAR SPONDYLOSIS: Primary | ICD-10-CM

## 2022-09-30 LAB
GLUCOSE SERPL-MCNC: 162 MG/DL (ref 70–110)
POCT GLUCOSE: 162 MG/DL (ref 70–110)

## 2022-09-30 PROCEDURE — 64636 PR DESTROY L/S FACET JNT ADDL: ICD-10-PCS | Mod: LT,,, | Performed by: PHYSICAL MEDICINE & REHABILITATION

## 2022-09-30 PROCEDURE — 64635 DESTROY LUMB/SAC FACET JNT: CPT | Mod: LT | Performed by: PHYSICAL MEDICINE & REHABILITATION

## 2022-09-30 PROCEDURE — 64636 DESTROY L/S FACET JNT ADDL: CPT | Mod: LT,,, | Performed by: PHYSICAL MEDICINE & REHABILITATION

## 2022-09-30 PROCEDURE — 25000003 PHARM REV CODE 250: Performed by: PHYSICAL MEDICINE & REHABILITATION

## 2022-09-30 PROCEDURE — 82962 GLUCOSE BLOOD TEST: CPT | Performed by: PHYSICAL MEDICINE & REHABILITATION

## 2022-09-30 PROCEDURE — 64636 DESTROY L/S FACET JNT ADDL: CPT | Mod: LT | Performed by: PHYSICAL MEDICINE & REHABILITATION

## 2022-09-30 PROCEDURE — 64635 PR DESTROY LUMB/SAC FACET JNT: ICD-10-PCS | Mod: LT,,, | Performed by: PHYSICAL MEDICINE & REHABILITATION

## 2022-09-30 PROCEDURE — 64635 DESTROY LUMB/SAC FACET JNT: CPT | Mod: LT,,, | Performed by: PHYSICAL MEDICINE & REHABILITATION

## 2022-09-30 PROCEDURE — 63600175 PHARM REV CODE 636 W HCPCS: Performed by: PHYSICAL MEDICINE & REHABILITATION

## 2022-09-30 RX ORDER — BUPIVACAINE HYDROCHLORIDE 2.5 MG/ML
INJECTION, SOLUTION EPIDURAL; INFILTRATION; INTRACAUDAL
Status: DISCONTINUED | OUTPATIENT
Start: 2022-09-30 | End: 2022-09-30 | Stop reason: HOSPADM

## 2022-09-30 RX ORDER — MIDAZOLAM HYDROCHLORIDE 1 MG/ML
INJECTION INTRAMUSCULAR; INTRAVENOUS
Status: DISCONTINUED
Start: 2022-09-30 | End: 2022-09-30 | Stop reason: HOSPADM

## 2022-09-30 RX ORDER — FENTANYL CITRATE 50 UG/ML
INJECTION, SOLUTION INTRAMUSCULAR; INTRAVENOUS
Status: DISCONTINUED | OUTPATIENT
Start: 2022-09-30 | End: 2022-09-30 | Stop reason: HOSPADM

## 2022-09-30 RX ORDER — LIDOCAINE HYDROCHLORIDE 10 MG/ML
INJECTION INFILTRATION; PERINEURAL
Status: DISCONTINUED
Start: 2022-09-30 | End: 2022-09-30 | Stop reason: HOSPADM

## 2022-09-30 RX ORDER — BUPIVACAINE HYDROCHLORIDE 2.5 MG/ML
INJECTION, SOLUTION EPIDURAL; INFILTRATION; INTRACAUDAL
Status: DISCONTINUED
Start: 2022-09-30 | End: 2022-09-30 | Stop reason: HOSPADM

## 2022-09-30 RX ORDER — FENTANYL CITRATE 50 UG/ML
INJECTION, SOLUTION INTRAMUSCULAR; INTRAVENOUS
Status: DISCONTINUED
Start: 2022-09-30 | End: 2022-09-30 | Stop reason: HOSPADM

## 2022-09-30 RX ORDER — MIDAZOLAM HYDROCHLORIDE 1 MG/ML
INJECTION, SOLUTION INTRAMUSCULAR; INTRAVENOUS
Status: DISCONTINUED | OUTPATIENT
Start: 2022-09-30 | End: 2022-09-30 | Stop reason: HOSPADM

## 2022-09-30 RX ORDER — LIDOCAINE HYDROCHLORIDE 20 MG/ML
INJECTION, SOLUTION EPIDURAL; INFILTRATION; INTRACAUDAL; PERINEURAL
Status: DISCONTINUED
Start: 2022-09-30 | End: 2022-09-30 | Stop reason: HOSPADM

## 2022-09-30 RX ORDER — METHYLPREDNISOLONE ACETATE 40 MG/ML
INJECTION, SUSPENSION INTRA-ARTICULAR; INTRALESIONAL; INTRAMUSCULAR; SOFT TISSUE
Status: DISCONTINUED
Start: 2022-09-30 | End: 2022-09-30 | Stop reason: WASHOUT

## 2022-09-30 RX ORDER — LIDOCAINE HYDROCHLORIDE 10 MG/ML
INJECTION INFILTRATION; PERINEURAL
Status: DISCONTINUED | OUTPATIENT
Start: 2022-09-30 | End: 2022-09-30 | Stop reason: HOSPADM

## 2022-09-30 RX ORDER — LIDOCAINE HYDROCHLORIDE 20 MG/ML
INJECTION, SOLUTION EPIDURAL; INFILTRATION; INTRACAUDAL; PERINEURAL
Status: DISCONTINUED | OUTPATIENT
Start: 2022-09-30 | End: 2022-09-30 | Stop reason: HOSPADM

## 2022-09-30 RX ORDER — SODIUM CHLORIDE 9 MG/ML
500 INJECTION, SOLUTION INTRAVENOUS CONTINUOUS
Status: DISCONTINUED | OUTPATIENT
Start: 2022-09-30 | End: 2022-09-30 | Stop reason: HOSPADM

## 2022-09-30 NOTE — DISCHARGE INSTRUCTIONS
DIET: You may resume your normal diet today.    BATHING: You may resume your normal bathing.          You may shower, no hot water directly on site for 24 hours.    DRESSING: You may remove your bandage today.    ACTIVITY LEVEL: You may resume your normal activities 24 hours after your  procedure.    If you have received sedation or an anesthetic, you may feel sleepy for several hours. Rest until you are more awake. Gradually resume your normal activities tomorrow.    If you have received sedation or an anesthetic, do not drive or operate heavy machinery for at least 24 hours.    MEDICATION: You may resume your normal medications today.    You will receive instructions for any pain prescriptions. Pain medications should be taken only as directed.    SPECIAL INSTRUCTIONS: No heat to the injection site for 24 hours including: bath or shower, heating pad, moist heat, hot tubs.    Use ice pack to injection site for any pain or discomfort. Apply ice pack to 20 minutes then remove for 20 minutes before re-applying to site.    WHEN TO CALL DOCTOR: Redness or swelling around injection site    Fever of 101F    Drainage (pus) from the injection site    For any continuous bleeding (some dried blood over the incision is normal).    FOLLOW UP: Follow up phone call will be made by office.    FOR EMERGENCIES: If any unusual problems or difficulties occur during clinic hours, call [phone] or [phone].

## 2022-09-30 NOTE — OP NOTE
Lumbar Medial Branch Radiofrequency Ablation Under Fluoroscopic Guidance:  I have reviewed the patient's medications, allergies and relevant histories prior to the procedure and no contraindications have been identified. The risks, benefits and alternatives to the procedure were discussed with the patient, and all questions regarding the procedure were answered to the patient's satisfaction. I personally obtained Micaela's consent prior to the start of the procedure and the signed consent can be found in the patient's chart.                                                         Time-out was taken to identify patient, procedure, laterality, and allergies prior to starting the procedure.       Date of Service: 09/30/2022  Procedure: Left L4-5, L5-S1 medial branch radiofrequency ablation  Pre-Operative Diagnosis: Lumbar Spondylosis  Post-Operative Diagnosis: Lumbar Spondylosis    Physician: Deena Daily M.D.  Assistants: None    Medications Injected:  Lidocaine 2% MPF. Of that, 1.5mL injected per level prior to ablation. Depo-Medrol 40 mg/mL and 2 mL Bupivacaine 0.25%. Of that, 1 mL injected per level after the ablation.  Local Anesthetic: Xylocaine 1% 10 mL    Sedation Medications: Versed 2 mg, Fentanyl 100 mcg. Under my direct observation, intravenous moderate sedation was administered during the course of this procedure, with continuous hemodynamic monitoring including blood pressure, EKG, and pulse oximetry. Please see RN documentation of total intraservice time for moderate sedation.    Procedural Technique:   Laying in a prone position, the patient was prepped and draped in the usual sterile fashion using ChloraPrep and fenestrated drape.  The level was determined under fluoroscopic guidance.  Local anesthetic was given by going down to the hub of the 25-gauge 1.5 in needle and raising a wheel.  The 20-gauge needle was introduced to the anatomic local of the medial branch at the junction of the superior  articular process & transverse process utilizing live fluoroscopy. Motor stimulation performed to confirm no motor nerve ablation takes place up to 2 Volt 2Hz.  Lidocaine 2% MPF was then injected slowly to anesthetize the area.  Ablation then done per level utilizing Recorrido radiofrequency generator setting of 80°C for 90 seconds.  Following the ablation, 1 mL of the Depo-Medrol and Bupivacaine per site. The needle was removed and bandage applied to the area.    Estimated Blood Loss:  None.  Complications:  None.     Disposition: The patient tolerated the procedure well, and there were no apparent complications. Vital signs remained stable throughout the procedure. The patient was taken to the recovery area and monitored. The patient was supplied with written discharge instructions for the procedure. If helpful, we can repeat as needed. The patient was discharged in a stable condition.    Follow-Up: We will see the patient back in 1-2 weeks or the patient may call to inform of status.

## 2022-09-30 NOTE — DISCHARGE SUMMARY
[facility]  Discharge Note  Short Stay     Admit Date: 9/30/2022    Discharge Date: 9/30/2022     Attending Physician: Deena Daily M.D.    Diagnoses:  Active Hospital Problems    Diagnosis  POA    *Lumbar spondylosis [M47.816]  Yes      Resolved Hospital Problems   No resolved problems to display.     Discharged Condition: Good     Hospital Course: Patient was admitted for an outpatient interventional pain management procedure and tolerated the procedure well with no complications.     Final Diagnoses: Same as principal problem.     Disposition: Home or Self Care     Follow up/Patient Instructions:   Follow-up in 1-2 weeks unless otherwise instructed. May return sooner as needed.       Reconciled Medications:     Medication List        CONTINUE taking these medications      aspirin 81 MG Chew  Take 81 mg by mouth once daily.     atorvastatin 40 MG tablet  Commonly known as: LIPITOR  Take 1 tablet (40 mg total) by mouth once daily.     baclofen 10 MG tablet  Commonly known as: LIORESAL  Take 1 tablet (10 mg total) by mouth 3 (three) times daily as needed (back pain).     blood sugar diagnostic Strp  Check blood sugar twice daily     blood-glucose meter kit  Commonly known as: FREESTYLE FREEDOM  Use as instructed     cholecalciferol (vitamin D3) 50 mcg (2,000 unit) Cap capsule  Commonly known as: VITAMIN D3  Take 2 capsules by mouth once daily.     colesevelam 625 mg tablet  Commonly known as: WELCHOL  Take 1 tablet (625 mg total) by mouth 2 (two) times daily with meals.     furosemide 20 MG tablet  Commonly known as: LASIX  Take 2 tablets (40 mg total) by mouth once daily.     gabapentin 100 MG capsule  Commonly known as: NEURONTIN  Take 1 capsule (100 mg total) by mouth 3 (three) times daily.     GLUCOSAMINE 1500 COMPLEX ORAL  Take 1 tablet by mouth 2 (two) times daily.     lancets Misc  Check blood sugar twice a daily     lisinopriL-hydrochlorothiazide 10-12.5 mg per tablet  Commonly known as:  PRINZIDE,ZESTORETIC  Take 1 tablet by mouth once daily.     metFORMIN 1000 MG tablet  Commonly known as: GLUCOPHAGE  Take 1 tablet (1,000 mg total) by mouth 2 (two) times daily with meals.     multivitamin capsule  Take 1 capsule by mouth once daily.     omega-3 fatty acids-vitamin E 1,000 mg Cap  Take 2 tablets by mouth once daily.     pantoprazole 40 MG tablet  Commonly known as: PROTONIX  Take 1 tablet (40 mg total) by mouth once daily.     propranoloL 40 MG tablet  Commonly known as: INDERAL  Take 1 tablet (40 mg total) by mouth 2 (two) times a day.     risedronate 35 MG tablet  Commonly known as: ACTONEL  Take 35 mg by mouth every 7 days.     sucralfate 1 gram tablet  Commonly known as: CARAFATE  Take 1 g by mouth as needed.     traZODone 50 MG tablet  Commonly known as: DESYREL  Take 1 tablet (50 mg total) by mouth every evening.             Discharge Procedure Orders (must include Diet, Follow-up, Activity)   Ice to affected area   Order Comments: 20 minutes of ice or until area numb to the touch if area is sore 2-3 times per day as needed     No driving until:   Order Comments: Until following day     No dressing needed     Notify your health care provider if you experience any of the following:  temperature >100.4     Notify your health care provider if you experience any of the following:  persistent nausea and vomiting or diarrhea     Notify your health care provider if you experience any of the following:  severe uncontrolled pain     Notify your health care provider if you experience any of the following:  redness, tenderness, or signs of infection (pain, swelling, redness, odor or green/yellow discharge around incision site)     Notify your health care provider if you experience any of the following:  difficulty breathing or increased cough     Notify your health care provider if you experience any of the following:  severe persistent headache     Notify your health care provider if you experience any  of the following:  worsening rash     Notify your health care provider if you experience any of the following:  persistent dizziness, light-headedness, or visual disturbances     Notify your health care provider if you experience any of the following:  increased confusion or weakness     Shower on day dressing removed (No bath)       Deena Daily M.D.  Interventional Pain Medicine / Physical Medicine & Rehabilitation

## 2022-10-05 ENCOUNTER — ANESTHESIA (OUTPATIENT)
Dept: SURGERY | Facility: HOSPITAL | Age: 67
End: 2022-10-05
Payer: MEDICARE

## 2022-10-05 ENCOUNTER — HOSPITAL ENCOUNTER (OUTPATIENT)
Facility: HOSPITAL | Age: 67
Discharge: HOME | End: 2022-10-05
Attending: OPHTHALMOLOGY | Admitting: OPHTHALMOLOGY
Payer: MEDICARE

## 2022-10-05 ENCOUNTER — ANESTHESIA EVENT (OUTPATIENT)
Dept: SURGERY | Facility: HOSPITAL | Age: 67
End: 2022-10-05
Payer: MEDICARE

## 2022-10-05 VITALS
HEART RATE: 76 BPM | RESPIRATION RATE: 18 BRPM | TEMPERATURE: 97 F | DIASTOLIC BLOOD PRESSURE: 76 MMHG | OXYGEN SATURATION: 99 % | SYSTOLIC BLOOD PRESSURE: 136 MMHG

## 2022-10-05 DIAGNOSIS — H25.10 SENILE NUCLEAR SCLEROSIS: ICD-10-CM

## 2022-10-05 PROBLEM — H26.9 CATARACT, LEFT EYE: Status: ACTIVE | Noted: 2022-10-05

## 2022-10-05 PROCEDURE — V2632 POST CHMBR INTRAOCULAR LENS: HCPCS | Performed by: OPHTHALMOLOGY

## 2022-10-05 PROCEDURE — 25000242 PHARM REV CODE 250 ALT 637 W/ HCPCS: Performed by: NURSE ANESTHETIST, CERTIFIED REGISTERED

## 2022-10-05 PROCEDURE — 25000003 PHARM REV CODE 250

## 2022-10-05 PROCEDURE — 36000706: Performed by: OPHTHALMOLOGY

## 2022-10-05 PROCEDURE — 36000707: Performed by: OPHTHALMOLOGY

## 2022-10-05 PROCEDURE — 37000008 HC ANESTHESIA 1ST 15 MINUTES: Performed by: OPHTHALMOLOGY

## 2022-10-05 PROCEDURE — 25000003 PHARM REV CODE 250: Performed by: OPHTHALMOLOGY

## 2022-10-05 PROCEDURE — D9220AH HC ANESTHESIA PROFESSIONAL FEE: Mod: QZ,P3,QS | Performed by: NURSE ANESTHETIST, CERTIFIED REGISTERED

## 2022-10-05 PROCEDURE — 27201423 OPTIME MED/SURG SUP & DEVICES STERILE SUPPLY: Performed by: OPHTHALMOLOGY

## 2022-10-05 PROCEDURE — 71000033 HC RECOVERY, INTIAL HOUR: Performed by: OPHTHALMOLOGY

## 2022-10-05 PROCEDURE — 37000009 HC ANESTHESIA EA ADD 15 MINS: Performed by: OPHTHALMOLOGY

## 2022-10-05 PROCEDURE — 00142 ANES PX ON EYE LENS SURGERY: CPT | Mod: QZ,P3,QS | Performed by: NURSE ANESTHETIST, CERTIFIED REGISTERED

## 2022-10-05 DEVICE — LENS SMPLCTY TECNIS MONO 18.0D: Type: IMPLANTABLE DEVICE | Site: EYE | Status: FUNCTIONAL

## 2022-10-05 RX ORDER — LIDOCAINE HYDROCHLORIDE 40 MG/ML
1 INJECTION, SOLUTION RETROBULBAR
Status: DISCONTINUED | OUTPATIENT
Start: 2022-10-05 | End: 2023-05-11

## 2022-10-05 RX ORDER — LIDOCAINE HYDROCHLORIDE 20 MG/ML
INJECTION, SOLUTION EPIDURAL; INFILTRATION; INTRACAUDAL; PERINEURAL
Status: DISCONTINUED | OUTPATIENT
Start: 2022-10-05 | End: 2022-10-05 | Stop reason: HOSPADM

## 2022-10-05 RX ORDER — TROPICAMIDE 10 MG/ML
1 SOLUTION/ DROPS OPHTHALMIC
Status: DISCONTINUED | OUTPATIENT
Start: 2022-10-05 | End: 2023-05-11

## 2022-10-05 RX ORDER — PROPARACAINE HYDROCHLORIDE 5 MG/ML
SOLUTION/ DROPS OPHTHALMIC
Status: DISPENSED
Start: 2022-10-05 | End: 2022-10-06

## 2022-10-05 RX ORDER — PHENYLEPHRINE HYDROCHLORIDE 25 MG/ML
SOLUTION/ DROPS OPHTHALMIC
Status: DISPENSED
Start: 2022-10-05 | End: 2022-10-06

## 2022-10-05 RX ORDER — MIDAZOLAM HCL 2 MG/ML
SYRUP ORAL
Status: DISCONTINUED | OUTPATIENT
Start: 2022-10-05 | End: 2022-10-05

## 2022-10-05 RX ORDER — CYCLOPENTOLATE HYDROCHLORIDE 10 MG/ML
1 SOLUTION/ DROPS OPHTHALMIC
Status: DISCONTINUED | OUTPATIENT
Start: 2022-10-05 | End: 2023-05-11

## 2022-10-05 RX ORDER — MIDAZOLAM HCL 2 MG/ML
SYRUP ORAL
Status: COMPLETED
Start: 2022-10-05 | End: 2022-10-05

## 2022-10-05 RX ORDER — TROPICAMIDE 10 MG/ML
SOLUTION/ DROPS OPHTHALMIC
Status: DISPENSED
Start: 2022-10-05 | End: 2022-10-06

## 2022-10-05 RX ORDER — PROPARACAINE HYDROCHLORIDE 5 MG/ML
1 SOLUTION/ DROPS OPHTHALMIC
Status: DISCONTINUED | OUTPATIENT
Start: 2022-10-05 | End: 2023-05-11

## 2022-10-05 RX ORDER — PHENYLEPHRINE HYDROCHLORIDE 25 MG/ML
1 SOLUTION/ DROPS OPHTHALMIC
Status: DISCONTINUED | OUTPATIENT
Start: 2022-10-05 | End: 2023-05-11

## 2022-10-05 RX ORDER — LIDOCAINE HYDROCHLORIDE 20 MG/ML
INJECTION, SOLUTION EPIDURAL; INFILTRATION; INTRACAUDAL; PERINEURAL
Status: DISCONTINUED
Start: 2022-10-05 | End: 2022-10-05 | Stop reason: HOSPADM

## 2022-10-05 RX ORDER — CYCLOPENTOLATE HYDROCHLORIDE 10 MG/ML
SOLUTION/ DROPS OPHTHALMIC
Status: COMPLETED
Start: 2022-10-05 | End: 2022-10-05

## 2022-10-05 RX ADMIN — TROPICAMIDE 1 DROP: 10 SOLUTION/ DROPS OPHTHALMIC at 08:10

## 2022-10-05 RX ADMIN — CYCLOPENTOLATE HYDROCHLORIDE 1 DROP: 10 SOLUTION/ DROPS OPHTHALMIC at 08:10

## 2022-10-05 RX ADMIN — PHENYLEPHRINE HYDROCHLORIDE 1 DROP: 25 SOLUTION/ DROPS OPHTHALMIC at 08:10

## 2022-10-05 RX ADMIN — PROPARACAINE HYDROCHLORIDE 1 DROP: 5 SOLUTION/ DROPS OPHTHALMIC at 08:10

## 2022-10-05 RX ADMIN — MIDAZOLAM HYDROCHLORIDE 5 MG: 2 SYRUP ORAL at 07:10

## 2022-10-05 NOTE — OP NOTE
[facility]  Operative Note    Date:  10/5/2022    Patient:  Micaela Arvizu  MRN:  [id number]   :  1955    Surgeon:  Boo Manning MD    PREOPERATIVE DIAGNOSIS:  Visually significant age-related nuclear left eye.    POSTOPERATIVE DIAGNOSIS:  Visually significant age-related nuclear left eye.    PROCEDURE:  Phacoemulsification of cataract with posterior chamber intraocular lens implant, left eye.    ANESTHESIA:  Topical with MAC.      COMPLICATIONS:  None.    ESTIMATED BLOOD LOSS:  Minimal.    PROCEDURE IN DETAIL:  The patient presented to our clinic complaining of increasing difficulties with activities of daily living due to a visually significant cataract in the left eye.  After risks, benefits, and alternatives were explained to the patient, the patient agreed to proceed with the above procedure.  The patient was brought to the operating room and received topical anesthetic to the left eye and was then prepped and draped in the usual sterile fashion.  The left eye was first entered at 5:00 o'clock paracentesis site followed by intracameral lidocaine, and Viscoat.  The primary surgical site was then created at 2:30 o'clock followed by continuous capsulotomy, hydrodissection, and phacoemulsification of the cataract.  Residual cortical material was removed using the automated irrigation-aspiration technique.  Provisc was injected into the posterior chamber and a J&J DCBOO 18.0 diopter lens was placed in the bag without difficulty.   Residual Provisc was removed using the automated irrigation-aspiration technique.  The eye was re-pressurized using BSS solution, and both the paracentesis and primary surgical site were demonstrated to be watertight at the end of the case with Weck-Peg manipulation.  Vigamox, Pred Forte, and Prolensa were placed in the eye followed by placement of a Muro shield.  The patient tolerated the procedure well and was taken to the recovery room in good and stable condition.   The patient was instructed to refrain from any heavy lifting, bending, stooping, or straining activities and to follow-up in the morning for routine post-operative care.

## 2022-10-05 NOTE — ADDENDUM NOTE
Addendum  created 10/05/22 0825 by Prakash Jimenes CRNA    Delete clinical note, Flowsheet accepted, Intraprocedure Event deleted, Intraprocedure Flowsheets edited

## 2022-10-05 NOTE — ANESTHESIA POSTPROCEDURE EVALUATION
Anesthesia Post Evaluation    Patient: Micaela Arvizu    Procedure(s) Performed: Procedure(s) (LRB):  PHACOEMULSIFICATION, CATARACT (Left)  EXTRACTION, CATARACT, WITH IOL INSERTION (Left)    Final Anesthesia Type: MAC      Patient location during evaluation: PACU  Patient participation: Yes- Able to Participate  Level of consciousness: awake and alert, oriented and awake  Post-procedure vital signs: reviewed and stable  Pain management: adequate  Airway patency: patent    PONV status at discharge: No PONV  Anesthetic complications: no      Cardiovascular status: blood pressure returned to baseline, hemodynamically stable and stable  Respiratory status: unassisted, spontaneous ventilation and room air  Hydration status: euvolemic  Follow-up not needed.          Vitals Value Taken Time   /71 10/05/22 0819   Temp 37 10/05/22 0819   Pulse 79 10/05/22 0819   Resp 18 10/05/22 0819   SpO2 99 10/05/22 0819         No case tracking events are documented in the log.      Pain/Jayce Score: No data recorded

## 2022-10-05 NOTE — ADDENDUM NOTE
Addendum  created 10/05/22 0850 by Prakash Jimenes CRNA    Clinical Note Signed, Flowsheet accepted, Intraprocedure Event edited, Intraprocedure Flowsheets edited, Intraprocedure Staff edited, Patient device added, Patient device removed

## 2022-10-05 NOTE — ANESTHESIA POSTPROCEDURE EVALUATION
Anesthesia Post Evaluation    Patient: Micaela Arvizu    Procedure(s) Performed: Procedure(s) (LRB):  PHACOEMULSIFICATION, CATARACT (Left)  EXTRACTION, CATARACT, WITH IOL INSERTION (Left)    Final Anesthesia Type: MAC      Patient location during evaluation: PACU  Patient participation: Yes- Able to Participate  Level of consciousness: awake and alert, oriented and awake  Post-procedure vital signs: reviewed and stable  Pain management: adequate  Airway patency: patent    PONV status at discharge: No PONV  Anesthetic complications: no      Cardiovascular status: blood pressure returned to baseline, hemodynamically stable and stable  Respiratory status: unassisted, spontaneous ventilation and room air  Hydration status: euvolemic  Follow-up not needed.          Vitals Value Taken Time   /75 10/05/22 0850   Temp 37 10/05/22 0850   Pulse 78 10/05/22 0850   Resp 16 10/05/22 0850   SpO2 98 10/05/22 0850         No case tracking events are documented in the log.      Pain/Jayce Score: No data recorded

## 2022-10-05 NOTE — ANESTHESIA PREPROCEDURE EVALUATION
10/05/2022  Micaela Arvizu is a 67 y.o., female.      Pre-op Assessment    I have reviewed the Patient Summary Reports.     I have reviewed the Nursing Notes.    I have reviewed the Medications.     Review of Systems  Anesthesia Hx:  No problems with previous Anesthesia    Social:  Non-Smoker, No Alcohol Use    Hematology/Oncology:  Hematology Normal   Oncology Normal     EENT/Dental:EENT/Dental Normal   Cardiovascular:   Exercise tolerance: good Hypertension    Pulmonary:   Asthma mild Sleep Apnea    Renal/:  Renal/ Normal     Hepatic/GI:   GERD    Musculoskeletal:  Musculoskeletal Normal    Neurological:  Neurology Normal    Endocrine:   Diabetes, well controlled, type 2    Dermatological:  Skin Normal    Psych:  Psychiatric Normal           Physical Exam  General: Well nourished        Anesthesia Plan  Type of Anesthesia, risks & benefits discussed:    Anesthesia Type: MAC  Intra-op Monitoring Plan: Standard ASA Monitors  Post Op Pain Control Plan: multimodal analgesia  Induction:  IV  Airway Plan: Direct  Informed Consent: Informed consent signed with the Patient and all parties understand the risks and agree with anesthesia plan.  All questions answered.   ASA Score: 3  Day of Surgery Review of History & Physical: H&P Update referred to the surgeon/provider.I have interviewed and examined the patient. I have reviewed the patient's H&P dated: 10/5/22. There are no significant changes.     Ready For Surgery From Anesthesia Perspective.     .

## 2022-10-05 NOTE — BRIEF OP NOTE
[facility]  Brief Discharge Note    Patient Name:  Micaela Arvizu   MRN:  [id number]    :  1955   Admission Date:  10/5/2022   Discharge Date:  10/5/2022   Attending Physician: Boo Manning MD     Procedure:  PHACOEMULSIFICATION, CATARACT (Left)    OUTCOME: Patient tolerated procedure well without complication and is now ready for discharge.    DISPOSITION: Home or Self Care    FINAL DIAGNOSIS:  Age-related nuclear cataract, left eye                                       FOLLOWUP: 1 day in clinic    DISCHARGE INSTRUCTIONS:  Follow the post-op eye instructions given from the clinic.  Continue Prednisolone, ciprofloxacin, and ketorolac eye drops all 4 times a day.

## 2022-10-25 ENCOUNTER — HOSPITAL ENCOUNTER (OUTPATIENT)
Dept: RADIOLOGY | Facility: HOSPITAL | Age: 67
Discharge: HOME | End: 2022-10-25
Attending: PHYSICAL MEDICINE & REHABILITATION
Payer: MEDICARE

## 2022-10-25 ENCOUNTER — OFFICE VISIT (OUTPATIENT)
Dept: PAIN MEDICINE | Facility: CLINIC | Age: 67
End: 2022-10-25
Payer: MEDICARE

## 2022-10-25 VITALS
WEIGHT: 266.31 LBS | BODY MASS INDEX: 55.9 KG/M2 | DIASTOLIC BLOOD PRESSURE: 72 MMHG | RESPIRATION RATE: 16 BRPM | HEIGHT: 58 IN | SYSTOLIC BLOOD PRESSURE: 138 MMHG

## 2022-10-25 DIAGNOSIS — M16.11 PRIMARY OSTEOARTHRITIS OF RIGHT HIP: ICD-10-CM

## 2022-10-25 DIAGNOSIS — M25.551 RIGHT HIP PAIN: Primary | ICD-10-CM

## 2022-10-25 DIAGNOSIS — M25.651 IMPAIRED RANGE OF MOTION OF RIGHT HIP: ICD-10-CM

## 2022-10-25 DIAGNOSIS — M25.551 RIGHT HIP PAIN: ICD-10-CM

## 2022-10-25 DIAGNOSIS — M70.61 GREATER TROCHANTERIC BURSITIS OF RIGHT HIP: ICD-10-CM

## 2022-10-25 PROCEDURE — 99999 PR STA SHADOW: ICD-10-PCS | Mod: PBBFAC,,, | Performed by: PHYSICAL MEDICINE & REHABILITATION

## 2022-10-25 PROCEDURE — 73502 X-RAY EXAM HIP UNI 2-3 VIEWS: CPT | Mod: 26,RT,, | Performed by: RADIOLOGY

## 2022-10-25 PROCEDURE — 99999 PR PBB SHADOW E&M-EST. PATIENT-LVL IV: CPT | Mod: PBBFAC,,, | Performed by: PHYSICAL MEDICINE & REHABILITATION

## 2022-10-25 PROCEDURE — 73502 X-RAY EXAM HIP UNI 2-3 VIEWS: CPT | Mod: TC,RT

## 2022-10-25 PROCEDURE — 99214 OFFICE O/P EST MOD 30 MIN: CPT | Mod: PBBFAC | Performed by: PHYSICAL MEDICINE & REHABILITATION

## 2022-10-25 PROCEDURE — 99999 PR STA SHADOW: CPT | Mod: PBBFAC,,, | Performed by: PHYSICAL MEDICINE & REHABILITATION

## 2022-10-25 PROCEDURE — 99214 OFFICE O/P EST MOD 30 MIN: CPT | Mod: S$PBB | Performed by: PHYSICAL MEDICINE & REHABILITATION

## 2022-10-25 PROCEDURE — 73502 XR HIP WITH PELVIS WHEN PERFORMED, 2 OR 3  VIEWS RIGHT: ICD-10-PCS | Mod: 26,RT,, | Performed by: RADIOLOGY

## 2022-10-25 NOTE — PROGRESS NOTES
Pain Medicine     Chief Complaint:   Chief Complaint   Patient presents with    Back Pain         History of Present Illness: Micaela Arvizu is a 67 y.o. female with GERD, HTN, Anxiety, ROBERTO, insomnia, HTN, HLD, cervical dystonia w/ head tremor, osteoporosis, thoracic compression fx's, L2 & L4 compression fx's, referred by Adelina Saavedra NP for lumbar pain.  She previously saw Dr. Wolfe, last on 7/9/15, and was getting injections that provided benefit (B/L L3-5 MBB w/ steroid).     She states she was having very good relief of her back pain from the previous injection for about 4 years.  She had a right knee replacement with Dr. Vizcaino on 9/11/19 that helped her back pain. Planning on doing left knee eventually, likely next year.     Onset: off and on for years   Location: lower lumbar spine bilaterally and over SIJ  Radiation: down both legs to the feet when she stands.   Timing: intermittent  Quality: Aching and Deep  Exacerbating Factors: sitting, standing for more than 20 minutes and walking for more than 30 minutes  Alleviating Factors: laying down, medications and sitting  Associated Symptoms: denies night fever/night sweats, urinary incontinence, bowel incontinence, significant weight loss, significant motor weakness and loss of sensations     Severity: Currently: 4/10   Typical Range: 4-8/10     Exacerbation: 8/10    Interval History (10/25/2022):  Micaela Arvizu returns today for follow up.  At the last clinic visit, scheduled RFA, cont baclofen.     Left and Right L4-5, L5-S1 RFA provided 80% relief.     Currently, the back pain is improved.  She is having more hip pain now.  She localizes the pain to right lateral hip in the right groin.  Pain is intermittent.  Pain seems to be worse with walking and getting in and out of a vehicle.  She denies any radiation of the pain.  She denies any new weakness or numbness.  She denies any changes in bowel or bladder function.    She continues to take baclofen 10  mg, but mostly does this once a day and at nighttime when she needs it.  It does cause sedation.    Current Pain Scales:  Current: 3/10              Typical Range: 3-5/10      Pain Disability Index  Family/Home Responsibilities:: 3  Recreation:: 5  Social Activity:: 5  Occupation:: 7  Sexual Behavior:: 5  Self Care:: 2  Life-Support Activities:: 3  Pain Disability Index (PDI): 30      Previous Interventions:  - 9/30/22: Left L4-5, L5-S1 MB RFA w/ 80% relief  - 9/23/22: Right L4-5, L5-S1 MB RFA w/ 80% relief  - 10/23/20: Right L3-5 MB RFA w/ 95% relief of the right low back  - 9/11/20: Right L4 & L5 TF AUBREY w/ 100% relief.   - 12/20/19: B/L L4-5, L5-S1 MBB w/ 100% relief  - 6/19/15: B/L L3-5 MBBs w/ kenalog (Tolba) w/ noted 90% relief    Previous Therapies:  PT/OT: yes  Surgery: no   Previous Medications:   - NSAIDS: NSAIDs caused stomach ulcer (per Dr. Robles's notes)   - Muscle Relaxants: Klonopin, Flexeril    - TCAs: None  - SNRIs: None  - Topicals: None  - Anticonvulsants: gabapentin  - Opioids: Tramadol, Oxycodone     Current Pain Medications:  Tylenol  Gabapentin 100 mg TID (only BID so far)  Baclofen 10 mg TID PRN (mostly taking at night because makes her tired)    Blood Thinners: ASA 81 mg    Full Medication List:    Current Outpatient Medications:     atorvastatin (LIPITOR) 40 MG tablet, Take 1 tablet (40 mg total) by mouth once daily., Disp: 90 tablet, Rfl: 3    baclofen (LIORESAL) 10 MG tablet, Take 1 tablet (10 mg total) by mouth 3 (three) times daily as needed (back pain)., Disp: 90 tablet, Rfl: 2    blood sugar diagnostic Strp, Check blood sugar twice daily, Disp: 100 strip, Rfl: 1    blood-glucose meter (FREESTYLE FREEDOM) kit, Use as instructed, Disp: 1 each, Rfl: 0    cholecalciferol, vitamin D3, (VITAMIN D3) 50 mcg (2,000 unit) Cap, Take 2 capsules by mouth once daily. , Disp: , Rfl:     colesevelam (WELCHOL) 625 mg tablet, Take 1 tablet (625 mg total) by mouth 2 (two) times daily with meals., Disp:  180 tablet, Rfl: 3    furosemide (LASIX) 20 MG tablet, Take 2 tablets (40 mg total) by mouth once daily., Disp: 180 tablet, Rfl: 3    gabapentin (NEURONTIN) 100 MG capsule, Take 1 capsule (100 mg total) by mouth 3 (three) times daily., Disp: 270 capsule, Rfl: 1    GLUC BALL/CHONDRO BALL A/VIT C/MN (GLUCOSAMINE 1500 COMPLEX ORAL), Take 1 tablet by mouth 2 (two) times daily., Disp: , Rfl:     lancets Misc, Check blood sugar twice a daily, Disp: 100 each, Rfl: 1    lisinopriL-hydrochlorothiazide (PRINZIDE,ZESTORETIC) 10-12.5 mg per tablet, Take 1 tablet by mouth once daily., Disp: 90 tablet, Rfl: 3    metFORMIN (GLUCOPHAGE) 1000 MG tablet, Take 1 tablet (1,000 mg total) by mouth 2 (two) times daily with meals., Disp: 180 tablet, Rfl: 3    multivitamin capsule, Take 1 capsule by mouth once daily., Disp: , Rfl:     omega-3 fatty acids-vitamin E 1,000 mg Cap, Take 2 tablets by mouth once daily., Disp: , Rfl:     pantoprazole (PROTONIX) 40 MG tablet, Take 1 tablet (40 mg total) by mouth once daily., Disp: 90 tablet, Rfl: 3    propranoloL (INDERAL) 40 MG tablet, Take 1 tablet (40 mg total) by mouth 2 (two) times a day., Disp: 180 tablet, Rfl: 1    risedronate (ACTONEL) 35 MG tablet, Take 35 mg by mouth every 7 days., Disp: , Rfl:     sucralfate (CARAFATE) 1 gram tablet, Take 1 g by mouth as needed. , Disp: , Rfl: 1    traZODone (DESYREL) 50 MG tablet, Take 1 tablet (50 mg total) by mouth every evening., Disp: 90 tablet, Rfl: 1    aspirin 81 MG Chew, Take 81 mg by mouth once daily., Disp: , Rfl:   No current facility-administered medications for this visit.    Facility-Administered Medications Ordered in Other Visits:     cyclopentolate 1% ophthalmic solution 1 drop, 1 drop, Left Eye, On Call Procedure, Boo Manning MD, 1 drop at 10/05/22 0828    LIDOcaine (PF) 40 mg/mL (4 %) injection 1 mL, 1 mL, Other, PRN, Boo Manning MD    phenylephrine HCL 2.5% ophthalmic solution 1 drop, 1 drop, Left Eye, On Call  Procedure, Boo Manning MD, 1 drop at 10/05/22 0827    proparacaine 0.5 % ophthalmic solution 1 drop, 1 drop, Left Eye, On Call Procedure, Boo Manning MD, 1 drop at 10/05/22 0827    tropicamide 1% ophthalmic solution 1 drop, 1 drop, Left Eye, On Call Procedure, Boo Manning MD, 1 drop at 10/05/22 0828     Review of Systems:  Review of Systems   Constitutional:  Negative for fever and weight loss.   HENT:  Negative for ear pain and tinnitus.    Eyes:  Negative for pain and redness.   Respiratory:  Negative for cough and shortness of breath.    Cardiovascular:  Negative for chest pain and palpitations.   Gastrointestinal:  Negative for constipation and heartburn.   Genitourinary: Negative.         Denies urinary incontinence. Denies urine retention.    Musculoskeletal:  Positive for back pain. Negative for neck pain.   Skin:  Negative for itching and rash.   Neurological:  Negative for tingling, focal weakness and seizures.   Endo/Heme/Allergies:  Negative for environmental allergies. Does not bruise/bleed easily.   Psychiatric/Behavioral:  Negative for depression. The patient has insomnia. The patient is not nervous/anxious.      Allergies:  No known allergies     Medical History:   has a past medical history of Arthritis, Asthma, GERD (gastroesophageal reflux disease), Hiatal hernia, History of colonoscopy with polypectomy, History of esophagogastroduodenoscopy (EGD), Hyperlipidemia, Hypertension, Lumbar facet arthropathy, and Type 2 diabetes mellitus without complication, without long-term current use of insulin.    Surgical History:   has a past surgical history that includes Cholecystectomy (2012); Colonoscopy w/ biopsies and polypectomy (2000; 2002; 2007; 2/2012); Total knee arthroplasty (09/11/2019); Injection of anesthetic agent around medial branch nerves innervating lumbar facet joint (Bilateral, 12/20/2019); Total knee arthroplasty; Transforaminal epidural injection of steroid  "(Right, 9/11/2020); Radiofrequency ablation of lumbar medial branch nerve at single level (Right, 10/23/2020); Radiofrequency thermocoagulation (Right, 9/23/2022); and Radiofrequency thermocoagulation (Left, 9/30/2022).    Family History:  family history includes Hypertension in her father and mother; Parkinsonism in her father.    Social History:   reports that she has never smoked. She has never used smokeless tobacco. She reports that she does not drink alcohol and does not use drugs.    Physical Exam:  /72   Resp 16   Ht 4' 10" (1.473 m)   Wt 120.8 kg (266 lb 5.1 oz)   BMI 55.66 kg/m²   GEN: No acute distress. Calm, comfortable  HENT: Normocephalic, atraumatic, moist mucous membranes  EYE: Anicteric sclera, non-injected.   CV: Non-diaphoretic. Regular Rate. Radial Pulses 2+.  RESP: Breathing comfortably. Chest expansion symmetric.  EXT: No clubbing, cyanosis.   SKIN: Warm, & dry to palpation. No visible rashes or lesions of exposed skin.   PSYCH: Pleasant mood and appropriate affect. Recent and remote memory intact.   GAIT: Independent, normal ambulation  Lumbar Spine Exam:       Inspection: No erythema, bruising. No surgical incisions      Palpation: (+) TTP of lumbar paraspinals, SIJ and piriformis on the right      ROM:  Limited in flexion, extension, lateral bending.       (+) Facet loading b/l      (-) Straight Leg Raise bilaterally      (+) EDVIN on the right  Hip Exam:      Inspection: No gross deformity or apparent leg length discrepancy      Palpation: (+) TTP to right greater trochanteric bursa, piriformis.      ROM: (+)  limitation and pain in internal rotation, external rotation on the right  Neurologic Exam:     Alert. Speech is fluent and appropriate.     Strength: bilateral hip flexion/knee extension 4/5, otherwise 5/5 throughout bilateral lower extremities     Sensation: Grossly intact to light touch in bilateral lower extremities     Reflexes: 1+ in b/l patella, absent in b/l " achilles     Tone: No abnormality appreciated in bilateral lower extremities     No Clonus     Downgoing toes on plantar stimulation    Imaging:  - MRI L-spine 9/4/20:  There are old compression fractures of T11 and T12.  Similar findings present on the previous study.  No evidence of an acute fracture.  No congenital spinal stenosis.  Visualized portion of the spinal cord appears normal and terminates at approximately the L1 level.  At the T12-L1 level there is a minimal right paracentral disc-osteophyte with mild effacement along the anterior thecal sac.  No significant spinal canal or foraminal encroachment.  At the L1-L2 level there is a broad-based and left paracentral disc protrusion with mild effacement along the anterior thecal sac.  Mild facet degenerative changes.  Mild spinal canal and foraminal encroachment.  At the L2-L3 and L3-L4 levels there are minimal disc bulges with effacement along the anterior margin of the thecal sac.  There are mild-to-moderate bilateral facet degenerative changes without evidence of significant spinal canal or foraminal encroachment.  At the L4-L5 level there is questionable minimal anterolisthesis related to advanced bilateral facet degenerative changes and prominence of the ligamentum flavum.  There is a pseudo disc bulge.  There is mild bilateral foraminal encroachment.  No spinal canal encroachment.  At the L5-S1 level there is a minimal degenerative disc bulge with effacement along the anterior margin of the thecal sac.  There are moderate bilateral facet degenerative changes with prominence of the ligamentum flavum.  There is mild bilateral foraminal encroachment    Labs:  BMP  Lab Results   Component Value Date     06/07/2022    K 4.1 06/07/2022    CL 97 06/07/2022    CO2 28 06/07/2022    BUN 16 06/07/2022    CREATININE 0.7 06/07/2022    CALCIUM 9.6 06/07/2022    ANIONGAP 14 06/07/2022    ESTGFRAFRICA >60 06/07/2022    EGFRNONAA >60 06/07/2022     Lab Results    Component Value Date    ALT 28 06/07/2022    AST 23 06/07/2022    ALKPHOS 72 06/07/2022    BILITOT 0.5 06/07/2022     Lab Results   Component Value Date     06/07/2022       Assessment:  Micaela Arvizu is a 67 y.o. female with the following diagnoses based on history, exam, and imaging:    Problem List Items Addressed This Visit    None  Visit Diagnoses       Right hip pain    -  Primary    Relevant Orders    X-Ray Hip 2 or 3 views Right (with Pelvis when performed)    Greater trochanteric bursitis of right hip        Relevant Orders    X-Ray Hip 2 or 3 views Right (with Pelvis when performed)    Ambulatory referral/consult to Physical/Occupational Therapy    Primary osteoarthritis of right hip        Relevant Orders    X-Ray Hip 2 or 3 views Right (with Pelvis when performed)    Ambulatory referral/consult to Physical/Occupational Therapy    Impaired range of motion of right hip        Relevant Orders    X-Ray Hip 2 or 3 views Right (with Pelvis when performed)    Ambulatory referral/consult to Physical/Occupational Therapy            This is a pleasant 67 y.o. lady with GERD, depression and anxiety, HTN, HLD, morbid obesity presenting with:    - Chronic low back pain that appears multifactorial in nature.She does have facetogenic aspects to her pain, but she also has features of lumbar spinal stenosis with neurogenic claudication, and myofascial features.   - Right hip pain: Trochanteric bursitis on the right. Limited right hip ROM with likely OA.   - Pain complicated by depression and anxiety     Treatment Plan: I discussed with the patient the following assessment and recommendations. The following is the plan the patient agreed upon:  - PT/OT/HEP: Refer to PT for hip pain. Cont HEP  - Procedures: None at this time  - Medications: Cont baclofen 10 mg TID prn.   - Imaging: Reviewed. Order x-ray of right hip.   - Labs: Reviewed.      Follow Up: RTC PRN, call to schedule if pain not improved with PT.        Deena Daily M.D.  Interventional Pain Medicine / Physical Medicine & Rehabilitation    Disclaimer: This note was partly generated using dictation software which may occasionally result in transcription errors.

## 2022-10-26 ENCOUNTER — PATIENT MESSAGE (OUTPATIENT)
Dept: INTERNAL MEDICINE | Facility: CLINIC | Age: 67
End: 2022-10-26
Payer: MEDICARE

## 2022-11-14 LAB
LEFT EYE DM RETINOPATHY: POSITIVE
RIGHT EYE DM RETINOPATHY: POSITIVE

## 2022-12-08 ENCOUNTER — LAB VISIT (OUTPATIENT)
Dept: LAB | Facility: HOSPITAL | Age: 67
End: 2022-12-08
Attending: INTERNAL MEDICINE
Payer: MEDICARE

## 2022-12-08 DIAGNOSIS — E11.9 TYPE 2 DIABETES MELLITUS WITHOUT COMPLICATION, WITHOUT LONG-TERM CURRENT USE OF INSULIN: ICD-10-CM

## 2022-12-08 DIAGNOSIS — E66.01 MORBID OBESITY WITH BMI OF 50.0-59.9, ADULT: ICD-10-CM

## 2022-12-08 DIAGNOSIS — I87.2 VENOUS INSUFFICIENCY OF BOTH LOWER EXTREMITIES: ICD-10-CM

## 2022-12-08 DIAGNOSIS — I10 ESSENTIAL HYPERTENSION: ICD-10-CM

## 2022-12-08 DIAGNOSIS — I87.2 VENOUS INSUFFICIENCY OF BOTH LOWER EXTREMITIES: Primary | ICD-10-CM

## 2022-12-08 DIAGNOSIS — E78.00 HYPERCHOLESTEREMIA: ICD-10-CM

## 2022-12-08 LAB
ALBUMIN SERPL BCP-MCNC: 3.3 G/DL (ref 3.5–5.2)
ALBUMIN/CREAT UR: 6.2 UG/MG (ref 0–30)
ALP SERPL-CCNC: 71 U/L (ref 55–135)
ALT SERPL W/O P-5'-P-CCNC: 22 U/L (ref 10–44)
ANION GAP SERPL CALC-SCNC: 14 MMOL/L (ref 8–16)
AST SERPL-CCNC: 24 U/L (ref 10–40)
BASOPHILS # BLD AUTO: 0.06 K/UL (ref 0–0.2)
BASOPHILS NFR BLD: 0.7 % (ref 0–1.9)
BILIRUB SERPL-MCNC: 0.5 MG/DL (ref 0.1–1)
BUN SERPL-MCNC: 14 MG/DL (ref 8–23)
CALCIUM SERPL-MCNC: 9.8 MG/DL (ref 8.7–10.5)
CHLORIDE SERPL-SCNC: 98 MMOL/L (ref 95–110)
CHOLEST SERPL-MCNC: 145 MG/DL (ref 120–199)
CHOLEST/HDLC SERPL: 2.6 {RATIO} (ref 2–5)
CO2 SERPL-SCNC: 28 MMOL/L (ref 23–29)
CREAT SERPL-MCNC: 0.7 MG/DL (ref 0.5–1.4)
CREAT UR-MCNC: 145.1 MG/DL (ref 15–325)
DIFFERENTIAL METHOD: ABNORMAL
EOSINOPHIL # BLD AUTO: 0.2 K/UL (ref 0–0.5)
EOSINOPHIL NFR BLD: 2.7 % (ref 0–8)
ERYTHROCYTE [DISTWIDTH] IN BLOOD BY AUTOMATED COUNT: 14.3 % (ref 11.5–14.5)
EST. GFR  (NO RACE VARIABLE): >60 ML/MIN/1.73 M^2
ESTIMATED AVG GLUCOSE: 163 MG/DL (ref 68–131)
GLUCOSE SERPL-MCNC: 169 MG/DL (ref 70–110)
HBA1C MFR BLD: 7.3 % (ref 4–5.6)
HCT VFR BLD AUTO: 43.3 % (ref 37–48.5)
HDLC SERPL-MCNC: 56 MG/DL (ref 40–75)
HDLC SERPL: 38.6 % (ref 20–50)
HGB BLD-MCNC: 13.9 G/DL (ref 12–16)
IMM GRANULOCYTES # BLD AUTO: 0.02 K/UL (ref 0–0.04)
IMM GRANULOCYTES NFR BLD AUTO: 0.2 % (ref 0–0.5)
LDLC SERPL CALC-MCNC: 54.8 MG/DL (ref 63–159)
LYMPHOCYTES # BLD AUTO: 1.3 K/UL (ref 1–4.8)
LYMPHOCYTES NFR BLD: 15.4 % (ref 18–48)
MCH RBC QN AUTO: 27 PG (ref 27–31)
MCHC RBC AUTO-ENTMCNC: 32.1 G/DL (ref 32–36)
MCV RBC AUTO: 84 FL (ref 82–98)
MICROALBUMIN UR DL<=1MG/L-MCNC: 9 UG/ML
MONOCYTES # BLD AUTO: 0.6 K/UL (ref 0.3–1)
MONOCYTES NFR BLD: 6.8 % (ref 4–15)
NEUTROPHILS # BLD AUTO: 6.2 K/UL (ref 1.8–7.7)
NEUTROPHILS NFR BLD: 74.2 % (ref 38–73)
NONHDLC SERPL-MCNC: 89 MG/DL
NRBC BLD-RTO: 0 /100 WBC
PLATELET # BLD AUTO: 290 K/UL (ref 150–450)
PMV BLD AUTO: 8.2 FL (ref 9.2–12.9)
POTASSIUM SERPL-SCNC: 4 MMOL/L (ref 3.5–5.1)
PROT SERPL-MCNC: 7.6 G/DL (ref 6–8.4)
RBC # BLD AUTO: 5.15 M/UL (ref 4–5.4)
SODIUM SERPL-SCNC: 140 MMOL/L (ref 136–145)
TRIGL SERPL-MCNC: 171 MG/DL (ref 30–150)
WBC # BLD AUTO: 8.29 K/UL (ref 3.9–12.7)

## 2022-12-08 PROCEDURE — 85025 COMPLETE CBC W/AUTO DIFF WBC: CPT | Performed by: INTERNAL MEDICINE

## 2022-12-08 PROCEDURE — 82570 ASSAY OF URINE CREATININE: CPT | Performed by: INTERNAL MEDICINE

## 2022-12-08 PROCEDURE — 80061 LIPID PANEL: CPT | Performed by: INTERNAL MEDICINE

## 2022-12-08 PROCEDURE — 80053 COMPREHEN METABOLIC PANEL: CPT | Performed by: INTERNAL MEDICINE

## 2022-12-08 PROCEDURE — 83036 HEMOGLOBIN GLYCOSYLATED A1C: CPT | Performed by: INTERNAL MEDICINE

## 2022-12-08 PROCEDURE — 82043 UR ALBUMIN QUANTITATIVE: CPT | Performed by: INTERNAL MEDICINE

## 2022-12-08 PROCEDURE — 36415 COLL VENOUS BLD VENIPUNCTURE: CPT | Performed by: INTERNAL MEDICINE

## 2022-12-12 DIAGNOSIS — I10 ESSENTIAL HYPERTENSION: ICD-10-CM

## 2022-12-12 NOTE — TELEPHONE ENCOUNTER
No new care gaps identified.  [facility] Embedded Care Gaps. Reference number: [id number]. 12/12/2022   10:46:16 AM CST

## 2022-12-13 ENCOUNTER — OFFICE VISIT (OUTPATIENT)
Dept: INTERNAL MEDICINE | Facility: CLINIC | Age: 67
End: 2022-12-13
Payer: MEDICARE

## 2022-12-13 VITALS
HEART RATE: 69 BPM | HEIGHT: 58 IN | SYSTOLIC BLOOD PRESSURE: 126 MMHG | DIASTOLIC BLOOD PRESSURE: 78 MMHG | OXYGEN SATURATION: 94 % | BODY MASS INDEX: 54.57 KG/M2 | WEIGHT: 259.94 LBS | RESPIRATION RATE: 16 BRPM

## 2022-12-13 DIAGNOSIS — E78.00 HYPERCHOLESTEREMIA: ICD-10-CM

## 2022-12-13 DIAGNOSIS — G47.00 INSOMNIA, UNSPECIFIED TYPE: ICD-10-CM

## 2022-12-13 DIAGNOSIS — Z98.42 HISTORY OF LEFT CATARACT EXTRACTION: ICD-10-CM

## 2022-12-13 DIAGNOSIS — M47.816 LUMBAR FACET ARTHROPATHY: ICD-10-CM

## 2022-12-13 DIAGNOSIS — G25.0 ESSENTIAL TREMOR: ICD-10-CM

## 2022-12-13 DIAGNOSIS — M51.36 DDD (DEGENERATIVE DISC DISEASE), LUMBAR: ICD-10-CM

## 2022-12-13 DIAGNOSIS — E11.319 TYPE 2 DIABETES MELLITUS WITH BOTH EYES AFFECTED BY RETINOPATHY WITHOUT MACULAR EDEMA, WITHOUT LONG-TERM CURRENT USE OF INSULIN, UNSPECIFIED RETINOPATHY SEVERITY: Primary | ICD-10-CM

## 2022-12-13 DIAGNOSIS — I10 ESSENTIAL HYPERTENSION: ICD-10-CM

## 2022-12-13 DIAGNOSIS — G47.33 OSA (OBSTRUCTIVE SLEEP APNEA): ICD-10-CM

## 2022-12-13 DIAGNOSIS — Z23 NEED FOR VACCINATION: ICD-10-CM

## 2022-12-13 DIAGNOSIS — M81.0 OSTEOPOROSIS, UNSPECIFIED OSTEOPOROSIS TYPE, UNSPECIFIED PATHOLOGICAL FRACTURE PRESENCE: ICD-10-CM

## 2022-12-13 PROBLEM — Z87.09 HISTORY OF BRONCHITIS: Status: RESOLVED | Noted: 2021-03-02 | Resolved: 2022-12-13

## 2022-12-13 PROBLEM — J40 BRONCHITIS: Status: RESOLVED | Noted: 2022-05-27 | Resolved: 2022-12-13

## 2022-12-13 PROBLEM — F32.A DEPRESSION: Status: RESOLVED | Noted: 2018-10-03 | Resolved: 2022-12-13

## 2022-12-13 PROCEDURE — 90670 PCV13 VACCINE IM: CPT | Mod: PBBFAC

## 2022-12-13 PROCEDURE — 99999 PR STA SHADOW: CPT | Mod: PBBFAC,,, | Performed by: INTERNAL MEDICINE

## 2022-12-13 PROCEDURE — 99999 PNEUMOCOCCAL CONJUGATE VACCINE 13-VALENT LESS THAN 5YO & GREATER THAN: ICD-10-PCS | Mod: PBBFAC,,,

## 2022-12-13 PROCEDURE — 99214 OFFICE O/P EST MOD 30 MIN: CPT | Mod: S$PBB | Performed by: INTERNAL MEDICINE

## 2022-12-13 PROCEDURE — 99999 PR PBB SHADOW E&M-EST. PATIENT-LVL V: CPT | Mod: PBBFAC,,, | Performed by: INTERNAL MEDICINE

## 2022-12-13 PROCEDURE — 99215 OFFICE O/P EST HI 40 MIN: CPT | Mod: PBBFAC | Performed by: INTERNAL MEDICINE

## 2022-12-13 PROCEDURE — 99999 PR STA SHADOW: ICD-10-PCS | Mod: PBBFAC,,, | Performed by: INTERNAL MEDICINE

## 2022-12-13 PROCEDURE — 99999 PNEUMOCOCCAL CONJUGATE VACCINE 13-VALENT LESS THAN 5YO & GREATER THAN: CPT | Mod: PBBFAC,,,

## 2022-12-13 RX ORDER — GABAPENTIN 100 MG/1
CAPSULE ORAL
Qty: 270 CAPSULE | Refills: 1
Start: 2022-12-13 | End: 2023-03-13 | Stop reason: SDUPTHER

## 2022-12-13 RX ORDER — PANTOPRAZOLE SODIUM 40 MG/1
TABLET, DELAYED RELEASE ORAL
Qty: 90 TABLET | Refills: 1 | Status: SHIPPED | OUTPATIENT
Start: 2022-12-13 | End: 2023-06-26

## 2022-12-13 RX ORDER — LISINOPRIL AND HYDROCHLOROTHIAZIDE 10; 12.5 MG/1; MG/1
1 TABLET ORAL DAILY
Qty: 90 TABLET | Refills: 1 | Status: SHIPPED | OUTPATIENT
Start: 2022-12-13 | End: 2023-06-26

## 2022-12-13 NOTE — PROGRESS NOTES
"Subjective:       Patient ID: Micaela Arvizu is a 67 y.o. female.    Chief Complaint: Follow-up      HPI:    Patient is known to me and presents for follow up HTN, HLD, osteoporosis, GERD and anxiety. Labs from 6/7/22 were personally reviewed and discussed with the patient today.     Chronic low back pain: Has had MRIs in the past. Has seen Dr. Daily for injections She uses tramadol for pain management for her arthrits which is working well. Supplementing with tylenol right now as trying not to use tramadol often.  Had stomach ulcer with NSAIDs.   Also taking gabapentin 100mg BID.     HTN: on lisinopril-HCTZ, lasix. Home BP < 140/90. Denies chest pains, SOB and LE edema.      HLD: on atorvastatin and fish oil. Denies medicatoin side effects. LDL at goal < 70.      DM: last A1C 7.4%, trending up. On metformin 500mg BID. she has really started focusing on her diet. Less carbs and smaller portions but no significant weight loss.   Denies numbness/tingling. Does not check blood sugars regularly.    Microalb: 5/2021  Foot exam: due next visit  Eye exam: done 11/2020     GERD: taking omeprazole daily. She has h/o of esophageal stricture. Had "stretching" with Dr. Bojorquez. Denies abd pain, n/v/d/c.      Anxiety/insomnia: on trazodone 50mg at night. Working well. was on  Klonopin PRN, no recent fills on this.       Osteoporosis: follows with GYN (lorena) for this. Had DEXA done 3/2018 with GYN which showed improvement. Doing Prolia with her but was too expensive so will discuss with her GYN--now on risedronate.      Tremor: dad has Parkinson's disease. Tells me she and her sisters all have this. Sees neurology and dx with essential tremor on propranolol; now off primidone.     Past Medical History:   Diagnosis Date    Arthritis     Asthma     GERD (gastroesophageal reflux disease)     Hiatal hernia     History of colonoscopy with polypectomy     History of esophagogastroduodenoscopy (EGD)     Hyperlipidemia     " Hypertension     Lumbar facet arthropathy     Type 2 diabetes mellitus without complication, without long-term current use of insulin        Family History   Problem Relation Age of Onset    Hypertension Mother     Hypertension Father     Parkinsonism Father        Social History     Socioeconomic History    Marital status:    Tobacco Use    Smoking status: Never    Smokeless tobacco: Never   Substance and Sexual Activity    Alcohol use: No    Drug use: No    Sexual activity: Not Currently     Comment:        Review of Systems   Constitutional:  Negative for activity change, fatigue, fever and unexpected weight change.   HENT:  Negative for congestion, ear pain, hearing loss, rhinorrhea and sore throat.    Eyes:  Negative for redness and visual disturbance.   Respiratory:  Negative for cough, shortness of breath and wheezing.    Cardiovascular:  Negative for chest pain, palpitations and leg swelling.   Gastrointestinal:  Negative for abdominal pain, constipation, diarrhea, nausea and vomiting.   Genitourinary:  Negative for dysuria, frequency and urgency.   Musculoskeletal:  Positive for arthralgias and back pain. Negative for joint swelling and neck pain.   Skin:  Negative for color change, rash and wound.   Neurological:  Positive for tremors. Negative for dizziness, weakness, light-headedness and headaches.       Objective:      Physical Exam  Vitals reviewed.   Constitutional:       General: She is not in acute distress.     Appearance: She is well-developed. She is obese.   HENT:      Head: Normocephalic and atraumatic.      Right Ear: External ear normal.      Left Ear: External ear normal.      Nose: Nose normal.   Eyes:      General:         Right eye: No discharge.         Left eye: No discharge.      Extraocular Movements: Extraocular movements intact.      Conjunctiva/sclera: Conjunctivae normal.      Pupils: Pupils are equal, round, and reactive to light.   Neck:      Thyroid: No  thyromegaly.   Cardiovascular:      Rate and Rhythm: Normal rate and regular rhythm.      Heart sounds: No murmur heard.  Pulmonary:      Effort: Pulmonary effort is normal. No respiratory distress.      Breath sounds: Normal breath sounds. No wheezing.   Abdominal:      General: Bowel sounds are normal. There is no distension.      Palpations: Abdomen is soft.      Tenderness: There is no abdominal tenderness.   Skin:     General: Skin is warm and dry.   Neurological:      Mental Status: She is alert and oriented to person, place, and time. Mental status is at baseline.      Cranial Nerves: No cranial nerve deficit.      Comments: Tremor noted   Psychiatric:         Behavior: Behavior normal.         Thought Content: Thought content normal.       Assessment:       1. Type 2 diabetes mellitus with both eyes affected by retinopathy without macular edema, without long-term current use of insulin, unspecified retinopathy severity    2. DDD (degenerative disc disease), lumbar    3. Lumbar facet arthropathy    4. Need for vaccination    5. Essential hypertension    6. Hypercholesteremia    7. ROBERTO (obstructive sleep apnea)    8. Insomnia, unspecified type    9. Osteoporosis, unspecified osteoporosis type, unspecified pathological fracture presence    10. History of left cataract extraction    11. Essential tremor        Plan:       Micaela was seen today for follow-up.    Diagnoses and all orders for this visit:    Type 2 diabetes mellitus with both eyes affected by retinopathy without macular edema, without long-term current use of insulin, unspecified retinopathy severity  -     CBC Auto Differential; Future  -     Comprehensive Metabolic Panel; Future  -     TSH; Future  -     Lipid Panel; Future  -     Hemoglobin A1C; Future  Chronic stable  Cont meds same dose  ADA diet  Check sugars and keep log    DDD (degenerative disc disease), lumbar  -     gabapentin (NEURONTIN) 100 MG capsule; 2 tabs in the AM and 1 in the  PM  Chronic stable  Sees pain management  Cont meds same dose for now  Referred to PT last visit with pain management  Follow up pain management as scheudled     Lumbar facet arthropathy  -     gabapentin (NEURONTIN) 100 MG capsule; 2 tabs in the AM and 1 in the PM  Chronic stable  Sees pain management  Cont meds same dose for now  Referred to PT last visit with pain management  Follow up pain management as scheudled    Need for vaccination  -     (In Office Administered) Pneumococcal Conjugate Vaccine (13 Valent) (IM)    Essential hypertension  -     CBC Auto Differential; Future  -     Comprehensive Metabolic Panel; Future  -     TSH; Future  -     Lipid Panel; Future  -     Hemoglobin A1C; Future  Chronic controlled  Continue medications at same dose  Low Na diet  Exercise, weight loss  Check BP and keep log for next visit    Hypercholesteremia  -     CBC Auto Differential; Future  -     Comprehensive Metabolic Panel; Future  -     TSH; Future  -     Lipid Panel; Future  -     Hemoglobin A1C; Future  Chronic stable  Cont meds same dose    ROBERTO (obstructive sleep apnea)  Weight loss  CPAP    Insomnia, unspecified type  Chronic stable  Cont meds same dose    Osteoporosis, unspecified osteoporosis type, unspecified pathological fracture presence  -     CBC Auto Differential; Future  -     Comprehensive Metabolic Panel; Future  Chronic stable  Cont meds same dose  Ca and Vit D daily    History of left cataract extraction  Planning right eye cataract in a few weeks  Medically stable to proceed with scheduled surgery  A1C < 8%-rec holding metformin morning of surgery  OK to continue other medications int he perioperative period  No further testing needed prior to surgery    Essential tremor  -     CBC Auto Differential; Future  -     Comprehensive Metabolic Panel; Future  -     TSH; Future  Chronic worsening  FH of Parkinsons as well  Cont meds same dose for now as she does have follow up already scheduled with  neuro  No new falls  Cont PT    RTC 6 months wth labs and PRN

## 2022-12-13 NOTE — TELEPHONE ENCOUNTER
Refill Decision Note   Micaela Arvizu  is requesting a refill authorization.  Brief Assessment and Rationale for Refill:  Approve     Medication Therapy Plan:       Medication Reconciliation Completed: No   Comments:     No Care Gaps recommended.     Note composed:12:45 PM 12/13/2022

## 2022-12-13 NOTE — Clinical Note
Hello! My sweet patient is seeing you 12/19 for cataract surgery clearrance since I had nothing available and she said I couldn't sign her form today because needed to be within 30 days. Since you don't see her regularly I put her clearance in my note today so you can review easily and sign her forms. Hopefully makes it easier for you and thanks for seeing her!!!! -Kinga

## 2023-01-23 DIAGNOSIS — E78.00 HYPERCHOLESTEREMIA: ICD-10-CM

## 2023-01-23 DIAGNOSIS — E11.9 TYPE 2 DIABETES MELLITUS WITHOUT COMPLICATION, WITHOUT LONG-TERM CURRENT USE OF INSULIN: ICD-10-CM

## 2023-01-23 RX ORDER — ATORVASTATIN CALCIUM 40 MG/1
TABLET, FILM COATED ORAL
Qty: 90 TABLET | Refills: 3 | Status: SHIPPED | OUTPATIENT
Start: 2023-01-23 | End: 2024-01-31

## 2023-01-23 NOTE — TELEPHONE ENCOUNTER
No new care gaps identified.  [facility] Embedded Care Gaps. Reference number: [id number]. 1/23/2023   1:42:34 PM CST

## 2023-01-24 NOTE — TELEPHONE ENCOUNTER
Refill Decision Note   Micaela Arvizu  is requesting a refill authorization.  Brief Assessment and Rationale for Refill:  Approve     Medication Therapy Plan:       Medication Reconciliation Completed: No   Comments:     No Care Gaps recommended.     Note composed:6:20 PM 01/23/2023

## 2023-02-02 ENCOUNTER — OFFICE VISIT (OUTPATIENT)
Dept: NEUROLOGY | Facility: CLINIC | Age: 68
End: 2023-02-02
Payer: MEDICARE

## 2023-02-02 VITALS
HEART RATE: 72 BPM | WEIGHT: 261.38 LBS | BODY MASS INDEX: 54.87 KG/M2 | RESPIRATION RATE: 14 BRPM | HEIGHT: 58 IN | SYSTOLIC BLOOD PRESSURE: 122 MMHG | DIASTOLIC BLOOD PRESSURE: 78 MMHG

## 2023-02-02 DIAGNOSIS — I10 ESSENTIAL HYPERTENSION: ICD-10-CM

## 2023-02-02 DIAGNOSIS — R29.898 BILATERAL LEG WEAKNESS: ICD-10-CM

## 2023-02-02 DIAGNOSIS — G47.00 INSOMNIA, UNSPECIFIED TYPE: ICD-10-CM

## 2023-02-02 DIAGNOSIS — G47.33 OSA (OBSTRUCTIVE SLEEP APNEA): ICD-10-CM

## 2023-02-02 DIAGNOSIS — G24.3 CERVICAL DYSTONIA: Primary | ICD-10-CM

## 2023-02-02 DIAGNOSIS — E11.319 TYPE 2 DIABETES MELLITUS WITH BOTH EYES AFFECTED BY RETINOPATHY WITHOUT MACULAR EDEMA, WITHOUT LONG-TERM CURRENT USE OF INSULIN, UNSPECIFIED RETINOPATHY SEVERITY: ICD-10-CM

## 2023-02-02 DIAGNOSIS — E66.01 MORBID OBESITY WITH BMI OF 50.0-59.9, ADULT: ICD-10-CM

## 2023-02-02 DIAGNOSIS — M54.16 LUMBAR RADICULOPATHY: ICD-10-CM

## 2023-02-02 DIAGNOSIS — F41.1 GAD (GENERALIZED ANXIETY DISORDER): ICD-10-CM

## 2023-02-02 DIAGNOSIS — G25.0 ESSENTIAL TREMOR: ICD-10-CM

## 2023-02-02 PROCEDURE — 99999 PR STA SHADOW: CPT | Mod: PBBFAC,,, | Performed by: NURSE PRACTITIONER

## 2023-02-02 PROCEDURE — 99999 PR PBB SHADOW E&M-EST. PATIENT-LVL V: CPT | Mod: PBBFAC,,, | Performed by: NURSE PRACTITIONER

## 2023-02-02 PROCEDURE — 99999 PR PBB SHADOW E&M-EST. PATIENT-LVL V: ICD-10-PCS | Mod: PBBFAC,,, | Performed by: NURSE PRACTITIONER

## 2023-02-02 PROCEDURE — 99214 OFFICE O/P EST MOD 30 MIN: CPT | Mod: S$PBB | Performed by: NURSE PRACTITIONER

## 2023-02-02 PROCEDURE — 99215 OFFICE O/P EST HI 40 MIN: CPT | Mod: PBBFAC | Performed by: NURSE PRACTITIONER

## 2023-02-02 RX ORDER — PROPRANOLOL HYDROCHLORIDE 40 MG/1
40 TABLET ORAL 2 TIMES DAILY
Qty: 180 TABLET | Refills: 1 | Status: SHIPPED | OUTPATIENT
Start: 2023-02-02 | End: 2023-05-11 | Stop reason: SDUPTHER

## 2023-02-02 RX ORDER — TRAZODONE HYDROCHLORIDE 50 MG/1
50 TABLET ORAL NIGHTLY
Qty: 90 TABLET | Refills: 1 | Status: SHIPPED | OUTPATIENT
Start: 2023-02-02 | End: 2023-05-11 | Stop reason: SDUPTHER

## 2023-02-02 RX ORDER — SERTRALINE HYDROCHLORIDE 25 MG/1
25 TABLET, FILM COATED ORAL DAILY
Qty: 30 TABLET | Refills: 5 | Status: SHIPPED | OUTPATIENT
Start: 2023-02-02 | End: 2023-05-11 | Stop reason: SDUPTHER

## 2023-02-02 RX ORDER — IPRATROPIUM BROMIDE AND ALBUTEROL SULFATE 2.5; .5 MG/3ML; MG/3ML
SOLUTION RESPIRATORY (INHALATION)
COMMUNITY
Start: 2022-11-14 | End: 2023-12-13

## 2023-02-02 RX ORDER — PREDNISOLONE ACETATE 10 MG/ML
1 SUSPENSION/ DROPS OPHTHALMIC 2 TIMES DAILY
COMMUNITY
Start: 2022-11-14 | End: 2023-05-11

## 2023-02-02 NOTE — PROGRESS NOTES
HPI: Micaela Arvizu is a 67 y.o. female with cervical dystonia with head tremor, multiple level degenerative changes of the L spine, prior lower T spine compression fractures and L2 and L4 compression deformities. She has bilateral  lumbar radicular pain. EMG/NCS of the legs normal 2015. She has GERD, HLD, HTN, JOHNNIE, insomnia, osteoporosis, and ROBERTO, as well as a history of asthmatic bronchitis. S/p right knee replacement in  with Dr. Vizcaino.      She presents today for a follow up visit. She has diffuse tremors complaint today. Her friend passed away and her  is this weekend. She feels that her entire body is shaking at times.     She remains on Gabapentin and Propranolol. BP and HR are normal today.     She is having a great deal of general anxiety. She is not taking an SSRI.     Neck tension is worse lately with increased stress.     She had a lumbar procedure with Dr. Daily, which was ineffective. She has not followed up with pain management. She is requesting an prescription for a power scooter for trips to stores, as she needs to use one a [employer] or [place] to shop, given increased lumbar pain with ambulating for longer periods of time. She has weakness to the lower legs at times. She uses a walker at times to help with this.     Trazodone continues to be effective for her insomnia.     She is compliant with CPAP, but her machine was recalled, and she is waiting for a replacement.  Dr. Robles has tried to assist with this.     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     Review of Systems   Constitutional:  Negative for fever.   HENT:  Negative for nosebleeds.    Eyes:  Negative for double vision.   Respiratory:  Negative for hemoptysis.    Cardiovascular:  Negative for leg swelling.   Gastrointestinal:  Negative for blood in stool.   Genitourinary:  Negative for hematuria.   Musculoskeletal:  Positive for back pain and neck pain.   Skin:  Negative for rash.   Neurological:  Positive  for tremors and weakness.   Psychiatric/Behavioral:  Negative for depression. The patient is not nervous/anxious and does not have insomnia.    Exam:  Gen Appearance, well developed/nourished in no apparent distress  CV: 2+ distal pulses with no edema or swelling  Neuro:  MS: Awake, alert, oriented to place, person, time, situation. Sustains attention. Recent/remote memory intact, Language is full to spontaneous speech/comprehension. Fund of Knowledge is full  CN: Optic discs not visualized today, PERRL, Extraoccular movements and visual fields are full. Normal facial sensation and strength, Tongue and Palate are midline and strong. Shoulder Shrug symmetric and strong.  Motor: Normal bulk, tone, no abnormal movements in the hands, no head tremor noted today. 5/5 strength bilateral upper/lower extremities with 1+ reflexes  Sensory: symmetric to temp, and vibration.  Romberg negative  Cerebellar: Finger-nose, Rapid alternating movements intact  Gait: Normal stance, no ataxia, antalgic gait.     Imagin2015 MRI L-spine:   Interval resolution of the edema like signal involving the inferior end plate of L2 which has been replaced with fatty marrow.    Mild spondylosis of the lumbar spine without evidence for significant central canal stenosis or neural foraminal narrowing.    MRI C-spine 2016: Multilevel cervical spondylosis with foraminal encroachment greatest from the C3-C5 levels as noted above.    Assessment/Plan: Micaela Arvizu is a 67 y.o. female with multiple level degenerative changes of the L spine, prior lower T spine compression fractures and L2  And L4 compression deformities. She has bilateral  lumbar radicular pain. EMG/NCS of the legs normal 2015. She has essential tremor of the head, which runs in her family. Exam shows essential tremor with cervical dystonia with left torticollis.    Labs:   23 A1c 7.3%, LDL 54/8, CMP and CBC unremarkable    I recommend:   1. Start Zoloft to treat for JOHNNIE,  which is likely contributing to her worsening tremor complaints, which are currently diffuse. Will titrate at her next visit if needed.   -To ER with threat of harm to self or others.   -She took Lexapro prior.   -stress reduction techniques were discussed today.     2. Continue Trazodone for insomnia.   -She failed Melatonin.   -No longer taking Klonopin.     3. S/p right knee replacement surgery. Dietary changes were discussed. Bariatric surgery not covered by her insurance.     4. Continue Propranolol for essential tremor at 40 mg bid. Increase back to 80 mg bid was ineffective. Tremor reportedly worse on reduced dose from 80 mg to 40 mg prior.     -No worsening of tremor off of Primidone.  -Tremor worse with anxiety, but this is improved lately.    -denies history of asthma-clarified prior records with her. She had episodic bronchitis prior.   Continue Gabapentin from 100 mg bid to 100 mg tid. Increased frequency has helped her head tremor, in conjunction with reducing her neck tension with Baclofen and PT.     5. Botox was ineffective for cervical dystonia prior.  -PT was effective prior for neck tension, reduced ROM/pain post knee replacement, and lumbar pain, but the effect was short lived.     6. L-spine complaints improved with right TKA, but are ongoing. She has seen pain management. She failed her last injections. Advised her to follow up with pain management to discuss ongoing complaints.     7. She is still on Gabapentin for L-spine pain.     8. Order [employer] scooter for shopping, as she cannot tolerate prolonged walking/standing, secondary to severe lumbar pain/radiculopathy, as well as ongoing pain and ROM issues after her right knee replacement. She has sensory issues from her lumbar radiculopathy, which result in subjective leg weakness, which interferes with her walking at times.     8. The patient is also being treated for osteoporosis.     9. Weight loss is difficult at this time, due to MSK  complaints.    10. She is  now s/p bilateral cataract surgery.     FU 3 months

## 2023-03-13 DIAGNOSIS — M51.36 DDD (DEGENERATIVE DISC DISEASE), LUMBAR: ICD-10-CM

## 2023-03-13 DIAGNOSIS — M47.816 LUMBAR FACET ARTHROPATHY: ICD-10-CM

## 2023-03-13 NOTE — TELEPHONE ENCOUNTER
----- Message from Kayley Grace sent at 3/13/2023 11:04 AM CDT -----  Contact: PATIENT  Micaela Arvizu  MRN: [id number]  : 1955  PCP: Kinga Robles  Home Phone      [phone]  Work Phone      Not on file.  Mobile          [phone]      MESSAGE: Patient is needing a refill on Gabapentin 100 mg 2 am 1 pm sent to [facility].  The pharmacy told the patient that the prescription was cancelled.        Phone: [phone]

## 2023-03-15 RX ORDER — GABAPENTIN 100 MG/1
CAPSULE ORAL
Qty: 270 CAPSULE | Refills: 1
Start: 2023-03-15 | End: 2023-09-05

## 2023-03-20 ENCOUNTER — TELEPHONE (OUTPATIENT)
Dept: NEUROLOGY | Facility: CLINIC | Age: 68
End: 2023-03-20
Payer: MEDICARE

## 2023-03-20 DIAGNOSIS — M47.816 LUMBAR FACET ARTHROPATHY: ICD-10-CM

## 2023-03-20 DIAGNOSIS — M51.36 DDD (DEGENERATIVE DISC DISEASE), LUMBAR: ICD-10-CM

## 2023-03-20 RX ORDER — GABAPENTIN 100 MG/1
CAPSULE ORAL
Qty: 270 CAPSULE | Refills: 1 | Status: CANCELLED | OUTPATIENT
Start: 2023-03-20

## 2023-03-20 NOTE — TELEPHONE ENCOUNTER
----- Message from Paloma Morrison sent at 3/20/2023 12:31 PM CDT -----  Contact: self  Micaela Arvizu  MRN: [id number]  : 1955  PCP: Kinga Robles  Home Phone      [phone]  Work Phone      Not on file.  Mobile          [phone]      MESSAGE: calling stating her refill for medication gabapentin was not called into her pharmacy [facility]     Phone  [phone]

## 2023-03-20 NOTE — TELEPHONE ENCOUNTER
Gabapentin RX never sent to pharmacy, so  I called it in today and it was approved by the pharmacist.  Patient notified and v/u.      gabapentin (NEURONTIN) 100 MG capsule 270 capsule 1 3/15/2023     Si tabs in the AM and 1 in the PM

## 2023-03-22 ENCOUNTER — PATIENT MESSAGE (OUTPATIENT)
Dept: NEUROLOGY | Facility: CLINIC | Age: 68
End: 2023-03-22
Payer: MEDICARE

## 2023-03-23 NOTE — TELEPHONE ENCOUNTER
I'd like to see her in office for a follow up visit to discuss. I started Zoloft on 2/2/23, and her follow up was to discuss the response and need for any changes. She should have an upcoming follow up visit with me already.

## 2023-03-27 DIAGNOSIS — E11.9 TYPE 2 DIABETES MELLITUS WITHOUT COMPLICATION, WITHOUT LONG-TERM CURRENT USE OF INSULIN: Primary | ICD-10-CM

## 2023-03-27 RX ORDER — TIRZEPATIDE 5 MG/.5ML
5 INJECTION, SOLUTION SUBCUTANEOUS
Qty: 4 PEN | Refills: 5 | Status: SHIPPED | OUTPATIENT
Start: 2023-03-27 | End: 2023-04-03 | Stop reason: ALTCHOICE

## 2023-03-28 ENCOUNTER — HOSPITAL ENCOUNTER (EMERGENCY)
Facility: HOSPITAL | Age: 68
Discharge: HOME | End: 2023-03-28
Attending: STUDENT IN AN ORGANIZED HEALTH CARE EDUCATION/TRAINING PROGRAM
Payer: MEDICARE

## 2023-03-28 VITALS
RESPIRATION RATE: 18 BRPM | SYSTOLIC BLOOD PRESSURE: 116 MMHG | HEART RATE: 68 BPM | OXYGEN SATURATION: 98 % | WEIGHT: 250 LBS | DIASTOLIC BLOOD PRESSURE: 59 MMHG | TEMPERATURE: 98 F | HEIGHT: 58 IN | BODY MASS INDEX: 52.48 KG/M2

## 2023-03-28 DIAGNOSIS — M79.602 LEFT ARM PAIN: Primary | ICD-10-CM

## 2023-03-28 DIAGNOSIS — M25.512 ACUTE PAIN OF LEFT SHOULDER: ICD-10-CM

## 2023-03-28 PROCEDURE — 25000003 PHARM REV CODE 250: Performed by: NURSE PRACTITIONER

## 2023-03-28 PROCEDURE — 99283 EMERGENCY DEPT VISIT LOW MDM: CPT

## 2023-03-28 RX ORDER — TRAMADOL HYDROCHLORIDE 50 MG/1
50 TABLET ORAL
Status: COMPLETED | OUTPATIENT
Start: 2023-03-28 | End: 2023-03-28

## 2023-03-28 RX ORDER — TRAMADOL HYDROCHLORIDE 50 MG/1
50 TABLET ORAL EVERY 8 HOURS PRN
Qty: 6 TABLET | Refills: 0 | Status: SHIPPED | OUTPATIENT
Start: 2023-03-28 | End: 2023-08-16

## 2023-03-28 RX ADMIN — TRAMADOL HYDROCHLORIDE 50 MG: 50 TABLET, COATED ORAL at 02:03

## 2023-03-28 NOTE — ED PROVIDER NOTES
Encounter Date: 3/28/2023       History     Chief Complaint   Patient presents with    Arm Injury     Micaela Arvizu is a 67 y.o. female with PMH of arthritis, asthma, GERD, hypertension, hyperlipidemia, DM type 2 who presents the ED for evaluation of left arm pain.  Patient reports that she tried to catch herself from falling with her left arm PTA and is now having L upper arm pain. She reports that she is unable to lift her arm without pain. She has no pain with rest and currently rates pain 2/10 in severity. She denies numbness/tingling to LUE.     The history is provided by the patient.   Review of patient's allergies indicates:   Allergen Reactions    No known allergies      Past Medical History:   Diagnosis Date    Arthritis     Asthma     GERD (gastroesophageal reflux disease)     Hiatal hernia     History of colonoscopy with polypectomy     History of esophagogastroduodenoscopy (EGD)     Hyperlipidemia     Hypertension     Lumbar facet arthropathy     Type 2 diabetes mellitus without complication, without long-term current use of insulin      Past Surgical History:   Procedure Laterality Date    CATARACT EXTRACTION, BILATERAL Bilateral     CHOLECYSTECTOMY  2012    COLONOSCOPY W/ BIOPSIES AND POLYPECTOMY  2000; 2002; 2007; 2/2012    Large polyp 1st scope only    EYE SURGERY      INJECTION OF ANESTHETIC AGENT AROUND MEDIAL BRANCH NERVES INNERVATING LUMBAR FACET JOINT Bilateral 12/20/2019    Procedure: LUMBAR MEDIAL BRANCH NERVE BLOCK (L4-5,L5-S1);  Surgeon: Deena Daily MD;  Location: [facility] OR;  Service: Pain Management;  Laterality: Bilateral;    RADIOFREQUENCY ABLATION OF LUMBAR MEDIAL BRANCH NERVE AT SINGLE LEVEL Right 10/23/2020    Procedure: RADIOFREQUENCY ABLATION (L4-5,L5-S1);  Surgeon: Deena Daily MD;  Location: [facility] OR;  Service: Pain Management;  Laterality: Right;    RADIOFREQUENCY THERMOCOAGULATION Right 09/23/2022    Procedure: RADIOFREQUENCY THERMAL COAGULATION (L4-5,L5-S1);  Surgeon: Deena  SAUL Daily MD;  Location: [facility] OR;  Service: Pain Management;  Laterality: Right;    RADIOFREQUENCY THERMOCOAGULATION Left 09/30/2022    Procedure: RADIOFREQUENCY THERMAL COAGULATION (L4-5,L5-S1);  Surgeon: Deena Daily MD;  Location: [facility] OR;  Service: Pain Management;  Laterality: Left;    TOTAL KNEE ARTHROPLASTY  09/11/2019    TOTAL KNEE ARTHROPLASTY      TRANSFORAMINAL EPIDURAL INJECTION OF STEROID Right 09/11/2020    Procedure: TRANSFORAMINAL EPIDURAL STEROID INJECTION (L4,5);  Surgeon: Deena Daily MD;  Location: [facility] OR;  Service: Pain Management;  Laterality: Right;     Family History   Problem Relation Age of Onset    Hypertension Mother     Hypertension Father     Parkinsonism Father      Social History     Tobacco Use    Smoking status: Never    Smokeless tobacco: Never   Substance Use Topics    Alcohol use: No    Drug use: No     Review of Systems   Constitutional:  Negative for fever.   HENT:  Negative for sore throat.    Respiratory:  Negative for chest tightness and shortness of breath.    Cardiovascular:  Negative for chest pain.   Gastrointestinal:  Negative for abdominal pain and nausea.   Genitourinary:  Negative for dysuria, frequency and urgency.   Musculoskeletal:  Positive for arthralgias (L arm pain). Negative for back pain.   Skin:  Negative for rash.   Neurological:  Negative for dizziness, weakness, light-headedness and numbness.   Hematological:  Does not bruise/bleed easily.     Physical Exam     Initial Vitals [03/28/23 1255]   BP Pulse Resp Temp SpO2   (!) 116/59 68 18 97.9 °F (36.6 °C) 98 %      MAP       --         Physical Exam    Nursing note and vitals reviewed.  Constitutional: She appears well-developed and well-nourished.   HENT:   Head: Normocephalic and atraumatic.   Right Ear: Tympanic membrane, external ear and ear canal normal. Tympanic membrane is not erythematous. No middle ear effusion.   Left Ear: Tympanic membrane, external ear and ear canal normal. Tympanic  membrane is not erythematous.  No middle ear effusion.   Nose: Nose normal.   Mouth/Throat: Uvula is midline, oropharynx is clear and moist and mucous membranes are normal. Mucous membranes are not pale and not dry.   Eyes: Conjunctivae and EOM are normal. Pupils are equal, round, and reactive to light.   Neck: Neck supple.   Normal range of motion.  Cardiovascular:  Normal rate, regular rhythm, normal heart sounds and intact distal pulses.           Pulmonary/Chest: Effort normal and breath sounds normal. She has no decreased breath sounds. She has no wheezes. She has no rhonchi. She has no rales.   Abdominal: Abdomen is soft. Bowel sounds are normal. There is no abdominal tenderness.   Musculoskeletal:      Left shoulder: Tenderness present. Decreased range of motion.      Left upper arm: Tenderness present.      Cervical back: Normal range of motion and neck supple.     Neurological: She is alert and oriented to person, place, and time. She has normal strength. She displays normal reflexes. No cranial nerve deficit or sensory deficit.   Skin: Skin is warm and dry. Capillary refill takes less than 2 seconds. No rash noted.   Psychiatric: She has a normal mood and affect. Her behavior is normal. Judgment and thought content normal.       ED Course   Procedures  Labs Reviewed - No data to display       Imaging Results              X-Ray Humerus 2 View Left (Final result)  Result time 03/28/23 14:47:58      Final result by Silas Mckenzie MD (03/28/23 14:47:58)                   Impression:      No acute finding detected.      Electronically signed by: Silas Mckenzie MD  Date:    03/28/2023  Time:    14:47               Narrative:    EXAMINATION:  XR HUMERUS 2 VIEW LEFT    CLINICAL HISTORY:  Pain in left arm    TECHNIQUE:  Left humerus 2 views    COMPARISON:  No priors available    FINDINGS:  No acute fracture or dislocation. No periostitis or osseous erosion. No radiopaque foreign bodies.                                        X-Ray Shoulder 2 or More Views Left (Final result)  Result time 03/28/23 14:44:33      Final result by Silas Mckenzie MD (03/28/23 14:44:33)                   Impression:      Osteoarthritic change without evidence for acute osseous abnormality.      Electronically signed by: Silas Mckenzie MD  Date:    03/28/2023  Time:    14:44               Narrative:    EXAMINATION:  XR SHOULDER COMPLETE 2 OR MORE VIEWS LEFT    CLINICAL HISTORY:  left shoulder pain;    TECHNIQUE:  Left shoulder three views    COMPARISON:  Contralateral shoulder radiographs 06/22/2016    FINDINGS:  No acute fracture or dislocation detected.  Glenohumeral and acromioclavicular osteophytes are present.  Visualized left-sided ribs and lung unremarkable.                                       Medications   traMADoL tablet 50 mg (50 mg Oral Given 3/28/23 1415)     Medical Decision Making:   Differential Diagnosis:   Humeral head fx, ligament injury, dislocation   Clinical Tests:   Radiological Study: Ordered and Reviewed  ED Management:  Evaluation of a 67-year-old female with left shoulder and upper arm pain after catching herself from falling.  + decreased ROM> + Bustos.   Good distal pulses.   XRAys show no soft tissue swelling or fracture.  Patient placed in arm sling. Will follow-up with ortho; established with DR. Vizcaino. Patient/caregiver voices understanding and feels comfortable with discharge plan.      The patient acknowledges that close follow up with medical provider is required. Instructed to follow up with PCP within 2 days. Patient was given specific return precautions. The patient agrees to comply with all instruction and directions given in the ER.                          Clinical Impression:   Final diagnoses:  [M79.602] Left arm pain (Primary)  [M25.512] Acute pain of left shoulder        ED Disposition Condition    Discharge Stable          ED Prescriptions       Medication Sig Dispense Start Date End Date Auth. Provider     traMADoL (ULTRAM) 50 mg tablet Take 1 tablet (50 mg total) by mouth every 8 (eight) hours as needed for Pain. 6 tablet 3/28/2023 -- Carrol Cisneros NP          Follow-up Information       Follow up With Specialties Details Why Contact Info    Harvinder Renteria MD Orthopedic Surgery Schedule an appointment as soon as possible for a visit in 2 days  [address]  [address]  [facility] [address]  [phone]               Carrol Cisneros NP  03/28/23 8942

## 2023-03-28 NOTE — ED TRIAGE NOTES
C/o left upper arm pain. Patient reports attempted to stop herself from falling and injured left upper arm. Patient reports is unable to move left arm forward due to pain. Patient did not fall.

## 2023-04-03 ENCOUNTER — PATIENT MESSAGE (OUTPATIENT)
Dept: FAMILY MEDICINE | Facility: CLINIC | Age: 68
End: 2023-04-03
Payer: MEDICARE

## 2023-04-03 DIAGNOSIS — E11.9 TYPE 2 DIABETES MELLITUS WITHOUT COMPLICATION, WITHOUT LONG-TERM CURRENT USE OF INSULIN: Primary | ICD-10-CM

## 2023-04-03 NOTE — TELEPHONE ENCOUNTER
Pt would like a prescription for Ozempic as Majouro is not covered under her insurance.     Please advise

## 2023-05-11 ENCOUNTER — OFFICE VISIT (OUTPATIENT)
Dept: NEUROLOGY | Facility: CLINIC | Age: 68
End: 2023-05-11
Payer: MEDICARE

## 2023-05-11 VITALS
SYSTOLIC BLOOD PRESSURE: 122 MMHG | HEART RATE: 77 BPM | WEIGHT: 249.88 LBS | OXYGEN SATURATION: 94 % | HEIGHT: 58 IN | BODY MASS INDEX: 52.45 KG/M2 | RESPIRATION RATE: 16 BRPM | DIASTOLIC BLOOD PRESSURE: 74 MMHG

## 2023-05-11 DIAGNOSIS — G25.0 ESSENTIAL TREMOR: ICD-10-CM

## 2023-05-11 DIAGNOSIS — G25.81 RESTLESS LEG: ICD-10-CM

## 2023-05-11 DIAGNOSIS — M79.675 GREAT TOE PAIN, LEFT: ICD-10-CM

## 2023-05-11 DIAGNOSIS — G47.00 INSOMNIA, UNSPECIFIED TYPE: ICD-10-CM

## 2023-05-11 DIAGNOSIS — E11.319 TYPE 2 DIABETES MELLITUS WITH BOTH EYES AFFECTED BY RETINOPATHY WITHOUT MACULAR EDEMA, WITHOUT LONG-TERM CURRENT USE OF INSULIN, UNSPECIFIED RETINOPATHY SEVERITY: ICD-10-CM

## 2023-05-11 DIAGNOSIS — R79.9 ABNORMAL FINDING OF BLOOD CHEMISTRY, UNSPECIFIED: ICD-10-CM

## 2023-05-11 DIAGNOSIS — G24.3 CERVICAL DYSTONIA: ICD-10-CM

## 2023-05-11 DIAGNOSIS — G47.33 OSA (OBSTRUCTIVE SLEEP APNEA): ICD-10-CM

## 2023-05-11 DIAGNOSIS — F41.1 GAD (GENERALIZED ANXIETY DISORDER): Primary | ICD-10-CM

## 2023-05-11 PROCEDURE — 99999 PR PBB SHADOW E&M-EST. PATIENT-LVL V: CPT | Mod: PBBFAC,,, | Performed by: NURSE PRACTITIONER

## 2023-05-11 PROCEDURE — 99214 OFFICE O/P EST MOD 30 MIN: CPT | Mod: S$PBB | Performed by: NURSE PRACTITIONER

## 2023-05-11 PROCEDURE — 99999 PR STA SHADOW: CPT | Mod: PBBFAC,,, | Performed by: NURSE PRACTITIONER

## 2023-05-11 PROCEDURE — 99999 PR STA SHADOW: ICD-10-PCS | Mod: PBBFAC,,, | Performed by: NURSE PRACTITIONER

## 2023-05-11 PROCEDURE — 99215 OFFICE O/P EST HI 40 MIN: CPT | Mod: PBBFAC | Performed by: NURSE PRACTITIONER

## 2023-05-11 RX ORDER — TRAZODONE HYDROCHLORIDE 50 MG/1
50 TABLET ORAL NIGHTLY
Qty: 90 TABLET | Refills: 1 | Status: SHIPPED | OUTPATIENT
Start: 2023-05-11 | End: 2024-02-20 | Stop reason: SDUPTHER

## 2023-05-11 RX ORDER — SERTRALINE HYDROCHLORIDE 50 MG/1
50 TABLET, FILM COATED ORAL DAILY
Qty: 30 TABLET | Refills: 5 | Status: SHIPPED | OUTPATIENT
Start: 2023-05-11 | End: 2023-08-16 | Stop reason: SDUPTHER

## 2023-05-11 RX ORDER — PROPRANOLOL HYDROCHLORIDE 40 MG/1
40 TABLET ORAL 2 TIMES DAILY
Qty: 180 TABLET | Refills: 1 | Status: SHIPPED | OUTPATIENT
Start: 2023-05-11 | End: 2023-11-16 | Stop reason: SDUPTHER
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
## 2023-05-11 NOTE — PATIENT INSTRUCTIONS
To wean Baclofen, take 1/2 tablet at night for 1 week then STOP.     Please make an appointment to see pain management to discus your pain further.     For Ferritin level ordered by this clinic, you may complete this with your next labs.

## 2023-06-04 DIAGNOSIS — M54.16 LUMBAR RADICULOPATHY: ICD-10-CM

## 2023-06-04 DIAGNOSIS — M47.816 LUMBAR SPONDYLOSIS: ICD-10-CM

## 2023-06-04 DIAGNOSIS — G89.29 CHRONIC BILATERAL LOW BACK PAIN WITH BILATERAL SCIATICA: ICD-10-CM

## 2023-06-04 DIAGNOSIS — M70.61 GREATER TROCHANTERIC BURSITIS OF RIGHT HIP: ICD-10-CM

## 2023-06-04 DIAGNOSIS — M54.42 CHRONIC BILATERAL LOW BACK PAIN WITH BILATERAL SCIATICA: ICD-10-CM

## 2023-06-04 DIAGNOSIS — M54.41 CHRONIC BILATERAL LOW BACK PAIN WITH BILATERAL SCIATICA: ICD-10-CM

## 2023-06-05 ENCOUNTER — CLINICAL SUPPORT (OUTPATIENT)
Dept: INTERNAL MEDICINE | Facility: CLINIC | Age: 68
End: 2023-06-05
Payer: MEDICARE

## 2023-06-05 DIAGNOSIS — E78.00 HYPERCHOLESTEREMIA: ICD-10-CM

## 2023-06-05 DIAGNOSIS — E11.319 TYPE 2 DIABETES MELLITUS WITH BOTH EYES AFFECTED BY RETINOPATHY WITHOUT MACULAR EDEMA, WITHOUT LONG-TERM CURRENT USE OF INSULIN, UNSPECIFIED RETINOPATHY SEVERITY: ICD-10-CM

## 2023-06-05 DIAGNOSIS — G25.81 RESTLESS LEG: ICD-10-CM

## 2023-06-05 DIAGNOSIS — I10 ESSENTIAL HYPERTENSION: Primary | ICD-10-CM

## 2023-06-05 DIAGNOSIS — R79.9 ABNORMAL FINDING OF BLOOD CHEMISTRY, UNSPECIFIED: ICD-10-CM

## 2023-06-05 LAB
ALBUMIN SERPL BCP-MCNC: 3.3 G/DL (ref 3.5–5.2)
ALP SERPL-CCNC: 67 U/L (ref 55–135)
ALT SERPL W/O P-5'-P-CCNC: 17 U/L (ref 10–44)
ANION GAP SERPL CALC-SCNC: 13 MMOL/L (ref 8–16)
AST SERPL-CCNC: 19 U/L (ref 10–40)
BASOPHILS # BLD AUTO: 0.04 K/UL (ref 0–0.2)
BASOPHILS NFR BLD: 0.4 % (ref 0–1.9)
BILIRUB SERPL-MCNC: 0.4 MG/DL (ref 0.1–1)
BUN SERPL-MCNC: 15 MG/DL (ref 8–23)
CALCIUM SERPL-MCNC: 10.5 MG/DL (ref 8.7–10.5)
CHLORIDE SERPL-SCNC: 100 MMOL/L (ref 95–110)
CHOLEST SERPL-MCNC: 145 MG/DL (ref 120–199)
CHOLEST/HDLC SERPL: 2.5 {RATIO} (ref 2–5)
CO2 SERPL-SCNC: 28 MMOL/L (ref 23–29)
CREAT SERPL-MCNC: 0.7 MG/DL (ref 0.5–1.4)
DIFFERENTIAL METHOD: ABNORMAL
EOSINOPHIL # BLD AUTO: 0.3 K/UL (ref 0–0.5)
EOSINOPHIL NFR BLD: 2.8 % (ref 0–8)
ERYTHROCYTE [DISTWIDTH] IN BLOOD BY AUTOMATED COUNT: 14.6 % (ref 11.5–14.5)
EST. GFR  (NO RACE VARIABLE): >60 ML/MIN/1.73 M^2
ESTIMATED AVG GLUCOSE: 134 MG/DL (ref 68–131)
GLUCOSE SERPL-MCNC: 134 MG/DL (ref 70–110)
HBA1C MFR BLD: 6.3 % (ref 4–5.6)
HCT VFR BLD AUTO: 45.2 % (ref 37–48.5)
HDLC SERPL-MCNC: 57 MG/DL (ref 40–75)
HDLC SERPL: 39.3 % (ref 20–50)
HGB BLD-MCNC: 13.6 G/DL (ref 12–16)
IMM GRANULOCYTES # BLD AUTO: 0.02 K/UL (ref 0–0.04)
IMM GRANULOCYTES NFR BLD AUTO: 0.2 % (ref 0–0.5)
LDLC SERPL CALC-MCNC: 62.2 MG/DL (ref 63–159)
LYMPHOCYTES # BLD AUTO: 1.6 K/UL (ref 1–4.8)
LYMPHOCYTES NFR BLD: 16.3 % (ref 18–48)
MCH RBC QN AUTO: 25.9 PG (ref 27–31)
MCHC RBC AUTO-ENTMCNC: 30.1 G/DL (ref 32–36)
MCV RBC AUTO: 86 FL (ref 82–98)
MONOCYTES # BLD AUTO: 0.5 K/UL (ref 0.3–1)
MONOCYTES NFR BLD: 4.9 % (ref 4–15)
NEUTROPHILS # BLD AUTO: 7.3 K/UL (ref 1.8–7.7)
NEUTROPHILS NFR BLD: 75.4 % (ref 38–73)
NONHDLC SERPL-MCNC: 88 MG/DL
NRBC BLD-RTO: 0 /100 WBC
PLATELET # BLD AUTO: 328 K/UL (ref 150–450)
PMV BLD AUTO: 8.7 FL (ref 9.2–12.9)
POTASSIUM SERPL-SCNC: 4.3 MMOL/L (ref 3.5–5.1)
PROT SERPL-MCNC: 7.8 G/DL (ref 6–8.4)
RBC # BLD AUTO: 5.26 M/UL (ref 4–5.4)
SODIUM SERPL-SCNC: 141 MMOL/L (ref 136–145)
TRIGL SERPL-MCNC: 129 MG/DL (ref 30–150)
TSH SERPL DL<=0.005 MIU/L-ACNC: 3.06 UIU/ML (ref 0.4–4)
WBC # BLD AUTO: 9.68 K/UL (ref 3.9–12.7)

## 2023-06-05 PROCEDURE — 80053 COMPREHEN METABOLIC PANEL: CPT | Performed by: INTERNAL MEDICINE

## 2023-06-05 PROCEDURE — 82728 ASSAY OF FERRITIN: CPT | Performed by: NURSE PRACTITIONER

## 2023-06-05 PROCEDURE — 99999 PR STA SHADOW: ICD-10-PCS | Mod: PBBFAC,,,

## 2023-06-05 PROCEDURE — 84443 ASSAY THYROID STIM HORMONE: CPT | Performed by: INTERNAL MEDICINE

## 2023-06-05 PROCEDURE — 83036 HEMOGLOBIN GLYCOSYLATED A1C: CPT | Performed by: INTERNAL MEDICINE

## 2023-06-05 PROCEDURE — 85025 COMPLETE CBC W/AUTO DIFF WBC: CPT | Performed by: INTERNAL MEDICINE

## 2023-06-05 PROCEDURE — 99999 PR STA SHADOW: CPT | Mod: PBBFAC,,,

## 2023-06-05 PROCEDURE — 36415 COLL VENOUS BLD VENIPUNCTURE: CPT | Mod: PBBFAC

## 2023-06-05 PROCEDURE — 80061 LIPID PANEL: CPT | Performed by: INTERNAL MEDICINE

## 2023-06-05 RX ORDER — BACLOFEN 10 MG/1
TABLET ORAL
Qty: 90 TABLET | Refills: 0 | Status: SHIPPED | OUTPATIENT
Start: 2023-06-05 | End: 2023-06-26

## 2023-06-06 LAB — FERRITIN SERPL-MCNC: 57 NG/ML (ref 20–300)

## 2023-06-10 NOTE — TELEPHONE ENCOUNTER
No care due was identified.  [facility] Embedded Care Due Messages. Reference number: [id number].   6/10/2023 9:03:31 AM CDT

## 2023-06-12 ENCOUNTER — TELEPHONE (OUTPATIENT)
Dept: INTERNAL MEDICINE | Facility: CLINIC | Age: 68
End: 2023-06-12
Payer: MEDICARE

## 2023-06-12 RX ORDER — SEMAGLUTIDE 0.68 MG/ML
INJECTION, SOLUTION SUBCUTANEOUS
Qty: 3 ML | Refills: 0 | Status: SHIPPED | OUTPATIENT
Start: 2023-06-12 | End: 2023-06-13

## 2023-06-12 NOTE — TELEPHONE ENCOUNTER
[facility] FLOWER Staff  Caller: Teresa (Today, 11:03 AM)  Micaela Arvizu   MRN: [id number]   : 1955   PCP: Kinga Robles   Home Phone      [phone]   Work Phone      Not on file.   Mobile          [phone]       MESSAGE: Teresa with [facility] needs the date of the last office visit and the A1C on the pt.     [phone] after 12:00 Leave message if she doesn't answer   Fax [phone]

## 2023-06-13 ENCOUNTER — OFFICE VISIT (OUTPATIENT)
Dept: INTERNAL MEDICINE | Facility: CLINIC | Age: 68
End: 2023-06-13
Payer: MEDICARE

## 2023-06-13 VITALS
SYSTOLIC BLOOD PRESSURE: 110 MMHG | HEIGHT: 58 IN | WEIGHT: 242.5 LBS | HEART RATE: 81 BPM | RESPIRATION RATE: 16 BRPM | OXYGEN SATURATION: 96 % | DIASTOLIC BLOOD PRESSURE: 72 MMHG | BODY MASS INDEX: 50.9 KG/M2

## 2023-06-13 DIAGNOSIS — G47.33 OSA (OBSTRUCTIVE SLEEP APNEA): ICD-10-CM

## 2023-06-13 DIAGNOSIS — E66.01 MORBID OBESITY WITH BMI OF 50.0-59.9, ADULT: ICD-10-CM

## 2023-06-13 DIAGNOSIS — E11.319 TYPE 2 DIABETES MELLITUS WITH BOTH EYES AFFECTED BY RETINOPATHY WITHOUT MACULAR EDEMA, WITHOUT LONG-TERM CURRENT USE OF INSULIN, UNSPECIFIED RETINOPATHY SEVERITY: ICD-10-CM

## 2023-06-13 DIAGNOSIS — G25.0 ESSENTIAL TREMOR: ICD-10-CM

## 2023-06-13 DIAGNOSIS — M47.816 LUMBAR FACET ARTHROPATHY: ICD-10-CM

## 2023-06-13 DIAGNOSIS — I87.2 VENOUS INSUFFICIENCY OF BOTH LOWER EXTREMITIES: ICD-10-CM

## 2023-06-13 DIAGNOSIS — M16.11 PRIMARY OSTEOARTHRITIS OF RIGHT HIP: ICD-10-CM

## 2023-06-13 DIAGNOSIS — E78.00 HYPERCHOLESTEREMIA: ICD-10-CM

## 2023-06-13 DIAGNOSIS — I10 ESSENTIAL HYPERTENSION: Primary | ICD-10-CM

## 2023-06-13 PROCEDURE — 99999 PR STA SHADOW: CPT | Mod: PBBFAC,,, | Performed by: INTERNAL MEDICINE

## 2023-06-13 PROCEDURE — 99214 OFFICE O/P EST MOD 30 MIN: CPT | Mod: S$PBB | Performed by: INTERNAL MEDICINE

## 2023-06-13 PROCEDURE — 99999 PR PBB SHADOW E&M-EST. PATIENT-LVL V: CPT | Mod: PBBFAC,,, | Performed by: INTERNAL MEDICINE

## 2023-06-13 PROCEDURE — 99215 OFFICE O/P EST HI 40 MIN: CPT | Mod: PBBFAC | Performed by: INTERNAL MEDICINE

## 2023-06-13 PROCEDURE — 99999 PR STA SHADOW: ICD-10-PCS | Mod: PBBFAC,,, | Performed by: INTERNAL MEDICINE

## 2023-06-13 RX ORDER — SEMAGLUTIDE 1.34 MG/ML
1 INJECTION, SOLUTION SUBCUTANEOUS
Qty: 3 ML | Refills: 11 | Status: SHIPPED | OUTPATIENT
Start: 2023-06-13 | End: 2023-08-16

## 2023-06-13 RX ORDER — BLOOD-GLUCOSE TRANSMITTER
EACH MISCELLANEOUS
Qty: 1 EACH | Refills: 1 | Status: SHIPPED | OUTPATIENT
Start: 2023-06-13 | End: 2023-08-16

## 2023-06-13 RX ORDER — BLOOD-GLUCOSE,RECEIVER,CONT
EACH MISCELLANEOUS
Qty: 1 EACH | Refills: 1 | Status: SHIPPED | OUTPATIENT
Start: 2023-06-13 | End: 2023-08-16

## 2023-06-13 NOTE — PROGRESS NOTES
Subjective:       Patient ID: Micaela Arvizu is a 67 y.o. female.    Chief Complaint: Follow-up (6 months/)      HPI:  Patient is known to me and presents for follow up HTN, HLD, osteoporosis, GERD and anxiety. Labs from 6/5/23 were personally reviewed, interpreted and discussed with the patient today.     H/H 13.6/45.2, normal  GFR > 60, normal  A1C 6.3%, trended down and normal  LDL 62, at goal    Chronic low back pain: Has had MRIs in the past. Has seen Dr. Daily for injections She uses tramadol for pain management for her arthrits which is working well. Supplementing with tylenol right now as trying not to use tramadol often.  Had stomach ulcer with NSAIDs.   Also taking gabapentin 100mg BID.     She is now dealing with worsening right hip pain and left shoudler pain. She has severe hip OA. Saw Dr. Drake who does think surgery is necessary but can not do it until she looses weight. Started GLP-1 and weight is trending down (see below). She also had left shoulder pain from rotator cuff injury. Saw Dr. Vizcaino who also felt surgery would be necessary for repair but also felt she was high risk for surgery per patient.  She is walking with walker. Having a lot of trouble walking long distances due to pain; can not even walk grocery store right now. She is inquiring about motorized scooter.     HTN: on lisinopril-HCTZ, lasix. Home BP < 140/90. Denies chest pains, SOB and LE edema.      HLD: on atorvastatin and fish oil. Denies medicatoin side effects. LDL at goal < 70.      DM: last A1C 6.3% On metformin 1000mg BID. We added Ozempic for weight loss and glucose control. She is on 0.5mg weekly. Denies side effects. Has been taking for 8 weeks. Weight is trending down. she has really started focusing on her diet. Less carbs and smaller portions. Feeling full sooner  Denies numbness/tingling. Does not check blood sugars regularly due to finger sticks; asking about Dexcom   Microalb: 5/2021  Foot exam: due next  "visit  Eye exam: done 11/2020     GERD: taking omeprazole daily. She has h/o of esophageal stricture. Had "stretching" with Dr. Bojorquez. Denies abd pain, n/v/d/c.      Anxiety/insomnia: on trazodone 50mg at night. Working well. was on  Klonopin PRN, no recent fills on this.       Osteoporosis: follows with GYN (lorena) for this. Had DEXA done 3/2018 with GYN which showed improvement. Doing Prolia with her but was too expensive so will discuss with her GYN--now on risedronate.      Tremor: dad has Parkinson's disease. Tells me she and her sisters all have this. Sees neurology and dx with essential tremor on propranolol; now off primidone.     Past Medical History:   Diagnosis Date    Arthritis     Asthma     GERD (gastroesophageal reflux disease)     Hiatal hernia     History of colonoscopy with polypectomy     History of esophagogastroduodenoscopy (EGD)     Hyperlipidemia     Hypertension     Lumbar facet arthropathy     Type 2 diabetes mellitus without complication, without long-term current use of insulin        Family History   Problem Relation Age of Onset    Hypertension Mother     Hypertension Father     Parkinsonism Father        Social History     Socioeconomic History    Marital status:    Tobacco Use    Smoking status: Never    Smokeless tobacco: Never   Substance and Sexual Activity    Alcohol use: No    Drug use: No    Sexual activity: Not Currently     Comment:        Review of Systems   Constitutional:  Negative for activity change, fatigue, fever and unexpected weight change.   HENT:  Negative for congestion, ear pain, hearing loss, rhinorrhea and sore throat.    Eyes:  Negative for redness and visual disturbance.   Respiratory:  Negative for cough, shortness of breath and wheezing.    Cardiovascular:  Negative for chest pain, palpitations and leg swelling.   Gastrointestinal:  Negative for abdominal pain, constipation, diarrhea, nausea and vomiting.   Genitourinary:  Negative for " dysuria, frequency and urgency.   Musculoskeletal:  Positive for arthralgias and back pain. Negative for joint swelling and neck pain.   Skin:  Negative for color change, rash and wound.   Neurological:  Positive for tremors. Negative for dizziness, weakness, light-headedness and headaches.       Objective:      Physical Exam  Vitals reviewed.   Constitutional:       General: She is not in acute distress.     Appearance: She is well-developed. She is obese.   HENT:      Head: Normocephalic and atraumatic.      Right Ear: External ear normal.      Left Ear: External ear normal.      Nose: Nose normal.   Eyes:      General:         Right eye: No discharge.         Left eye: No discharge.      Extraocular Movements: Extraocular movements intact.      Conjunctiva/sclera: Conjunctivae normal.   Neck:      Thyroid: No thyromegaly.   Cardiovascular:      Rate and Rhythm: Normal rate and regular rhythm.      Heart sounds: No murmur heard.  Pulmonary:      Effort: Pulmonary effort is normal. No respiratory distress.      Breath sounds: Normal breath sounds. No wheezing.   Abdominal:      Palpations: Abdomen is soft.      Tenderness: There is no abdominal tenderness.   Skin:     General: Skin is warm and dry.   Neurological:      Mental Status: She is alert and oriented to person, place, and time.      Cranial Nerves: No cranial nerve deficit.   Psychiatric:         Behavior: Behavior normal.         Thought Content: Thought content normal.       Assessment:       1. Essential hypertension    2. Hypercholesteremia    3. Venous insufficiency of both lower extremities    4. Morbid obesity with BMI of 50.0-59.9, adult    5. Type 2 diabetes mellitus with both eyes affected by retinopathy without macular edema, without long-term current use of insulin, unspecified retinopathy severity    6. ROBERTO (obstructive sleep apnea)    7. Lumbar facet arthropathy    8. Essential tremor    9. Primary osteoarthritis of right hip        Plan:        1. Essential hypertension  Chronic controlled  Continue medications at same dose  Low Na diet  Exercise, weight loss  Check BP and keep log for next visit    -     CBC Auto Differential; Future; Expected date: 12/10/2023  -     Comprehensive Metabolic Panel; Future; Expected date: 12/10/2023  -     Lipid Panel; Future; Expected date: 12/10/2023  -     Hemoglobin A1C; Future; Expected date: 12/10/2023  -     Microalbumin/Creatinine Ratio, Urine; Future; Expected date: 12/10/2023    2. Hypercholesteremia  Chronic controlled  Cont statin same dose  Diet, exercise, weight loss  -     CBC Auto Differential; Future; Expected date: 12/10/2023  -     Comprehensive Metabolic Panel; Future; Expected date: 12/10/2023  -     Lipid Panel; Future; Expected date: 12/10/2023  -     Hemoglobin A1C; Future; Expected date: 12/10/2023  -     Microalbumin/Creatinine Ratio, Urine; Future; Expected date: 12/10/2023    3. Venous insufficiency of both lower extremities  Chronic stable  No worsening swelling today  Low Na and leg elevation  -     CBC Auto Differential; Future; Expected date: 12/10/2023  -     Comprehensive Metabolic Panel; Future; Expected date: 12/10/2023  -     Lipid Panel; Future; Expected date: 12/10/2023  -     Hemoglobin A1C; Future; Expected date: 12/10/2023  -     Microalbumin/Creatinine Ratio, Urine; Future; Expected date: 12/10/2023    4. Morbid obesity with BMI of 50.0-59.9, adult  Chronic improving  Weight is trending down!!!!  INCREASE ozempic to 1mg weekly  Side effects discussed today  Glucose monitoring discussed  -     semaglutide (OZEMPIC) 1 mg/dose (4 mg/3 mL); Inject 1 mg into the skin every 7 days.  Dispense: 3 mL; Refill: 11  -     CBC Auto Differential; Future; Expected date: 12/10/2023  -     Comprehensive Metabolic Panel; Future; Expected date: 12/10/2023  -     Lipid Panel; Future; Expected date: 12/10/2023  -     Hemoglobin A1C; Future; Expected date: 12/10/2023  -     Microalbumin/Creatinine  Ratio, Urine; Future; Expected date: 12/10/2023    5. Type 2 diabetes mellitus with both eyes affected by retinopathy without macular edema, without long-term current use of insulin, unspecified retinopathy severity  Chronic controlled  Cont metformin  Increase ozempic for continue glucose control and weight loss benefit  ADA diet  Check sugars and keep log; call if hypoglycemia develops with dose change  -     semaglutide (OZEMPIC) 1 mg/dose (4 mg/3 mL); Inject 1 mg into the skin every 7 days.  Dispense: 3 mL; Refill: 11  -     blood-glucose meter,continuous (DEXCOM G6 ) Misc; Check blood sugar three time daily  Dispense: 1 each; Refill: 1  -     blood-glucose transmitter (DEXCOM G6 TRANSMITTER) Dhara; Check blood sugar three time daily  Dispense: 1 each; Refill: 1  -     CBC Auto Differential; Future; Expected date: 12/10/2023  -     Comprehensive Metabolic Panel; Future; Expected date: 12/10/2023  -     Lipid Panel; Future; Expected date: 12/10/2023  -     Hemoglobin A1C; Future; Expected date: 12/10/2023  -     Microalbumin/Creatinine Ratio, Urine; Future; Expected date: 12/10/2023    6. ROBERTO (obstructive sleep apnea)  Chronic stable  Cont CPAP  Weight loss  Overview:  On cpap      7. Lumbar facet arthropathy  Chronic worsening  Continued pain  PRN tramadol and tylenol; avoid NSAIDs    8. Essential tremor  Chronic stable  Cnot BB same dose    9. Primary osteoarthritis of right hip  Chronic worsening  Saw ortho and surgery recommended but needs more weight loss. Cont GLP-1  Cont PRN Tyelnol, tramadol  Walking with walker       RTC 6 months with labs for routine and PRN

## 2023-06-16 ENCOUNTER — PATIENT MESSAGE (OUTPATIENT)
Dept: INTERNAL MEDICINE | Facility: CLINIC | Age: 68
End: 2023-06-16
Payer: MEDICARE

## 2023-06-16 DIAGNOSIS — M51.36 DDD (DEGENERATIVE DISC DISEASE), LUMBAR: Primary | ICD-10-CM

## 2023-06-16 DIAGNOSIS — S22.080A COMPRESSION FRACTURE OF T12 VERTEBRA, INITIAL ENCOUNTER: ICD-10-CM

## 2023-06-16 DIAGNOSIS — M16.11 PRIMARY OSTEOARTHRITIS OF RIGHT HIP: ICD-10-CM

## 2023-06-26 DIAGNOSIS — I10 ESSENTIAL HYPERTENSION: ICD-10-CM

## 2023-06-26 DIAGNOSIS — M70.61 GREATER TROCHANTERIC BURSITIS OF RIGHT HIP: ICD-10-CM

## 2023-06-26 DIAGNOSIS — G89.29 CHRONIC BILATERAL LOW BACK PAIN WITH BILATERAL SCIATICA: ICD-10-CM

## 2023-06-26 DIAGNOSIS — M54.42 CHRONIC BILATERAL LOW BACK PAIN WITH BILATERAL SCIATICA: ICD-10-CM

## 2023-06-26 DIAGNOSIS — M47.816 LUMBAR SPONDYLOSIS: ICD-10-CM

## 2023-06-26 DIAGNOSIS — M54.16 LUMBAR RADICULOPATHY: ICD-10-CM

## 2023-06-26 DIAGNOSIS — M54.41 CHRONIC BILATERAL LOW BACK PAIN WITH BILATERAL SCIATICA: ICD-10-CM

## 2023-06-26 RX ORDER — PANTOPRAZOLE SODIUM 40 MG/1
TABLET, DELAYED RELEASE ORAL
Qty: 90 TABLET | Refills: 3 | Status: SHIPPED | OUTPATIENT
Start: 2023-06-26

## 2023-06-26 RX ORDER — BACLOFEN 10 MG/1
TABLET ORAL
Qty: 90 TABLET | Refills: 0 | Status: SHIPPED | OUTPATIENT
Start: 2023-06-26 | End: 2023-09-19 | Stop reason: SDUPTHER

## 2023-06-26 RX ORDER — LISINOPRIL AND HYDROCHLOROTHIAZIDE 10; 12.5 MG/1; MG/1
1 TABLET ORAL DAILY
Qty: 90 TABLET | Refills: 3 | Status: SHIPPED | OUTPATIENT
Start: 2023-06-26

## 2023-06-26 NOTE — TELEPHONE ENCOUNTER
Refill Decision Note   Micaela Arvizu  is requesting a refill authorization.  Brief Assessment and Rationale for Refill:  Approve     Medication Therapy Plan:         Alert overridden per protocol: Yes   Comments:     No Care Gaps recommended.     Note composed:2:27 PM 06/26/2023

## 2023-06-26 NOTE — TELEPHONE ENCOUNTER
No care due was identified.  [facility] Embedded Care Due Messages. Reference number: [id number].   6/26/2023 9:26:22 AM CDT

## 2023-07-10 ENCOUNTER — PATIENT MESSAGE (OUTPATIENT)
Dept: ADMINISTRATIVE | Facility: HOSPITAL | Age: 68
End: 2023-07-10
Payer: MEDICARE

## 2023-07-10 ENCOUNTER — TELEPHONE (OUTPATIENT)
Dept: INTERNAL MEDICINE | Facility: CLINIC | Age: 68
End: 2023-07-10
Payer: MEDICARE

## 2023-07-10 NOTE — TELEPHONE ENCOUNTER
Notified patient to contact her insurance to see if any alternatives are covered better. Patient stated that Mounjaro is not covered. She said she will call them again tomorrow.

## 2023-07-10 NOTE — TELEPHONE ENCOUNTER
----- Message from Pippa Holguin sent at 7/10/2023  3:31 PM CDT -----  Contact: pt  Micaela Arvizu  MRN: [id number]  : 1955  PCP: Kinga Robles  Home Phone      [phone]  Work Phone      Not on file.  Mobile          [phone]      MESSAGE:     Pt is wanting to speak with someone about semaglutide (OZEMPIC) 1 mg/dose (4 mg/3 mL) as she is having to pay 200$ this month and is needing to pay more the following month and is wanting to know if there is something else she can be sent or if we can try sending through [facility] [place] [place] as she was told it would be cheaper.      Please advise  [phone]

## 2023-07-25 ENCOUNTER — OFFICE VISIT (OUTPATIENT)
Dept: PAIN MEDICINE | Facility: CLINIC | Age: 68
End: 2023-07-25
Payer: MEDICARE

## 2023-07-25 VITALS
HEIGHT: 58 IN | HEART RATE: 80 BPM | RESPIRATION RATE: 18 BRPM | WEIGHT: 241.31 LBS | BODY MASS INDEX: 50.65 KG/M2 | SYSTOLIC BLOOD PRESSURE: 108 MMHG | DIASTOLIC BLOOD PRESSURE: 66 MMHG | OXYGEN SATURATION: 97 %

## 2023-07-25 DIAGNOSIS — L98.9 SKIN LESION: Primary | ICD-10-CM

## 2023-07-25 PROCEDURE — 99999 PR PBB SHADOW E&M-EST. PATIENT-LVL V: ICD-10-PCS | Mod: PBBFAC,,, | Performed by: NURSE PRACTITIONER

## 2023-07-25 PROCEDURE — 99214 OFFICE O/P EST MOD 30 MIN: CPT | Mod: S$PBB,,, | Performed by: NURSE PRACTITIONER

## 2023-07-25 PROCEDURE — 99215 OFFICE O/P EST HI 40 MIN: CPT | Mod: PBBFAC | Performed by: NURSE PRACTITIONER

## 2023-07-25 PROCEDURE — 99214 PR OFFICE/OUTPT VISIT, EST, LEVL IV, 30-39 MIN: ICD-10-PCS | Mod: S$PBB,,, | Performed by: NURSE PRACTITIONER

## 2023-07-25 PROCEDURE — 99999 PR PBB SHADOW E&M-EST. PATIENT-LVL V: CPT | Mod: PBBFAC,,, | Performed by: NURSE PRACTITIONER

## 2023-07-25 NOTE — PROGRESS NOTES
Pain Medicine     Chief Complaint:   Chief Complaint   Patient presents with    Back Pain         History of Present Illness: Micaela Arvizu is a 67 y.o. female with GERD, HTN, Anxiety, ROBERTO, insomnia, HTN, HLD, cervical dystonia w/ head tremor, osteoporosis, thoracic compression fx's, L2 & L4 compression fx's, referred by Adelina Saavedra NP for lumbar pain.  She previously saw Dr. Wolfe, last on 7/9/15, and was getting injections that provided benefit (B/L L3-5 MBB w/ steroid).     She states she was having very good relief of her back pain from the previous injection for about 4 years.  She had a right knee replacement with Dr. Vizcaino on 9/11/19 that helped her back pain. Planning on doing left knee eventually, likely next year.     Onset: off and on for years   Location: lower lumbar spine bilaterally and over SIJ  Radiation: down both legs to the feet when she stands.   Timing: intermittent  Quality: Aching and Deep  Exacerbating Factors: sitting, standing for more than 20 minutes and walking for more than 30 minutes  Alleviating Factors: laying down, medications and sitting  Associated Symptoms: denies night fever/night sweats, urinary incontinence, bowel incontinence, significant weight loss, significant motor weakness and loss of sensations     Severity: Currently: 4/10   Typical Range: 4-8/10     Exacerbation: 8/10    Interval History (10/25/2022):  Micaela Arvizu returns today for follow up.  At the last clinic visit, scheduled RFA, cont baclofen.     Left and Right L4-5, L5-S1 RFA provided 80% relief.     Currently, the back pain is improved.  She is having more hip pain now.  She localizes the pain to right lateral hip in the right groin.  Pain is intermittent.  Pain seems to be worse with walking and getting in and out of a vehicle.  She denies any radiation of the pain.  She denies any new weakness or numbness.  She denies any changes in bowel or bladder function.    She continues to take baclofen 10  "mg, but mostly does this once a day and at nighttime when she needs it.  It does cause sedation.    Current Pain Scales:  Current: 3/10              Typical Range: 3-5/10       Interval History 7/25/2023:  Mrs Arvizu presents for follow up of newer several month of symptoms of "bump" to R lower back and she felt it was secondary to RFA procedure several months prior. She denies fever, chills, drainage from area. It is painful to lay on this side.       Previous Interventions:  - 9/30/22: Left L4-5, L5-S1 MB RFA w/ 80% relief  - 9/23/22: Right L4-5, L5-S1 MB RFA w/ 80% relief  - 10/23/20: Right L3-5 MB RFA w/ 95% relief of the right low back  - 9/11/20: Right L4 & L5 TF AUBREY w/ 100% relief.   - 12/20/19: B/L L4-5, L5-S1 MBB w/ 100% relief  - 6/19/15: B/L L3-5 MBBs w/ kenalog (Tolba) w/ noted 90% relief    Previous Therapies:  PT/OT: yes  Surgery: no   Previous Medications:   - NSAIDS: NSAIDs caused stomach ulcer (per Dr. Robles's notes)   - Muscle Relaxants: Klonopin, Flexeril    - TCAs: None  - SNRIs: None  - Topicals: None  - Anticonvulsants: gabapentin  - Opioids: Tramadol, Oxycodone     Current Pain Medications:  Tylenol  Gabapentin 100 mg TID (only BID so far)  Baclofen 10 mg TID PRN (mostly taking at night because makes her tired)    Blood Thinners: ASA 81 mg    Full Medication List:    Current Outpatient Medications:     aspirin 81 MG Chew, Take 81 mg by mouth once daily. Temporarily off due to eye sx, Disp: , Rfl:     atorvastatin (LIPITOR) 40 MG tablet, Take 1 tablet by mouth once daily, Disp: 90 tablet, Rfl: 3    baclofen (LIORESAL) 10 MG tablet, TAKE 1 TABLET BY MOUTH THREE TIMES DAILY AS NEEDED FOR BACK PAIN, Disp: 90 tablet, Rfl: 0    blood sugar diagnostic Strp, Check blood sugar twice daily, Disp: 100 strip, Rfl: 1    blood-glucose meter (FREESTYLE FREEDOM) kit, Use as instructed, Disp: 1 each, Rfl: 0    cholecalciferol, vitamin D3, (VITAMIN D3) 50 mcg (2,000 unit) Cap, Take 2 capsules by mouth once " daily. , Disp: , Rfl:     colesevelam (WELCHOL) 625 mg tablet, Take 1 tablet (625 mg total) by mouth 2 (two) times daily with meals., Disp: 180 tablet, Rfl: 3    furosemide (LASIX) 20 MG tablet, Take 2 tablets (40 mg total) by mouth once daily. (Patient taking differently: Take 40 mg by mouth once daily. Sometimes only takes 1 pill), Disp: 180 tablet, Rfl: 3    gabapentin (NEURONTIN) 100 MG capsule, 2 tabs in the AM and 1 in the PM, Disp: 270 capsule, Rfl: 1    GLUC BALL/CHONDRO BALL A/VIT C/MN (GLUCOSAMINE 1500 COMPLEX ORAL), Take 1 tablet by mouth 2 (two) times daily., Disp: , Rfl:     lancets Misc, Check blood sugar twice a daily, Disp: 100 each, Rfl: 1    lisinopriL-hydrochlorothiazide (PRINZIDE,ZESTORETIC) 10-12.5 mg per tablet, Take 1 tablet by mouth once daily, Disp: 90 tablet, Rfl: 3    metFORMIN (GLUCOPHAGE) 1000 MG tablet, TAKE 1 TABLET BY MOUTH TWICE DAILY WITH MEALS, Disp: 180 tablet, Rfl: 0    multivitamin capsule, Take 1 capsule by mouth once daily., Disp: , Rfl:     omega-3 fatty acids-vitamin E 1,000 mg Cap, Take 1 tablet by mouth once daily., Disp: , Rfl:     pantoprazole (PROTONIX) 40 MG tablet, Take 1 tablet by mouth once daily, Disp: 90 tablet, Rfl: 3    propranoloL (INDERAL) 40 MG tablet, Take 1 tablet (40 mg total) by mouth 2 (two) times a day., Disp: 180 tablet, Rfl: 1    risedronate (ACTONEL) 35 MG tablet, Take 35 mg by mouth every 7 days., Disp: , Rfl:     semaglutide (OZEMPIC) 1 mg/dose (4 mg/3 mL), Inject 1 mg into the skin every 7 days., Disp: 3 mL, Rfl: 11    sertraline (ZOLOFT) 50 MG tablet, Take 1 tablet (50 mg total) by mouth once daily., Disp: 30 tablet, Rfl: 5    sucralfate (CARAFATE) 1 gram tablet, Take 1 g by mouth as needed. , Disp: , Rfl: 1    traMADoL (ULTRAM) 50 mg tablet, Take 1 tablet (50 mg total) by mouth every 8 (eight) hours as needed for Pain., Disp: 6 tablet, Rfl: 0    traZODone (DESYREL) 50 MG tablet, Take 1 tablet (50 mg total) by mouth every evening., Disp: 90 tablet,  Rfl: 1    albuterol-ipratropium (DUO-NEB) 2.5 mg-0.5 mg/3 mL nebulizer solution, SMARTSI Ampule(s) Via Nebulizer Every 6 Hours PRN, Disp: , Rfl:     blood-glucose meter,continuous (DEXCOM G6 ) Misc, Check blood sugar three time daily (Patient not taking: Reported on 2023), Disp: 1 each, Rfl: 1    blood-glucose transmitter (DEXCOM G6 TRANSMITTER) Dhara, Check blood sugar three time daily (Patient not taking: Reported on 2023), Disp: 1 each, Rfl: 1     Review of Systems:  Review of Systems   Constitutional:  Negative for fever and weight loss.   HENT:  Negative for ear pain and tinnitus.    Eyes:  Negative for pain and redness.   Respiratory:  Negative for cough and shortness of breath.    Cardiovascular:  Negative for chest pain and palpitations.   Gastrointestinal:  Negative for constipation and heartburn.   Genitourinary: Negative.         Denies urinary incontinence. Denies urine retention.    Musculoskeletal:  Positive for back pain. Negative for neck pain.   Skin:  Negative for itching and rash.   Neurological:  Negative for tingling, focal weakness and seizures.   Endo/Heme/Allergies:  Negative for environmental allergies. Does not bruise/bleed easily.   Psychiatric/Behavioral:  Negative for depression. The patient has insomnia. The patient is not nervous/anxious.      Allergies:  No known allergies     Medical History:   has a past medical history of Arthritis, Asthma, GERD (gastroesophageal reflux disease), Hiatal hernia, History of colonoscopy with polypectomy, History of esophagogastroduodenoscopy (EGD), Hyperlipidemia, Hypertension, Lumbar facet arthropathy, and Type 2 diabetes mellitus without complication, without long-term current use of insulin.    Surgical History:   has a past surgical history that includes Cholecystectomy (); Colonoscopy w/ biopsies and polypectomy (; ; ; 2012); Total knee arthroplasty (2019); Injection of anesthetic agent around medial  "branch nerves innervating lumbar facet joint (Bilateral, 12/20/2019); Total knee arthroplasty; Transforaminal epidural injection of steroid (Right, 09/11/2020); Radiofrequency ablation of lumbar medial branch nerve at single level (Right, 10/23/2020); Radiofrequency thermocoagulation (Right, 09/23/2022); Radiofrequency thermocoagulation (Left, 09/30/2022); Eye surgery; and Cataract extraction, bilateral (Bilateral).    Family History:  family history includes Hypertension in her father and mother; Parkinsonism in her father.    Social History:   reports that she has never smoked. She has never used smokeless tobacco. She reports that she does not drink alcohol and does not use drugs.    Physical Exam:  /66   Pulse 80   Resp 18   Ht 4' 10" (1.473 m)   Wt 109.5 kg (241 lb 4.8 oz)   SpO2 97%   BMI 50.43 kg/m²   GEN:  Well developed, well nourished.  No acute distress.   HEENT:  No trauma.  Mucous membranes moist.  Nares patent bilaterally.  PSYCH: Normal affect. Thought content appropriate.  CHEST:  Breathing symmetric.  No audible wheezing.  ABD: Soft, non-distended.  EXT:  No cyanosis, clubbing, or edema.  No color change or changes in nail or hair growth.  NEURO/MUSCULOSKELETAL:  Fully alert, oriented, and appropriate. Speech normal casandra. No cranial nerve deficits.   Gait: Using Cane.     Skin:  Far R upper buttock with approx 2x2cm discolored area with erythematous border, mildly indurated. Lesion does not appear to be in bullseye pattern, there is no fluctuation or drainage. No gross warmth.             Imaging:  - MRI L-spine 9/4/20:  There are old compression fractures of T11 and T12.  Similar findings present on the previous study.  No evidence of an acute fracture.  No congenital spinal stenosis.  Visualized portion of the spinal cord appears normal and terminates at approximately the L1 level.  At the T12-L1 level there is a minimal right paracentral disc-osteophyte with mild effacement along " the anterior thecal sac.  No significant spinal canal or foraminal encroachment.  At the L1-L2 level there is a broad-based and left paracentral disc protrusion with mild effacement along the anterior thecal sac.  Mild facet degenerative changes.  Mild spinal canal and foraminal encroachment.  At the L2-L3 and L3-L4 levels there are minimal disc bulges with effacement along the anterior margin of the thecal sac.  There are mild-to-moderate bilateral facet degenerative changes without evidence of significant spinal canal or foraminal encroachment.  At the L4-L5 level there is questionable minimal anterolisthesis related to advanced bilateral facet degenerative changes and prominence of the ligamentum flavum.  There is a pseudo disc bulge.  There is mild bilateral foraminal encroachment.  No spinal canal encroachment.  At the L5-S1 level there is a minimal degenerative disc bulge with effacement along the anterior margin of the thecal sac.  There are moderate bilateral facet degenerative changes with prominence of the ligamentum flavum.  There is mild bilateral foraminal encroachment    Labs:  BMP  Lab Results   Component Value Date     06/05/2023    K 4.3 06/05/2023     06/05/2023    CO2 28 06/05/2023    BUN 15 06/05/2023    CREATININE 0.7 06/05/2023    CALCIUM 10.5 06/05/2023    ANIONGAP 13 06/05/2023    ESTGFRAFRICA >60 06/07/2022    EGFRNONAA >60 06/07/2022     Lab Results   Component Value Date    ALT 17 06/05/2023    AST 19 06/05/2023    ALKPHOS 67 06/05/2023    BILITOT 0.4 06/05/2023     Lab Results   Component Value Date     06/05/2023       Assessment:  Micaela Arvizu is a 67 y.o. female with the following diagnoses based on history, exam, and imaging:    Problem List Items Addressed This Visit    None        This is a pleasant 67 y.o. lady with GERD, depression and anxiety, HTN, HLD, morbid obesity presenting with:    - Chronic low back pain that appears multifactorial in nature.She does  have facetogenic aspects to her pain, but she also has features of lumbar spinal stenosis with neurogenic claudication, and myofascial features.   - Right hip pain: Trochanteric bursitis on the right. Limited right hip ROM with likely OA.   - Pain complicated by depression and anxiety     Treatment Plan: I discussed with the patient the following assessment and recommendations. The following is the plan the patient agreed upon:  - Prior records reviewed  - Imaging reviewed  - Newer lesion and voiced concern this was secondary to procedure she had near 10 months prior (RFA)  - Reitterated this was not located where procedure was done  - Discussed to FU with her Dermatologist. (Did not appear in bullseye pattern to consider starting antibiotics, will again defer to Derm)  - RTC PRN       Follow Up: RTC PRN    JOSE ALFREDO Floyd  Interventional Pain Medicine / Physical Medicine & Rehabilitation    Disclaimer: This note was partly generated using dictation software which may occasionally result in transcription errors.    I spent a total of 30 minutes on the day of the visit.  This includes face to face time and non-face to face time preparing to see the patient by reviewing previous labs/imaging, obtaining and/or reviewing separately obtained history, documenting clinical information in the electronic or other health record, independently interpreting results and communicating results to the patient/family/caregiver.

## 2023-07-31 ENCOUNTER — PATIENT MESSAGE (OUTPATIENT)
Dept: FAMILY MEDICINE | Facility: CLINIC | Age: 68
End: 2023-07-31
Payer: MEDICARE

## 2023-08-01 DIAGNOSIS — E66.01 MORBID OBESITY WITH BMI OF 50.0-59.9, ADULT: ICD-10-CM

## 2023-08-01 DIAGNOSIS — E11.319 TYPE 2 DIABETES MELLITUS WITH BOTH EYES AFFECTED BY RETINOPATHY WITHOUT MACULAR EDEMA, WITHOUT LONG-TERM CURRENT USE OF INSULIN, UNSPECIFIED RETINOPATHY SEVERITY: ICD-10-CM

## 2023-08-02 ENCOUNTER — PATIENT MESSAGE (OUTPATIENT)
Dept: FAMILY MEDICINE | Facility: CLINIC | Age: 68
End: 2023-08-02
Payer: MEDICARE

## 2023-08-02 ENCOUNTER — TELEPHONE (OUTPATIENT)
Dept: INTERNAL MEDICINE | Facility: CLINIC | Age: 68
End: 2023-08-02
Payer: MEDICARE

## 2023-08-02 LAB — BCS RECOMMENDATION EXT: NORMAL

## 2023-08-02 NOTE — TELEPHONE ENCOUNTER
Ms. Elly,      Can you see if this patient qualifies for any patient assistance for her Ozempic. She is in the donut hole and having difficulty affordine. Medicare insurance. Thanks

## 2023-08-02 NOTE — LETTER
August 7, 2023    Micaela Arvizu  [address] [address]             [facility]  [address] [address]  Phone: [phone]  Fax: [phone] To Whom it May Concern:  Micaela Arvizu is trying to receive her medications with the medication assistance program. We cannot accept any medications here in office to dispense to patients. Please mail medications to patient's home.       If you have any questions or concerns, please don't hesitate to call.    Sincerely,        Kinga Robles MD

## 2023-08-16 ENCOUNTER — OFFICE VISIT (OUTPATIENT)
Dept: NEUROLOGY | Facility: CLINIC | Age: 68
End: 2023-08-16
Payer: MEDICARE

## 2023-08-16 VITALS
SYSTOLIC BLOOD PRESSURE: 112 MMHG | RESPIRATION RATE: 16 BRPM | HEART RATE: 107 BPM | BODY MASS INDEX: 50.69 KG/M2 | DIASTOLIC BLOOD PRESSURE: 68 MMHG | HEIGHT: 58 IN | WEIGHT: 241.5 LBS | OXYGEN SATURATION: 99 %

## 2023-08-16 DIAGNOSIS — G47.33 OSA (OBSTRUCTIVE SLEEP APNEA): ICD-10-CM

## 2023-08-16 DIAGNOSIS — G47.00 INSOMNIA, UNSPECIFIED TYPE: ICD-10-CM

## 2023-08-16 DIAGNOSIS — M54.16 LUMBAR RADICULOPATHY: ICD-10-CM

## 2023-08-16 DIAGNOSIS — G25.0 ESSENTIAL TREMOR: Primary | ICD-10-CM

## 2023-08-16 DIAGNOSIS — F41.1 GAD (GENERALIZED ANXIETY DISORDER): ICD-10-CM

## 2023-08-16 DIAGNOSIS — G24.3 CERVICAL DYSTONIA: ICD-10-CM

## 2023-08-16 DIAGNOSIS — G25.81 RESTLESS LEG SYNDROME: ICD-10-CM

## 2023-08-16 PROCEDURE — 99215 OFFICE O/P EST HI 40 MIN: CPT | Mod: PBBFAC | Performed by: NURSE PRACTITIONER

## 2023-08-16 PROCEDURE — 99999 PR PBB SHADOW E&M-EST. PATIENT-LVL V: CPT | Mod: PBBFAC,,, | Performed by: NURSE PRACTITIONER

## 2023-08-16 PROCEDURE — 99999 PR STA SHADOW: CPT | Mod: PBBFAC,,, | Performed by: NURSE PRACTITIONER

## 2023-08-16 PROCEDURE — 99214 OFFICE O/P EST MOD 30 MIN: CPT | Mod: S$PBB | Performed by: NURSE PRACTITIONER

## 2023-08-16 PROCEDURE — 99999 PR PBB SHADOW E&M-EST. PATIENT-LVL V: ICD-10-PCS | Mod: PBBFAC,,, | Performed by: NURSE PRACTITIONER

## 2023-08-16 RX ORDER — SERTRALINE HYDROCHLORIDE 50 MG/1
50 TABLET, FILM COATED ORAL DAILY
Qty: 90 TABLET | Refills: 1 | Status: SHIPPED | OUTPATIENT
Start: 2023-08-16 | End: 2023-11-16 | Stop reason: SDUPTHER

## 2023-08-16 RX ORDER — BETAMETHASONE DIPROPIONATE 0.5 MG/G
CREAM TOPICAL DAILY PRN
COMMUNITY
Start: 2023-08-03

## 2023-08-16 RX ORDER — ROPINIROLE 0.5 MG/1
0.5 TABLET, FILM COATED ORAL NIGHTLY
Qty: 30 TABLET | Refills: 5 | Status: SHIPPED | OUTPATIENT
Start: 2023-08-16 | End: 2023-11-16 | Stop reason: SDUPTHER

## 2023-08-16 NOTE — PATIENT INSTRUCTIONS
Call clinic in one week with response to ropinirole for restless legs. We could consider a dose increase if needed then.

## 2023-08-16 NOTE — PROGRESS NOTES
HPI: Micaela Arvizu is a 67 y.o. female with cervical dystonia with head tremor, multiple level degenerative changes of the L spine, prior lower T spine compression fractures and L2 and L4 compression deformities. She has bilateral  lumbar radicular pain. EMG/NCS of the legs normal 5/2015. She has GERD, HLD, HTN, JOHNNIE, insomnia, osteoporosis, and ROBERTO, as well as a history of asthmatic bronchitis. S/p right knee replacement in 201 with Dr. Vizcaino.      She presents today for a follow up visit. Her Zoloft was increased at her last visit for further control of her general anxiety, which has been effective.     She was referred to Podiatry at her last visit for orthopedic type left great toe pain. She was diagnosed with an ingrown toenail. She had her toenail cut, which resolved her pain.     She is pending a right hip replacement. She was advised by Ortho/Dr. Drake that she needed to lose weight. She had to stop taking Ozempic, due to high cost. She is still trying to lose weight.     Ferritin level was normal at her last visit. She has RLS complaints. 3 nights each week. She has tried her 's klonopin, which was helpful.     She is taking Trazodone for sleep currently.     She remains on Gabapentin and Propranolol to treat for essential tremor. BP is normal today. HR is elevated.     Neck tension is improved lately. Left shoulder pain is also improved. She has seen Ortho for her left shoulder pain.     She is compliant with CPAP.    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     Review of Systems   Unable to perform ROS: Other   Constitutional:  Negative for malaise/fatigue.   Eyes:  Negative for blurred vision and double vision.   Cardiovascular:  Negative for palpitations and leg swelling.   Musculoskeletal:  Positive for back pain and joint pain. Negative for falls and neck pain.   Skin:  Negative for rash.   Neurological:  Positive for tremors. Negative for dizziness, tingling, sensory change,  weakness and headaches.   Endo/Heme/Allergies:  Does not bruise/bleed easily.   Psychiatric/Behavioral:  Negative for depression and memory loss. The patient is not nervous/anxious and does not have insomnia.      Exam:  Gen Appearance, well developed/nourished in no apparent distress  CV: 2+ distal pulses with no edema or swelling  Neuro:  MS: Awake, alert, oriented to place, person, time, situation. Sustains attention. Recent/remote memory intact, Language is full to spontaneous speech/comprehension. Fund of Knowledge is full  CN: PERRL, Extraoccular movements and visual fields are full. Normal facial sensation and strength, Tongue and Palate are midline and strong. Shoulder Shrug symmetric and strong.  Motor: Normal bulk, tone, no abnormal movements in the hands, no head tremor noted today with generalized tremulousness when discussing stressful items. 5/5 strength bilateral upper/lower extremities with 1+ reflexes  Sensory: symmetric to temp, and vibration.  Romberg negative  Cerebellar: Finger-nose, Rapid alternating movements intact  Gait: Normal stance, no ataxia, antalgic gait.       Imagin2015 MRI L-spine:   Interval resolution of the edema like signal involving the inferior end plate of L2 which has been replaced with fatty marrow.    Mild spondylosis of the lumbar spine without evidence for significant central canal stenosis or neural foraminal narrowing.    MRI C-spine 2016: Multilevel cervical spondylosis with foraminal encroachment greatest from the C3-C5 levels as noted above.    Assessment/Plan: Micaela Arvizu is a 67 y.o. female with multiple level degenerative changes of the L spine, prior lower T spine compression fractures and L2  And L4 compression deformities. She has bilateral  lumbar radicular pain. EMG/NCS of the legs normal 2015. She has essential tremor of the head, which runs in her family. Exam shows essential tremor with cervical dystonia with left torticollis.    Labs:   23  A1c 7.3%, LDL 54/8, CMP and CBC unremarkable.  6/2023 Ferritin    I recommend:   1. Continue Zoloft 50 mg treat for JOHNNIE. Recent increase was effective, and seemed to resolve diffuse tremors.   -To ER with threat of harm to self or others.   -She took Lexapro prior.   -stress reduction techniques were discussed today.     -counseling was suggested to help with grief and other stressors. Discussed how stress can affect the body.     2. Continue Trazodone for insomnia. Increased insomnia with increased stress.   -She failed Melatonin.   -No longer taking Klonopin.     3. S/p right knee replacement surgery. Dietary changes were discussed. Bariatric surgery not covered by her insurance.     4. Continue Propranolol for essential tremor at 40 mg bid. Increase back to 80 mg bid was ineffective. Tremor reportedly worse on reduced dose from 80 mg to 40 mg prior.     -No worsening of tremor off of Primidone.    -Tremor worse with anxiety, and is diffuse with anxiety, rather than only affecting the hands and head.   -denies history of asthma-clarified prior records with her. She had episodic bronchitis prior.   Continue Gabapentin.     5. She failed Tizanidine, and is taking Baclofen per pain management.     6. Referred to Podiatry for evaluation of left great toe pain. Explained that localized pain to the great toe was not suggestive of neuropathy. She was diagnosed with an ingrown toenail.     7. Start trial of Requip for RLS. Ferritin level was normal. She has tried her 's klonopin, which was helpful. It is preferable to start with a dopamine agonist, rather than a benzo, as discussed. Call clinic in one week with update on response.     8. Botox was ineffective for cervical dystonia prior.  -PT was effective prior for neck tension, reduced ROM/pain post knee replacement, and lumbar pain, but the effect was short lived.     9. L-spine complaints improved with right TKA, but are ongoing. She has seen pain management.  She failed her last injections. Advised her to follow up with pain management to discuss ongoing complaints.     -She is still on Gabapentin for L-spine pain.     10. Order motorized scooter prior, as she cannot tolerate prolonged walking/standing, secondary to severe lumbar pain/radiculopathy, as well as ongoing pain and ROM issues after her right knee replacement. She has sensory issues from her lumbar radiculopathy, which result in subjective leg weakness, which interferes with her walking at times. This was not approved by her insurance.     11. The patient is also being treated for osteoporosis.     12. Weight loss is difficult at this time, due to MSK complaints. She was placed on Ozempic recently to try to lose weight in preparation for a right hip replacement.     13. She is  now s/p bilateral cataract surgery.     FU 3 months

## 2023-08-25 ENCOUNTER — PATIENT OUTREACH (OUTPATIENT)
Dept: ADMINISTRATIVE | Facility: HOSPITAL | Age: 68
End: 2023-08-25
Payer: MEDICARE

## 2023-08-25 RX ORDER — FUROSEMIDE 20 MG/1
40 TABLET ORAL
Qty: 180 TABLET | Refills: 3 | Status: SHIPPED | OUTPATIENT
Start: 2023-08-25

## 2023-08-25 NOTE — PROGRESS NOTES
Chart reviewed, immunization record updated.  No new results noted on [facility] or [facility] web site.  Care Everywhere updated.   Patient care coordination note updated.   Upcoming PCP visit updated.  Next PCP visit 12/13/2023.  LOV with PCP 06/13/2023.  E-fax sent to [facility] for last Mammogram report.

## 2023-08-25 NOTE — LETTER
AUTHORIZATION FOR RELEASE OF   CONFIDENTIAL INFORMATION    Dear [facility],    We are seeing Micaela Arvizu, date of birth 1955, in the clinic at [facility] INTERNAL MEDICINE II. Kinga Robles MD is the patient's PCP. Micaela Arvizu has an outstanding lab/procedure at the time we reviewed her chart. In order to help keep her health information updated, she has authorized us to request the following medical record(s):        ( X )  MAMMOGRAM                 Please fax records to [facility], [place], [place], [place], [phone].    If you have any questions, please contact...  Ingrdi Nieto LPN  Clinical Care Coordinator  [facility]  Phone: [phone]  Fax: [phone]           Patient Name: Micaela Arvizu  : 1955  Patient Phone #: [phone]

## 2023-08-26 NOTE — TELEPHONE ENCOUNTER
No care due was identified.  [facility] Embedded Care Due Messages. Reference number: [id number].   8/25/2023 7:26:24 PM CDT

## 2023-08-26 NOTE — TELEPHONE ENCOUNTER
Refill Decision Note   Micaela Arvizu  is requesting a refill authorization.  Brief Assessment and Rationale for Refill:  Approve     Medication Therapy Plan:         Comments:     No Care Gaps recommended.     Note composed:7:29 PM 08/25/2023

## 2023-09-05 DIAGNOSIS — M47.816 LUMBAR FACET ARTHROPATHY: ICD-10-CM

## 2023-09-05 DIAGNOSIS — M51.36 DDD (DEGENERATIVE DISC DISEASE), LUMBAR: ICD-10-CM

## 2023-09-05 RX ORDER — GABAPENTIN 100 MG/1
CAPSULE ORAL
Qty: 270 CAPSULE | Refills: 0 | Status: SHIPPED | OUTPATIENT
Start: 2023-09-05 | End: 2023-11-16

## 2023-09-16 NOTE — TELEPHONE ENCOUNTER
Care Due:                  Date            Visit Type   Department     Provider  --------------------------------------------------------------------------------                                EP -                              PRIMARY      [facility] INTERNAL  Last Visit: 06-      CARE ([facility])   MEDICINE II    Kinga Robles                               -                              PRIMARY      [facility] INTERNAL  Next Visit: 12-      CARE ([facility])   MEDICINE II    Kinga Robles                                                            Last  Test          Frequency    Reason                     Performed    Due Date  --------------------------------------------------------------------------------    HBA1C.......  6 months...  metFORMIN................  06- 12-    [facility] Embedded Care Due Messages. Reference number: [id number].   9/16/2023 11:56:06 AM CDT

## 2023-09-18 RX ORDER — METFORMIN HYDROCHLORIDE 1000 MG/1
TABLET ORAL
Qty: 180 TABLET | Refills: 0 | Status: SHIPPED | OUTPATIENT
Start: 2023-09-18 | End: 2023-12-13 | Stop reason: SDUPTHER

## 2023-09-18 NOTE — TELEPHONE ENCOUNTER
Refill Decision Note   Micaela Arvizu  is requesting a refill authorization.  Brief Assessment and Rationale for Refill:  Approve     Medication Therapy Plan:  FLOS      Comments:     Note composed:4:02 AM 09/18/2023

## 2023-09-19 ENCOUNTER — TELEPHONE (OUTPATIENT)
Dept: INTERNAL MEDICINE | Facility: CLINIC | Age: 68
End: 2023-09-19
Payer: MEDICARE

## 2023-09-19 DIAGNOSIS — G89.29 CHRONIC BILATERAL LOW BACK PAIN WITH BILATERAL SCIATICA: ICD-10-CM

## 2023-09-19 DIAGNOSIS — M70.61 GREATER TROCHANTERIC BURSITIS OF RIGHT HIP: ICD-10-CM

## 2023-09-19 DIAGNOSIS — M54.16 LUMBAR RADICULOPATHY: ICD-10-CM

## 2023-09-19 DIAGNOSIS — U07.1 COVID-19: Primary | ICD-10-CM

## 2023-09-19 DIAGNOSIS — M47.816 LUMBAR SPONDYLOSIS: ICD-10-CM

## 2023-09-19 DIAGNOSIS — M54.42 CHRONIC BILATERAL LOW BACK PAIN WITH BILATERAL SCIATICA: ICD-10-CM

## 2023-09-19 DIAGNOSIS — M54.41 CHRONIC BILATERAL LOW BACK PAIN WITH BILATERAL SCIATICA: ICD-10-CM

## 2023-09-19 RX ORDER — BENZONATATE 200 MG/1
200 CAPSULE ORAL 3 TIMES DAILY PRN
Qty: 30 CAPSULE | Refills: 0 | Status: SHIPPED | OUTPATIENT
Start: 2023-09-19 | End: 2023-09-29

## 2023-09-19 RX ORDER — NIRMATRELVIR AND RITONAVIR 300-100 MG
KIT ORAL
Qty: 30 TABLET | Refills: 0 | Status: SHIPPED | OUTPATIENT
Start: 2023-09-19 | End: 2023-09-19 | Stop reason: SDUPTHER

## 2023-09-19 RX ORDER — NIRMATRELVIR AND RITONAVIR 300-100 MG
KIT ORAL
Qty: 30 TABLET | Refills: 0 | Status: SHIPPED | OUTPATIENT
Start: 2023-09-19 | End: 2023-11-16

## 2023-09-19 NOTE — TELEPHONE ENCOUNTER
----- Message from Moraima Brady sent at 2023  3:17 PM CDT -----  Contact: pt  Micaela Arvizu  MRN: [id number]  : 1955  PCP: Kinga Robles  Home Phone      [phone]  Work Phone      Not on file.  Mobile          [phone]      MESSAGE: pt states she tested positive for covid using a home test. She states her symptoms are fever, sinus, itchy throat, coughing, slight headache and started yesterday with sneezing. Please advise on what she should do.     [facility] [address] [place] [address] [address] [address]  Phone: [phone] Fax: [phone]  Hours: Not open 24 hours    [phone]

## 2023-09-19 NOTE — TELEPHONE ENCOUNTER
COVID risk score 4  She qualifies for paxlovid. Prescription sent in. She needs to stop her atorvastatin while taking this medication due to medication interaction.   Tessalon sent for cough  Rest up and stay hydrated  If oxygen < 92% go to ER otherwise quarantine at home for 5 days from onset of symptoms and until fevers are resolved without use of medication for at lest 24 hours.

## 2023-09-20 RX ORDER — BACLOFEN 10 MG/1
10 TABLET ORAL 3 TIMES DAILY
Qty: 90 TABLET | Refills: 11 | Status: SHIPPED | OUTPATIENT
Start: 2023-09-20

## 2023-09-25 ENCOUNTER — OFFICE VISIT (OUTPATIENT)
Dept: ENDOCRINOLOGY | Facility: CLINIC | Age: 68
End: 2023-09-25
Payer: MEDICARE

## 2023-09-25 VITALS
RESPIRATION RATE: 18 BRPM | HEART RATE: 99 BPM | DIASTOLIC BLOOD PRESSURE: 60 MMHG | HEIGHT: 58 IN | WEIGHT: 238 LBS | BODY MASS INDEX: 49.96 KG/M2 | SYSTOLIC BLOOD PRESSURE: 122 MMHG

## 2023-09-25 DIAGNOSIS — E78.00 HYPERCHOLESTEREMIA: ICD-10-CM

## 2023-09-25 DIAGNOSIS — E11.319 TYPE 2 DIABETES MELLITUS WITH BOTH EYES AFFECTED BY RETINOPATHY WITHOUT MACULAR EDEMA, WITHOUT LONG-TERM CURRENT USE OF INSULIN, UNSPECIFIED RETINOPATHY SEVERITY: ICD-10-CM

## 2023-09-25 DIAGNOSIS — I10 ESSENTIAL HYPERTENSION: ICD-10-CM

## 2023-09-25 PROCEDURE — 99999 PR PBB SHADOW E&M-EST. PATIENT-LVL V: CPT | Mod: PBBFAC,,, | Performed by: STUDENT IN AN ORGANIZED HEALTH CARE EDUCATION/TRAINING PROGRAM

## 2023-09-25 PROCEDURE — 99215 OFFICE O/P EST HI 40 MIN: CPT | Mod: PBBFAC | Performed by: STUDENT IN AN ORGANIZED HEALTH CARE EDUCATION/TRAINING PROGRAM

## 2023-09-25 PROCEDURE — 99204 OFFICE O/P NEW MOD 45 MIN: CPT | Mod: S$PBB | Performed by: STUDENT IN AN ORGANIZED HEALTH CARE EDUCATION/TRAINING PROGRAM

## 2023-09-25 PROCEDURE — 99999 PR STA SHADOW: CPT | Mod: PBBFAC,,, | Performed by: STUDENT IN AN ORGANIZED HEALTH CARE EDUCATION/TRAINING PROGRAM

## 2023-09-25 PROCEDURE — 99999 PR PBB SHADOW E&M-EST. PATIENT-LVL V: ICD-10-PCS | Mod: PBBFAC,,, | Performed by: STUDENT IN AN ORGANIZED HEALTH CARE EDUCATION/TRAINING PROGRAM

## 2023-09-25 NOTE — PROGRESS NOTES
"Subjective:      Patient ID: Micaela Arvizu is a 68 y.o. female.    Chief Complaint:  Type 2 diabetes mellitus    History of Present Illness  This is a 68 y.o. female. with a past medical history of Type 2 diabetes mellitus, HTN, HLD, here for evaluation.        Type 2 diabetes mellitus  Diagnosed around age 64    Current diabetes medications:  - Metformin 1,000 mg BID      Past diabetes medications:  - Ozempic - cost - did very well    Lab Results   Component Value Date    CREATININE 0.7 06/05/2023    EGFRNORACEVR >60 06/05/2023       Known diabetic complications: retinopathy    Weight trend:  Wt Readings from Last 8 Encounters:   09/25/23 108 kg (238 lb)   08/16/23 109.5 kg (241 lb 8.2 oz)   07/25/23 109.5 kg (241 lb 4.8 oz)   06/13/23 110 kg (242 lb 8.1 oz)   05/11/23 113.3 kg (249 lb 14.3 oz)   03/28/23 113.4 kg (250 lb)   02/02/23 118.6 kg (261 lb 5.7 oz)   12/13/22 117.9 kg (259 lb 14.8 oz)       Family history of diabetes:  Yes    Prior visit with diabetes education: Yes    Current diet: 3 meals per day  Current exercise: Limited    Blood glucose monitoring at home: BID  Home blood sugar records: 130-180  Any episodes of hypoglycemia? Yes      Diabetes Management Status  Statin: Taking  ACE/ARB: Taking    Screening or Prevention Patient's value   HgA1C Testing and Control   Lab Results   Component Value Date    HGBA1C 6.3 (H) 06/05/2023        LDL control Lab Results   Component Value Date    LDLCALC 62.2 (L) 06/05/2023      Nephropathy screening Lab Results   Component Value Date    MICALBCREAT 6.2 12/08/2022        Lab Results   Component Value Date    TSH 3.060 06/05/2023       Last eye exam: : 09/16/2022      Review of Systems  As above    Social and family history reviewed  Current medications and allergies reviewed    Objective:   /60 (BP Location: Right arm, Patient Position: Sitting, BP Method: Medium (Manual))   Pulse 99   Resp 18   Ht 4' 10" (1.473 m)   Wt 108 kg (238 lb)   BMI 49.74 kg/m² "   Physical Exam  Alert, oriented    BP Readings from Last 1 Encounters:   09/25/23 122/60      Wt Readings from Last 1 Encounters:   09/25/23 1424 108 kg (238 lb)     Body mass index is 49.74 kg/m².    Lab Review:   Lab Results   Component Value Date    HGBA1C 6.3 (H) 06/05/2023     Lab Results   Component Value Date    CHOL 145 06/05/2023    HDL 57 06/05/2023    LDLCALC 62.2 (L) 06/05/2023    TRIG 129 06/05/2023    CHOLHDL 39.3 06/05/2023     Lab Results   Component Value Date     06/05/2023    K 4.3 06/05/2023     06/05/2023    CO2 28 06/05/2023     (H) 06/05/2023    BUN 15 06/05/2023    CREATININE 0.7 06/05/2023    CALCIUM 10.5 06/05/2023    PROT 7.8 06/05/2023    ALBUMIN 3.3 (L) 06/05/2023    BILITOT 0.4 06/05/2023    ALKPHOS 67 06/05/2023    AST 19 06/05/2023    ALT 17 06/05/2023    ANIONGAP 13 06/05/2023    ESTGFRAFRICA >60 06/07/2022    EGFRNONAA >60 06/07/2022    TSH 3.060 06/05/2023       All pertinent labs reviewed    Assessment and Plan     Type 2 diabetes mellitus with retinopathy of both eyes, without long-term current use of insulin  Controlled with A1c 6.3%  She was doing well on GLP-1 RA but cost went up due to Medicare care gap - will apply for assistance    Plan  - Restart Ozempic 0.5 mg weekly for 4 weeks, then increase to 1 mg weekly  - Continue Metformin 1,000 mg BID - can consider tapering off pending repeat labs    Labs per PCP    F/u 6 months    Adult BMI 45.0-49.9 kg/sq m  Restart GLP-1 RA given weight loss benefit      Hypercholesteremia  Continue statin    Essential hypertension  Continue antihypertensive regimen including ARB/ACEi      Follow-up in 6 months    Mukul Davis MD  Endocrinology

## 2023-09-25 NOTE — ASSESSMENT & PLAN NOTE
Controlled with A1c 6.3%  She was doing well on GLP-1 RA but cost went up due to Medicare care gap - will apply for assistance    Plan  - Restart Ozempic 0.5 mg weekly for 4 weeks, then increase to 1 mg weekly  - Continue Metformin 1,000 mg BID - can consider tapering off pending repeat labs    Labs per PCP    F/u 6 months

## 2023-10-11 ENCOUNTER — PATIENT OUTREACH (OUTPATIENT)
Dept: ADMINISTRATIVE | Facility: HOSPITAL | Age: 68
End: 2023-10-11
Payer: MEDICARE

## 2023-10-11 DIAGNOSIS — Z13.820 ENCOUNTER FOR OSTEOPOROSIS SCREENING IN ASYMPTOMATIC POSTMENOPAUSAL PATIENT: Primary | ICD-10-CM

## 2023-10-11 DIAGNOSIS — Z78.0 ENCOUNTER FOR OSTEOPOROSIS SCREENING IN ASYMPTOMATIC POSTMENOPAUSAL PATIENT: Primary | ICD-10-CM

## 2023-10-11 NOTE — PROGRESS NOTES
Chart reviewed, immunization record updated.  No new results noted on [facility] or [facility] web site.  Care Everywhere updated.   Patient care coordination note updated.   Upcoming PCP visit updated.  Next PCP visit 12/13/2023.  LOV with PCP 06/13/2023.  Contacted patient to discuss Enhanced Annual Wellness Visit, Diabetic Eye Exam and Osteoporosis screening.  Patient states she will schedule eye exam soon.   Scheduled eAWV for 12/18/2023.  Scheduled DEXA scan for 12/05/2023.

## 2023-11-16 ENCOUNTER — OFFICE VISIT (OUTPATIENT)
Dept: NEUROLOGY | Facility: CLINIC | Age: 68
End: 2023-11-16
Payer: MEDICARE

## 2023-11-16 VITALS
HEIGHT: 58 IN | DIASTOLIC BLOOD PRESSURE: 72 MMHG | BODY MASS INDEX: 49.21 KG/M2 | HEART RATE: 79 BPM | OXYGEN SATURATION: 98 % | RESPIRATION RATE: 14 BRPM | SYSTOLIC BLOOD PRESSURE: 118 MMHG | WEIGHT: 234.44 LBS

## 2023-11-16 DIAGNOSIS — G47.33 OSA (OBSTRUCTIVE SLEEP APNEA): ICD-10-CM

## 2023-11-16 DIAGNOSIS — F41.1 GAD (GENERALIZED ANXIETY DISORDER): ICD-10-CM

## 2023-11-16 DIAGNOSIS — G25.81 RESTLESS LEG SYNDROME: ICD-10-CM

## 2023-11-16 DIAGNOSIS — G25.0 ESSENTIAL TREMOR: Primary | ICD-10-CM

## 2023-11-16 DIAGNOSIS — M54.16 LUMBAR RADICULOPATHY: ICD-10-CM

## 2023-11-16 DIAGNOSIS — G24.3 CERVICAL DYSTONIA: ICD-10-CM

## 2023-11-16 PROCEDURE — 99999 PR PBB SHADOW E&M-EST. PATIENT-LVL V: CPT | Mod: PBBFAC,,, | Performed by: NURSE PRACTITIONER

## 2023-11-16 PROCEDURE — 99214 OFFICE O/P EST MOD 30 MIN: CPT | Mod: S$PBB | Performed by: NURSE PRACTITIONER

## 2023-11-16 PROCEDURE — 99999 PR STA SHADOW: CPT | Mod: PBBFAC,,, | Performed by: NURSE PRACTITIONER

## 2023-11-16 PROCEDURE — 99215 OFFICE O/P EST HI 40 MIN: CPT | Mod: PBBFAC | Performed by: NURSE PRACTITIONER

## 2023-11-16 PROCEDURE — 99999 PR PBB SHADOW E&M-EST. PATIENT-LVL V: ICD-10-PCS | Mod: PBBFAC,,, | Performed by: NURSE PRACTITIONER

## 2023-11-16 RX ORDER — ROPINIROLE 1 MG/1
1 TABLET, FILM COATED ORAL NIGHTLY
Qty: 30 TABLET | Refills: 5 | Status: SHIPPED | OUTPATIENT
Start: 2023-11-16 | End: 2024-02-20 | Stop reason: SDUPTHER

## 2023-11-16 RX ORDER — SERTRALINE HYDROCHLORIDE 50 MG/1
50 TABLET, FILM COATED ORAL DAILY
Qty: 90 TABLET | Refills: 1 | Status: SHIPPED | OUTPATIENT
Start: 2023-11-16 | End: 2024-02-20 | Stop reason: SDUPTHER

## 2023-11-16 RX ORDER — SEMAGLUTIDE 1.34 MG/ML
1 INJECTION, SOLUTION SUBCUTANEOUS
COMMUNITY
End: 2024-02-01

## 2023-11-16 RX ORDER — PROPRANOLOL HYDROCHLORIDE 40 MG/1
40 TABLET ORAL 2 TIMES DAILY
Qty: 180 TABLET | Refills: 1 | Status: SHIPPED | OUTPATIENT
Start: 2023-11-16 | End: 2023-11-28

## 2023-11-16 NOTE — PROGRESS NOTES
HPI: Micaela Arvizu is a 68 y.o. female with cervical dystonia with head tremor, multiple level degenerative changes of the L spine, prior lower T spine compression fractures and L2 and L4 compression deformities. She has bilateral  lumbar radicular pain. EMG/NCS of the legs normal 5/2015. She has GERD, HLD, HTN, JOHNNIE, insomnia, osteoporosis, and ROBERTO, as well as a history of asthmatic bronchitis. S/p right knee replacement in 201 with Dr. Vizcaino.      She presents today for a follow up visit. Trial of Requip started for her RLS. This has reduced her complaints, but they still persist.     She weaned off of Gabapentin, due to mild cognitive complaints, which resolved when she stopped this.     She and her  are excited about playing [employer] and [employer] for the community.     Zoloft continues to help her general anxiety.     Her head tremor is bothersome at times, but is mostly tolerable. She remains on Propranolol.     She is on Ozempic again, and has lost 7 pounds since her last visit.     She is pending a right hip replacement. She was advised by Ortho/Dr. Drake that she needed to lose weight.     She is taking Trazodone for sleep currently.     Neck tension is improved lately. Left shoulder pain is also improved. She has seen Ortho for her left shoulder pain.     She is compliant with CPAP.    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     Review of Systems   Unable to perform ROS: Other   Constitutional:  Negative for malaise/fatigue.   Eyes:  Negative for blurred vision and double vision.   Cardiovascular:  Negative for palpitations and leg swelling.   Musculoskeletal:  Positive for back pain and joint pain. Negative for falls and neck pain.   Skin:  Negative for rash.   Neurological:  Positive for tremors. Negative for dizziness, tingling, sensory change, weakness and headaches.   Endo/Heme/Allergies:  Does not bruise/bleed easily.   Psychiatric/Behavioral:  Negative for depression and  memory loss. The patient is not nervous/anxious and does not have insomnia.      Exam:  Gen Appearance, well developed/nourished in no apparent distress  CV: 2+ distal pulses with no edema or swelling  Neuro:  MS: Awake, alert, oriented to place, person, time, situation. Sustains attention. Recent/remote memory intact, Language is full to spontaneous speech/comprehension. Fund of Knowledge is full  CN: PERRL, Extraoccular movements and visual fields are full. Normal facial sensation and strength, Tongue and Palate are midline and strong. Shoulder Shrug symmetric and strong.  Motor: Normal bulk, tone, no abnormal movements in the hands, no head tremor noted today with generalized tremulousness when discussing stressful items. 5/5 strength bilateral upper/lower extremities with 1+ reflexes  Sensory: symmetric to temp, and vibration.  Romberg negative  Cerebellar: Finger-nose, Rapid alternating movements intact  Gait: Normal stance, no ataxia, antalgic gait.       Imagin2015 MRI L-spine:   Interval resolution of the edema like signal involving the inferior end plate of L2 which has been replaced with fatty marrow.    Mild spondylosis of the lumbar spine without evidence for significant central canal stenosis or neural foraminal narrowing.    MRI C-spine 2016: Multilevel cervical spondylosis with foraminal encroachment greatest from the C3-C5 levels as noted above.    Assessment/Plan: Micaela Arvizu is a 68 y.o. female with multiple level degenerative changes of the L spine, prior lower T spine compression fractures and L2  And L4 compression deformities. She has bilateral  lumbar radicular pain. EMG/NCS of the legs normal 2015. She has essential tremor of the head, which runs in her family. Exam shows essential tremor with cervical dystonia with left torticollis.    Labs:   23 A1c 7.3%, LDL 54/8, CMP and CBC unremarkable.  2023 Ferritin    I recommend:   1. Continue Zoloft 50 mg treat for JOHNNIE. Recent  increase was effective, and seemed to resolve diffuse tremors.   -To ER with threat of harm to self or others.   -She took Lexapro prior.   -stress reduction techniques were discussed today.     -counseling was suggested to help with grief and other stressors. Discussed how stress can affect the body.     2. Continue Trazodone for insomnia. Increased insomnia with increased stress.   -She failed Melatonin.   -No longer taking Klonopin.     3. S/p right knee replacement surgery. Dietary changes were discussed. Bariatric surgery not covered by her insurance.     4. Continue Propranolol for essential tremor at 40 mg bid. Increase back to 80 mg bid was ineffective. Tremor reportedly worse on reduced dose from 80 mg to 40 mg prior.     -No worsening of tremor off of Primidone or Gabapentin.     -Tremor worse with anxiety, and is diffuse with anxiety, rather than only affecting the hands and head.   -denies history of asthma-clarified prior records with her. She had episodic bronchitis prior.     5. She failed Tizanidine, and is taking Baclofen per pain management.     6. Referred to Podiatry for evaluation of left great toe pain. Explained that localized pain to the great toe was not suggestive of neuropathy. She was diagnosed with an ingrown toenail.     7. Increase Requip for RLS. Ferritin level was normal. She has tried her 's klonopin, which was helpful. It is preferable to start with a dopamine agonist, rather than a benzo, as discussed. Call clinic in one week with update on response.     -she stopped Gabapentin with no worsening of her RLS.     8. Botox was ineffective for cervical dystonia prior.  -PT was effective prior for neck tension, reduced ROM/pain post knee replacement, and lumbar pain, but the effect was short lived.     9. L-spine complaints improved with right TKA, but are ongoing. She has seen pain management. She failed her last injections. Advised her to follow up with pain management to  discuss ongoing complaints.     -No worsening of her lumbar pain off of Gabapentin.     10. Order motorized scooter prior, as she cannot tolerate prolonged walking/standing, secondary to severe lumbar pain/radiculopathy, as well as ongoing pain and ROM issues after her right knee replacement. She has sensory issues from her lumbar radiculopathy, which result in subjective leg weakness, which interferes with her walking at times. This was not approved by her insurance.     11. The patient is also being treated for osteoporosis.     12. Weight loss is difficult at this time, due to MSK complaints. She was placed on Ozempic recently to try to lose weight in preparation for a right hip replacement.     13. She is  now s/p bilateral cataract surgery.     FU 3 months

## 2023-11-17 ENCOUNTER — PATIENT OUTREACH (OUTPATIENT)
Dept: ADMINISTRATIVE | Facility: HOSPITAL | Age: 68
End: 2023-11-17
Payer: MEDICARE

## 2023-11-17 LAB
LEFT EYE DM RETINOPATHY: NEGATIVE
RIGHT EYE DM RETINOPATHY: NEGATIVE

## 2023-11-17 NOTE — PROGRESS NOTES
Chart reviewed, immunization record updated.  No new results noted on [facility] or [facility] web site.  Care Everywhere updated.   Patient care coordination note updated.   Upcoming PCP visit updated.  Next PCP visit 12/13/2023.  LOV with PCP 06/13/2023.  Received external Diabetic Eye Exam collected/ completed on 11/17/2023, updated to .

## 2023-11-25 DIAGNOSIS — G25.0 ESSENTIAL TREMOR: ICD-10-CM

## 2023-11-28 RX ORDER — PROPRANOLOL HYDROCHLORIDE 40 MG/1
40 TABLET ORAL 2 TIMES DAILY
Qty: 180 TABLET | Refills: 1 | Status: SHIPPED | OUTPATIENT
Start: 2023-11-28 | End: 2024-02-20 | Stop reason: SDUPTHER

## 2023-12-05 ENCOUNTER — HOSPITAL ENCOUNTER (OUTPATIENT)
Dept: RADIOLOGY | Facility: HOSPITAL | Age: 68
Discharge: HOME | End: 2023-12-05
Attending: INTERNAL MEDICINE
Payer: MEDICARE

## 2023-12-05 DIAGNOSIS — Z78.0 ENCOUNTER FOR OSTEOPOROSIS SCREENING IN ASYMPTOMATIC POSTMENOPAUSAL PATIENT: ICD-10-CM

## 2023-12-05 DIAGNOSIS — Z13.820 ENCOUNTER FOR OSTEOPOROSIS SCREENING IN ASYMPTOMATIC POSTMENOPAUSAL PATIENT: ICD-10-CM

## 2023-12-05 PROCEDURE — 77080 DXA BONE DENSITY AXIAL: CPT | Mod: TC

## 2023-12-05 PROCEDURE — 77080 DXA BONE DENSITY AXIAL SKELETON 1 OR MORE SITES: ICD-10-PCS | Mod: 26,,, | Performed by: RADIOLOGY

## 2023-12-05 PROCEDURE — 77080 DXA BONE DENSITY AXIAL: CPT | Mod: 26,,, | Performed by: RADIOLOGY

## 2023-12-07 ENCOUNTER — LAB VISIT (OUTPATIENT)
Dept: LAB | Facility: HOSPITAL | Age: 68
End: 2023-12-07
Attending: INTERNAL MEDICINE
Payer: MEDICARE

## 2023-12-07 DIAGNOSIS — E66.01 MORBID OBESITY WITH BMI OF 50.0-59.9, ADULT: ICD-10-CM

## 2023-12-07 DIAGNOSIS — I10 ESSENTIAL HYPERTENSION: ICD-10-CM

## 2023-12-07 DIAGNOSIS — I87.2 VENOUS INSUFFICIENCY OF BOTH LOWER EXTREMITIES: ICD-10-CM

## 2023-12-07 DIAGNOSIS — E11.319 TYPE 2 DIABETES MELLITUS WITH BOTH EYES AFFECTED BY RETINOPATHY WITHOUT MACULAR EDEMA, WITHOUT LONG-TERM CURRENT USE OF INSULIN, UNSPECIFIED RETINOPATHY SEVERITY: ICD-10-CM

## 2023-12-07 DIAGNOSIS — E78.00 HYPERCHOLESTEREMIA: ICD-10-CM

## 2023-12-07 LAB
ALBUMIN SERPL BCP-MCNC: 3 G/DL (ref 3.5–5.2)
ALBUMIN/CREAT UR: 5.6 UG/MG (ref 0–30)
ALP SERPL-CCNC: 57 U/L (ref 55–135)
ALT SERPL W/O P-5'-P-CCNC: 15 U/L (ref 10–44)
ANION GAP SERPL CALC-SCNC: 8 MMOL/L (ref 8–16)
AST SERPL-CCNC: 13 U/L (ref 10–40)
BASOPHILS # BLD AUTO: 0.04 K/UL (ref 0–0.2)
BASOPHILS NFR BLD: 0.5 % (ref 0–1.9)
BILIRUB SERPL-MCNC: 0.4 MG/DL (ref 0.1–1)
BUN SERPL-MCNC: 17 MG/DL (ref 8–23)
CALCIUM SERPL-MCNC: 9.2 MG/DL (ref 8.7–10.5)
CHLORIDE SERPL-SCNC: 103 MMOL/L (ref 95–110)
CHOLEST SERPL-MCNC: 140 MG/DL (ref 120–199)
CHOLEST/HDLC SERPL: 2.6 {RATIO} (ref 2–5)
CO2 SERPL-SCNC: 30 MMOL/L (ref 23–29)
CREAT SERPL-MCNC: 0.7 MG/DL (ref 0.5–1.4)
CREAT UR-MCNC: 107 MG/DL (ref 15–325)
DIFFERENTIAL METHOD: ABNORMAL
EOSINOPHIL # BLD AUTO: 0.2 K/UL (ref 0–0.5)
EOSINOPHIL NFR BLD: 2.2 % (ref 0–8)
ERYTHROCYTE [DISTWIDTH] IN BLOOD BY AUTOMATED COUNT: 14.6 % (ref 11.5–14.5)
EST. GFR  (NO RACE VARIABLE): >60 ML/MIN/1.73 M^2
ESTIMATED AVG GLUCOSE: 137 MG/DL (ref 68–131)
GLUCOSE SERPL-MCNC: 128 MG/DL (ref 70–110)
HBA1C MFR BLD: 6.4 % (ref 4–5.6)
HCT VFR BLD AUTO: 41.4 % (ref 37–48.5)
HDLC SERPL-MCNC: 53 MG/DL (ref 40–75)
HDLC SERPL: 37.9 % (ref 20–50)
HGB BLD-MCNC: 13.2 G/DL (ref 12–16)
IMM GRANULOCYTES # BLD AUTO: 0.03 K/UL (ref 0–0.04)
IMM GRANULOCYTES NFR BLD AUTO: 0.4 % (ref 0–0.5)
LDLC SERPL CALC-MCNC: 58.8 MG/DL (ref 63–159)
LYMPHOCYTES # BLD AUTO: 1.3 K/UL (ref 1–4.8)
LYMPHOCYTES NFR BLD: 15.7 % (ref 18–48)
MCH RBC QN AUTO: 26.8 PG (ref 27–31)
MCHC RBC AUTO-ENTMCNC: 31.9 G/DL (ref 32–36)
MCV RBC AUTO: 84 FL (ref 82–98)
MICROALBUMIN UR DL<=1MG/L-MCNC: 6 UG/ML
MONOCYTES # BLD AUTO: 0.5 K/UL (ref 0.3–1)
MONOCYTES NFR BLD: 5.8 % (ref 4–15)
NEUTROPHILS # BLD AUTO: 6.1 K/UL (ref 1.8–7.7)
NEUTROPHILS NFR BLD: 75.4 % (ref 38–73)
NONHDLC SERPL-MCNC: 87 MG/DL
NRBC BLD-RTO: 0 /100 WBC
PLATELET # BLD AUTO: 268 K/UL (ref 150–450)
PMV BLD AUTO: 8.4 FL (ref 9.2–12.9)
POTASSIUM SERPL-SCNC: 3.8 MMOL/L (ref 3.5–5.1)
PROT SERPL-MCNC: 6.8 G/DL (ref 6–8.4)
RBC # BLD AUTO: 4.92 M/UL (ref 4–5.4)
SODIUM SERPL-SCNC: 141 MMOL/L (ref 136–145)
TRIGL SERPL-MCNC: 141 MG/DL (ref 30–150)
WBC # BLD AUTO: 8.05 K/UL (ref 3.9–12.7)

## 2023-12-07 PROCEDURE — 83036 HEMOGLOBIN GLYCOSYLATED A1C: CPT | Performed by: INTERNAL MEDICINE

## 2023-12-07 PROCEDURE — 36415 COLL VENOUS BLD VENIPUNCTURE: CPT | Performed by: INTERNAL MEDICINE

## 2023-12-07 PROCEDURE — 82043 UR ALBUMIN QUANTITATIVE: CPT | Performed by: INTERNAL MEDICINE

## 2023-12-07 PROCEDURE — 85025 COMPLETE CBC W/AUTO DIFF WBC: CPT | Performed by: INTERNAL MEDICINE

## 2023-12-07 PROCEDURE — 80061 LIPID PANEL: CPT | Performed by: INTERNAL MEDICINE

## 2023-12-07 PROCEDURE — 80053 COMPREHEN METABOLIC PANEL: CPT | Performed by: INTERNAL MEDICINE

## 2023-12-13 ENCOUNTER — OFFICE VISIT (OUTPATIENT)
Dept: INTERNAL MEDICINE | Facility: CLINIC | Age: 68
End: 2023-12-13
Payer: MEDICARE

## 2023-12-13 VITALS
SYSTOLIC BLOOD PRESSURE: 130 MMHG | RESPIRATION RATE: 16 BRPM | OXYGEN SATURATION: 98 % | HEART RATE: 88 BPM | DIASTOLIC BLOOD PRESSURE: 70 MMHG | WEIGHT: 232.13 LBS | HEIGHT: 58 IN | BODY MASS INDEX: 48.73 KG/M2

## 2023-12-13 DIAGNOSIS — G47.33 OSA (OBSTRUCTIVE SLEEP APNEA): ICD-10-CM

## 2023-12-13 DIAGNOSIS — M47.816 LUMBAR FACET ARTHROPATHY: ICD-10-CM

## 2023-12-13 DIAGNOSIS — I10 ESSENTIAL HYPERTENSION: Primary | ICD-10-CM

## 2023-12-13 DIAGNOSIS — E11.319 TYPE 2 DIABETES MELLITUS WITH BOTH EYES AFFECTED BY RETINOPATHY WITHOUT MACULAR EDEMA, WITHOUT LONG-TERM CURRENT USE OF INSULIN, UNSPECIFIED RETINOPATHY SEVERITY: ICD-10-CM

## 2023-12-13 DIAGNOSIS — M16.11 PRIMARY OSTEOARTHRITIS OF RIGHT HIP: ICD-10-CM

## 2023-12-13 DIAGNOSIS — E78.00 HYPERCHOLESTEREMIA: ICD-10-CM

## 2023-12-13 DIAGNOSIS — F41.1 GAD (GENERALIZED ANXIETY DISORDER): ICD-10-CM

## 2023-12-13 PROCEDURE — 99999 PR PBB SHADOW E&M-EST. PATIENT-LVL V: CPT | Mod: PBBFAC,,, | Performed by: INTERNAL MEDICINE

## 2023-12-13 PROCEDURE — 99999 PR STA SHADOW: CPT | Mod: PBBFAC,,, | Performed by: INTERNAL MEDICINE

## 2023-12-13 PROCEDURE — 99999 PR PBB SHADOW E&M-EST. PATIENT-LVL V: ICD-10-PCS | Mod: PBBFAC,,, | Performed by: INTERNAL MEDICINE

## 2023-12-13 PROCEDURE — 99215 OFFICE O/P EST HI 40 MIN: CPT | Mod: PBBFAC | Performed by: INTERNAL MEDICINE

## 2023-12-13 PROCEDURE — 99214 OFFICE O/P EST MOD 30 MIN: CPT | Mod: S$PBB | Performed by: INTERNAL MEDICINE

## 2023-12-13 RX ORDER — METFORMIN HYDROCHLORIDE 1000 MG/1
1000 TABLET ORAL 2 TIMES DAILY WITH MEALS
Qty: 180 TABLET | Refills: 1 | Status: SHIPPED | OUTPATIENT
Start: 2023-12-13

## 2023-12-13 NOTE — PROGRESS NOTES
Subjective:       Patient ID: Micaela Arvizu is a 68 y.o. female.    Chief Complaint: Follow-up      HPI:  Patient is known to me and presents for follow up HTN, HLD, osteoporosis, GERD and anxiety. Labs from 12/7/23 were personally reviewed, interpreted and discussed with the patient today.     H/H normal  GFR > 60, normal  A1C 6.4%, stable  LDL 58, at goal  microalb normal    Chronic low back pain: Has had MRIs in the past. Has seen Dr. Daily for injections She uses tramadol for pain management for her arthrits which is working well. Supplementing with tylenol right now as trying not to use tramadol often.  Had stomach ulcer with NSAIDs.   Also taking gabapentin 100mg BID.     She is now dealing with worsening right hip pain and left shoudler pain. She has severe hip OA. Saw Dr. Drake who does think surgery is necessary but can not do it until she looses weight. Started GLP-1 and weight is trending down (see below). She also had left shoulder pain from rotator cuff injury. Saw Dr. Vizcaino who also felt surgery would be necessary for repair but also felt she was high risk for surgery per patient.  She is walking with walker. Having a lot of trouble walking long distances due to pain; can not even walk grocery store right now. She is inquiring about motorized scooter.      HTN: on lisinopril-HCTZ, lasix. Home BP < 140/90. Denies chest pains, SOB and LE edema.      HLD: on atorvastatin and fish oil. Denies medicatoin side effects. LDL at goal < 70.      DM: A1C at goal. On metformin 1000mg BID and Ozempic 1mg. She has some mild nausea from ozempic but tolerable. Weight is trending down. she has really started focusing on her diet. Less carbs and smaller portions. Feeling full sooner  Denies numbness/tingling. Does not check blood sugars regularly due to finger sticks; asking about Dexcom   Microalb: 12/2023  Eye exam: done 11/2023     GERD: taking omeprazole daily. She has h/o of esophageal stricture. Had  ""stretching" with Dr. Bojorquez. Denies abd pain, n/v/d/c.      Anxiety/insomnia: on trazodone 50mg at night. Working well. was on  Klonopin PRN, no recent fills on this.       Osteoporosis: follows with GYN (lorena) for this. Had DEXA done 3/2018 with GYN which showed improvement. Doing Prolia with her but was too expensive so will discuss with her GYN--now on risedronate.      Tremor: dad has Parkinson's disease. Tells me she and her sisters all have this. Sees neurology and dx with essential tremor on propranolol; now off primidone.     Past Medical History:   Diagnosis Date    Arthritis     Asthma     GERD (gastroesophageal reflux disease)     Hiatal hernia     History of colonoscopy with polypectomy     History of esophagogastroduodenoscopy (EGD)     Hyperlipidemia     Hypertension     Lumbar facet arthropathy     Type 2 diabetes mellitus without complication, without long-term current use of insulin        Family History   Problem Relation Age of Onset    Hypertension Mother     Hypertension Father     Parkinsonism Father        Social History     Socioeconomic History    Marital status:    Tobacco Use    Smoking status: Never    Smokeless tobacco: Never   Substance and Sexual Activity    Alcohol use: No    Drug use: No    Sexual activity: Not Currently     Comment:      Social Determinants of Health     Financial Resource Strain: Low Risk  (12/12/2023)    Overall Financial Resource Strain (CARDIA)     Difficulty of Paying Living Expenses: Not hard at all   Food Insecurity: No Food Insecurity (12/12/2023)    Hunger Vital Sign     Worried About Running Out of Food in the Last Year: Never true     Ran Out of Food in the Last Year: Never true   Transportation Needs: No Transportation Needs (12/12/2023)    PRAPARE - Transportation     Lack of Transportation (Medical): No     Lack of Transportation (Non-Medical): No   Physical Activity: Insufficiently Active (12/12/2023)    Exercise Vital Sign     " Days of Exercise per Week: 2 days     Minutes of Exercise per Session: 20 min   Stress: No Stress Concern Present (12/12/2023)    [employer] - Occupational Stress Questionnaire     Feeling of Stress : Not at all   Social Connections: Unknown (12/12/2023)    Social Connection and Isolation Panel [NHANES]     Frequency of Communication with Friends and Family: More than three times a week     Frequency of Social Gatherings with Friends and Family: Once a week     Active Member of Clubs or Organizations: Yes     Attends Club or Organization Meetings: More than 4 times per year     Marital Status:    Housing Stability: Unknown (12/12/2023)    Housing Stability Vital Sign     Unable to Pay for Housing in the Last Year: No     Unstable Housing in the Last Year: No       Review of Systems   Constitutional:  Negative for activity change, fatigue, fever and unexpected weight change.   HENT:  Negative for congestion, ear pain, hearing loss, rhinorrhea and sore throat.    Eyes:  Negative for redness and visual disturbance.   Respiratory:  Negative for cough, shortness of breath and wheezing.    Cardiovascular:  Negative for chest pain, palpitations and leg swelling.   Gastrointestinal:  Negative for abdominal pain, constipation, diarrhea, nausea and vomiting.   Genitourinary:  Negative for dysuria, frequency and urgency.   Musculoskeletal:  Positive for arthralgias (chronic) and back pain. Negative for joint swelling and neck pain.   Skin:  Negative for color change, rash and wound.   Neurological:  Positive for tremors (chronic). Negative for dizziness, weakness, light-headedness and headaches.         Objective:      Physical Exam  Vitals reviewed.   Constitutional:       General: She is not in acute distress.     Appearance: She is well-developed. She is obese.   HENT:      Head: Normocephalic and atraumatic.      Right Ear: External ear normal.      Left Ear: External ear normal.       Nose: Nose normal.   Eyes:      General:         Right eye: No discharge.         Left eye: No discharge.      Conjunctiva/sclera: Conjunctivae normal.   Neck:      Thyroid: No thyromegaly.   Cardiovascular:      Rate and Rhythm: Normal rate and regular rhythm.      Heart sounds: No murmur heard.  Pulmonary:      Effort: Pulmonary effort is normal. No respiratory distress.      Breath sounds: Normal breath sounds.   Abdominal:      Palpations: Abdomen is soft.      Tenderness: There is no abdominal tenderness.   Skin:     General: Skin is warm and dry.   Neurological:      Mental Status: She is alert and oriented to person, place, and time.   Psychiatric:         Behavior: Behavior normal.         Thought Content: Thought content normal.         Assessment:       1. Essential hypertension    2. Hypercholesteremia    3. Type 2 diabetes mellitus with both eyes affected by retinopathy without macular edema, without long-term current use of insulin, unspecified retinopathy severity    4. Adult BMI 45.0-49.9 kg/sq m    5. JOHNNIE (generalized anxiety disorder)    6. Primary osteoarthritis of right hip    7. ROBERTO (obstructive sleep apnea)    8. Lumbar facet arthropathy        Plan:       1. Essential hypertension  Chronic controlled  Continue medications at same dose  Low Na diet  Exercise, weight loss  Check BP and keep log for next visit    -     CBC Auto Differential; Future; Expected date: 06/10/2024  -     Comprehensive Metabolic Panel; Future; Expected date: 06/10/2024  -     TSH; Future; Expected date: 06/10/2024  -     Lipid Panel; Future; Expected date: 06/10/2024  -     Hemoglobin A1C; Future; Expected date: 06/10/2024    2. Hypercholesteremia  Chronic controlled  Cont meds same dose  Diet, exercise, weigh tloss  -     CBC Auto Differential; Future; Expected date: 06/10/2024  -     Comprehensive Metabolic Panel; Future; Expected date: 06/10/2024  -     TSH; Future; Expected date: 06/10/2024  -     Lipid Panel;  Future; Expected date: 06/10/2024  -     Hemoglobin A1C; Future; Expected date: 06/10/2024    3. Type 2 diabetes mellitus with both eyes affected by retinopathy without macular edema, without long-term current use of insulin, unspecified retinopathy severity  Chronic controlled  Cont meds same dose  ADA diet  Check sugars and keep log  Yearly eye exams    -     metFORMIN (GLUCOPHAGE) 1000 MG tablet; Take 1 tablet (1,000 mg total) by mouth 2 (two) times daily with meals.  Dispense: 180 tablet; Refill: 1  -     CBC Auto Differential; Future; Expected date: 06/10/2024  -     Comprehensive Metabolic Panel; Future; Expected date: 06/10/2024  -     TSH; Future; Expected date: 06/10/2024  -     Lipid Panel; Future; Expected date: 06/10/2024  -     Hemoglobin A1C; Future; Expected date: 06/10/2024    4. Adult BMI 45.0-49.9 kg/sq m  Chronic improving  Cont GLP-1  Cont positive diet changes  -     CBC Auto Differential; Future; Expected date: 06/10/2024  -     Comprehensive Metabolic Panel; Future; Expected date: 06/10/2024  -     TSH; Future; Expected date: 06/10/2024  -     Lipid Panel; Future; Expected date: 06/10/2024  -     Hemoglobin A1C; Future; Expected date: 06/10/2024    5. JOHNNIE (generalized anxiety disorder)  Chronic stable  Cont meds same dose    6. Primary osteoarthritis of right hip  Chronic worsening  Surgery needed but working on weight loss first  Using walker but pain is limited ability to walk long distances independently. Would benefit from motorized scooter for long ambulation  -     MOTORIZED SCOOTER FOR HOME USE    7. ROBERTO (obstructive sleep apnea)  Chronic stable  Cont CPAP  Cont weight loss  Overview:  On cpap      8. Lumbar facet arthropathy  Chronic stable  Cont meds same dose         RTC 6 months with labs and PRN

## 2023-12-18 ENCOUNTER — OFFICE VISIT (OUTPATIENT)
Dept: INTERNAL MEDICINE | Facility: CLINIC | Age: 68
End: 2023-12-18
Payer: MEDICARE

## 2023-12-18 VITALS
SYSTOLIC BLOOD PRESSURE: 120 MMHG | RESPIRATION RATE: 18 BRPM | DIASTOLIC BLOOD PRESSURE: 64 MMHG | WEIGHT: 231.94 LBS | HEIGHT: 58 IN | BODY MASS INDEX: 48.69 KG/M2 | HEART RATE: 75 BPM | OXYGEN SATURATION: 98 %

## 2023-12-18 DIAGNOSIS — F41.1 GAD (GENERALIZED ANXIETY DISORDER): ICD-10-CM

## 2023-12-18 DIAGNOSIS — Z00.00 ENCOUNTER FOR PREVENTIVE HEALTH EXAMINATION: ICD-10-CM

## 2023-12-18 DIAGNOSIS — I10 ESSENTIAL HYPERTENSION: ICD-10-CM

## 2023-12-18 DIAGNOSIS — M51.36 DDD (DEGENERATIVE DISC DISEASE), LUMBAR: ICD-10-CM

## 2023-12-18 DIAGNOSIS — S22.000S COMPRESSION FRACTURE OF THORACIC VERTEBRA, UNSPECIFIED THORACIC VERTEBRAL LEVEL, SEQUELA: ICD-10-CM

## 2023-12-18 DIAGNOSIS — E11.65 TYPE 2 DIABETES MELLITUS WITH HYPERGLYCEMIA, WITHOUT LONG-TERM CURRENT USE OF INSULIN: ICD-10-CM

## 2023-12-18 DIAGNOSIS — G47.33 OSA (OBSTRUCTIVE SLEEP APNEA): ICD-10-CM

## 2023-12-18 DIAGNOSIS — R26.9 ABNORMALITY OF GAIT AND MOBILITY: ICD-10-CM

## 2023-12-18 DIAGNOSIS — G25.0 ESSENTIAL TREMOR: ICD-10-CM

## 2023-12-18 DIAGNOSIS — I70.0 AORTIC ATHEROSCLEROSIS: Primary | ICD-10-CM

## 2023-12-18 DIAGNOSIS — E66.01 CLASS 3 SEVERE OBESITY DUE TO EXCESS CALORIES WITH SERIOUS COMORBIDITY AND BODY MASS INDEX (BMI) OF 45.0 TO 49.9 IN ADULT: ICD-10-CM

## 2023-12-18 DIAGNOSIS — K44.9 HIATAL HERNIA: ICD-10-CM

## 2023-12-18 DIAGNOSIS — H35.033 HYPERTENSIVE RETINOPATHY OF BOTH EYES: ICD-10-CM

## 2023-12-18 PROCEDURE — 99999 PR STA SHADOW: ICD-10-PCS | Mod: PBBFAC,,, | Performed by: NURSE PRACTITIONER

## 2023-12-18 PROCEDURE — G0439 PPPS, SUBSEQ VISIT: HCPCS | Performed by: NURSE PRACTITIONER

## 2023-12-18 PROCEDURE — 99215 OFFICE O/P EST HI 40 MIN: CPT | Mod: PBBFAC | Performed by: NURSE PRACTITIONER

## 2023-12-18 PROCEDURE — 99999 PR PBB SHADOW E&M-EST. PATIENT-LVL V: CPT | Mod: PBBFAC,,, | Performed by: NURSE PRACTITIONER

## 2023-12-18 PROCEDURE — 99999 PR STA SHADOW: CPT | Mod: PBBFAC,,, | Performed by: NURSE PRACTITIONER

## 2023-12-18 NOTE — PATIENT INSTRUCTIONS
Counseling and Referral of Other Preventative  (Italic type indicates deductible and co-insurance are waived)    Patient Name: Micaela Arvizu  Today's Date: 12/18/2023    Health Maintenance       Date Due Completion Date    Foot Exam Never done ---    RSV Vaccine (Age 60+ and Pregnant patients) (1 - 1-dose 60+ series) Never done ---    COVID-19 Vaccine (5 - 2023-24 season) 09/01/2023 1/10/2023    Hemoglobin A1c 06/07/2024 12/7/2023    Mammogram 08/02/2024 8/2/2023    Eye Exam 11/17/2024 11/17/2023    DEXA Scan 12/05/2024 12/5/2023    Diabetes Urine Screening 12/07/2024 12/7/2023    Lipid Panel 12/07/2024 12/7/2023    High Dose Statin 12/18/2024 12/18/2023    TETANUS VACCINE 09/16/2026 9/16/2016    Colorectal Cancer Screening 05/18/2029 5/18/2022        No orders of the defined types were placed in this encounter.    The following information is provided to all patients.  This information is to help you find resources for any of the problems found today that may be affecting your health:                Living healthy guide: www.[facility].[place].gov      Understanding Diabetes: www.diabetes.org      Eating healthy: www.cdc.gov/healthyweight      CDC home safety checklist: www.cdc.gov/steadi/patient.html      Agency on Aging: www.goea.[place].[facility]      Alcoholics anonymous (AA): www.aa.org      Physical Activity: www.stacey.nih.gov/gw4cjhw      Tobacco use: www.quitwithusla.org

## 2023-12-18 NOTE — PROGRESS NOTES
"  Micaela Arvizu presented for a  Medicare AWV and comprehensive Health Risk Assessment today. The following components were reviewed and updated:    Medical history  Family History  Social history  Allergies and Current Medications  Health Risk Assessment  Health Maintenance  Care Team         ** See Completed Assessments for Annual Wellness Visit within the encounter summary.**         The following assessments were completed:  Living Situation  CAGE  Depression Screening  Timed Get Up and Go  Whisper Test  Cognitive Function Screening  Nutrition Screening  ADL Screening  PAQ Screening        Vitals:    12/18/23 1104   BP: 120/64   Pulse: 75   Resp: 18   SpO2: 98%   Weight: 105.2 kg (231 lb 14.8 oz)   Height: 4' 10" (1.473 m)     Body mass index is 48.47 kg/m².  Physical Exam  Vitals and nursing note reviewed.   Constitutional:       Appearance: She is well-developed. She is obese.   HENT:      Head: Normocephalic and atraumatic.      Right Ear: External ear normal.      Left Ear: External ear normal.      Nose: Nose normal.   Eyes:      Conjunctiva/sclera: Conjunctivae normal.      Pupils: Pupils are equal, round, and reactive to light.   Cardiovascular:      Rate and Rhythm: Normal rate and regular rhythm.      Heart sounds: Normal heart sounds. No murmur heard.  Pulmonary:      Effort: Pulmonary effort is normal. No respiratory distress.      Breath sounds: Normal breath sounds. No wheezing.   Abdominal:      General: Bowel sounds are normal.      Palpations: Abdomen is soft.   Musculoskeletal:         General: No swelling. Normal range of motion.      Cervical back: Normal range of motion and neck supple.   Skin:     General: Skin is warm and dry.      Capillary Refill: Capillary refill takes less than 2 seconds.   Neurological:      Mental Status: She is alert and oriented to person, place, and time.   Psychiatric:         Behavior: Behavior normal.         Thought Content: Thought content normal.         " Judgment: Judgment normal.               Diagnoses and health risks identified today and associated recommendations/orders:    1. Encounter for preventive health examination  She is UTD with vaccines and labs  Going for RSV next month  Colonoscopy was 2022> repeat due in 7 years  Mammo at [facility] 8/2023 repeat 1 year    2. Aortic atherosclerosis  CXR 2/2015>  The aorta is mildly tortuous.   On lipitor and asa    3. Class 3 severe obesity due to excess calories with serious comorbidity and body mass index (BMI) of 45.0 to 49.9 in adult  BMI 48- she has lost 50 # with ozempic. Cont eating healthier and ozempic- needs to increase exercise but has joint OA pain - pool? Bike?     4. Type 2 diabetes mellitus with hyperglycemia, without long-term current use of insulin  Lab Results   Component Value Date    HGBA1C 6.4 (H) 12/07/2023     Well controlled on ozempic and metformin per endocrine Dr Davis     5. Hypertensive retinopathy of both eyes  Noted on Dr Noriega eye care notes scanned into media 11/2023     6. Abnormality of gait and mobility  Has rollator in use   Due to OA multiple jonmts   Needs hip replacement but has to lose weight prior to procedure     7. Compression fracture of thoracic vertebra, unspecified thoracic vertebral level, sequela  Has chronic back pain from this. Seeing neurology for it- was on gabaoentin but no longer takes it     8. DDD (degenerative disc disease), lumbar  Uses baclofen as needed per neurology     9. Essential hypertension  Well controlled on lasix and  lisinopril/hctz 120/64     10. Essential tremor  Noted- follows with neurology on BB    11. JOHNNIE (generalized anxiety disorder)  On zoloft per neuro well controlled     12. Hiatal hernia  Follows with GI in [place]- on carafate and protonix     13. ROBERTO (obstructive sleep apnea)  Uses CPAP       Provided [facility] with a 5-10 year written screening schedule and personal prevention plan. Recommendations were developed using the USPSTF age  appropriate recommendations. Education, counseling, and referrals were provided as needed. After Visit Summary printed and given to patient which includes a list of additional screenings\tests needed.    No follow-ups on file.    Shreya Hernandez NP      * gabapentin 100mg capsules 270 quantity which is a 90 day supply per merissa Saavedra in which she had  filled routinely last year and this year only  3/20/23, 6/14/23 but also filled tramadol 50mg fropm ER x 1 then from Amelie at ortho 22 tablets 4/7 day supplies for use as needed for OA.     I offered to discuss advanced care planning, including how to pick a person who would make decisions for you if you were unable to make them for yourself, called a health care power of , and what kind of decisions you might make such as use of life sustaining treatments such as ventilators and tube feeding when faced with a life limiting illness recorded on a living will that they will need to know. (How you want to be cared for as you near the end of your natural life)     X Patient is interested in learning more about how to make advanced directives.  I provided them paperwork and offered to discuss this with them. She has a  and no children mother is 85 and still living with decision making capacity but would rather her sister as decision maker. She will complete POA and LW and return copies to PCP

## 2023-12-24 ENCOUNTER — PATIENT MESSAGE (OUTPATIENT)
Dept: ADMINISTRATIVE | Facility: HOSPITAL | Age: 68
End: 2023-12-24
Payer: MEDICARE

## 2024-01-09 ENCOUNTER — TELEPHONE (OUTPATIENT)
Dept: ENDOCRINOLOGY | Facility: CLINIC | Age: 69
End: 2024-01-09
Payer: MEDICARE

## 2024-01-09 NOTE — TELEPHONE ENCOUNTER
Patient notified of coming in tmrw to apply for assistance, verbalized understanding.        ----- Message from Jessie Rutherford MA sent at 2024 12:19 PM CST -----  Regarding: Self  Micaela Arvizu  MRN: [id number]  : 1955  PCP: Kinga Robles  Home Phone      [phone]  Work Phone      Not on file.  Mobile          [phone]      MESSAGE:     Pt needs to speak to someone regarding her paperwork for Ozempic. Please return call @ [phone]. Thank you.

## 2024-01-12 ENCOUNTER — PATIENT MESSAGE (OUTPATIENT)
Dept: INTERNAL MEDICINE | Facility: CLINIC | Age: 69
End: 2024-01-12
Payer: MEDICARE

## 2024-01-31 DIAGNOSIS — E11.9 TYPE 2 DIABETES MELLITUS WITHOUT COMPLICATION, WITHOUT LONG-TERM CURRENT USE OF INSULIN: ICD-10-CM

## 2024-01-31 DIAGNOSIS — E78.00 HYPERCHOLESTEREMIA: ICD-10-CM

## 2024-01-31 RX ORDER — ATORVASTATIN CALCIUM 40 MG/1
40 TABLET, FILM COATED ORAL DAILY
Qty: 90 TABLET | Refills: 3 | Status: SHIPPED | OUTPATIENT
Start: 2024-01-31

## 2024-01-31 NOTE — TELEPHONE ENCOUNTER
No care due was identified.  Health [facility] Embedded Care Due Messages. Reference number: [id number].   1/31/2024 5:43:54 PM CST

## 2024-02-01 DIAGNOSIS — E11.319 TYPE 2 DIABETES MELLITUS WITH BOTH EYES AFFECTED BY RETINOPATHY WITHOUT MACULAR EDEMA, WITHOUT LONG-TERM CURRENT USE OF INSULIN, UNSPECIFIED RETINOPATHY SEVERITY: Primary | ICD-10-CM

## 2024-02-01 NOTE — TELEPHONE ENCOUNTER
Refill Decision Note   Micaela Arvizu  is requesting a refill authorization.  Brief Assessment and Rationale for Refill:  Approve     Medication Therapy Plan:         Comments:     Note composed:10:40 PM 01/31/2024

## 2024-02-14 ENCOUNTER — OFFICE VISIT (OUTPATIENT)
Dept: ENDOCRINOLOGY | Facility: CLINIC | Age: 69
End: 2024-02-14
Payer: MEDICARE

## 2024-02-14 VITALS — WEIGHT: 230 LBS | BODY MASS INDEX: 48.07 KG/M2

## 2024-02-14 DIAGNOSIS — E11.65 TYPE 2 DIABETES MELLITUS WITH HYPERGLYCEMIA, WITH LONG-TERM CURRENT USE OF INSULIN: Primary | ICD-10-CM

## 2024-02-14 DIAGNOSIS — E66.01 CLASS 3 SEVERE OBESITY DUE TO EXCESS CALORIES WITH SERIOUS COMORBIDITY AND BODY MASS INDEX (BMI) OF 45.0 TO 49.9 IN ADULT: ICD-10-CM

## 2024-02-14 DIAGNOSIS — E78.00 HYPERCHOLESTEREMIA: ICD-10-CM

## 2024-02-14 DIAGNOSIS — Z79.4 TYPE 2 DIABETES MELLITUS WITH HYPERGLYCEMIA, WITH LONG-TERM CURRENT USE OF INSULIN: Primary | ICD-10-CM

## 2024-02-14 DIAGNOSIS — E11.65 TYPE 2 DIABETES MELLITUS WITH HYPERGLYCEMIA, WITHOUT LONG-TERM CURRENT USE OF INSULIN: ICD-10-CM

## 2024-02-14 DIAGNOSIS — I10 ESSENTIAL HYPERTENSION: ICD-10-CM

## 2024-02-14 DIAGNOSIS — E11.319 TYPE 2 DIABETES MELLITUS WITH BOTH EYES AFFECTED BY RETINOPATHY WITHOUT MACULAR EDEMA, WITHOUT LONG-TERM CURRENT USE OF INSULIN, UNSPECIFIED RETINOPATHY SEVERITY: ICD-10-CM

## 2024-02-14 PROCEDURE — 99213 OFFICE O/P EST LOW 20 MIN: CPT | Mod: PBBFAC | Performed by: STUDENT IN AN ORGANIZED HEALTH CARE EDUCATION/TRAINING PROGRAM

## 2024-02-14 PROCEDURE — 99999 PR PBB SHADOW E&M-EST. PATIENT-LVL III: CPT | Mod: PBBFAC,,, | Performed by: STUDENT IN AN ORGANIZED HEALTH CARE EDUCATION/TRAINING PROGRAM

## 2024-02-14 PROCEDURE — G2211 COMPLEX E/M VISIT ADD ON: HCPCS | Mod: S$PBB | Performed by: STUDENT IN AN ORGANIZED HEALTH CARE EDUCATION/TRAINING PROGRAM

## 2024-02-14 PROCEDURE — 99214 OFFICE O/P EST MOD 30 MIN: CPT | Mod: S$PBB | Performed by: STUDENT IN AN ORGANIZED HEALTH CARE EDUCATION/TRAINING PROGRAM

## 2024-02-14 PROCEDURE — 99999 PR STA SHADOW: CPT | Mod: PBBFAC,,, | Performed by: STUDENT IN AN ORGANIZED HEALTH CARE EDUCATION/TRAINING PROGRAM

## 2024-02-14 RX ORDER — BLOOD-GLUCOSE SENSOR
1 EACH MISCELLANEOUS
Qty: 2 EACH | Refills: 11 | Status: SHIPPED | OUTPATIENT
Start: 2024-02-14

## 2024-02-14 NOTE — ASSESSMENT & PLAN NOTE
Controlled with A1c 6.4%  Continue GLP-1 RA and metformin  Use insulin SSI to get CGM approval    Plan  - Continue Ozempic 1 mg weekly  - Continue Metformin 1,000 mg BID  - Novolog SSI 4 times daily  - Start FreeStyle Trudi 3 CGM    Labs per PCP    F/u 6 months

## 2024-02-14 NOTE — PROGRESS NOTES
Subjective:      Patient ID: Micaela Arvizu is a 68 y.o. female.    Chief Complaint:  Type 2 diabetes mellitus    History of Present Illness  This is a 68 y.o. female. with a past medical history of Type 2 diabetes mellitus, HTN, HLD, here for evaluation.        Type 2 diabetes mellitus  Diagnosed around age 64    Current diabetes medications:  - Metformin 1,000 mg BID  - Ozempic 1 mg weekly      Past diabetes medications:  - None      Lab Results   Component Value Date    CREATININE 0.7 12/07/2023    EGFRNORACEVR >60 12/07/2023       Known diabetic complications: retinopathy    Weight trend:  Wt Readings from Last 8 Encounters:   02/14/24 104.3 kg (230 lb)   12/18/23 105.2 kg (231 lb 14.8 oz)   12/13/23 105.3 kg (232 lb 2.3 oz)   11/16/23 106.4 kg (234 lb 7.4 oz)   09/25/23 108 kg (238 lb)   08/16/23 109.5 kg (241 lb 8.2 oz)   07/25/23 109.5 kg (241 lb 4.8 oz)   06/13/23 110 kg (242 lb 8.1 oz)       Family history of diabetes:  Yes    Prior visit with diabetes education: Yes    Current diet: 3 meals per day  Current exercise: Limited    Blood glucose monitoring at home: daily  Home blood sugar records: 100-130        Diabetes Management Status  Statin: Taking  ACE/ARB: Taking    Screening or Prevention Patient's value   HgA1C Testing and Control   Lab Results   Component Value Date    HGBA1C 6.4 (H) 12/07/2023        LDL control Lab Results   Component Value Date    LDLCALC 58.8 (L) 12/07/2023      Nephropathy screening Lab Results   Component Value Date    MICALBCREAT 5.6 12/07/2023        Lab Results   Component Value Date    TSH 3.060 06/05/2023       Last eye exam: : 11/17/2023      Review of Systems  As above    Social and family history reviewed  Current medications and allergies reviewed    Objective:   Wt 104.3 kg (230 lb)   BMI 48.07 kg/m²   Physical Exam  Alert, oriented    BP Readings from Last 1 Encounters:   12/18/23 120/64      Wt Readings from Last 1 Encounters:   02/14/24 0901 104.3 kg (230 lb)      Body mass index is 48.07 kg/m².    Lab Review:   Lab Results   Component Value Date    HGBA1C 6.4 (H) 12/07/2023     Lab Results   Component Value Date    CHOL 140 12/07/2023    HDL 53 12/07/2023    LDLCALC 58.8 (L) 12/07/2023    TRIG 141 12/07/2023    CHOLHDL 37.9 12/07/2023     Lab Results   Component Value Date     12/07/2023    K 3.8 12/07/2023     12/07/2023    CO2 30 (H) 12/07/2023     (H) 12/07/2023    BUN 17 12/07/2023    CREATININE 0.7 12/07/2023    CALCIUM 9.2 12/07/2023    PROT 6.8 12/07/2023    ALBUMIN 3.0 (L) 12/07/2023    BILITOT 0.4 12/07/2023    ALKPHOS 57 12/07/2023    AST 13 12/07/2023    ALT 15 12/07/2023    ANIONGAP 8 12/07/2023    ESTGFRAFRICA >60 06/07/2022    EGFRNONAA >60 06/07/2022    TSH 3.060 06/05/2023       All pertinent labs reviewed    Assessment and Plan     Type 2 diabetes mellitus with hyperglycemia, with long-term current use of insulin  Controlled with A1c 6.4%  Continue GLP-1 RA and metformin  Use insulin SSI to get CGM approval    Plan  - Continue Ozempic 1 mg weekly  - Continue Metformin 1,000 mg BID  - Novolog SSI 4 times daily  - Start FreeStyle Trudi 3 CGM    Labs per PCP    F/u 6 months    Class 3 severe obesity due to excess calories with serious comorbidity and body mass index (BMI) of 45.0 to 49.9 in adult  Continue GLP-1 RA given weight loss benefit    Hypercholesteremia  Continue statin  LDL monitored by PCP    Essential hypertension  Continue antihypertensive regimen including ARB/ACEi      Mukul Davis MD  Endocrinology

## 2024-02-20 ENCOUNTER — OFFICE VISIT (OUTPATIENT)
Dept: NEUROLOGY | Facility: CLINIC | Age: 69
End: 2024-02-20
Payer: MEDICARE

## 2024-02-20 VITALS
RESPIRATION RATE: 20 BRPM | OXYGEN SATURATION: 100 % | WEIGHT: 234.38 LBS | DIASTOLIC BLOOD PRESSURE: 60 MMHG | HEIGHT: 58 IN | HEART RATE: 74 BPM | BODY MASS INDEX: 49.2 KG/M2 | SYSTOLIC BLOOD PRESSURE: 118 MMHG

## 2024-02-20 DIAGNOSIS — M54.16 LUMBAR RADICULOPATHY: ICD-10-CM

## 2024-02-20 DIAGNOSIS — G47.33 OSA (OBSTRUCTIVE SLEEP APNEA): ICD-10-CM

## 2024-02-20 DIAGNOSIS — G25.0 ESSENTIAL TREMOR: ICD-10-CM

## 2024-02-20 DIAGNOSIS — G24.3 CERVICAL DYSTONIA: Primary | ICD-10-CM

## 2024-02-20 DIAGNOSIS — G25.81 RESTLESS LEG SYNDROME: ICD-10-CM

## 2024-02-20 DIAGNOSIS — G47.00 INSOMNIA, UNSPECIFIED TYPE: ICD-10-CM

## 2024-02-20 DIAGNOSIS — F41.1 GAD (GENERALIZED ANXIETY DISORDER): ICD-10-CM

## 2024-02-20 PROCEDURE — 99999 PR STA SHADOW: CPT | Mod: PBBFAC,,, | Performed by: NURSE PRACTITIONER

## 2024-02-20 PROCEDURE — 99214 OFFICE O/P EST MOD 30 MIN: CPT | Mod: S$PBB | Performed by: NURSE PRACTITIONER

## 2024-02-20 PROCEDURE — 99214 OFFICE O/P EST MOD 30 MIN: CPT | Mod: PBBFAC | Performed by: NURSE PRACTITIONER

## 2024-02-20 PROCEDURE — 99999 PR PBB SHADOW E&M-EST. PATIENT-LVL IV: CPT | Mod: PBBFAC,,, | Performed by: NURSE PRACTITIONER

## 2024-02-20 RX ORDER — ROPINIROLE 1 MG/1
1 TABLET, FILM COATED ORAL NIGHTLY
Qty: 30 TABLET | Refills: 5 | Status: SHIPPED | OUTPATIENT
Start: 2024-02-20 | End: 2025-02-19

## 2024-02-20 RX ORDER — TRAZODONE HYDROCHLORIDE 50 MG/1
50 TABLET ORAL NIGHTLY
Qty: 90 TABLET | Refills: 1 | Status: SHIPPED | OUTPATIENT
Start: 2024-02-20

## 2024-02-20 RX ORDER — SERTRALINE HYDROCHLORIDE 50 MG/1
50 TABLET, FILM COATED ORAL DAILY
Qty: 90 TABLET | Refills: 1 | Status: SHIPPED | OUTPATIENT
Start: 2024-02-20 | End: 2025-02-19

## 2024-02-20 RX ORDER — PROPRANOLOL HYDROCHLORIDE 40 MG/1
40 TABLET ORAL 2 TIMES DAILY
Qty: 180 TABLET | Refills: 1 | Status: SHIPPED | OUTPATIENT
Start: 2024-02-20

## 2024-02-20 NOTE — PROGRESS NOTES
HPI: Micaela Arvizu is a 68 y.o. female with cervical dystonia with head tremor, multiple level degenerative changes of the L spine, prior lower T spine compression fractures and L2 and L4 compression deformities. She has bilateral  lumbar radicular pain. EMG/NCS of the legs normal 5/2015. She has GERD, HLD, HTN, JOHNNIE, insomnia, osteoporosis, and ROBERTO, as well as a history of asthmatic bronchitis. S/p right knee replacement in 201 with Dr. Vizcaino.      She presents today for a follow up visit. Her Requip was increased at her last visit for her RLS, which has been very effective.     Zoloft continues to help her general anxiety.     Head tremor is tolerable currently. She remains on Propranolol.     Weight has been stable on Ozempic. She has been nauseated with taking it.     She is pending a right hip replacement. She was advised by Ortho/Dr. Drake that she needed to lose weight.     She is taking Trazodone for sleep currently. This remains effective.     Neck tension is improved lately. Left shoulder pain is also improved. She has seen Ortho for her left shoulder pain.     She used to see pain management at [facility]. She failed injections with this provider. She is interested in seeing a provider at [facility].     She is compliant with CPAP.    Dr. Robles's nurse was able to get a motor scooter for her.     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     Review of Systems   Unable to perform ROS: Other   Constitutional:  Negative for malaise/fatigue.   Eyes:  Negative for blurred vision and double vision.   Cardiovascular:  Negative for palpitations and leg swelling.   Musculoskeletal:  Positive for back pain and joint pain. Negative for falls and neck pain.   Skin:  Negative for rash.   Neurological:  Positive for tremors. Negative for dizziness, tingling, sensory change, weakness and headaches.   Endo/Heme/Allergies:  Does not bruise/bleed easily.   Psychiatric/Behavioral:  Negative for depression and  memory loss. The patient is not nervous/anxious and does not have insomnia.      Exam:  Gen Appearance, well developed/nourished in no apparent distress  CV: 2+ distal pulses with no edema or swelling  Neuro:  MS: Awake, alert, oriented to place, person, time, situation. Sustains attention. Recent/remote memory intact, Language is full to spontaneous speech/comprehension. Fund of Knowledge is full  CN: PERRL, Extraoccular movements and visual fields are full. Normal facial sensation and strength, Tongue and Palate are midline and strong. Shoulder Shrug symmetric and strong.  Motor: Normal bulk, tone, no abnormal movements in the hands, no head tremor noted today with generalized tremulousness when discussing stressful items. 5/5 strength bilateral upper/lower extremities with 1+ reflexes  Sensory: symmetric to temp, and vibration.  Romberg negative  Cerebellar: Finger-nose, Rapid alternating movements intact  Gait: Normal stance, no ataxia, antalgic gait.     Imagin2015 MRI L-spine:   Interval resolution of the edema like signal involving the inferior end plate of L2 which has been replaced with fatty marrow.    Mild spondylosis of the lumbar spine without evidence for significant central canal stenosis or neural foraminal narrowing.    MRI C-spine 2016: Multilevel cervical spondylosis with foraminal encroachment greatest from the C3-C5 levels as noted above.    Assessment/Plan: Micaela Arvizu is a 68 y.o. female with multiple level degenerative changes of the L spine, prior lower T spine compression fractures and L2  And L4 compression deformities. She has bilateral  lumbar radicular pain. EMG/NCS of the legs normal 2015. She has essential tremor of the head, which runs in her family. Exam shows essential tremor with cervical dystonia with left torticollis.    Labs:   23 A1c 7.3%, LDL 54/8, CMP and CBC unremarkable.  2023 Ferritin    I recommend:   1. Continue Zoloft 50 mg treat for JOHNNIE. Recent  increase was effective, and seemed to resolve diffuse tremors.   -To ER with threat of harm to self or others.   -She took Lexapro prior.   -stress reduction techniques were discussed today.     -counseling was suggested to help with grief and other stressors. Discussed how stress can affect the body.     2. Continue Trazodone for insomnia. Increased insomnia with increased stress.   -She failed Melatonin.   -No longer taking Klonopin.     3. S/p right knee replacement surgery. Dietary changes were discussed. Bariatric surgery not covered by her insurance.     4. Continue Propranolol for essential tremor at 40 mg bid. Increase back to 80 mg bid was ineffective. Tremor reportedly worse on reduced dose from 80 mg to 40 mg prior.     -No worsening of tremor off of Primidone or Gabapentin.     -Tremor worse with anxiety, and is diffuse with anxiety, rather than only affecting the hands and head.   -denies history of asthma-clarified prior records with her. She had episodic bronchitis prior.     5. She failed Tizanidine, and is taking Baclofen per pain management.     6. Referred to Podiatry for evaluation of left great toe pain. Explained that localized pain to the great toe was not suggestive of neuropathy. She was diagnosed with an ingrown toenail.     7. Continue Requip for RLS. Ferritin level was normal. She has tried her 's Klonopin, which was helpful. It is preferable to start with a dopamine agonist, rather than a benzo, as discussed.   -she stopped Gabapentin with no worsening of her RLS.     8. Botox was ineffective for cervical dystonia prior.  -PT was effective prior for neck tension, reduced ROM/pain post knee replacement, and lumbar pain, but the effect was short lived.     9. L-spine complaints improved with right TKA, but are ongoing. She has seen pain management. She failed her last injections. Advised her to follow up with pain management to discuss ongoing complaints. She may schedule a visit  with a provider at [facility].     -No worsening of her lumbar pain off of Gabapentin.     10. Order motorized scooter prior, as she cannot tolerate prolonged walking/standing, secondary to severe lumbar pain/radiculopathy, as well as ongoing pain and ROM issues after her right knee replacement. She has sensory issues from her lumbar radiculopathy, which result in subjective leg weakness, which interferes with her walking at times. This was not approved by her insurance; however, her PCP's office was able to get an alternate scooter device approved for her.     11. The patient is also being treated for osteoporosis.     12. Weight loss is difficult at this time, due to MSK complaints. She was placed on Ozempic recently to try to lose weight in preparation for a right hip replacement.     13. She is  now s/p bilateral cataract surgery.     FU 6 months

## 2024-02-21 ENCOUNTER — PATIENT MESSAGE (OUTPATIENT)
Dept: ENDOCRINOLOGY | Facility: CLINIC | Age: 69
End: 2024-02-21
Payer: MEDICARE

## 2024-02-22 ENCOUNTER — PATIENT MESSAGE (OUTPATIENT)
Dept: ENDOCRINOLOGY | Facility: CLINIC | Age: 69
End: 2024-02-22
Payer: MEDICARE

## 2024-02-23 ENCOUNTER — TELEPHONE (OUTPATIENT)
Dept: ENDOCRINOLOGY | Facility: CLINIC | Age: 69
End: 2024-02-23
Payer: MEDICARE

## 2024-03-18 ENCOUNTER — HOSPITAL ENCOUNTER (OUTPATIENT)
Dept: RADIOLOGY | Facility: HOSPITAL | Age: 69
Discharge: HOME | End: 2024-03-18
Attending: INTERNAL MEDICINE
Payer: MEDICARE

## 2024-03-18 ENCOUNTER — OFFICE VISIT (OUTPATIENT)
Dept: INTERNAL MEDICINE | Facility: CLINIC | Age: 69
End: 2024-03-18
Payer: MEDICARE

## 2024-03-18 VITALS
HEIGHT: 58 IN | HEART RATE: 78 BPM | OXYGEN SATURATION: 94 % | SYSTOLIC BLOOD PRESSURE: 106 MMHG | WEIGHT: 226.44 LBS | TEMPERATURE: 98 F | DIASTOLIC BLOOD PRESSURE: 58 MMHG | BODY MASS INDEX: 47.53 KG/M2 | RESPIRATION RATE: 16 BRPM

## 2024-03-18 DIAGNOSIS — J13 PNEUMONIA OF RIGHT LUNG DUE TO STREPTOCOCCUS PNEUMONIAE, UNSPECIFIED PART OF LUNG: Primary | ICD-10-CM

## 2024-03-18 DIAGNOSIS — J13 PNEUMONIA OF RIGHT LUNG DUE TO STREPTOCOCCUS PNEUMONIAE, UNSPECIFIED PART OF LUNG: ICD-10-CM

## 2024-03-18 DIAGNOSIS — I70.0 AORTIC ATHEROSCLEROSIS: ICD-10-CM

## 2024-03-18 PROCEDURE — 71046 X-RAY EXAM CHEST 2 VIEWS: CPT | Mod: 26,,, | Performed by: RADIOLOGY

## 2024-03-18 PROCEDURE — 99999 PR STA SHADOW: CPT | Mod: PBBFAC,,, | Performed by: INTERNAL MEDICINE

## 2024-03-18 PROCEDURE — 99999 PR PBB SHADOW E&M-EST. PATIENT-LVL V: CPT | Mod: PBBFAC,,, | Performed by: INTERNAL MEDICINE

## 2024-03-18 PROCEDURE — 71046 X-RAY EXAM CHEST 2 VIEWS: CPT | Mod: TC

## 2024-03-18 PROCEDURE — 99215 OFFICE O/P EST HI 40 MIN: CPT | Mod: PBBFAC,25 | Performed by: INTERNAL MEDICINE

## 2024-03-18 PROCEDURE — 99213 OFFICE O/P EST LOW 20 MIN: CPT | Mod: S$PBB | Performed by: INTERNAL MEDICINE

## 2024-03-18 PROCEDURE — G2211 COMPLEX E/M VISIT ADD ON: HCPCS | Mod: S$PBB | Performed by: INTERNAL MEDICINE

## 2024-03-18 RX ORDER — BENZONATATE 200 MG/1
200 CAPSULE ORAL 3 TIMES DAILY PRN
Qty: 30 CAPSULE | Refills: 0 | Status: SHIPPED | OUTPATIENT
Start: 2024-03-18 | End: 2024-03-28

## 2024-03-18 RX ORDER — IPRATROPIUM BROMIDE AND ALBUTEROL SULFATE 2.5; .5 MG/3ML; MG/3ML
3 SOLUTION RESPIRATORY (INHALATION) EVERY 6 HOURS PRN
Qty: 75 ML | Refills: 0 | Status: SHIPPED | OUTPATIENT
Start: 2024-03-18 | End: 2025-03-18

## 2024-03-18 RX ORDER — LEVOFLOXACIN 750 MG/1
750 TABLET ORAL DAILY
Qty: 7 TABLET | Refills: 0 | Status: SHIPPED | OUTPATIENT
Start: 2024-03-18 | End: 2024-03-25

## 2024-03-18 NOTE — PROGRESS NOTES
Subjective:       Patient ID: Micaela Arvizu is a 68 y.o. female.    Chief Complaint: Cough, sneezing, Wheezing, Shortness of Breath, Fatigue, and Sore Throat      HPI:  Patient is known to me and presents with cough and congesiton. Sx tarted 4 days ago. + SOB + wheezing. Sats 94% on RA. + diffuse body aches. Tmax at home 99.9 F. 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     Past Medical History:   Diagnosis Date    Anxiety     Arthritis     Asthma     Depression     GERD (gastroesophageal reflux disease)     Hiatal hernia     History of colonoscopy with polypectomy     History of esophagogastroduodenoscopy (EGD)     Hyperlipidemia     Hypertension     Lumbar facet arthropathy     Obesity     Osteoporosis     Pneumonia     Sleep apnea     cpap    Trouble in sleeping     Type 2 diabetes mellitus without complication, without long-term current use of insulin        Family History   Problem Relation Age of Onset    Hypertension Mother     Hypertension Father     Parkinsonism Father        Social History     Socioeconomic History    Marital status:    Tobacco Use    Smoking status: Never    Smokeless tobacco: Never   Substance and Sexual Activity    Alcohol use: No    Drug use: No    Sexual activity: Not Currently     Comment:      Social Determinants of Health     Financial Resource Strain: Low Risk  (12/18/2023)    Overall Financial Resource Strain (CARDIA)     Difficulty of Paying Living Expenses: Not hard at all   Food Insecurity: No Food Insecurity (12/18/2023)    Hunger Vital Sign     Worried About Running Out of Food in the Last Year: Never true     Ran Out of Food in the Last Year: Never true   Transportation Needs: No Transportation Needs (12/18/2023)    PRAPARE - Transportation     Lack of Transportation (Medical): No     Lack of Transportation (Non-Medical): No   Physical Activity: Inactive (12/18/2023)    Exercise Vital Sign     Days of  Exercise per Week: 0 days     Minutes of Exercise per Session: 0 min   Stress: No Stress Concern Present (12/18/2023)    [employer] - Occupational Stress Questionnaire     Feeling of Stress : Not at all   Social Connections: Socially Integrated (12/18/2023)    Social Connection and Isolation Panel [NHANES]     Frequency of Communication with Friends and Family: More than three times a week     Frequency of Social Gatherings with Friends and Family: More than three times a week     Attends [employer] Services: More than 4 times per year     Active Member of Clubs or Organizations: Yes     Attends Club or Organization Meetings: More than 4 times per year     Marital Status:    Housing Stability: Low Risk  (12/18/2023)    Housing Stability Vital Sign     Unable to Pay for Housing in the Last Year: No     Number of Places Lived in the Last Year: 1     Unstable Housing in the Last Year: No       Review of Systems   Constitutional:  Negative for chills and fever.   HENT:  Negative for ear pain, rhinorrhea and sore throat.    Respiratory:  Positive for cough, shortness of breath and wheezing.    Cardiovascular:  Negative for chest pain.   Musculoskeletal:  Negative for myalgias.   Skin:  Negative for rash.   Neurological:  Negative for headaches.         Objective:      Physical Exam  Vitals reviewed.   Constitutional:       General: She is not in acute distress.     Appearance: She is well-developed. She is obese.   HENT:      Head: Normocephalic and atraumatic.      Right Ear: External ear normal.      Left Ear: External ear normal.      Nose: Nose normal.   Eyes:      General:         Right eye: No discharge.         Left eye: No discharge.      Conjunctiva/sclera: Conjunctivae normal.   Neck:      Thyroid: No thyromegaly.   Cardiovascular:      Rate and Rhythm: Normal rate and regular rhythm.   Pulmonary:      Effort: Pulmonary effort is normal. No respiratory distress.      Breath  sounds: Wheezing and rhonchi (right side) present.   Skin:     General: Skin is warm and dry.   Neurological:      Mental Status: She is alert and oriented to person, place, and time.   Psychiatric:         Behavior: Behavior normal.         Thought Content: Thought content normal.         Assessment:       1. Pneumonia of right lung due to Streptococcus pneumoniae, unspecified part of lung    2. Aortic atherosclerosis        Plan:       1. Pneumonia of right lung due to Streptococcus pneumoniae, unspecified part of lung  Acute problem  Exam concerning for right sided pneumonia with rhonchi  Start levaquin, GFR > 60 last check  Nebs for SOB and wheezing  Tessalon for cough control  CXR today--will call with results when available  ER precautions for sats < 92%, worsening SOB or fever > 100.9 despite antibx    -     X-Ray Chest PA And Lateral; Future; Expected date: 03/18/2024  -     levoFLOXacin (LEVAQUIN) 750 MG tablet; Take 1 tablet (750 mg total) by mouth once daily. for 7 days  Dispense: 7 tablet; Refill: 0  -     NEBULIZER FOR HOME USE  -     albuterol-ipratropium (DUO-NEB) 2.5 mg-0.5 mg/3 mL nebulizer solution; Take 3 mLs by nebulization every 6 (six) hours as needed for Wheezing. Rescue  Dispense: 75 mL; Refill: 0  -     benzonatate (TESSALON) 200 MG capsule; Take 1 capsule (200 mg total) by mouth 3 (three) times daily as needed.  Dispense: 30 capsule; Refill: 0    2. Aortic atherosclerosis  Noted on prior imaging  Cont statin therapy  Overview:  CXR 2/2015>  The aorta is mildly tortuous.          RTC as scheduled and PRN pending above work up

## 2024-03-21 ENCOUNTER — PATIENT MESSAGE (OUTPATIENT)
Dept: INTERNAL MEDICINE | Facility: CLINIC | Age: 69
End: 2024-03-21
Payer: MEDICARE

## 2024-03-25 ENCOUNTER — PATIENT MESSAGE (OUTPATIENT)
Dept: ENDOCRINOLOGY | Facility: CLINIC | Age: 69
End: 2024-03-25
Payer: MEDICARE

## 2024-03-27 ENCOUNTER — PATIENT MESSAGE (OUTPATIENT)
Dept: INTERNAL MEDICINE | Facility: CLINIC | Age: 69
End: 2024-03-27
Payer: MEDICARE

## 2024-03-28 ENCOUNTER — PATIENT MESSAGE (OUTPATIENT)
Dept: ENDOCRINOLOGY | Facility: CLINIC | Age: 69
End: 2024-03-28
Payer: MEDICARE

## 2024-04-01 ENCOUNTER — TELEPHONE (OUTPATIENT)
Dept: INTERNAL MEDICINE | Facility: CLINIC | Age: 69
End: 2024-04-01
Payer: MEDICARE

## 2024-04-01 NOTE — TELEPHONE ENCOUNTER
----- Message from Pippa Holguin sent at 2024  1:25 PM CDT -----  Contact: Kaykay/ [employer]  Micaela Arvizu  MRN: [id number]  : 1955  PCP: Kinga Robles  Home Phone      [phone]  Work Phone      Not on file.  Mobile          [phone]      MESSAGE:     Kaykay with [employer] needs signed pt eval and signed detailed work order.      Please advise   [phone]  Fax  [phone]

## 2024-04-02 NOTE — TELEPHONE ENCOUNTER
I spoke to Kaykay. She will fax the necessary paperwork over so Dr. Robles can review it when she is in clinic tomorrow

## 2024-05-06 NOTE — TELEPHONE ENCOUNTER
Care Due:                  Date            Visit Type   Department     Provider  --------------------------------------------------------------------------------                                MYCHART                              FOLLOWUP/OF  [facility]  Last Visit: 03-      [facility]ICE VISIT   MEDICINE II    Kinga Robles                              EP -                              PRIMARY      [facility]  Next Visit: 06-      CARE ([facility])   MEDICINE II    Kinga Robles                                                            Last  Test          Frequency    Reason                     Performed    Due Date  --------------------------------------------------------------------------------    HBA1C.......  6 months...  metFORMIN................  12- 06-    Health [facility] Embedded Care Due Messages. Reference number: [id number].   5/06/2024 11:14:56 AM CDT

## 2024-05-06 NOTE — TELEPHONE ENCOUNTER
Refill Routing Note   Medication(s) are not appropriate for processing by [facility] for the following reason(s):        Due for refill >6 months ago: no dispenses within past 6 months per Epic data     [facility] action(s):  Defer    Future labs scheduled 6/6/24            Appointments  past 12m or future 3m with PCP    Date Provider   Last Visit   3/18/2024 Kinga Robles MD   Next Visit   6/14/2024 Kinga Robles MD   ED visits in past 90 days: 0        Note composed:3:38 PM 05/06/2024

## 2024-05-07 RX ORDER — COLESEVELAM 180 1/1
625 TABLET ORAL 2 TIMES DAILY WITH MEALS
Qty: 180 TABLET | Refills: 1 | Status: SHIPPED | OUTPATIENT
Start: 2024-05-07

## 2024-06-07 ENCOUNTER — LAB VISIT (OUTPATIENT)
Dept: LAB | Facility: HOSPITAL | Age: 69
End: 2024-06-07
Attending: INTERNAL MEDICINE
Payer: MEDICARE

## 2024-06-07 DIAGNOSIS — E11.319 TYPE 2 DIABETES MELLITUS WITH BOTH EYES AFFECTED BY RETINOPATHY WITHOUT MACULAR EDEMA, WITHOUT LONG-TERM CURRENT USE OF INSULIN, UNSPECIFIED RETINOPATHY SEVERITY: ICD-10-CM

## 2024-06-07 DIAGNOSIS — I10 ESSENTIAL HYPERTENSION: ICD-10-CM

## 2024-06-07 DIAGNOSIS — E78.00 HYPERCHOLESTEREMIA: ICD-10-CM

## 2024-06-07 LAB
ALBUMIN SERPL BCP-MCNC: 3.2 G/DL (ref 3.5–5.2)
ALP SERPL-CCNC: 64 U/L (ref 55–135)
ALT SERPL W/O P-5'-P-CCNC: 17 U/L (ref 10–44)
ANION GAP SERPL CALC-SCNC: 13 MMOL/L (ref 8–16)
AST SERPL-CCNC: 15 U/L (ref 10–40)
BASOPHILS # BLD AUTO: 0.04 K/UL (ref 0–0.2)
BASOPHILS NFR BLD: 0.5 % (ref 0–1.9)
BILIRUB SERPL-MCNC: 0.4 MG/DL (ref 0.1–1)
BUN SERPL-MCNC: 12 MG/DL (ref 8–23)
CALCIUM SERPL-MCNC: 9.8 MG/DL (ref 8.7–10.5)
CHLORIDE SERPL-SCNC: 100 MMOL/L (ref 95–110)
CHOLEST SERPL-MCNC: 144 MG/DL (ref 120–199)
CHOLEST/HDLC SERPL: 3 {RATIO} (ref 2–5)
CO2 SERPL-SCNC: 26 MMOL/L (ref 23–29)
CREAT SERPL-MCNC: 0.6 MG/DL (ref 0.5–1.4)
DIFFERENTIAL METHOD BLD: ABNORMAL
EOSINOPHIL # BLD AUTO: 0.3 K/UL (ref 0–0.5)
EOSINOPHIL NFR BLD: 3 % (ref 0–8)
ERYTHROCYTE [DISTWIDTH] IN BLOOD BY AUTOMATED COUNT: 14.5 % (ref 11.5–14.5)
EST. GFR  (NO RACE VARIABLE): >60 ML/MIN/1.73 M^2
ESTIMATED AVG GLUCOSE: 126 MG/DL (ref 68–131)
GLUCOSE SERPL-MCNC: 120 MG/DL (ref 70–110)
HBA1C MFR BLD: 6 % (ref 4–5.6)
HCT VFR BLD AUTO: 41 % (ref 37–48.5)
HDLC SERPL-MCNC: 48 MG/DL (ref 40–75)
HDLC SERPL: 33.3 % (ref 20–50)
HGB BLD-MCNC: 13.2 G/DL (ref 12–16)
IMM GRANULOCYTES # BLD AUTO: 0.03 K/UL (ref 0–0.04)
IMM GRANULOCYTES NFR BLD AUTO: 0.3 % (ref 0–0.5)
LDLC SERPL CALC-MCNC: 59.8 MG/DL (ref 63–159)
LYMPHOCYTES # BLD AUTO: 1.3 K/UL (ref 1–4.8)
LYMPHOCYTES NFR BLD: 15.4 % (ref 18–48)
MCH RBC QN AUTO: 27.1 PG (ref 27–31)
MCHC RBC AUTO-ENTMCNC: 32.2 G/DL (ref 32–36)
MCV RBC AUTO: 84 FL (ref 82–98)
MONOCYTES # BLD AUTO: 0.5 K/UL (ref 0.3–1)
MONOCYTES NFR BLD: 5.9 % (ref 4–15)
NEUTROPHILS # BLD AUTO: 6.5 K/UL (ref 1.8–7.7)
NEUTROPHILS NFR BLD: 74.9 % (ref 38–73)
NONHDLC SERPL-MCNC: 96 MG/DL
NRBC BLD-RTO: 0 /100 WBC
PLATELET # BLD AUTO: 252 K/UL (ref 150–450)
PMV BLD AUTO: 8 FL (ref 9.2–12.9)
POTASSIUM SERPL-SCNC: 3.9 MMOL/L (ref 3.5–5.1)
PROT SERPL-MCNC: 7.2 G/DL (ref 6–8.4)
RBC # BLD AUTO: 4.87 M/UL (ref 4–5.4)
SODIUM SERPL-SCNC: 139 MMOL/L (ref 136–145)
TRIGL SERPL-MCNC: 181 MG/DL (ref 30–150)
WBC # BLD AUTO: 8.71 K/UL (ref 3.9–12.7)

## 2024-06-07 PROCEDURE — 83036 HEMOGLOBIN GLYCOSYLATED A1C: CPT | Performed by: INTERNAL MEDICINE

## 2024-06-07 PROCEDURE — 80061 LIPID PANEL: CPT | Performed by: INTERNAL MEDICINE

## 2024-06-07 PROCEDURE — 80053 COMPREHEN METABOLIC PANEL: CPT | Performed by: INTERNAL MEDICINE

## 2024-06-07 PROCEDURE — 36415 COLL VENOUS BLD VENIPUNCTURE: CPT | Performed by: INTERNAL MEDICINE

## 2024-06-07 PROCEDURE — 85025 COMPLETE CBC W/AUTO DIFF WBC: CPT | Performed by: INTERNAL MEDICINE

## 2024-06-11 DIAGNOSIS — E11.319 TYPE 2 DIABETES MELLITUS WITH BOTH EYES AFFECTED BY RETINOPATHY WITHOUT MACULAR EDEMA, WITHOUT LONG-TERM CURRENT USE OF INSULIN, UNSPECIFIED RETINOPATHY SEVERITY: ICD-10-CM

## 2024-06-11 DIAGNOSIS — I10 ESSENTIAL HYPERTENSION: ICD-10-CM

## 2024-06-11 RX ORDER — PANTOPRAZOLE SODIUM 40 MG/1
TABLET, DELAYED RELEASE ORAL
Qty: 90 TABLET | Refills: 3 | Status: SHIPPED | OUTPATIENT
Start: 2024-06-11

## 2024-06-11 RX ORDER — LISINOPRIL AND HYDROCHLOROTHIAZIDE 10; 12.5 MG/1; MG/1
1 TABLET ORAL DAILY
Qty: 90 TABLET | Refills: 3 | Status: SHIPPED | OUTPATIENT
Start: 2024-06-11

## 2024-06-11 RX ORDER — METFORMIN HYDROCHLORIDE 1000 MG/1
1000 TABLET ORAL 2 TIMES DAILY WITH MEALS
Qty: 180 TABLET | Refills: 1 | Status: SHIPPED | OUTPATIENT
Start: 2024-06-11

## 2024-06-11 NOTE — TELEPHONE ENCOUNTER
Refill Routing Note   Medication(s) are not appropriate for processing by [facility] for the following reason(s):      Drug-drug interaction    [facility] action(s):  Defer Care Due:  None identified     Medication Therapy Plan: furosemide, lisinopriL-hydrochlorothiazide &  sucralfate, pantoprazole    Pharmacist review requested: Yes     Appointments  past 12m or future 3m with PCP    Date Provider   Last Visit   3/18/2024 Kinga Robles MD   Next Visit   6/25/2024 Kinga Robles MD   ED visits in past 90 days: 0        Note composed:8:58 AM 06/11/2024

## 2024-06-11 NOTE — TELEPHONE ENCOUNTER
Micaela Arvizu  is requesting a refill authorization.  Brief Assessment and Rationale for Refill:  Approve     Medication Therapy Plan:         Pharmacist review requested: Yes   Extended chart review required: Yes   Comments:     Note composed:12:24 PM 06/11/2024

## 2024-06-11 NOTE — TELEPHONE ENCOUNTER
No care due was identified.  [facility] Embedded Care Due Messages. Reference number: [id number].   6/11/2024 7:04:56 AM CDT

## 2024-06-25 ENCOUNTER — TELEPHONE (OUTPATIENT)
Dept: INTERNAL MEDICINE | Facility: CLINIC | Age: 69
End: 2024-06-25

## 2024-06-25 ENCOUNTER — OFFICE VISIT (OUTPATIENT)
Dept: INTERNAL MEDICINE | Facility: CLINIC | Age: 69
End: 2024-06-25
Payer: MEDICARE

## 2024-06-25 VITALS
WEIGHT: 224.88 LBS | HEIGHT: 58 IN | SYSTOLIC BLOOD PRESSURE: 122 MMHG | RESPIRATION RATE: 16 BRPM | OXYGEN SATURATION: 96 % | HEART RATE: 88 BPM | BODY MASS INDEX: 47.2 KG/M2 | DIASTOLIC BLOOD PRESSURE: 62 MMHG

## 2024-06-25 DIAGNOSIS — E78.00 HYPERCHOLESTEREMIA: ICD-10-CM

## 2024-06-25 DIAGNOSIS — Z12.31 ENCOUNTER FOR SCREENING MAMMOGRAM FOR MALIGNANT NEOPLASM OF BREAST: ICD-10-CM

## 2024-06-25 DIAGNOSIS — M54.16 LUMBAR RADICULOPATHY: ICD-10-CM

## 2024-06-25 DIAGNOSIS — F41.1 GAD (GENERALIZED ANXIETY DISORDER): ICD-10-CM

## 2024-06-25 DIAGNOSIS — E11.319 TYPE 2 DIABETES MELLITUS WITH BOTH EYES AFFECTED BY RETINOPATHY WITHOUT MACULAR EDEMA, WITHOUT LONG-TERM CURRENT USE OF INSULIN, UNSPECIFIED RETINOPATHY SEVERITY: ICD-10-CM

## 2024-06-25 DIAGNOSIS — I10 ESSENTIAL HYPERTENSION: Primary | ICD-10-CM

## 2024-06-25 DIAGNOSIS — E66.01 CLASS 3 SEVERE OBESITY DUE TO EXCESS CALORIES WITH SERIOUS COMORBIDITY AND BODY MASS INDEX (BMI) OF 45.0 TO 49.9 IN ADULT: ICD-10-CM

## 2024-06-25 DIAGNOSIS — I70.0 AORTIC ATHEROSCLEROSIS: ICD-10-CM

## 2024-06-25 PROBLEM — Z79.4 TYPE 2 DIABETES MELLITUS WITH HYPERGLYCEMIA, WITH LONG-TERM CURRENT USE OF INSULIN: Status: RESOLVED | Noted: 2017-09-22 | Resolved: 2024-06-25

## 2024-06-25 PROBLEM — E11.65 TYPE 2 DIABETES MELLITUS WITH HYPERGLYCEMIA, WITH LONG-TERM CURRENT USE OF INSULIN: Status: RESOLVED | Noted: 2017-09-22 | Resolved: 2024-06-25

## 2024-06-25 PROCEDURE — 99999 PR STA SHADOW: CPT | Mod: PBBFAC,,, | Performed by: INTERNAL MEDICINE

## 2024-06-25 PROCEDURE — G2211 COMPLEX E/M VISIT ADD ON: HCPCS | Mod: S$PBB | Performed by: INTERNAL MEDICINE

## 2024-06-25 PROCEDURE — 99215 OFFICE O/P EST HI 40 MIN: CPT | Mod: PBBFAC | Performed by: INTERNAL MEDICINE

## 2024-06-25 PROCEDURE — 99999 PR PBB SHADOW E&M-EST. PATIENT-LVL V: CPT | Mod: PBBFAC,,, | Performed by: INTERNAL MEDICINE

## 2024-06-25 PROCEDURE — 99214 OFFICE O/P EST MOD 30 MIN: CPT | Mod: S$PBB | Performed by: INTERNAL MEDICINE

## 2024-06-25 NOTE — Clinical Note
Please let Mrs. Vasquez know Dr. Davis can't do the Mounjaro through the same program she is using for the Ozempic. She can stay on the Ozempic and keep doing what she is doing. It is getting there!

## 2024-06-25 NOTE — PROGRESS NOTES
Subjective:       Patient ID: Micaela Arvizu is a 68 y.o. female.    Chief Complaint: Follow-up (6 m f/u )      HPI:  Patient is known to me and presents for follow up HTN, HLD, osteoporosis, GERD and anxiety. Labs from 6/7/24 were personally reviewed, interpreted and discussed with the patient today.     H/H normal  GFR > 60, normal  A1C 6%, stable  LDL 59, at goal  microalb normal     Chronic low back pain: Has had MRIs in the past. Has seen Dr. Daily for injections She uses tramadol for pain management for her arthrits which is working well. Supplementing with tylenol right now as trying not to use tramadol often.  Had stomach ulcer with NSAIDs.   Also taking baclofen PRN.  She is planning      She is now dealing with worsening right hip pain and left shoudler pain. She has severe hip OA. Saw Dr. Drake who does think surgery is necessary but can not do it until she looses weight. Started GLP-1 and weight is trending down (see below). She also had left shoulder pain from rotator cuff injury. Saw Dr. Vizcaino who also felt surgery would be necessary for repair but also felt she was high risk for surgery per patient.  She is walking with walker. Having a lot of trouble walking long distances due to pain; can not even walk grocery store right now. She is inquiring about motorized scooter.      HTN: on lisinopril-HCTZ, lasix. Home BP < 140/90. Denies chest pains, SOB and LE edema.      HLD: on atorvastatin and fish oil. Denies medicatoin side effects. LDL at goal < 70.      DM: A1C at goal. On metformin 1000mg BID and Ozempic 2mg. She has some mild nausea from ozempic but tolerable. Weight is trending down but slow. she has really started focusing on her diet. Less carbs and smaller portions. Feeling full sooner  Denies numbness/tingling. Does not check blood sugars regularly due to finger sticks; asking about Dexcom   Microalb: 12/2023  Eye exam: done 11/2023     GERD: taking omeprazole daily. She has h/o of  "esophageal stricture. Had "stretching" with Dr. Bojorquez. Denies abd pain, n/v/d/c.      Anxiety/insomnia: on trazodone 50mg at night. Working well. was on  Klonopin PRN, no recent fills on this.       Osteoporosis: follows with GYN (lorena) for this. Had DEXA done 3/2018 with GYN which showed improvement. Doing Prolia with her but was too expensive so will discuss with her GYN--now on risedronate.      Tremor: dad has Parkinson's disease. Tells me she and her sisters all have this. Sees neurology and dx with essential tremor on propranolol; now off primidone.        Past Medical History:   Diagnosis Date    Anxiety     Arthritis     Asthma     Depression     GERD (gastroesophageal reflux disease)     Hiatal hernia     History of colonoscopy with polypectomy     History of esophagogastroduodenoscopy (EGD)     Hyperlipidemia     Hypertension     Lumbar facet arthropathy     Obesity     Osteoporosis     Pneumonia     Sleep apnea     cpap    Trouble in sleeping     Type 2 diabetes mellitus without complication, without long-term current use of insulin        Family History   Problem Relation Name Age of Onset    Hypertension Mother      Hypertension Father      Parkinsonism Father         Social History     Socioeconomic History    Marital status:    Tobacco Use    Smoking status: Never    Smokeless tobacco: Never   Substance and Sexual Activity    Alcohol use: No    Drug use: No    Sexual activity: Not Currently     Comment:      Social Determinants of Health     Financial Resource Strain: Low Risk  (12/18/2023)    Overall Financial Resource Strain (CARDIA)     Difficulty of Paying Living Expenses: Not hard at all   Food Insecurity: No Food Insecurity (12/18/2023)    Hunger Vital Sign     Worried About Running Out of Food in the Last Year: Never true     Ran Out of Food in the Last Year: Never true   Transportation Needs: No Transportation Needs (12/18/2023)    PRAPARE - Transportation     Lack of " Transportation (Medical): No     Lack of Transportation (Non-Medical): No   Physical Activity: Inactive (12/18/2023)    Exercise Vital Sign     Days of Exercise per Week: 0 days     Minutes of Exercise per Session: 0 min   Stress: No Stress Concern Present (12/18/2023)    [employer] - Occupational Stress Questionnaire     Feeling of Stress : Not at all   Housing Stability: Low Risk  (12/18/2023)    Housing Stability Vital Sign     Unable to Pay for Housing in the Last Year: No     Number of Places Lived in the Last Year: 1     Unstable Housing in the Last Year: No       Review of Systems   Constitutional:  Negative for activity change, fatigue, fever and unexpected weight change.   HENT:  Negative for congestion, ear pain, hearing loss, rhinorrhea and sore throat.    Eyes:  Negative for redness and visual disturbance.   Respiratory:  Negative for cough, shortness of breath and wheezing.    Cardiovascular:  Negative for chest pain, palpitations and leg swelling.   Gastrointestinal:  Negative for abdominal pain, constipation, diarrhea, nausea and vomiting.   Genitourinary:  Negative for dysuria, frequency, pelvic pain and urgency.   Musculoskeletal:  Positive for arthralgias and back pain. Negative for joint swelling and neck pain.   Skin:  Negative for color change, rash and wound.   Neurological:  Positive for tremors (chronic). Negative for dizziness, weakness, light-headedness and headaches.         Objective:      Physical Exam  Vitals reviewed.   Constitutional:       General: She is not in acute distress.     Appearance: She is well-developed. She is obese.   HENT:      Head: Normocephalic and atraumatic.      Right Ear: External ear normal.      Left Ear: External ear normal.      Nose: Nose normal.   Eyes:      General:         Right eye: No discharge.         Left eye: No discharge.      Conjunctiva/sclera: Conjunctivae normal.      Pupils: Pupils are equal, round, and reactive  to light.   Neck:      Thyroid: No thyromegaly.   Cardiovascular:      Rate and Rhythm: Normal rate and regular rhythm.      Heart sounds: No murmur heard.  Pulmonary:      Effort: Pulmonary effort is normal. No respiratory distress.      Breath sounds: Normal breath sounds.   Skin:     General: Skin is warm and dry.   Neurological:      Mental Status: She is alert and oriented to person, place, and time. Mental status is at baseline.      Comments: Head tremor   Psychiatric:         Behavior: Behavior normal.         Thought Content: Thought content normal.         Assessment:       1. Essential hypertension    2. Hypercholesteremia    3. Aortic atherosclerosis    4. Type 2 diabetes mellitus with both eyes affected by retinopathy without macular edema, without long-term current use of insulin, unspecified retinopathy severity    5. Class 3 severe obesity due to excess calories with serious comorbidity and body mass index (BMI) of 45.0 to 49.9 in adult    6. JOHNNIE (generalized anxiety disorder)    7. Lumbar radiculopathy    8. Encounter for screening mammogram for malignant neoplasm of breast        Plan:       1. Essential hypertension  Chronic controlled  Continue medications at same dose  Low Na diet  Exercise, weight loss  Check BP and keep log for next visit    -     CBC Auto Differential; Future; Expected date: 12/22/2024  -     Comprehensive Metabolic Panel; Future; Expected date: 12/22/2024  -     Lipid Panel; Future; Expected date: 12/22/2024  -     Hemoglobin A1C; Future; Expected date: 12/22/2024    2. Hypercholesteremia  Chronic controlled  Cont statin same dose  -     CBC Auto Differential; Future; Expected date: 12/22/2024  -     Comprehensive Metabolic Panel; Future; Expected date: 12/22/2024  -     Lipid Panel; Future; Expected date: 12/22/2024  -     Hemoglobin A1C; Future; Expected date: 12/22/2024    3. Aortic atherosclerosis  Chronic stable  Cont statin  Overview:  CXR 2/2015>  The aorta is mildly  tortuous.     Orders:  -     CBC Auto Differential; Future; Expected date: 12/22/2024  -     Comprehensive Metabolic Panel; Future; Expected date: 12/22/2024  -     Lipid Panel; Future; Expected date: 12/22/2024  -     Hemoglobin A1C; Future; Expected date: 12/22/2024    4. Type 2 diabetes mellitus with both eyes affected by retinopathy without macular edema, without long-term current use of insulin, unspecified retinopathy severity  Chronic controlled  Cont meds same dose  ADA diet  Check sugars and keep log  -     CBC Auto Differential; Future; Expected date: 12/22/2024  -     Comprehensive Metabolic Panel; Future; Expected date: 12/22/2024  -     Lipid Panel; Future; Expected date: 12/22/2024  -     Hemoglobin A1C; Future; Expected date: 12/22/2024    5. Class 3 severe obesity due to excess calories with serious comorbidity and body mass index (BMI) of 45.0 to 49.9 in adult  Chronic improving  Weight loss slowing on 2mg Ozempic weekly  Unsure Mounjaro will be a financial option; getting Ozempic via assistance program I believe  -     CBC Auto Differential; Future; Expected date: 12/22/2024  -     Comprehensive Metabolic Panel; Future; Expected date: 12/22/2024  -     Lipid Panel; Future; Expected date: 12/22/2024  -     Hemoglobin A1C; Future; Expected date: 12/22/2024    6. JOHNNIE (generalized anxiety disorder)  Chronic stable  Recent death of he 18yo grandson and  recently diagnosed with melanoma. She is stressed but this is situational and normal reacton  Will monitor and adjust treatment if needed    7. Lumbar radiculopathy  Chronic stable  Still with significant pain with back and hip  She is working on weight loss      8. Encounter for screening mammogram for malignant neoplasm of breast  Scheduled-due August  -     Mammo Digital Screening Bilat w/ [facility]; Future; Expected date: 08/05/2024               RTC 6 months with labs and PRN

## 2024-06-25 NOTE — TELEPHONE ENCOUNTER
----- Message from Kinga Robles MD sent at 6/25/2024 12:04 PM CDT -----  Please let Mrs. Vasquez know Dr. Davis can't do the Mounjaro through the same program she is using for the Ozempic. She can stay on the Ozempic and keep doing what she is doing. It is getting there!

## 2024-08-15 ENCOUNTER — PATIENT OUTREACH (OUTPATIENT)
Dept: INTERNAL MEDICINE | Facility: CLINIC | Age: 69
End: 2024-08-15
Payer: MEDICARE

## 2024-08-15 NOTE — TELEPHONE ENCOUNTER
Left voicemail briefly introducing the [employer] HTN, DM2 programs.  Encouraged call back for more information.

## 2024-08-20 ENCOUNTER — OFFICE VISIT (OUTPATIENT)
Dept: NEUROLOGY | Facility: CLINIC | Age: 69
End: 2024-08-20
Payer: MEDICARE

## 2024-08-20 VITALS
WEIGHT: 221.56 LBS | OXYGEN SATURATION: 98 % | HEART RATE: 99 BPM | DIASTOLIC BLOOD PRESSURE: 70 MMHG | SYSTOLIC BLOOD PRESSURE: 110 MMHG | HEIGHT: 58 IN | BODY MASS INDEX: 46.51 KG/M2 | RESPIRATION RATE: 16 BRPM

## 2024-08-20 DIAGNOSIS — G47.33 OSA (OBSTRUCTIVE SLEEP APNEA): ICD-10-CM

## 2024-08-20 DIAGNOSIS — M47.816 LUMBAR SPONDYLOSIS: ICD-10-CM

## 2024-08-20 DIAGNOSIS — M70.61 GREATER TROCHANTERIC BURSITIS OF RIGHT HIP: ICD-10-CM

## 2024-08-20 DIAGNOSIS — G47.00 INSOMNIA, UNSPECIFIED TYPE: ICD-10-CM

## 2024-08-20 DIAGNOSIS — G25.0 ESSENTIAL TREMOR: ICD-10-CM

## 2024-08-20 DIAGNOSIS — G24.3 CERVICAL DYSTONIA: Primary | ICD-10-CM

## 2024-08-20 DIAGNOSIS — M54.42 CHRONIC BILATERAL LOW BACK PAIN WITH BILATERAL SCIATICA: ICD-10-CM

## 2024-08-20 DIAGNOSIS — E66.01 CLASS 3 SEVERE OBESITY DUE TO EXCESS CALORIES WITH SERIOUS COMORBIDITY AND BODY MASS INDEX (BMI) OF 45.0 TO 49.9 IN ADULT: ICD-10-CM

## 2024-08-20 DIAGNOSIS — M54.41 CHRONIC BILATERAL LOW BACK PAIN WITH BILATERAL SCIATICA: ICD-10-CM

## 2024-08-20 DIAGNOSIS — F41.1 GAD (GENERALIZED ANXIETY DISORDER): ICD-10-CM

## 2024-08-20 DIAGNOSIS — G89.29 CHRONIC BILATERAL LOW BACK PAIN WITH BILATERAL SCIATICA: ICD-10-CM

## 2024-08-20 DIAGNOSIS — G25.81 RESTLESS LEG SYNDROME: ICD-10-CM

## 2024-08-20 DIAGNOSIS — M54.16 LUMBAR RADICULOPATHY: ICD-10-CM

## 2024-08-20 PROCEDURE — 99214 OFFICE O/P EST MOD 30 MIN: CPT | Mod: S$PBB | Performed by: NURSE PRACTITIONER

## 2024-08-20 PROCEDURE — 99999 PR PBB SHADOW E&M-EST. PATIENT-LVL IV: CPT | Mod: PBBFAC,,, | Performed by: NURSE PRACTITIONER

## 2024-08-20 PROCEDURE — 99999 PR STA SHADOW: CPT | Mod: PBBFAC,,, | Performed by: NURSE PRACTITIONER

## 2024-08-20 PROCEDURE — 99214 OFFICE O/P EST MOD 30 MIN: CPT | Mod: PBBFAC | Performed by: NURSE PRACTITIONER

## 2024-08-20 RX ORDER — TRAZODONE HYDROCHLORIDE 50 MG/1
50 TABLET ORAL NIGHTLY
Qty: 90 TABLET | Refills: 1 | Status: SHIPPED | OUTPATIENT
Start: 2024-08-20

## 2024-08-20 RX ORDER — SERTRALINE HYDROCHLORIDE 50 MG/1
50 TABLET, FILM COATED ORAL DAILY
Qty: 90 TABLET | Refills: 1 | Status: SHIPPED | OUTPATIENT
Start: 2024-08-20 | End: 2025-08-20

## 2024-08-20 RX ORDER — BACLOFEN 10 MG/1
10 TABLET ORAL 3 TIMES DAILY
Qty: 270 TABLET | Refills: 1 | Status: SHIPPED | OUTPATIENT
Start: 2024-08-20

## 2024-08-20 RX ORDER — ROPINIROLE HYDROCHLORIDE 2 MG/1
2 TABLET, FILM COATED, EXTENDED RELEASE ORAL NIGHTLY
Qty: 30 TABLET | Refills: 5 | Status: SHIPPED | OUTPATIENT
Start: 2024-08-20 | End: 2025-08-20

## 2024-08-20 RX ORDER — PROPRANOLOL HYDROCHLORIDE 40 MG/1
40 TABLET ORAL 2 TIMES DAILY
Qty: 180 TABLET | Refills: 1 | Status: SHIPPED | OUTPATIENT
Start: 2024-08-20

## 2024-08-20 RX ORDER — FUROSEMIDE 20 MG/1
40 TABLET ORAL
Qty: 180 TABLET | Refills: 3 | Status: SHIPPED | OUTPATIENT
Start: 2024-08-20

## 2024-08-20 NOTE — TELEPHONE ENCOUNTER
Refill Decision Note   Micaela Arvizu  is requesting a refill authorization.  Brief Assessment and Rationale for Refill:  Approve     Medication Therapy Plan:         Alert overridden per protocol: Yes   Comments:     Note composed:6:46 PM 08/20/2024

## 2024-08-20 NOTE — TELEPHONE ENCOUNTER
No care due was identified.  [facility] Embedded Care Due Messages. Reference number: [id number].   8/20/2024 7:03:48 AM CDT

## 2024-08-20 NOTE — PROGRESS NOTES
"HPI: Micaela Arvizu is a 69 y.o. female with cervical dystonia with head tremor, multiple level degenerative changes of the L spine, prior lower T spine compression fractures and L2 and L4 compression deformities. She has bilateral  lumbar radicular pain. EMG/NCS of the legs normal 5/2015. She has GERD, HLD, HTN, JOHNNIE, insomnia, osteoporosis, and ROBERTO, as well as a history of asthmatic bronchitis. S/p right knee replacement in 201 with Dr. Vizcaino.      She presents today for a follow up visit. She feels that her RLS begins around 3:00 p.m. She has started to take the Requip immediate release earlier, and this is helpful, but it takes "a while" before it starts to work. This began about 2 months ago.     She is pending right hip replacement, and she was advised to have a left hip replacement afterwards. RLS is worse on the right sided, but this varies at times.     She is compliant with CPAP.    Zoloft continues to help her general anxiety.     Head tremor is tolerable currently. She notes some increase with stress. She remains on Propranolol.     She is still on Ozempic. She is not losing weight. She will see Endocrinology tomorrow. She still has nausea with taking this.     She is taking Trazodone for sleep currently. This remains effective.     Neck tension is improved lately. Left shoulder pain is also improved. She has seen Ortho for her left shoulder pain.     She used to see pain management at [facility]. She failed injections with this provider. She is interested in seeing a provider at [facility].     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 She and her  are looking forward to being [employer] for the [facility].     Review of Systems   Unable to perform ROS: Other   Constitutional:  Negative for malaise/fatigue.   Eyes:  Negative for blurred vision and double vision.   Cardiovascular:  Negative for palpitations and leg swelling.   Musculoskeletal:  Positive for back pain " and joint pain. Negative for falls and neck pain.   Skin:  Negative for rash.   Neurological:  Positive for tremors. Negative for dizziness, tingling, sensory change, weakness and headaches.   Endo/Heme/Allergies:  Does not bruise/bleed easily.   Psychiatric/Behavioral:  Negative for depression and memory loss. The patient is not nervous/anxious and does not have insomnia.      Exam:  Gen Appearance, well developed/nourished in no apparent distress  CV: 2+ distal pulses with no edema or swelling  Neuro:  MS: Awake, alert, oriented to place, person, time, situation. Sustains attention. Recent/remote memory intact, Language is full to spontaneous speech/comprehension. Fund of Knowledge is full  CN: RAMÍREZ (bilateral IOL insertions), Extraoccular movements and visual fields are full. Normal facial sensation and strength, Tongue and Palate are midline and strong. Shoulder Shrug symmetric and strong.  Motor: Normal bulk, tone, no abnormal movements in the hands, no head tremor noted today with generalized tremulousness when discussing stressful items. 5/5 strength bilateral upper/lower extremities with 1+ reflexes  Sensory: symmetric to temp, and vibration.  Romberg negative  Cerebellar: Finger-nose, Rapid alternating movements intact  Gait: Normal stance, no ataxia, antalgic gait.     Imagin2015 MRI L-spine:   Interval resolution of the edema like signal involving the inferior end plate of L2 which has been replaced with fatty marrow.    Mild spondylosis of the lumbar spine without evidence for significant central canal stenosis or neural foraminal narrowing.    MRI C-spine 2016: Multilevel cervical spondylosis with foraminal encroachment greatest from the C3-C5 levels as noted above.    Assessment/Plan: Micaela Arvizu is a 69 y.o. female with multiple level degenerative changes of the L spine, prior lower T spine compression fractures and L2  And L4 compression deformities. She has bilateral  lumbar radicular pain.  EMG/NCS of the legs normal 5/2015. She has essential tremor of the head, which runs in her family. Exam shows essential tremor with cervical dystonia with left torticollis.    Labs:   2/2/23 A1c 7.3%, LDL 54/8, CMP and CBC unremarkable.  6/2023 Ferritin    I recommend:   1. Continue Zoloft 50 mg treat for JOHNNIE. Recent increase was effective, and seemed to resolve diffuse tremors.   -She took Lexapro prior.   -stress reduction techniques were discussed today.   -counseling was suggested to help with grief and other stressors. Discussed how stress can affect the body.     2. Continue Trazodone for insomnia. Increased insomnia with increased stress.   -She failed Melatonin.   -No longer taking Klonopin.     3. S/p right knee replacement surgery. Dietary changes were discussed. Bariatric surgery not covered by her insurance.     4. Continue Propranolol for essential tremor at 40 mg bid. Increase back to 80 mg bid was ineffective. Tremor reportedly worse on reduced dose from 80 mg to 40 mg prior.     -No worsening of tremor off of Primidone or Gabapentin.     -Tremor worse with anxiety, and is diffuse with anxiety, rather than only affecting the hands and head.   -denies history of asthma-clarified prior records with her. She had episodic bronchitis prior.     5. She failed Tizanidine, and is taking Baclofen per pain management.     6. Referred to Podiatry for evaluation of left great toe pain. Explained that localized pain to the great toe was not suggestive of neuropathy. She was diagnosed with an ingrown toenail.     7. Continue Requip for RLS, but change from immediate release 1 mg IR to 2 mg XR.   -Ferritin level was normal. She has tried her 's Klonopin, which was helpful. It is preferable to start with a dopamine agonist, rather than a benzo, as discussed.   -she stopped Gabapentin with no worsening of her RLS.     -her intrinsic right hip issues may be aggravating her RLS.     8. Botox was ineffective for  cervical dystonia prior.  -PT was effective prior for neck tension, reduced ROM/pain post knee replacement, and lumbar pain, but the effect was short lived.     9. L-spine complaints improved with right TKA, but are ongoing. She has seen pain management. She failed her last injections. Advised her to follow up with pain management to discuss ongoing complaints. She may schedule a visit with a provider at [facility].   -No worsening of her lumbar pain off of Gabapentin.     10. Order motorized scooter prior, as she cannot tolerate prolonged walking/standing, secondary to severe lumbar pain/radiculopathy, as well as ongoing pain and ROM issues after her right knee replacement. She has sensory issues from her lumbar radiculopathy, which result in subjective leg weakness, which interferes with her walking at times. This was not approved by her insurance; however, her PCP's office was able to get an alternate scooter device approved for her.     11. The patient is also being treated for osteoporosis.     12. Weight loss is difficult at this time, due to MSK complaints. She was placed on Ozempic recently to try to lose weight in preparation for a right hip replacement. She has follow up with Endocrinology.     13. She is  now s/p bilateral cataract surgery.     FU 6 months

## 2024-08-21 ENCOUNTER — OFFICE VISIT (OUTPATIENT)
Dept: ENDOCRINOLOGY | Facility: CLINIC | Age: 69
End: 2024-08-21
Payer: MEDICARE

## 2024-08-21 VITALS
RESPIRATION RATE: 17 BRPM | WEIGHT: 221.63 LBS | HEART RATE: 88 BPM | BODY MASS INDEX: 46.52 KG/M2 | SYSTOLIC BLOOD PRESSURE: 110 MMHG | HEIGHT: 58 IN | DIASTOLIC BLOOD PRESSURE: 62 MMHG

## 2024-08-21 DIAGNOSIS — E78.00 HYPERCHOLESTEREMIA: ICD-10-CM

## 2024-08-21 DIAGNOSIS — E11.319 TYPE 2 DIABETES MELLITUS WITH BOTH EYES AFFECTED BY RETINOPATHY WITHOUT MACULAR EDEMA, WITHOUT LONG-TERM CURRENT USE OF INSULIN, UNSPECIFIED RETINOPATHY SEVERITY: Primary | ICD-10-CM

## 2024-08-21 DIAGNOSIS — E66.01 CLASS 3 SEVERE OBESITY DUE TO EXCESS CALORIES WITH SERIOUS COMORBIDITY AND BODY MASS INDEX (BMI) OF 45.0 TO 49.9 IN ADULT: ICD-10-CM

## 2024-08-21 PROCEDURE — 99999 PR STA SHADOW: CPT | Mod: PBBFAC,,, | Performed by: STUDENT IN AN ORGANIZED HEALTH CARE EDUCATION/TRAINING PROGRAM

## 2024-08-21 PROCEDURE — G2211 COMPLEX E/M VISIT ADD ON: HCPCS | Mod: S$PBB | Performed by: STUDENT IN AN ORGANIZED HEALTH CARE EDUCATION/TRAINING PROGRAM

## 2024-08-21 PROCEDURE — 99214 OFFICE O/P EST MOD 30 MIN: CPT | Mod: PBBFAC | Performed by: STUDENT IN AN ORGANIZED HEALTH CARE EDUCATION/TRAINING PROGRAM

## 2024-08-21 PROCEDURE — 99999 PR PBB SHADOW E&M-EST. PATIENT-LVL IV: CPT | Mod: PBBFAC,,, | Performed by: STUDENT IN AN ORGANIZED HEALTH CARE EDUCATION/TRAINING PROGRAM

## 2024-08-21 PROCEDURE — 99214 OFFICE O/P EST MOD 30 MIN: CPT | Mod: S$PBB | Performed by: STUDENT IN AN ORGANIZED HEALTH CARE EDUCATION/TRAINING PROGRAM

## 2024-08-21 NOTE — ASSESSMENT & PLAN NOTE
Controlled with A1c 6.0%  Continue GLP-1 RA and metformin  Benefits from further ibis loss but she has lost about 20 lb with GLP-1 RA which is what is expected   Discussed physical activity options bust most are limited due to multiple joint issues  Discussed prioritizing protein and vegetables in diet      Plan  - Continue Ozempic 2 mg weekly  - Continue metformin 1,000 mg BID      Labs per PCP    F/u 6 months

## 2024-08-21 NOTE — PROGRESS NOTES
"Subjective:      Patient ID: Micaela Arvizu is a 69 y.o. female.    Chief Complaint:  Type 2 diabetes mellitus    History of Present Illness  This is a 69 y.o. female. with a past medical history of Type 2 diabetes mellitus, HTN, HLD, here for evaluation.        Type 2 diabetes mellitus  Diagnosed around age 64    Current diabetes medications:  - Metformin 1,000 mg BID  - Ozempic 2 mg weekly      Past diabetes medications:  - None      Lab Results   Component Value Date    CREATININE 0.6 06/07/2024    EGFRNORACEVR >60 06/07/2024       Known diabetic complications: retinopathy    Weight trend:  Wt Readings from Last 8 Encounters:   08/21/24 100.5 kg (221 lb 9.6 oz)   08/20/24 100.5 kg (221 lb 9 oz)   06/25/24 102 kg (224 lb 13.9 oz)   03/18/24 102.7 kg (226 lb 6.6 oz)   02/20/24 106.3 kg (234 lb 5.6 oz)   02/14/24 104.3 kg (230 lb)   12/18/23 105.2 kg (231 lb 14.8 oz)   12/13/23 105.3 kg (232 lb 2.3 oz)       Family history of diabetes:  Yes    Prior visit with diabetes education: Yes    Current diet: 3 meals per day  Current exercise: Limited    Blood glucose monitoring at home: daily  Home blood sugar records: Not recently        Diabetes Management Status  Statin: Taking  ACE/ARB: Taking    Screening or Prevention Patient's value   HgA1C Testing and Control   Lab Results   Component Value Date    HGBA1C 6.0 (H) 06/07/2024        LDL control Lab Results   Component Value Date    LDLCALC 59.8 (L) 06/07/2024      Nephropathy screening Lab Results   Component Value Date    MICALBCREAT 5.6 12/07/2023        Lab Results   Component Value Date    TSH 3.060 06/05/2023       Last eye exam: : 11/17/2023      Review of Systems  As above    Social and family history reviewed  Current medications and allergies reviewed    Objective:   /62   Pulse 88   Resp 17   Ht 4' 10" (1.473 m)   Wt 100.5 kg (221 lb 9.6 oz)   BMI 46.31 kg/m²   Physical Exam  Alert, oriented    BP Readings from Last 1 Encounters:   08/21/24 110/62 "      Wt Readings from Last 1 Encounters:   08/21/24 0816 100.5 kg (221 lb 9.6 oz)     Body mass index is 46.31 kg/m².    Lab Review:   Lab Results   Component Value Date    HGBA1C 6.0 (H) 06/07/2024     Lab Results   Component Value Date    CHOL 144 06/07/2024    HDL 48 06/07/2024    LDLCALC 59.8 (L) 06/07/2024    TRIG 181 (H) 06/07/2024    CHOLHDL 33.3 06/07/2024     Lab Results   Component Value Date     06/07/2024    K 3.9 06/07/2024     06/07/2024    CO2 26 06/07/2024     (H) 06/07/2024    BUN 12 06/07/2024    CREATININE 0.6 06/07/2024    CALCIUM 9.8 06/07/2024    PROT 7.2 06/07/2024    ALBUMIN 3.2 (L) 06/07/2024    BILITOT 0.4 06/07/2024    ALKPHOS 64 06/07/2024    AST 15 06/07/2024    ALT 17 06/07/2024    ANIONGAP 13 06/07/2024    ESTGFRAFRICA >60 06/07/2022    EGFRNONAA >60 06/07/2022    TSH 3.060 06/05/2023       All pertinent labs reviewed    Assessment and Plan     Type 2 diabetes mellitus with both eyes affected by retinopathy without macular edema, without long-term current use of insulin, unspecified retinopathy severity  Controlled with A1c 6.0%  Continue GLP-1 RA and metformin  Benefits from further ibis loss but she has lost about 20 lb with GLP-1 RA which is what is expected   Discussed physical activity options bust most are limited due to multiple joint issues  Discussed prioritizing protein and vegetables in diet      Plan  - Continue Ozempic 2 mg weekly  - Continue metformin 1,000 mg BID      Labs per PCP    F/u 6 months    Class 3 severe obesity due to excess calories with serious comorbidity and body mass index (BMI) of 45.0 to 49.9 in adult  Continue GLP-1 RA given weight loss benefit    Hypercholesteremia  Continue statin  LDL monitored by PCP        Mukul Davis MD  Endocrinology

## 2024-08-28 ENCOUNTER — PATIENT MESSAGE (OUTPATIENT)
Dept: NEUROLOGY | Facility: CLINIC | Age: 69
End: 2024-08-28
Payer: MEDICARE

## 2024-10-02 ENCOUNTER — TELEPHONE (OUTPATIENT)
Dept: ENDOCRINOLOGY | Facility: CLINIC | Age: 69
End: 2024-10-02
Payer: MEDICARE

## 2024-11-12 ENCOUNTER — TELEPHONE (OUTPATIENT)
Dept: INTERNAL MEDICINE | Facility: CLINIC | Age: 69
End: 2024-11-12
Payer: MEDICARE

## 2024-11-12 NOTE — TELEPHONE ENCOUNTER
----- Message from Pippa sent at 2024  2:26 PM CST -----  Contact: [facility]  Micaela Arvizu  MRN: [id number]  : 1955  PCP: Kinga Robles  Home Phone      [phone]  Work Phone      Not on file.  Mobile          [phone]      MESSAGE:     [facility] called stating they sent an rx request for cpap supplies they wwould like an update.     Phone   [phone]  Fax  [phone]  Ref # [id number]  
Called [facility] but was unable to speak with someone so I left voicemail for call back.  
not examined

## 2024-11-13 ENCOUNTER — OFFICE VISIT (OUTPATIENT)
Dept: INTERNAL MEDICINE | Facility: CLINIC | Age: 69
End: 2024-11-13
Payer: MEDICARE

## 2024-11-13 ENCOUNTER — HOSPITAL ENCOUNTER (OUTPATIENT)
Dept: PULMONOLOGY | Facility: HOSPITAL | Age: 69
Discharge: HOME | End: 2024-11-13
Attending: INTERNAL MEDICINE
Payer: MEDICARE

## 2024-11-13 ENCOUNTER — HOSPITAL ENCOUNTER (OUTPATIENT)
Dept: RADIOLOGY | Facility: HOSPITAL | Age: 69
Discharge: HOME | End: 2024-11-13
Attending: INTERNAL MEDICINE
Payer: MEDICARE

## 2024-11-13 VITALS
HEIGHT: 58 IN | WEIGHT: 209.88 LBS | DIASTOLIC BLOOD PRESSURE: 66 MMHG | BODY MASS INDEX: 44.06 KG/M2 | SYSTOLIC BLOOD PRESSURE: 112 MMHG | RESPIRATION RATE: 16 BRPM | HEART RATE: 86 BPM

## 2024-11-13 DIAGNOSIS — E11.319 TYPE 2 DIABETES MELLITUS WITH BOTH EYES AFFECTED BY RETINOPATHY WITHOUT MACULAR EDEMA, WITHOUT LONG-TERM CURRENT USE OF INSULIN, UNSPECIFIED RETINOPATHY SEVERITY: ICD-10-CM

## 2024-11-13 DIAGNOSIS — R09.82 POST-NASAL DRIP: ICD-10-CM

## 2024-11-13 DIAGNOSIS — Z01.818 PRE-OP EVALUATION: ICD-10-CM

## 2024-11-13 DIAGNOSIS — Z01.818 PRE-OP EVALUATION: Primary | ICD-10-CM

## 2024-11-13 DIAGNOSIS — G47.33 OSA (OBSTRUCTIVE SLEEP APNEA): ICD-10-CM

## 2024-11-13 DIAGNOSIS — M16.11 PRIMARY OSTEOARTHRITIS OF RIGHT HIP: ICD-10-CM

## 2024-11-13 LAB
OHS QRS DURATION: 82 MS
OHS QTC CALCULATION: 449 MS

## 2024-11-13 PROCEDURE — 93005 ELECTROCARDIOGRAM TRACING: CPT

## 2024-11-13 PROCEDURE — 99999 PR STA SHADOW: CPT | Mod: PBBFAC,,, | Performed by: INTERNAL MEDICINE

## 2024-11-13 PROCEDURE — 99900035 HC TECH TIME PER 15 MIN (STAT)

## 2024-11-13 PROCEDURE — 99999 PR PBB SHADOW E&M-EST. PATIENT-LVL V: CPT | Mod: PBBFAC,,, | Performed by: INTERNAL MEDICINE

## 2024-11-13 PROCEDURE — 71046 X-RAY EXAM CHEST 2 VIEWS: CPT | Mod: TC

## 2024-11-13 PROCEDURE — G2211 COMPLEX E/M VISIT ADD ON: HCPCS | Mod: S$PBB | Performed by: INTERNAL MEDICINE

## 2024-11-13 PROCEDURE — 99215 OFFICE O/P EST HI 40 MIN: CPT | Mod: S$PBB | Performed by: INTERNAL MEDICINE

## 2024-11-13 PROCEDURE — 99215 OFFICE O/P EST HI 40 MIN: CPT | Mod: PBBFAC,25 | Performed by: INTERNAL MEDICINE

## 2024-11-13 PROCEDURE — 93010 ELECTROCARDIOGRAM REPORT: CPT | Mod: ,,, | Performed by: INTERNAL MEDICINE

## 2024-11-13 PROCEDURE — 71046 X-RAY EXAM CHEST 2 VIEWS: CPT | Mod: 26,,, | Performed by: RADIOLOGY

## 2024-11-13 RX ORDER — PROMETHAZINE HYDROCHLORIDE AND DEXTROMETHORPHAN HYDROBROMIDE 6.25; 15 MG/5ML; MG/5ML
5 SYRUP ORAL EVERY 4 HOURS PRN
Qty: 118 ML | Refills: 0 | Status: SHIPPED | OUTPATIENT
Start: 2024-11-13 | End: 2024-11-23

## 2024-11-13 NOTE — PROGRESS NOTES
"Subjective:       Patient ID: Micaela Arvizu is a 69 y.o. female.    Chief Complaint: Pre-op Exam      HPI:  Patient is known to me and presents for pre op eval for hip surgery. She has  HTN, HLD, osteoporosis, GERD and anxiety. She has no known h/o of CAD, TIA/CVA, CKD.      H/H normal  GFR > 60, normal  A1C 6%, stable  LDL 59, at goal  microalb normal    Chronic low back pain: Has had MRIs in the past. Has seen Dr. Daily for injections She uses tramadol for pain management for her arthrits which is working well. Supplementing with tylenol right now as trying not to use tramadol often.  Had stomach ulcer with NSAIDs.   Also taking baclofen PRN.    She is now dealing with worsening right hip pain and left shoudler pain. She has severe hip OA. Saw Dr. Drake who is now planning surgery since she has low weight.      Started GLP-1 and weight is trending down (see below). She also had left shoulder pain from rotator cuff injury. Saw Dr. Vizcaino who also felt surgery would be necessary for repair but also felt she was high risk for surgery per patient.  She is walking with walker. Having a lot of trouble walking long distances due to pain.     HTN: on lisinopril-HCTZ, lasix. Home BP < 140/90. Denies chest pains, SOB and LE edema.      HLD: on atorvastatin and fish oil. Denies medicatoin side effects. LDL at goal < 70.      DM: A1C at goal. On metformin 1000mg BID and Ozempic 2mg. She has some mild nausea from ozempic but tolerable. Weight is trending down. she has really started focusing on her diet. Less carbs and smaller portions. Feeling full sooner  Denies numbness/tingling.    Microalb: 12/2023  Eye exam: done 11/2023     GERD: taking omeprazole daily. She has h/o of esophageal stricture. Had "stretching" with Dr. Bojorquez. Denies abd pain, n/v/d/c.      Anxiety/insomnia: on trazodone 50mg at night. Working well. was on  Klonopin PRN, no recent fills on this.       Osteoporosis: follows with GYN ([facility]nar[facility]odaux) for this. " Had DEXA done 3/2018 with GYN which showed improvement. Doing Prolia with her but was too expensive so will discuss with her GYN--now on risedronate.      Tremor: dad has Parkinson's disease. Tells me she and her sisters all have this. Sees neurology and dx with essential tremor on propranolol; now off primidone.     Past Medical History:   Diagnosis Date    Anxiety     Arthritis     Asthma     Depression     GERD (gastroesophageal reflux disease)     Hiatal hernia     History of colonoscopy with polypectomy     History of esophagogastroduodenoscopy (EGD)     Hyperlipidemia     Hypertension     Lumbar facet arthropathy     Obesity     Osteoporosis     Pneumonia     Sleep apnea     cpap    Trouble in sleeping     Type 2 diabetes mellitus without complication, without long-term current use of insulin        Family History   Problem Relation Name Age of Onset    Hypertension Mother      Hypertension Father      Parkinsonism Father         Social History     Socioeconomic History    Marital status:    Tobacco Use    Smoking status: Never    Smokeless tobacco: Never   Substance and Sexual Activity    Alcohol use: No    Drug use: No    Sexual activity: Not Currently     Comment:      Social Drivers of Health     Financial Resource Strain: Low Risk  (12/18/2023)    Overall Financial Resource Strain (CARDIA)     Difficulty of Paying Living Expenses: Not hard at all   Food Insecurity: No Food Insecurity (12/18/2023)    Hunger Vital Sign     Worried About Running Out of Food in the Last Year: Never true     Ran Out of Food in the Last Year: Never true   Transportation Needs: No Transportation Needs (12/18/2023)    PRAPARE - Transportation     Lack of Transportation (Medical): No     Lack of Transportation (Non-Medical): No   Physical Activity: Inactive (12/18/2023)    Exercise Vital Sign     Days of Exercise per Week: 0 days     Minutes of Exercise per Session: 0 min   Stress: No Stress Concern Present  (12/18/2023)    [employer] - Occupational Stress Questionnaire     Feeling of Stress : Not at all   Housing Stability: Low Risk  (12/18/2023)    Housing Stability Vital Sign     Unable to Pay for Housing in the Last Year: No     Number of Places Lived in the Last Year: 1     Unstable Housing in the Last Year: No       Review of Systems   Constitutional:  Negative for activity change, fatigue, fever and unexpected weight change.   HENT:  Negative for congestion, ear pain, hearing loss, rhinorrhea, sore throat and tinnitus.    Eyes:  Negative for pain, redness and visual disturbance.   Respiratory:  Negative for cough, shortness of breath and wheezing.    Cardiovascular:  Negative for chest pain, palpitations and leg swelling.   Gastrointestinal:  Negative for abdominal pain, constipation, diarrhea and vomiting.   Genitourinary:  Negative for dysuria, frequency and urgency.   Musculoskeletal:  Positive for arthralgias and back pain. Negative for joint swelling and neck pain.   Skin:  Negative for color change, rash and wound.   Neurological:  Negative for dizziness, light-headedness and headaches.         Objective:      Physical Exam  Vitals reviewed.   Constitutional:       General: She is not in acute distress.     Appearance: She is well-developed. She is obese.   HENT:      Head: Normocephalic and atraumatic.      Right Ear: External ear normal.      Left Ear: External ear normal.      Nose: Nose normal.   Eyes:      General:         Right eye: No discharge.         Left eye: No discharge.      Conjunctiva/sclera: Conjunctivae normal.      Pupils: Pupils are equal, round, and reactive to light.   Neck:      Thyroid: No thyromegaly.   Cardiovascular:      Rate and Rhythm: Normal rate and regular rhythm.      Heart sounds: No murmur heard.  Pulmonary:      Effort: Pulmonary effort is normal. No respiratory distress.      Breath sounds: Normal breath sounds.   Abdominal:      General:  Bowel sounds are normal. There is no distension.      Palpations: Abdomen is soft.      Tenderness: There is no abdominal tenderness.   Skin:     General: Skin is warm and dry.   Neurological:      Mental Status: She is alert and oriented to person, place, and time.   Psychiatric:         Behavior: Behavior normal.         Thought Content: Thought content normal.         Assessment:       1. Pre-op evaluation    2. Type 2 diabetes mellitus with both eyes affected by retinopathy without macular edema, without long-term current use of insulin, unspecified retinopathy severity    3. ROBERTO (obstructive sleep apnea)    4. Primary osteoarthritis of right hip    5. Post-nasal drip        Plan:       1. Pre-op evaluation  Using Joseph Revised Cardiac Risk Index she is low-moderate risk for surgery. From a medical standpoint she can proceed with her scheduled surgery without any further testing.  CAD: no  CHF: no  CVA: no  DM on insulin: no  Cr >2: no  High risk surgery: no  MET >4: no--limited by MSK pain  Tobacco: no  EtOH: no    EKG reviewed and personally interpreted: NSR and no ST, T wave changes concerning for ischemia  CXR personally reviewed and interpreted: no acute pathology noted including no effusion, pneumothorax, consolidation  Labs reviewed and interpreted: A1C 5.6%-normal, H/H normal, Plt 292, normal, GFR > 60, normal    She is to hold her ozempic 1 week prior to surgery  Hold metformin evening prior and day of surgery  Hold ASA, NSAIDs 5 days prior to surgery. She is not on any other blood thinners.  All are meds are safe in the perioperative period.     -     CBC Auto Differential; Future; Expected date: 11/20/2024  -     Comprehensive Metabolic Panel; Future; Expected date: 11/20/2024  -     Hemoglobin A1C; Future; Expected date: 11/20/2024  -     X-Ray Chest PA And Lateral; Future; Expected date: 11/13/2024  -     SCHEDULED EKG 12-LEAD (to Muse); Future  -     Culture, MRSA; Future; Expected date:  11/13/2024    2. Type 2 diabetes mellitus with both eyes affected by retinopathy without macular edema, without long-term current use of insulin, unspecified retinopathy severity  Chronic controlled  Cont meds same dose  -     CBC Auto Differential; Future; Expected date: 11/20/2024  -     Comprehensive Metabolic Panel; Future; Expected date: 11/20/2024  -     Hemoglobin A1C; Future; Expected date: 11/20/2024  -     X-Ray Chest PA And Lateral; Future; Expected date: 11/13/2024  -     SCHEDULED EKG 12-LEAD (to Muse); Future  -     Culture, MRSA; Future; Expected date: 11/13/2024    3. ROBERTO (obstructive sleep apnea)  Chronic stable  + weight loss  Cont CPAP  Overview:  On cpap      4. Primary osteoarthritis of right hip  Chronic uncontrolled  Surgery planned  Cont weight loss    5. Post-nasal drip  Acute  Likely allergic  No acute infection found on exam today  Tessalon not helping  Short course promethazine DM for at night  Daily antihistamine  -     promethazine-dextromethorphan (PROMETHAZINE-DM) 6.25-15 mg/5 mL Syrp; Take 5 mLs by mouth every 4 (four) hours as needed.  Dispense: 118 mL; Refill: 0       RTC PRN

## 2024-11-13 NOTE — Clinical Note
Note complete and cleared for surgery. Labs, CXR and EKG reviewed and results noted in my note. Fax to Dr. Corley please. Also, Ms. Vasquez mentioned something about [facility] not sending her CPAP supplies. Can we call and see what the issue is? Thanks!

## 2024-11-13 NOTE — PATIENT INSTRUCTIONS
Do not take aspirin and antiinflammatories (other than Tylenol) 5 days prior to surgery.   Do not take Ozempic 1 week prior to surgery  Do not take metformin evening before or morning of surgery.

## 2024-11-14 ENCOUNTER — TELEPHONE (OUTPATIENT)
Dept: INTERNAL MEDICINE | Facility: CLINIC | Age: 69
End: 2024-11-14
Payer: MEDICARE

## 2024-11-14 ENCOUNTER — TELEPHONE (OUTPATIENT)
Dept: SURGERY | Facility: CLINIC | Age: 69
End: 2024-11-14
Payer: MEDICARE

## 2024-11-14 NOTE — TELEPHONE ENCOUNTER
----- Message from Pippa sent at 2024  2:25 PM CST -----  Contact: Keith Arvizu  MRN: [id number]  : 1955  PCP: Kinga Robles  Home Phone      [phone]  Work Phone      Not on file.  Mobile          [phone]      MESSAGE:     Alvin with [facility] states they need a f/u on rx request for CPAP supplies.       Please advise   [phone]   Ref # [id number]

## 2024-11-22 ENCOUNTER — PATIENT OUTREACH (OUTPATIENT)
Dept: ADMINISTRATIVE | Facility: HOSPITAL | Age: 69
End: 2024-11-22
Payer: MEDICARE

## 2024-11-22 NOTE — PROGRESS NOTES
Call cannot be completed at this time. No recorder no answer  Patient needs eye exam from eye dr. Or see if she did it yet. Also needs foot exam, med review and sdh

## 2024-12-06 ENCOUNTER — TELEPHONE (OUTPATIENT)
Dept: INTERNAL MEDICINE | Facility: CLINIC | Age: 69
End: 2024-12-06

## 2024-12-06 NOTE — TELEPHONE ENCOUNTER
Fax received from [facility] with a signed NEO for patient's surgery clearance. Clearance note, labs, EKG and CXR sent.

## 2024-12-12 DIAGNOSIS — E11.319 TYPE 2 DIABETES MELLITUS WITH BOTH EYES AFFECTED BY RETINOPATHY WITHOUT MACULAR EDEMA, WITHOUT LONG-TERM CURRENT USE OF INSULIN, UNSPECIFIED RETINOPATHY SEVERITY: ICD-10-CM

## 2024-12-12 RX ORDER — METFORMIN HYDROCHLORIDE 1000 MG/1
1000 TABLET ORAL 2 TIMES DAILY WITH MEALS
Qty: 180 TABLET | Refills: 1 | Status: SHIPPED | OUTPATIENT
Start: 2024-12-12

## 2024-12-12 NOTE — TELEPHONE ENCOUNTER
No care due was identified.  [facility] Embedded Care Due Messages. Reference number: [id number].   12/12/2024 7:04:17 AM CST

## 2024-12-12 NOTE — TELEPHONE ENCOUNTER
Refill Decision Note   Micaela Arvizu  is requesting a refill authorization.  Brief Assessment and Rationale for Refill:  Approve     Medication Therapy Plan:         Comments:     Note composed:10:39 AM 12/12/2024

## 2024-12-23 ENCOUNTER — PATIENT OUTREACH (OUTPATIENT)
Dept: ADMINISTRATIVE | Facility: HOSPITAL | Age: 69
End: 2024-12-23
Payer: MEDICARE

## 2025-01-02 ENCOUNTER — PATIENT OUTREACH (OUTPATIENT)
Dept: ADMINISTRATIVE | Facility: HOSPITAL | Age: 70
End: 2025-01-02
Payer: MEDICARE

## 2025-01-02 NOTE — LETTER
AUTHORIZATION FOR RELEASE OF   CONFIDENTIAL INFORMATION    Dear Boo Manning MD,    We are seeing Micaela Arvizu, date of birth 1955, in the clinic at [facility]. Kinga Robles MD is the patient's PCP. Micaela Arvizu has an outstanding lab/procedure at the time we reviewed her chart. In order to help keep her health information updated, she has authorized us to request the following medical record(s):                                             X  )  EYE EXAM              [facility], Knight, Sarah, MD, [phone]      If you have any questions, please contact Gisel Brunson MA at [phone].           Patient Name: Micaela Arvizu  : 1955  Patient Phone #: [phone]

## 2025-01-02 NOTE — PROGRESS NOTES
Contacted pt in reference to overdue DM eye exam. Pt states she had an eye exam done with Dr. Boo Manning  located in [place]

## 2025-01-06 ENCOUNTER — TELEPHONE (OUTPATIENT)
Dept: INTERNAL MEDICINE | Facility: CLINIC | Age: 70
End: 2025-01-06
Payer: MEDICARE

## 2025-01-06 ENCOUNTER — PATIENT OUTREACH (OUTPATIENT)
Dept: ADMINISTRATIVE | Facility: HOSPITAL | Age: 70
End: 2025-01-06
Payer: MEDICARE

## 2025-01-06 NOTE — TELEPHONE ENCOUNTER
----- Message from Pippa sent at 2025  2:03 PM CST -----  Contact: Jael Arvizu  MRN: [id number]  : 1955  PCP: Kinga Robles  Home Phone      [phone]  Work Phone      Not on file.  Mobile          [phone]      MESSAGE:     Ann with [facility] would like an update on order faxed for cpap supplies.           Please advise   [phone]   Rr # [id number]  Fax  [phone]

## 2025-01-08 DIAGNOSIS — Z78.0 MENOPAUSE: ICD-10-CM

## 2025-01-10 ENCOUNTER — TELEPHONE (OUTPATIENT)
Dept: INTERNAL MEDICINE | Facility: CLINIC | Age: 70
End: 2025-01-10
Payer: MEDICARE

## 2025-01-10 NOTE — TELEPHONE ENCOUNTER
----- Message from Pippa sent at 1/10/2025  3:11 PM CST -----  Contact: Jael Arvizu  MRN: [id number]  : 1955  PCP: Kinga Robles  Home Phone      [phone]  Work Phone      Not on file.  Mobile          [phone]      MESSAGE:     Jael with [facility] would like an update on fax for cpap supplies.         Please advise   [phone]  Ref #  [id number]  [phone]

## 2025-01-17 ENCOUNTER — TELEPHONE (OUTPATIENT)
Dept: INTERNAL MEDICINE | Facility: CLINIC | Age: 70
End: 2025-01-17
Payer: MEDICARE

## 2025-01-17 NOTE — TELEPHONE ENCOUNTER
Notified patient to keep the area as clean and dry as possible. Notified patient to avoid using the cream near the surgical site. Instructed patient to contact her surgical team as well

## 2025-01-17 NOTE — TELEPHONE ENCOUNTER
----- Message from Pippa sent at 2025 10:27 AM CST -----  Contact: Pt  Micaela Arvizu  MRN: [id number]  : 1955  PCP: Kinga Robles  Home Phone      [phone]  Work Phone      Not on file.  Mobile          [phone]      MESSAGE:     Pt states she had a hip replacement and now has a yeast infection. Pt states she has the cream b ut cannot get it in her cut. Pt states the cut did open up. Pt would like advice and/or an appt.       Please advise   [phone]

## 2025-01-26 DIAGNOSIS — E78.00 HYPERCHOLESTEREMIA: ICD-10-CM

## 2025-01-26 DIAGNOSIS — E11.9 TYPE 2 DIABETES MELLITUS WITHOUT COMPLICATION, WITHOUT LONG-TERM CURRENT USE OF INSULIN: ICD-10-CM

## 2025-01-26 NOTE — TELEPHONE ENCOUNTER
No care due was identified.  [facility] Embedded Care Due Messages. Reference number: [id number].   1/26/2025 8:04:12 AM CST

## 2025-01-27 ENCOUNTER — TELEPHONE (OUTPATIENT)
Dept: INTERNAL MEDICINE | Facility: CLINIC | Age: 70
End: 2025-01-27
Payer: MEDICARE

## 2025-01-27 DIAGNOSIS — G47.33 OSA (OBSTRUCTIVE SLEEP APNEA): Primary | ICD-10-CM

## 2025-01-27 RX ORDER — ATORVASTATIN CALCIUM 40 MG/1
40 TABLET, FILM COATED ORAL
Qty: 90 TABLET | Refills: 1 | Status: SHIPPED | OUTPATIENT
Start: 2025-01-27

## 2025-01-27 NOTE — TELEPHONE ENCOUNTER
Refill Decision Note   Micaela Arvizu  is requesting a refill authorization.  Brief Assessment and Rationale for Refill:  Approve     Medication Therapy Plan:         Comments:     Note composed:3:11 AM 01/27/2025

## 2025-01-27 NOTE — TELEPHONE ENCOUNTER
I am not receiving any faxed from [facility], and they refuse to send it in an email. Can you placed CPAP supply orders for the patient and ill send them to [facility]?     Patient stated that if we continue having issues she will switch companies because she needs her supplies.

## 2025-01-27 NOTE — TELEPHONE ENCOUNTER
----- Message from Moraima sent at 2025 11:09 AM CST -----  Contact: pt  Micaela Arvizu  MRN: [id number]  : 1955  PCP: Kinga Robles  Home Phone      [phone]  Work Phone      Not on file.  Mobile          [phone]      MESSAGE: pt states [facility] states they have requested cpap supplies three times for her. They are waiting for Dr. Robles to sign the order. Please advise, Pt states she needs her supplies      [phone]

## 2025-02-08 DIAGNOSIS — G25.81 RESTLESS LEG SYNDROME: ICD-10-CM

## 2025-02-11 RX ORDER — ROPINIROLE HYDROCHLORIDE 2 MG/1
2 TABLET, FILM COATED, EXTENDED RELEASE ORAL NIGHTLY
Qty: 30 TABLET | Refills: 0 | Status: SHIPPED | OUTPATIENT
Start: 2025-02-11

## 2025-02-12 ENCOUNTER — TELEPHONE (OUTPATIENT)
Dept: NEUROLOGY | Facility: CLINIC | Age: 70
End: 2025-02-12
Payer: MEDICARE

## 2025-02-12 NOTE — TELEPHONE ENCOUNTER
Spoke with patient about [place] denial for the Ropinirole, informed her she will still be able to get the medication, but she will have to pay out of pocket for it, states she usually has a copay for the rx. I advised her to call the pharmacy to get a cost estimate, and if she is unable to afford it then to give us a call. patient states she has an appt soon on 2/20 and will discuss it with HANNAH Sahu then when she comes in.

## 2025-02-13 ENCOUNTER — TELEPHONE (OUTPATIENT)
Dept: NEUROLOGY | Facility: CLINIC | Age: 70
End: 2025-02-13
Payer: MEDICARE

## 2025-02-13 NOTE — TELEPHONE ENCOUNTER
"Patient states will have to pay $77 per month for rOPINIRole (REQUIP XL) 2 mg 24 hr tablet, and thirteen cents for rOPINIRole (REQUIP) "plain, not er" 2 mg tablet. Patient desires rOPINIRole (REQUIP) "plain, not er" 2 mg tablet sent to [facility] ([place] Notified patient message routed to Adelina Saavedra NP to advise. Patient verbalized understanding.  "

## 2025-02-14 ENCOUNTER — TELEPHONE (OUTPATIENT)
Dept: INTERNAL MEDICINE | Facility: CLINIC | Age: 70
End: 2025-02-14
Payer: MEDICARE

## 2025-02-14 DIAGNOSIS — G47.33 OSA (OBSTRUCTIVE SLEEP APNEA): Primary | ICD-10-CM

## 2025-02-14 NOTE — TELEPHONE ENCOUNTER
----- Message from Pippa sent at 2025 12:36 PM CST -----  Contact: Isidro Arvizu  MRN: [id number]  : 1955  PCP: Kinga Robles  Home Phone      [phone]  Work Phone      Not on file.  Mobile          [phone]      MESSAGE:     Isidro with [facility] states they need a new for filled out for cpap supplies and needs  needs to be added and  excluded.       Please advise   [phone]   Fax  [phone]  Ref#[id number]

## 2025-02-17 ENCOUNTER — LAB VISIT (OUTPATIENT)
Dept: LAB | Facility: HOSPITAL | Age: 70
End: 2025-02-17
Attending: INTERNAL MEDICINE
Payer: MEDICARE

## 2025-02-17 DIAGNOSIS — E78.00 HYPERCHOLESTEREMIA: ICD-10-CM

## 2025-02-17 DIAGNOSIS — I70.0 AORTIC ATHEROSCLEROSIS: ICD-10-CM

## 2025-02-17 DIAGNOSIS — I10 ESSENTIAL HYPERTENSION: ICD-10-CM

## 2025-02-17 DIAGNOSIS — E11.319 TYPE 2 DIABETES MELLITUS WITH BOTH EYES AFFECTED BY RETINOPATHY WITHOUT MACULAR EDEMA, WITHOUT LONG-TERM CURRENT USE OF INSULIN, UNSPECIFIED RETINOPATHY SEVERITY: ICD-10-CM

## 2025-02-17 DIAGNOSIS — E66.01 CLASS 3 SEVERE OBESITY DUE TO EXCESS CALORIES WITH SERIOUS COMORBIDITY AND BODY MASS INDEX (BMI) OF 45.0 TO 49.9 IN ADULT: ICD-10-CM

## 2025-02-17 DIAGNOSIS — E66.813 CLASS 3 SEVERE OBESITY DUE TO EXCESS CALORIES WITH SERIOUS COMORBIDITY AND BODY MASS INDEX (BMI) OF 45.0 TO 49.9 IN ADULT: ICD-10-CM

## 2025-02-17 LAB
ALBUMIN SERPL BCP-MCNC: 3.4 G/DL (ref 3.5–5.2)
ALP SERPL-CCNC: 59 U/L (ref 40–150)
ALT SERPL W/O P-5'-P-CCNC: 16 U/L (ref 10–44)
ANION GAP SERPL CALC-SCNC: 10 MMOL/L (ref 8–16)
AST SERPL-CCNC: 15 U/L (ref 10–40)
BASOPHILS # BLD AUTO: 0.05 K/UL (ref 0–0.2)
BASOPHILS NFR BLD: 0.7 % (ref 0–1.9)
BILIRUB SERPL-MCNC: 0.3 MG/DL (ref 0.1–1)
BUN SERPL-MCNC: 16 MG/DL (ref 8–23)
CALCIUM SERPL-MCNC: 8.9 MG/DL (ref 8.7–10.5)
CHLORIDE SERPL-SCNC: 103 MMOL/L (ref 95–110)
CHOLEST SERPL-MCNC: 154 MG/DL (ref 120–199)
CHOLEST/HDLC SERPL: 2.5 {RATIO} (ref 2–5)
CO2 SERPL-SCNC: 26 MMOL/L (ref 23–29)
CREAT SERPL-MCNC: 0.6 MG/DL (ref 0.5–1.4)
DIFFERENTIAL METHOD BLD: ABNORMAL
EOSINOPHIL # BLD AUTO: 0.3 K/UL (ref 0–0.5)
EOSINOPHIL NFR BLD: 3.7 % (ref 0–8)
ERYTHROCYTE [DISTWIDTH] IN BLOOD BY AUTOMATED COUNT: 14.1 % (ref 11.5–14.5)
EST. GFR  (NO RACE VARIABLE): >60 ML/MIN/1.73 M^2
GLUCOSE SERPL-MCNC: 102 MG/DL (ref 70–110)
HCT VFR BLD AUTO: 37.3 % (ref 37–48.5)
HDLC SERPL-MCNC: 62 MG/DL (ref 40–75)
HDLC SERPL: 40.3 % (ref 20–50)
HGB BLD-MCNC: 11.6 G/DL (ref 12–16)
IMM GRANULOCYTES # BLD AUTO: 0.03 K/UL (ref 0–0.04)
IMM GRANULOCYTES NFR BLD AUTO: 0.4 % (ref 0–0.5)
LDLC SERPL CALC-MCNC: 70 MG/DL (ref 63–159)
LYMPHOCYTES # BLD AUTO: 1.4 K/UL (ref 1–4.8)
LYMPHOCYTES NFR BLD: 19.5 % (ref 18–48)
MCH RBC QN AUTO: 26.2 PG (ref 27–31)
MCHC RBC AUTO-ENTMCNC: 31.1 G/DL (ref 32–36)
MCV RBC AUTO: 84 FL (ref 82–98)
MONOCYTES # BLD AUTO: 0.5 K/UL (ref 0.3–1)
MONOCYTES NFR BLD: 6.8 % (ref 4–15)
NEUTROPHILS # BLD AUTO: 5 K/UL (ref 1.8–7.7)
NEUTROPHILS NFR BLD: 68.9 % (ref 38–73)
NONHDLC SERPL-MCNC: 92 MG/DL
NRBC BLD-RTO: 0 /100 WBC
PLATELET # BLD AUTO: 245 K/UL (ref 150–450)
PMV BLD AUTO: 8.2 FL (ref 9.2–12.9)
POTASSIUM SERPL-SCNC: 4 MMOL/L (ref 3.5–5.1)
PROT SERPL-MCNC: 7.3 G/DL (ref 6–8.4)
RBC # BLD AUTO: 4.42 M/UL (ref 4–5.4)
SODIUM SERPL-SCNC: 139 MMOL/L (ref 136–145)
TRIGL SERPL-MCNC: 110 MG/DL (ref 30–150)
WBC # BLD AUTO: 7.22 K/UL (ref 3.9–12.7)

## 2025-02-17 PROCEDURE — 36415 COLL VENOUS BLD VENIPUNCTURE: CPT | Performed by: INTERNAL MEDICINE

## 2025-02-17 PROCEDURE — 80061 LIPID PANEL: CPT | Performed by: INTERNAL MEDICINE

## 2025-02-17 PROCEDURE — 83036 HEMOGLOBIN GLYCOSYLATED A1C: CPT | Performed by: INTERNAL MEDICINE

## 2025-02-17 PROCEDURE — 80053 COMPREHEN METABOLIC PANEL: CPT | Performed by: INTERNAL MEDICINE

## 2025-02-17 PROCEDURE — 85025 COMPLETE CBC W/AUTO DIFF WBC: CPT | Performed by: INTERNAL MEDICINE

## 2025-02-18 LAB
ESTIMATED AVG GLUCOSE: 117 MG/DL (ref 68–131)
HBA1C MFR BLD: 5.7 % (ref 4–5.6)

## 2025-02-20 ENCOUNTER — OFFICE VISIT (OUTPATIENT)
Dept: NEUROLOGY | Facility: CLINIC | Age: 70
End: 2025-02-20
Payer: MEDICARE

## 2025-02-20 VITALS
DIASTOLIC BLOOD PRESSURE: 72 MMHG | WEIGHT: 211.19 LBS | SYSTOLIC BLOOD PRESSURE: 118 MMHG | HEART RATE: 67 BPM | RESPIRATION RATE: 16 BRPM | HEIGHT: 58 IN | OXYGEN SATURATION: 98 % | BODY MASS INDEX: 44.33 KG/M2

## 2025-02-20 DIAGNOSIS — G47.00 INSOMNIA, UNSPECIFIED TYPE: ICD-10-CM

## 2025-02-20 DIAGNOSIS — F41.1 GAD (GENERALIZED ANXIETY DISORDER): ICD-10-CM

## 2025-02-20 DIAGNOSIS — R26.89 IMBALANCE: Primary | ICD-10-CM

## 2025-02-20 DIAGNOSIS — G25.0 ESSENTIAL TREMOR: ICD-10-CM

## 2025-02-20 DIAGNOSIS — M47.816 LUMBAR SPONDYLOSIS: ICD-10-CM

## 2025-02-20 DIAGNOSIS — M54.41 CHRONIC BILATERAL LOW BACK PAIN WITH BILATERAL SCIATICA: ICD-10-CM

## 2025-02-20 DIAGNOSIS — G24.3 CERVICAL DYSTONIA: ICD-10-CM

## 2025-02-20 DIAGNOSIS — M70.61 GREATER TROCHANTERIC BURSITIS OF RIGHT HIP: ICD-10-CM

## 2025-02-20 DIAGNOSIS — M54.42 CHRONIC BILATERAL LOW BACK PAIN WITH BILATERAL SCIATICA: ICD-10-CM

## 2025-02-20 DIAGNOSIS — M54.16 LUMBAR RADICULOPATHY: ICD-10-CM

## 2025-02-20 DIAGNOSIS — G25.81 RESTLESS LEG SYNDROME: ICD-10-CM

## 2025-02-20 DIAGNOSIS — G89.29 CHRONIC BILATERAL LOW BACK PAIN WITH BILATERAL SCIATICA: ICD-10-CM

## 2025-02-20 PROCEDURE — 99214 OFFICE O/P EST MOD 30 MIN: CPT | Mod: S$PBB | Performed by: NURSE PRACTITIONER

## 2025-02-20 PROCEDURE — 99215 OFFICE O/P EST HI 40 MIN: CPT | Mod: PBBFAC | Performed by: NURSE PRACTITIONER

## 2025-02-20 RX ORDER — ROPINIROLE 2 MG/1
2 TABLET, FILM COATED ORAL NIGHTLY
Qty: 90 TABLET | Refills: 1 | Status: SHIPPED | OUTPATIENT
Start: 2025-02-20 | End: 2026-02-20

## 2025-02-20 RX ORDER — BACLOFEN 10 MG/1
10 TABLET ORAL 3 TIMES DAILY
Qty: 270 TABLET | Refills: 1 | Status: SHIPPED | OUTPATIENT
Start: 2025-02-20

## 2025-02-20 RX ORDER — TRAZODONE HYDROCHLORIDE 50 MG/1
50 TABLET ORAL NIGHTLY
Qty: 90 TABLET | Refills: 1 | Status: SHIPPED | OUTPATIENT
Start: 2025-02-20

## 2025-02-20 RX ORDER — PROPRANOLOL HYDROCHLORIDE 40 MG/1
40 TABLET ORAL 2 TIMES DAILY
Qty: 180 TABLET | Refills: 1 | Status: SHIPPED | OUTPATIENT
Start: 2025-02-20

## 2025-02-20 RX ORDER — SERTRALINE HYDROCHLORIDE 50 MG/1
50 TABLET, FILM COATED ORAL DAILY
Qty: 90 TABLET | Refills: 1 | Status: SHIPPED | OUTPATIENT
Start: 2025-02-20 | End: 2026-02-20

## 2025-02-20 NOTE — PROGRESS NOTES
HPI: Micaela Arvizu is a 69 y.o. female with cervical dystonia with head tremor, multiple level degenerative changes of the L spine, prior lower T spine compression fractures and L2 and L4 compression deformities. She has bilateral  lumbar radicular pain. EMG/NCS of the legs normal 5/2015. She has GERD, HLD, HTN, JOHNNIE, insomnia, osteoporosis, and ROBERTO, as well as a history of asthmatic bronchitis. S/p right knee replacement in 201 with Dr. Vizcaino.      She presents today for a follow up visit. Requip 1 mg IR changed to 2 mg XR at her last visit for complaint of earlier RLS symptoms. This was not affordable, and she requested to change back to IR formulation. Requip 2 mg IR prescribed. She tried this last night and it is effective thus far.     She had a right hip replacement in 12/2024 with Dr. Drake. She is nearly complete with PT. She is also in need of a left knee and left hip replacement.     She lost 10 pounds since her last visit. She is still on Ozempic. She will see Endocrinology tomorrow.     She is compliant with CPAP.    Zoloft continues to help her general anxiety.     Head tremor is tolerable currently. She notes some increase with stress. She remains on Propranolol.     She is taking Trazodone for sleep currently. This remains effective.     Neck tension is improved lately. Left shoulder pain is also improved. She has seen Ortho for her left shoulder pain.     She is not currently seeing pain management.     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 She and her  are looking forward to being [place] for the [facility].     Review of Systems   Unable to perform ROS: Other   Constitutional:  Negative for malaise/fatigue.   Eyes:  Negative for blurred vision and double vision.   Cardiovascular:  Negative for palpitations and leg swelling.   Musculoskeletal:  Positive for back pain and joint pain. Negative for falls and neck pain.   Skin:  Negative for rash.    Neurological:  Positive for tremors. Negative for dizziness, tingling, sensory change, weakness and headaches.   Endo/Heme/Allergies:  Does not bruise/bleed easily.   Psychiatric/Behavioral:  Negative for depression and memory loss. The patient is not nervous/anxious and does not have insomnia.      Exam:  Gen Appearance, well developed/nourished in no apparent distress  CV: 2+ distal pulses with no edema or swelling  Neuro:  MS: Awake, alert, oriented to place, person, time, situation. Sustains attention. Recent/remote memory intact, Language is full to spontaneous speech/comprehension. Fund of Knowledge is full  CN: RAMÍREZ (bilateral IOL insertions), Extraoccular movements and visual fields are full. Normal facial sensation and strength, Tongue and Palate are midline and strong. Shoulder Shrug symmetric and strong.  Motor: Normal bulk, tone, no abnormal movements in the hands, no head tremor noted today with generalized tremulousness when discussing stressful items. 5/5 strength bilateral upper/lower extremities with 1+ reflexes  Sensory: symmetric to temp, and vibration.  Romberg negative  Cerebellar: Finger-nose, Rapid alternating movements intact  Gait: Normal stance, slight waddling gait    Imagin2015 MRI L-spine:   Interval resolution of the edema like signal involving the inferior end plate of L2 which has been replaced with fatty marrow.    Mild spondylosis of the lumbar spine without evidence for significant central canal stenosis or neural foraminal narrowing.    MRI C-spine 2016: Multilevel cervical spondylosis with foraminal encroachment greatest from the C3-C5 levels as noted above.    Assessment/Plan: Micaela Arvizu is a 69 y.o. female with multiple level degenerative changes of the L spine, prior lower T spine compression fractures and L2  And L4 compression deformities. She has bilateral  lumbar radicular pain. EMG/NCS of the legs normal 2015. She has essential tremor of the head, which runs  in her family. Exam shows essential tremor with cervical dystonia with left torticollis.    Labs:   2/2/23 A1c 7.3%, LDL 54/8, CMP and CBC unremarkable.  6/2023 Ferritin    I recommend:   1. Continue Zoloft 50 mg treat for JOHNNIE. Recent increase was effective, and seemed to resolve diffuse tremors.   -She took Lexapro prior.   -stress reduction techniques were discussed today.   -counseling was suggested to help with grief and other stressors. Discussed how stress can affect the body.     2. Continue Trazodone for insomnia. Increased insomnia with increased stress.   -She failed Melatonin.   -No longer taking Klonopin.     3. S/p right knee replacement surgery. Dietary changes were discussed. Bariatric surgery not covered by her insurance.     4. Continue Propranolol for essential tremor at 40 mg bid. Increase back to 80 mg bid was ineffective. Tremor reportedly worse on reduced dose from 80 mg to 40 mg prior.     -No worsening of tremor off of Primidone or Gabapentin.     -Tremor worse with anxiety, and is diffuse with anxiety, rather than only affecting the hands and head.   -denies history of asthma-clarified prior records with her. She had episodic bronchitis prior.     5. She failed Tizanidine, and is taking Baclofen per pain management.     6. Referred to Podiatry for evaluation of left great toe pain. Explained that localized pain to the great toe was not suggestive of neuropathy. She was diagnosed with an ingrown toenail.     7. Continue Requip for RLS 2 mg. XR 2 mg was effective, but not affordable. Changed to 2 mg IR and she has relief with this. Should she have breakthrough complaints, she may take 1 mg early afternoon and 1 mg at bedtime. Advised her to message me with changes/responses.   -Ferritin level was normal. She has tried her 's Klonopin, which was helpful. It is preferable to treat with a dopamine agonist, rather than a benzo, as discussed.   -she stopped Gabapentin with no worsening of  her RLS.   -her intrinsic right hip issues may have been  aggravating her RLS. Somewhat improved post right hip replacement.     8. Botox was ineffective for cervical dystonia prior.  -PT was effective prior for neck tension, reduced ROM/pain post knee replacement, and lumbar pain, but the effect was short lived.     9. L-spine complaints improved with right TKA, but are ongoing. She has seen pain management. She failed her last injections. Advised her to follow up with pain management to discuss ongoing complaints. She may schedule a visit with a provider at [facility].   -No worsening of her lumbar pain off of Gabapentin.     10. Order motorized scooter prior, as she cannot tolerate prolonged walking/standing, secondary to severe lumbar pain/radiculopathy, as well as ongoing pain and ROM issues after her right knee replacement. She has sensory issues from her lumbar radiculopathy, which result in subjective leg weakness, which interferes with her walking at times. This was not approved by her insurance; however, her PCP's office was able to get an alternate scooter device approved for her.     11. The patient is also being treated for osteoporosis.     12. Weight loss is difficult at this time, due to MSK complaints. She was placed on Ozempic recently to try to lose weight in preparation for a right hip replacement. She has follow up with Endocrinology.     13. She is  now s/p bilateral cataract surgery.     14. Add PT for gait/balance therapy post hip replacement. She is nearly complete with PT for her right hip.     FU 6 months

## 2025-02-21 ENCOUNTER — RESULTS FOLLOW-UP (OUTPATIENT)
Dept: INTERNAL MEDICINE | Facility: CLINIC | Age: 70
End: 2025-02-21

## 2025-02-24 DIAGNOSIS — Z00.00 ENCOUNTER FOR MEDICARE ANNUAL WELLNESS EXAM: ICD-10-CM

## 2025-02-25 ENCOUNTER — RESULTS FOLLOW-UP (OUTPATIENT)
Dept: INTERNAL MEDICINE | Facility: CLINIC | Age: 70
End: 2025-02-25

## 2025-02-25 ENCOUNTER — OFFICE VISIT (OUTPATIENT)
Dept: INTERNAL MEDICINE | Facility: CLINIC | Age: 70
End: 2025-02-25
Payer: MEDICARE

## 2025-02-25 ENCOUNTER — LAB VISIT (OUTPATIENT)
Dept: LAB | Facility: HOSPITAL | Age: 70
End: 2025-02-25
Attending: INTERNAL MEDICINE
Payer: MEDICARE

## 2025-02-25 ENCOUNTER — TELEPHONE (OUTPATIENT)
Dept: ENDOCRINOLOGY | Facility: CLINIC | Age: 70
End: 2025-02-25
Payer: MEDICARE

## 2025-02-25 VITALS
OXYGEN SATURATION: 97 % | WEIGHT: 211 LBS | SYSTOLIC BLOOD PRESSURE: 126 MMHG | HEART RATE: 92 BPM | DIASTOLIC BLOOD PRESSURE: 78 MMHG | HEIGHT: 58 IN | BODY MASS INDEX: 44.29 KG/M2 | RESPIRATION RATE: 20 BRPM

## 2025-02-25 DIAGNOSIS — G47.00 INSOMNIA, UNSPECIFIED TYPE: ICD-10-CM

## 2025-02-25 DIAGNOSIS — E66.813 CLASS 3 SEVERE OBESITY DUE TO EXCESS CALORIES WITH SERIOUS COMORBIDITY AND BODY MASS INDEX (BMI) OF 40.0 TO 44.9 IN ADULT: ICD-10-CM

## 2025-02-25 DIAGNOSIS — E66.01 CLASS 3 SEVERE OBESITY DUE TO EXCESS CALORIES WITH SERIOUS COMORBIDITY AND BODY MASS INDEX (BMI) OF 40.0 TO 44.9 IN ADULT: ICD-10-CM

## 2025-02-25 DIAGNOSIS — E11.319 TYPE 2 DIABETES MELLITUS WITH BOTH EYES AFFECTED BY RETINOPATHY WITHOUT MACULAR EDEMA, WITHOUT LONG-TERM CURRENT USE OF INSULIN, UNSPECIFIED RETINOPATHY SEVERITY: ICD-10-CM

## 2025-02-25 DIAGNOSIS — I10 ESSENTIAL HYPERTENSION: Primary | ICD-10-CM

## 2025-02-25 DIAGNOSIS — G25.0 ESSENTIAL TREMOR: ICD-10-CM

## 2025-02-25 DIAGNOSIS — I70.0 AORTIC ATHEROSCLEROSIS: ICD-10-CM

## 2025-02-25 DIAGNOSIS — E78.00 HYPERCHOLESTEREMIA: ICD-10-CM

## 2025-02-25 LAB
ALBUMIN/CREAT UR: 13.4 UG/MG (ref 0–30)
CREAT UR-MCNC: 67 MG/DL (ref 15–325)
MICROALBUMIN UR DL<=1MG/L-MCNC: 9 UG/ML

## 2025-02-25 PROCEDURE — 99215 OFFICE O/P EST HI 40 MIN: CPT | Mod: PBBFAC | Performed by: INTERNAL MEDICINE

## 2025-02-25 PROCEDURE — G2211 COMPLEX E/M VISIT ADD ON: HCPCS | Mod: S$PBB | Performed by: INTERNAL MEDICINE

## 2025-02-25 PROCEDURE — 99999 PR STA SHADOW: CPT | Mod: PBBFAC,,, | Performed by: INTERNAL MEDICINE

## 2025-02-25 PROCEDURE — 82043 UR ALBUMIN QUANTITATIVE: CPT | Performed by: INTERNAL MEDICINE

## 2025-02-25 PROCEDURE — 99214 OFFICE O/P EST MOD 30 MIN: CPT | Mod: S$PBB | Performed by: INTERNAL MEDICINE

## 2025-02-25 PROCEDURE — 99999 PR PBB SHADOW E&M-EST. PATIENT-LVL V: CPT | Mod: PBBFAC,,, | Performed by: INTERNAL MEDICINE

## 2025-02-25 NOTE — TELEPHONE ENCOUNTER
Spoke with patient in regards of message, patient wants to know what to do since she is going to be running out of medication soon. She has not done any paperwork for patient assistance, she stated that will be holding off until Friday on her appt. Day to be referred to [employer] patient assistance.    Currently: Ozempic 2mg

## 2025-02-25 NOTE — PROGRESS NOTES
Subjective:       Patient ID: Micaela Arvizu is a 69 y.o. female.    Chief Complaint: Follow-up (Patient is here for a 3 month check up. No concerns. )      HPI:  Patient is known to me and presents for follow up HTN, HLD, osteoporosis, GERD and anxiety. Labs from 2/17/25 were personally reviewed, interpreted and discussed with the patient today.     GFR > 60, normal  A1C 5.7%, stable  LDL 70, at goal       Chronic low back pain: Has had MRIs in the past. Has seen Dr. Daily for injections She uses tramadol for pain management for her arthrits which is working well. Supplementing with tylenol right now as trying not to use tramadol often.  Had stomach ulcer with NSAIDs.  Also taking baclofen PRN. Sx are at baseline today.      She developed worsening hip pain and diagnosed with severe hip OA. Saw Dr. Drake who does think surgery is necessary but can not do it until she looses weight. Started GLP-1 and weight is trending down (see below). She also had left shoulder pain from rotator cuff injury. Saw Dr. Vizcaino who also felt surgery would be necessary for repair but also felt she was high risk for surgery per patient.  She is now s/p  right hip replacement in 12/2024 with Dr. Drake. She is nearly complete with PT. She is also in need of a left knee and left hip replacement which she will start planning with Dr. Drake soon.  She is no longer needing her walker or cane for ambulation!    HTN: on lisinopril-HCTZ, lasix. Home BP < 140/90. Denies chest pains, SOB and LE edema.      HLD: on atorvastatin and fish oil. Denies medicatoin side effects. LDL at goal < 70.      DM: A1C at goal. On metformin 1000mg BID and Ozempic 2mg. She has some mild nausea from ozempic but tolerable. Weight is trending down but slow. she has really started focusing on her diet. Less carbs and smaller portions. Feeling full sooner  Denies numbness/tingling. Does not check blood sugars regularly due to finger sticks; asking about Dexcom    "Microalb: 12/2023-repeat ordered  Eye exam: done 11/2023     GERD: taking omeprazole daily. She has h/o of esophageal stricture. Had "stretching" with Dr. Bojorquez. Denies abd pain, n/v/d/c.      Anxiety/insomnia: on trazodone 50mg at night. Working well. was on  Klonopin PRN, no recent fills on this.       Osteoporosis: follows with GYN (lorena) for this. Had DEXA done 3/2018 with GYN which showed improvement. Doing Prolia with her but was too expensive so will discuss with her GYN--now on risedronate.      Tremor: dad has Parkinson's disease. Tells me she and her sisters all have this. Sees neurology and dx with essential tremor on propranolol; now off primidone.         Past Medical History:   Diagnosis Date    Anxiety     Arthritis     Asthma     Depression     GERD (gastroesophageal reflux disease)     Hiatal hernia     History of colonoscopy with polypectomy     History of esophagogastroduodenoscopy (EGD)     Hyperlipidemia     Hypertension     Lumbar facet arthropathy     Obesity     Osteoporosis     Pneumonia     Sleep apnea     cpap    Trouble in sleeping     Type 2 diabetes mellitus without complication, without long-term current use of insulin        Family History   Problem Relation Name Age of Onset    Hypertension Mother      Hypertension Father      Parkinsonism Father         Social History[1]    Review of Systems   Constitutional:  Negative for activity change, fatigue, fever and unexpected weight change.   HENT:  Negative for congestion, ear pain, hearing loss, rhinorrhea and sore throat.    Eyes:  Negative for redness and visual disturbance.   Respiratory:  Negative for cough, shortness of breath and wheezing.    Cardiovascular:  Negative for chest pain, palpitations and leg swelling.   Gastrointestinal:  Negative for abdominal pain, constipation, diarrhea, nausea and vomiting.   Musculoskeletal:  Positive for arthralgias and back pain. Negative for joint swelling and neck pain.   Skin:  Negative " for color change, rash and wound.   Neurological:  Positive for tremors (at baseline). Negative for dizziness, light-headedness and headaches.         Objective:      Physical Exam  Vitals reviewed.   Constitutional:       General: She is not in acute distress.     Appearance: She is well-developed.   HENT:      Head: Normocephalic and atraumatic.      Right Ear: External ear normal.      Left Ear: External ear normal.      Nose: Nose normal.   Eyes:      General:         Right eye: No discharge.         Left eye: No discharge.      Conjunctiva/sclera: Conjunctivae normal.   Neck:      Thyroid: No thyromegaly.   Cardiovascular:      Rate and Rhythm: Normal rate and regular rhythm.   Pulmonary:      Effort: Pulmonary effort is normal. No respiratory distress.      Breath sounds: Wheezing present.   Abdominal:      General: There is no distension.      Palpations: Abdomen is soft.      Tenderness: There is no abdominal tenderness.   Skin:     General: Skin is warm and dry.   Neurological:      Mental Status: She is alert and oriented to person, place, and time.   Psychiatric:         Behavior: Behavior normal.         Thought Content: Thought content normal.         Assessment:       1. Essential hypertension    2. Hypercholesteremia    3. Aortic atherosclerosis    4. Class 3 severe obesity due to excess calories with serious comorbidity and body mass index (BMI) of 40.0 to 44.9 in adult    5. Type 2 diabetes mellitus with both eyes affected by retinopathy without macular edema, without long-term current use of insulin, unspecified retinopathy severity    6. Insomnia, unspecified type    7. Essential tremor        Plan:       1. Essential hypertension  Chronic controlled  Continue medications at same dose  Low Na diet  Exercise, weight loss  Check BP and keep log for next visit    Collected microalb today    She will call to schedule eye exam    -     CBC Auto Differential; Future; Expected date: 08/24/2025  -      Comprehensive Metabolic Panel; Future; Expected date: 08/24/2025  -     TSH; Future; Expected date: 08/24/2025  -     Lipid Panel; Future; Expected date: 08/24/2025  -     Hemoglobin A1C; Future; Expected date: 08/24/2025    2. Hypercholesteremia  Chronic controlled  Cont meds same dose  -     CBC Auto Differential; Future; Expected date: 08/24/2025  -     Comprehensive Metabolic Panel; Future; Expected date: 08/24/2025  -     TSH; Future; Expected date: 08/24/2025  -     Lipid Panel; Future; Expected date: 08/24/2025  -     Hemoglobin A1C; Future; Expected date: 08/24/2025    3. Aortic atherosclerosis  Noted on prior imaging  Cont statin  Overview:  CXR 2/2015>  The aorta is mildly tortuous.     Orders:  -     CBC Auto Differential; Future; Expected date: 08/24/2025  -     Comprehensive Metabolic Panel; Future; Expected date: 08/24/2025  -     TSH; Future; Expected date: 08/24/2025  -     Lipid Panel; Future; Expected date: 08/24/2025  -     Hemoglobin A1C; Future; Expected date: 08/24/2025    4. Class 3 severe obesity due to excess calories with serious comorbidity and body mass index (BMI) of 40.0 to 44.9 in adult  Chronic improving  Cont GLP-1  Diet, exercise, weight loss  -     CBC Auto Differential; Future; Expected date: 08/24/2025  -     Comprehensive Metabolic Panel; Future; Expected date: 08/24/2025  -     TSH; Future; Expected date: 08/24/2025  -     Lipid Panel; Future; Expected date: 08/24/2025  -     Hemoglobin A1C; Future; Expected date: 08/24/2025    5. Type 2 diabetes mellitus with both eyes affected by retinopathy without macular edema, without long-term current use of insulin, unspecified retinopathy severity  Chronic controlled  Cont meds same dose  -     semaglutide (OZEMPIC) 0.25 mg or 0.5 mg (2 mg/3 mL) pen injector; Inject 2 mg into the skin every 7 days.  -     Microalbumin/Creatinine Ratio, Urine; Future; Expected date: 02/25/2025  -     CBC Auto Differential; Future; Expected date:  08/24/2025  -     Comprehensive Metabolic Panel; Future; Expected date: 08/24/2025  -     TSH; Future; Expected date: 08/24/2025  -     Lipid Panel; Future; Expected date: 08/24/2025  -     Hemoglobin A1C; Future; Expected date: 08/24/2025    6. Insomnia, unspecified type  Chronic stable  Cont trazodone wame dose    7. Essential tremor  Chronic stable  Cont propranolol same dose       RTC 6m onths with labs and PRN               [1]   Social History  Socioeconomic History    Marital status:    Tobacco Use    Smoking status: Never    Smokeless tobacco: Never   Substance and Sexual Activity    Alcohol use: No    Drug use: No    Sexual activity: Not Currently     Comment:      Social Drivers of Health     Financial Resource Strain: Patient Declined (2/18/2025)    Overall Financial Resource Strain (CARDIA)     Difficulty of Paying Living Expenses: Patient declined   Food Insecurity: No Food Insecurity (2/18/2025)    Hunger Vital Sign     Worried About Running Out of Food in the Last Year: Never true     Ran Out of Food in the Last Year: Never true   Transportation Needs: No Transportation Needs (2/18/2025)    PRAPARE - Transportation     Lack of Transportation (Medical): No     Lack of Transportation (Non-Medical): No   Physical Activity: Insufficiently Active (2/18/2025)    Exercise Vital Sign     Days of Exercise per Week: 3 days     Minutes of Exercise per Session: 30 min   Stress: Stress Concern Present (2/18/2025)    [employer] [place] [employer] - Occupational Stress Questionnaire     Feeling of Stress : To some extent   Housing Stability: Low Risk  (2/18/2025)    Housing Stability Vital Sign     Unable to Pay for Housing in the Last Year: No     Homeless in the Last Year: No

## 2025-02-25 NOTE — TELEPHONE ENCOUNTER
----- Message from Med Assistant Harmon sent at 2/24/2025  2:18 PM CST -----  Regarding: FW: Ozempic  Seems like she was on assistance.  ----- Message -----  From: Silverio Harris LPN  Sent: 2/24/2025  10:15 AM CST  To: [facility] Endocrinology Clinical Support Staff  Subject: Ozempic                                          Patient is requesting a call back in regards for her Ozempic medication. States she needs to know if Dr. Davis will continue to prescribe it. Please return this call.

## 2025-02-28 ENCOUNTER — OFFICE VISIT (OUTPATIENT)
Dept: ENDOCRINOLOGY | Facility: CLINIC | Age: 70
End: 2025-02-28
Payer: MEDICARE

## 2025-02-28 VITALS
HEIGHT: 58 IN | DIASTOLIC BLOOD PRESSURE: 80 MMHG | WEIGHT: 209.81 LBS | SYSTOLIC BLOOD PRESSURE: 108 MMHG | BODY MASS INDEX: 44.04 KG/M2

## 2025-02-28 DIAGNOSIS — E11.319 TYPE 2 DIABETES MELLITUS WITH BOTH EYES AFFECTED BY RETINOPATHY WITHOUT MACULAR EDEMA, WITHOUT LONG-TERM CURRENT USE OF INSULIN, UNSPECIFIED RETINOPATHY SEVERITY: ICD-10-CM

## 2025-02-28 DIAGNOSIS — E78.00 HYPERCHOLESTEREMIA: ICD-10-CM

## 2025-02-28 DIAGNOSIS — E66.01 CLASS 3 SEVERE OBESITY DUE TO EXCESS CALORIES WITH SERIOUS COMORBIDITY AND BODY MASS INDEX (BMI) OF 40.0 TO 44.9 IN ADULT: Primary | ICD-10-CM

## 2025-02-28 DIAGNOSIS — E66.813 CLASS 3 SEVERE OBESITY DUE TO EXCESS CALORIES WITH SERIOUS COMORBIDITY AND BODY MASS INDEX (BMI) OF 40.0 TO 44.9 IN ADULT: Primary | ICD-10-CM

## 2025-02-28 DIAGNOSIS — I10 ESSENTIAL HYPERTENSION: ICD-10-CM

## 2025-02-28 PROCEDURE — 99999 PR PBB SHADOW E&M-EST. PATIENT-LVL IV: CPT | Mod: PBBFAC,,, | Performed by: STUDENT IN AN ORGANIZED HEALTH CARE EDUCATION/TRAINING PROGRAM

## 2025-02-28 PROCEDURE — 99214 OFFICE O/P EST MOD 30 MIN: CPT | Mod: PBBFAC | Performed by: STUDENT IN AN ORGANIZED HEALTH CARE EDUCATION/TRAINING PROGRAM

## 2025-02-28 RX ORDER — SEMAGLUTIDE 2.68 MG/ML
2 INJECTION, SOLUTION SUBCUTANEOUS
Start: 2025-02-28 | End: 2026-02-28

## 2025-02-28 RX ORDER — METFORMIN HYDROCHLORIDE 1000 MG/1
1000 TABLET ORAL
Start: 2025-02-28

## 2025-02-28 NOTE — PATIENT INSTRUCTIONS
You will be called by Celia Strickland at [phone] to start your Ozempic application renewal

## 2025-02-28 NOTE — PROGRESS NOTES
"Subjective:      Patient ID: Micaela Arvizu is a 69 y.o. female.    Chief Complaint:  Type 2 diabetes mellitus    History of Present Illness  This is a 69 y.o. female. with a past medical history of Type 2 diabetes mellitus, HTN, HLD, here for evaluation.        Type 2 diabetes mellitus  Diagnosed around age 64    Current diabetes medications:  - Metformin 1,000 mg BID  - Ozempic 2 mg weekly      Past diabetes medications:  - None      Lab Results   Component Value Date    CREATININE 0.6 02/17/2025    EGFRNORACEVR >60 02/17/2025       Known diabetic complications: retinopathy    Weight trend:  Wt Readings from Last 8 Encounters:   02/28/25 95.2 kg (209 lb 12.8 oz)   02/25/25 95.7 kg (210 lb 15.7 oz)   02/20/25 95.8 kg (211 lb 3.2 oz)   11/13/24 95.2 kg (209 lb 14.1 oz)   08/21/24 100.5 kg (221 lb 9.6 oz)   08/20/24 100.5 kg (221 lb 9 oz)   06/25/24 102 kg (224 lb 13.9 oz)   03/18/24 102.7 kg (226 lb 6.6 oz)         Family history of diabetes:  Yes    Prior visit with diabetes education: Yes    Current diet: 3 meals per day  Current exercise: Limited    Home blood sugar records: Not frequent, usually in low 100s        Diabetes Management Status  Statin: Taking  ACE/ARB: Taking    Screening or Prevention Patient's value   HgA1C Testing and Control   Lab Results   Component Value Date    HGBA1C 5.7 (H) 02/17/2025        LDL control Lab Results   Component Value Date    LDLCALC 70.0 02/17/2025      Nephropathy screening Lab Results   Component Value Date    MICALBCREAT 13.4 02/25/2025        Lab Results   Component Value Date    TSH 3.060 06/05/2023       Last eye exam: : 11/17/2023      Review of Systems  As above    Social and family history reviewed  Current medications and allergies reviewed    Objective:   /80   Ht 4' 10" (1.473 m)   Wt 95.2 kg (209 lb 12.8 oz)   BMI 43.85 kg/m²   Physical Exam  Alert, oriented    BP Readings from Last 1 Encounters:   02/28/25 108/80      Wt Readings from Last 1 " Encounters:   02/28/25 1104 95.2 kg (209 lb 12.8 oz)     Body mass index is 43.85 kg/m².    Lab Review:   Lab Results   Component Value Date    HGBA1C 5.7 (H) 02/17/2025     Lab Results   Component Value Date    CHOL 154 02/17/2025    HDL 62 02/17/2025    LDLCALC 70.0 02/17/2025    TRIG 110 02/17/2025    CHOLHDL 40.3 02/17/2025     Lab Results   Component Value Date     02/17/2025    K 4.0 02/17/2025     02/17/2025    CO2 26 02/17/2025     02/17/2025    BUN 16 02/17/2025    CREATININE 0.6 02/17/2025    CALCIUM 8.9 02/17/2025    PROT 7.3 02/17/2025    ALBUMIN 3.4 (L) 02/17/2025    BILITOT 0.3 02/17/2025    ALKPHOS 59 02/17/2025    AST 15 02/17/2025    ALT 16 02/17/2025    ANIONGAP 10 02/17/2025    ESTGFRAFRICA >60 06/07/2022    EGFRNONAA >60 06/07/2022    TSH 3.060 06/05/2023       All pertinent labs reviewed    Assessment and Plan     Type 2 diabetes mellitus with both eyes affected by retinopathy without macular edema, without long-term current use of insulin, unspecified retinopathy severity  Controlled with A1c 5.7%  Continue GLP-1 RA and metformin  Will lower metformin dose given excellent control, consider stopping if glucose remains at goal      Plan  - Continue Ozempic 2 mg weekly - will send for PAP renewal  - Decrease metformin to 1,000 mg daily    Labs per PCP    F/u 6 months    Class 3 severe obesity due to excess calories with serious comorbidity and body mass index (BMI) of 40.0 to 44.9 in adult  Continue GLP-1 RA given weight loss benefit    Hypercholesteremia  Continue statin  LDL at goal, monitored by PCP    Essential hypertension  Continue antihypertensive regimen including ARB/ACEi          Mukul Davis MD  Endocrinology

## 2025-02-28 NOTE — ASSESSMENT & PLAN NOTE
Controlled with A1c 5.7%  Continue GLP-1 RA and metformin  Will lower metformin dose given excellent control, consider stopping if glucose remains at goal      Plan  - Continue Ozempic 2 mg weekly - will send for PAP renewal  - Decrease metformin to 1,000 mg daily    Labs per PCP    F/u 6 months

## 2025-03-14 DIAGNOSIS — E11.319 TYPE 2 DIABETES MELLITUS WITH BOTH EYES AFFECTED BY RETINOPATHY WITHOUT MACULAR EDEMA, WITHOUT LONG-TERM CURRENT USE OF INSULIN, UNSPECIFIED RETINOPATHY SEVERITY: ICD-10-CM

## 2025-03-14 RX ORDER — METFORMIN HYDROCHLORIDE 1000 MG/1
1000 TABLET ORAL
Qty: 90 TABLET | Refills: 3 | Status: SHIPPED | OUTPATIENT
Start: 2025-03-14

## 2025-03-14 NOTE — TELEPHONE ENCOUNTER
No care due was identified.  [facility] Embedded Care Due Messages. Reference number: [id number].   3/14/2025 9:15:24 AM CDT

## 2025-03-20 DIAGNOSIS — E11.319 TYPE 2 DIABETES MELLITUS WITH BOTH EYES AFFECTED BY RETINOPATHY WITHOUT MACULAR EDEMA, WITHOUT LONG-TERM CURRENT USE OF INSULIN, UNSPECIFIED RETINOPATHY SEVERITY: ICD-10-CM

## 2025-03-20 RX ORDER — SEMAGLUTIDE 2.68 MG/ML
2 INJECTION, SOLUTION SUBCUTANEOUS
Qty: 3 ML | Refills: 0 | Status: SHIPPED | OUTPATIENT
Start: 2025-03-20

## 2025-04-09 ENCOUNTER — PATIENT OUTREACH (OUTPATIENT)
Dept: ADMINISTRATIVE | Facility: HOSPITAL | Age: 70
End: 2025-04-09
Payer: MEDICARE

## 2025-04-09 NOTE — PROGRESS NOTES
Care Coordination Encounter Details:       MyChart Portal Status:         [x]  Reviewed MyChart Portal Status offered / enrolled if applicable        Additional Notes:     MyChart Outcomes: Pt is enrolled & active          Updates Requested / Reviewed:        Updated Care Coordination Note, Care Everywhere, , External Sources: [facility] and [facility], Care Team Updated, Removed  or Duplicate Orders, and Immunizations Reconciliation Completed or Queried: [place]         Health Maintenance Screening(s) Due:      Health Maintenance Topics Overdue:      VBHM Score: 2     Eye Exam  Foot Exam    RSV Vaccine                  Health Maintenance Topic(s) Outreach Outcomes & Actions Taken:    Eye Exam - Outreach Outcomes & Actions Taken  : Pt Will Schedule with External Provider / Order Routed & Care Team Updated if Applicable    Diabetic Foot Exam - Outreach Outcomes & Actions Taken  : next appt.    Lab(s) - Outreach Outcomes & Actions Taken  : scheduled    Primary Care Appt - Outreach Outcomes & Actions Taken  : Primary Care Appt Scheduled      Chronic Disease Management:     Diabetes Measures        Lab Results   Component Value Date    HGBA1C 5.7 (H) 2025           [x]  Reviewed chart for active Diabetes diagnosis     []  Scheduled necessary follow up appointments if needed           Hypertension Measures        BP Readings from Last 1 Encounters:   25 108/80           [x]  Reviewed chart for active Hypertension diagnosis     []  Reviewed & documented Home BP Cuff     []  Documented a Remote BP if needed & applicable     []  Scheduled necessary follow up appointments with Primary Care if needed           Provider Team Continuity:     Last PCP Visit Date: 2025          [x]  Reviewed Primary Care Provider Visits, Annual Wellness Visit, and Future          Appointments to ensure appointments have been scheduled and/or           completed        Social Determinants of Health          [x]   Reviewed, completed, and/or updated the following sections:                  Food Insecurity, Transportation Needs, Financial Resource Strain,                 Tobacco Use          Care Management, Digital Medicine, and/or Education Referrals    OPCM Risk Score: 23

## 2025-04-11 ENCOUNTER — OFFICE VISIT (OUTPATIENT)
Dept: INTERNAL MEDICINE | Facility: CLINIC | Age: 70
End: 2025-04-11
Payer: MEDICARE

## 2025-04-11 VITALS
HEART RATE: 77 BPM | WEIGHT: 208.31 LBS | RESPIRATION RATE: 16 BRPM | BODY MASS INDEX: 43.73 KG/M2 | SYSTOLIC BLOOD PRESSURE: 110 MMHG | OXYGEN SATURATION: 96 % | DIASTOLIC BLOOD PRESSURE: 60 MMHG | HEIGHT: 58 IN

## 2025-04-11 DIAGNOSIS — Z01.818 PRE-OP EVALUATION: Primary | ICD-10-CM

## 2025-04-11 PROCEDURE — 99215 OFFICE O/P EST HI 40 MIN: CPT | Mod: PBBFAC | Performed by: INTERNAL MEDICINE

## 2025-04-11 PROCEDURE — 99999 PR PBB SHADOW E&M-EST. PATIENT-LVL V: CPT | Mod: PBBFAC,,, | Performed by: INTERNAL MEDICINE

## 2025-04-11 NOTE — PROGRESS NOTES
"Subjective:       Patient ID: Micaela Arvizu is a 69 y.o. female.    Chief Complaint: Pre-op Exam      HPI:  Patient is known to me who presents for preop evaluation. She has HTN, HLD, osteoporosis, GERD and anxiety. Labs from 2/17/25 were personally reviewed, interpreted and discussed with the patient today.     GFR > 60, normal  A1C 5.7%, stable  LDL 70, at goal        Chronic low back pain: Has had MRIs in the past. Has seen Dr. Daily for injections She uses tramadol for pain management for her arthrits which is working well. Supplementing with tylenol right now as trying not to use tramadol often.  Had stomach ulcer with NSAIDs.  Also taking baclofen PRN. Sx are at baseline today.       She developed worsening hip pain and diagnosed with severe hip OA. Saw Dr. Drake who does think surgery is necessary but can not do it until she looses weight. Started GLP-1 and weight is trending down (see below). She also had left shoulder pain from rotator cuff injury. Saw Dr. Vizcaino who also felt surgery would be necessary for repair but also felt she was high risk for surgery per patient.  She is now s/p  right hip replacement in 12/2024 with Dr. Drake. She is now planned for left hip replacement      HTN: on lisinopril-HCTZ, lasix. Home BP < 140/90. Denies chest pains, SOB and LE edema.      HLD: on atorvastatin and fish oil. Denies medicatoin side effects. LDL at goal < 70.      DM: A1C at goal. On metformin 1000mg BID and Ozempic 2mg. She has some mild nausea from ozempic but tolerable. Weight is trending down. She has really started focusing on her diet. Less carbs and smaller portions. Feeling full sooner  Denies numbness/tingling. Does not check blood sugars regularly due to finger sticks; asking about Dexcom   Microalb: 2/2025  Eye exam: done 11/2023     GERD: taking omeprazole daily. She has h/o of esophageal stricture. Had "stretching" with Dr. Bojorquez. Denies abd pain, n/v/d/c.      Anxiety/insomnia: on trazodone " 50mg at night. Working well. was on  Klonopin PRN, no recent fills on this.       Osteoporosis: follows with GYN (lorena) for this. Had DEXA done 3/2018 with GYN which showed improvement. Doing Prolia with her but was too expensive so will discuss with her GYN--now on risedronate.      Tremor: dad has Parkinson's disease. Tells me she and her sisters all have this. Sees neurology and dx with essential tremor on propranolol; now off primidone.     Past Medical History:   Diagnosis Date    Anxiety     Arthritis     Asthma     Depression     GERD (gastroesophageal reflux disease)     Hiatal hernia     History of colonoscopy with polypectomy     History of esophagogastroduodenoscopy (EGD)     Hyperlipidemia     Hypertension     Lumbar facet arthropathy     Obesity     Osteoporosis     Pneumonia     Sleep apnea     cpap    Trouble in sleeping     Type 2 diabetes mellitus without complication, without long-term current use of insulin        Family History   Problem Relation Name Age of Onset    Hypertension Mother      Hypertension Father      Parkinsonism Father         Social History[1]    Review of Systems   Constitutional:  Negative for activity change, fatigue, fever and unexpected weight change.   HENT:  Negative for congestion, ear pain, hearing loss, rhinorrhea and sore throat.    Eyes:  Negative for redness and visual disturbance.   Respiratory:  Negative for cough, shortness of breath and wheezing.    Cardiovascular:  Negative for chest pain, palpitations and leg swelling.   Gastrointestinal:  Negative for abdominal pain, constipation, diarrhea, nausea and vomiting.   Musculoskeletal:  Positive for arthralgias and back pain. Negative for joint swelling and neck pain.   Skin:  Negative for color change, rash and wound.   Neurological:  Negative for dizziness, light-headedness and headaches.         Objective:      Physical Exam  Vitals reviewed.   Constitutional:       General: She is not in acute distress.      Appearance: She is well-developed. She is obese.   HENT:      Head: Normocephalic and atraumatic.      Right Ear: External ear normal.      Left Ear: External ear normal.      Nose: Nose normal.   Eyes:      General:         Right eye: No discharge.         Left eye: No discharge.      Conjunctiva/sclera: Conjunctivae normal.   Neck:      Thyroid: No thyromegaly.   Cardiovascular:      Rate and Rhythm: Normal rate and regular rhythm.      Heart sounds: No murmur heard.  Pulmonary:      Effort: Pulmonary effort is normal. No respiratory distress.      Breath sounds: Normal breath sounds. No wheezing.   Abdominal:      General: There is no distension.      Palpations: Abdomen is soft.      Tenderness: There is no abdominal tenderness.   Skin:     General: Skin is warm and dry.   Neurological:      Mental Status: She is alert and oriented to person, place, and time.   Psychiatric:         Behavior: Behavior normal.         Thought Content: Thought content normal.         Assessment:       1. Pre-op evaluation        Plan:       1. Pre-op evaluation    -     X-Ray Chest PA And Lateral; Future; Expected date: 04/11/2025  -     EKG 12-lead; Future; Expected date: 04/11/2025     Using Joseph Revised Cardiac Risk Index she is moderate risk for surgery. From a medical standpoint she can proceed with her scheduled surgery without any further testing.  CAD: no  CHF: no  CVA: no  DM on insulin: no  Cr >2: no  High risk surgery: no  MET >4: no but limited by MSK pain; denies angina sx or equivelent  Tobacco: no  EtOH: no    She can hold all aspirin and NSAID products 5 days prior to surgery.  She is not on any other blood thinners.  She was instructed to hold her Ozempic 1 week prior to surgery. No metformin morning of surgery.  She can continue all other medications in the perioperative.  See surgery team next week for preop evaluation.  Will likely have chest x-ray and EKG done there.  But, if not I did place the orders today  for her to return to [place] to get them done.  We will fax copy of labs from February 2025 in my note to her surgery team today    Return to clinic  as previously scheduled and p.r.n.               [1]   Social History  Socioeconomic History    Marital status:    Tobacco Use    Smoking status: Never    Smokeless tobacco: Never   Substance and Sexual Activity    Alcohol use: No    Drug use: No    Sexual activity: Not Currently     Comment:      Social Drivers of Health     Financial Resource Strain: Patient Declined (2/18/2025)    Overall Financial Resource Strain (CARDIA)     Difficulty of Paying Living Expenses: Patient declined   Food Insecurity: No Food Insecurity (2/18/2025)    Hunger Vital Sign     Worried About Running Out of Food in the Last Year: Never true     Ran Out of Food in the Last Year: Never true   Transportation Needs: No Transportation Needs (2/18/2025)    PRAPARE - Transportation     Lack of Transportation (Medical): No     Lack of Transportation (Non-Medical): No   Physical Activity: Insufficiently Active (2/18/2025)    Exercise Vital Sign     Days of Exercise per Week: 3 days     Minutes of Exercise per Session: 30 min   Stress: Stress Concern Present (2/18/2025)    [employer] - Occupational Stress Questionnaire     Feeling of Stress : To some extent   Housing Stability: Low Risk  (2/18/2025)    Housing Stability Vital Sign     Unable to Pay for Housing in the Last Year: No     Homeless in the Last Year: No

## 2025-04-17 DIAGNOSIS — E11.319 TYPE 2 DIABETES MELLITUS WITH BOTH EYES AFFECTED BY RETINOPATHY WITHOUT MACULAR EDEMA, WITHOUT LONG-TERM CURRENT USE OF INSULIN, UNSPECIFIED RETINOPATHY SEVERITY: ICD-10-CM

## 2025-04-17 RX ORDER — SEMAGLUTIDE 2.68 MG/ML
2 INJECTION, SOLUTION SUBCUTANEOUS
Qty: 4 EACH | Refills: 2 | Status: SHIPPED | OUTPATIENT
Start: 2025-04-17

## 2025-04-21 ENCOUNTER — TELEPHONE (OUTPATIENT)
Dept: INTERNAL MEDICINE | Facility: CLINIC | Age: 70
End: 2025-04-21
Payer: MEDICARE

## 2025-04-21 NOTE — TELEPHONE ENCOUNTER
----- Message from Moraima sent at 4/21/2025  4:36 PM CDT -----  Contact: Dr. Henao's office  Micaela ArvizuN: [id number]: 1955PCP: Travis Kalia Phone      [phone] Phone      Not on file.Mobile          [phone]: Dr. Henao's office left a message on the machine stating she is looking for the SX clearance on the pt. Including the clearance, xray, blood work, and EKG. Please fax to them

## 2025-06-10 DIAGNOSIS — E11.319 TYPE 2 DIABETES MELLITUS WITH BOTH EYES AFFECTED BY RETINOPATHY WITHOUT MACULAR EDEMA, WITHOUT LONG-TERM CURRENT USE OF INSULIN, UNSPECIFIED RETINOPATHY SEVERITY: ICD-10-CM

## 2025-06-10 DIAGNOSIS — I10 ESSENTIAL HYPERTENSION: ICD-10-CM

## 2025-06-10 RX ORDER — PANTOPRAZOLE SODIUM 40 MG/1
40 TABLET, DELAYED RELEASE ORAL DAILY
Qty: 90 TABLET | Refills: 3 | Status: SHIPPED | OUTPATIENT
Start: 2025-06-10

## 2025-06-10 RX ORDER — METFORMIN HYDROCHLORIDE 1000 MG/1
1000 TABLET ORAL 2 TIMES DAILY WITH MEALS
Qty: 180 TABLET | Refills: 1 | Status: SHIPPED | OUTPATIENT
Start: 2025-06-10

## 2025-06-10 RX ORDER — LISINOPRIL AND HYDROCHLOROTHIAZIDE 10; 12.5 MG/1; MG/1
1 TABLET ORAL DAILY
Qty: 90 TABLET | Refills: 2 | Status: SHIPPED | OUTPATIENT
Start: 2025-06-10

## 2025-06-10 NOTE — TELEPHONE ENCOUNTER
Refill Routing Note   Medication(s) are not appropriate for processing by [facility] for the following reason(s):        No active prescription written by provider  Drug-drug interactionDuplicate Therapy: sucralfate, pantoprazole (no prev provider override found)    [facility] action(s):  Defer  Approve        Medication Therapy Plan: Duplicate Therapy: sucralfate, pantoprazole; Metformin under different provider    Pharmacist review requested: Yes     Appointments  past 12m or future 3m with PCP    Date Provider   Last Visit   4/11/2025 Kinga Robles MD   Next Visit   8/25/2025 Kinga Robles MD   ED visits in past 90 days: 0        Note composed:10:16 AM 06/10/2025

## 2025-06-10 NOTE — TELEPHONE ENCOUNTER
No care due was identified.  [facility] Embedded Care Due Messages. Reference number: [id number].   6/10/2025 7:03:31 AM CDT

## 2025-06-10 NOTE — TELEPHONE ENCOUNTER
Refill Routing Note   Medication(s) are not appropriate for processing by [facility] for the following reason(s):        No active prescription written by provider  Clarification of medication (Rx) details    [facility] action(s):  Defer  Approve        Medication Therapy Plan: Different Metformin dose under different provider 2 months ago; Deferred to PCP for decision    Pharmacist review requested: Yes   Extended chart review required: Yes     Appointments  past 12m or future 3m with PCP    Date Provider   Last Visit   4/11/2025 Kinga Robles MD   Next Visit   8/25/2025 Kinga Robles MD   ED visits in past 90 days: 0        Note composed:2:51 PM 06/10/2025

## 2025-06-20 ENCOUNTER — PATIENT OUTREACH (OUTPATIENT)
Dept: ADMINISTRATIVE | Facility: HOSPITAL | Age: 70
End: 2025-06-20
Payer: MEDICARE

## 2025-06-20 RX ORDER — TRANEXAMIC ACID 650 MG/1
1950 TABLET ORAL
COMMUNITY
Start: 2025-04-22

## 2025-06-20 RX ORDER — MUPIROCIN 20 MG/G
OINTMENT TOPICAL 2 TIMES DAILY
COMMUNITY
Start: 2025-04-22

## 2025-06-20 RX ORDER — GABAPENTIN 400 MG/1
400 CAPSULE ORAL 2 TIMES DAILY
COMMUNITY
Start: 2025-04-22

## 2025-06-20 RX ORDER — NAPROXEN 500 MG/1
500 TABLET ORAL 2 TIMES DAILY
COMMUNITY
Start: 2025-05-12

## 2025-06-20 RX ORDER — OXYCODONE AND ACETAMINOPHEN 5; 325 MG/1; MG/1
1 TABLET ORAL EVERY 6 HOURS PRN
COMMUNITY
Start: 2025-04-22

## 2025-06-20 RX ORDER — ONDANSETRON 4 MG/1
4 TABLET, ORALLY DISINTEGRATING ORAL EVERY 8 HOURS PRN
COMMUNITY
Start: 2025-04-22

## 2025-06-20 NOTE — LETTER
AUTHORIZATION FOR RELEASE OF   CONFIDENTIAL INFORMATION        We are seeing Micaela Arvizu, date of birth 1955, in the clinic at [facility]. Kinga Robles MD is the patient's PCP. Micaela Arvizu has an outstanding lab/procedure at the time we reviewed her chart. In order to help keep her health information updated, she has authorized us to request the following medical record(s):                                        ( x )  MAMMOGRAM                                Please fax records to [facility], Knight, Sarah, MD,                                        FAX  [phone]      Patient Name: Micaela Arvizu  : 1955  Patient Phone #: [phone]   Micaela Arvizu  MRN: [id number]  : 1955  Age: 68 y.o.  Sex: female         Patient/Legal Guardian Signature  This signature was collected at 2024           _______________________________   Printed Name/Relationship to Patient      Consent for Examination and Treatment: I hereby authorize the providers and employees of [facility] ([facility]) to provide medical treatment/services which includes, but is not limited to, performing and administering tests and diagnostic procedures that are deemed necessary, including, but not limited to, imaging examinations, blood tests and other laboratory procedures as may be required by the hospital, clinic, or may be ordered by my physician(s) or persons working under the general and/or special instructions of my physician(s).      I understand and agree that this consent covers all authorized persons, including but not limited to physicians, residents, nurse practitioners, physicians' assistants, specialists, consultants, student nurses, and independently contracted physicians, who are called upon by the physician in charge, to carry out the diagnostic procedures and medical or surgical treatment.     I hereby authorize [facility] to retain or dispose of any specimens or tissue, should there be  such remaining from any test or procedure.     I hereby authorize and give consent for [facility] providers and employees to take photographs, images or videotapes of such diagnostic, surgical or treatment procedures of Patient as may be required by [facility] or as may be ordered by a physician. I further acknowledge and agree that [facility] may use cameras or other devices for patient monitoring.     I am aware that the practice of medicine is not an exact science, and I acknowledge that no guarantees have been made to me as to the outcome of any tests, procedures or treatment.     Authorization for Release of Information: I understand that my insurance company and/or their agents may need information necessary to make determinations about payment/reimbursement. I hereby provide authorization to release to all insurance companies, their successors, assignees, other parties with whom they may have contracted, or others acting on their behalf, that are involved with payment for any hospital and/or clinic charges incurred by the patient, any information that they request and deem necessary for payment/reimbursement, and/or quality review.  I further authorize the release of my health information to physicians or other health care practitioners on staff who are involved in my health care now and in the future, and to other health care providers, entities, or institutions for the purpose of my continued care and treatment, including referrals.     REGISTRATION AUTHORIZATION  Form No. 36122 (Rev. 3/25/2024)    Page 1 of 3                       Medicare Patient's Certification and Authorization to Release Information and Payment Request:  I certify that the information given by me in applying for payment under Title XVIII of the Social Security Act is correct. I authorize any stein of medical or other information about me to release to the Social SecurityAdministration, or its intermediaries or carriers, any information needed  for this or a related Medicare claim. I request that payment of authorized benefits be made on my behalf.     Assignment of Insurance Benefits:   I hereby authorize any and all insurance companies, health plans, defined   benefit plans, health insurers or any entity that is or may be responsible for payment of my medical expenses to pay all hospital and medical benefits now due, and to become due and payable to me under any hospital benefits, sick benefits, injury benefits or any other benefit for services rendered to me, including Major Medical Benefits, direct to [facility] and all independently contracted physicians. I assign any and all rights that I may have against any and all insurance companies, health plans, defined benefit plans, health insurers or any entity that is or may be responsible for payment of my medical expenses, including, but not limited to any right to appeal a denial of a claim, any right to bring any action, lawsuit, administrative proceeding, or other cause of action on my behalf. I specifically assign my right to pursue litigation against any and all insurance companies, health plans, defined benefit plans, health insurers or any entity that is or may be responsible for payment of my medical expenses based upon a refusal to pay charges.            E. Valuables: It is understood and agreed that [facility] is not liable for the damage to or loss of any money, jewelry,   documents, dentures, eye glasses, hearing aids, prosthetics, or other property of value.     F. Computer Equipment: I understand and agree that should I choose to use computer equipment owned by [facility] or if I choose to access the Internet via [facility]s network, I do so at my own risk. [facility] is not responsible for any damage to my computer equipment or to any damages of any type that might arise from my loss of equipment or data.     G. Acceptance of Financial Responsibility:  I agree that in consideration of the services and    supplies that have been   or will be furnished to the patient, I am hereby obligated to pay all charges made for or on the account of the patient according to the standard rates (in effect at the time the services and supplies are delivered) established by [facility], including its Patient Financial Assistance Policy to the extent it is applicable. I understand that I am responsible for all charges, or portions thereof, not covered by insurance or other sources. Patient refunds will be distributed only after balances at all [facility] facilities are paid.     H. Communication Authorization:  I hereby authorize [facility] and its representatives, along with any billing service   or  who may work on their behalf, to contact me on   my cell phone and/or home phone using pre- recorded messages, artificial voice messages, automatic telephone dialing devices or other computer assisted technology, or by electronic      mail, text messaging, or by any other form of electronic communication. This includes, but is not limited to, appointment reminders, yearly physical exam reminders, preventive care reminders, patient campaigns, welcome calls, and calls about account balances on my account or any account on which I am listed as a guarantor. I understand I have the right to opt out of these communications at any time.      Relationship  Between  Facility and  Provider:      I understand that some, but not all, providers furnishing services to the patient are not employees or agents of [facility]. The patient is under the care and supervision of his/her attending physician, and it is the responsibility of the facility and its nursing staff to carry out the instructions of such physicians. It is the responsibility of the patient's physician/designee to obtain the patient's informed consent, when required, for medical or surgical treatment, special diagnostic or therapeutic procedures, or hospital services rendered for the  patient under the special instructions of the physician/designee.           REGISTRATION AUTHORIZATION  Form No. 73923 (Rev. 3/25/2024)    Page 2 of 3                       Immunizations: [facility] shares immunization information with state sponsored health departments to help you and your doctor keep track of your immunization records. By signing, you consent to have this information shared with the health department in your state:                                [place] - LINKS ([place] Immunization Network for Kids Statewide)                                [place] - MIIX ([place] Immunization Information eXchange)                                [place] - ImmPRINT (Immunization Patient Registry with Integrated Technology)     TERM: This authorization is valid for this and subsequent care/treatment I receive at [facility] and will remain valid unless/until revoked in writing by me.     [facility]: As used in this document, [facility] means all [facility] owned and managed facilities, including, but not limited to, all health centers, surgery centers, clinics, urgent care centers, and hospitals.         [facility] complies with applicable Federal civil rights laws and does not discriminate on the basis of race, color, national origin, age, disability, or sex.  ATENCIÓN: si habla español, tiene a solis disposición servicios gratuitos de asistencia lingüística. Llame al [phone].  CHÚ Ý: N?u b?n nói Ti?ng Vi?t, có các d?ch v? h? tr? ngôn ng? mi?n phí dành cho b?n. G?i s? [phone].        REGISTRATION AUTHORIZATION  Form No. 56136 (Rev. 3/25/2024)   Page 3 of 3

## 2025-06-20 NOTE — PROGRESS NOTES
Portal active: 5/22/25  Chart reviewed, immunization record updated.  No new results noted on [facility] or [facility] web site.  Care Everywhere updated.   Patient care coordination note  Upcoming PCP visit updated.  Next PCP visit 8/25/25  LOV with PCP 4/11/25   Cmat to obtain eye exam from Dr. Shanell Marie

## 2025-06-20 NOTE — Clinical Note
Patient had eye exam at Dr. Shanell turner in [facility] Patient had mammogram at [facility].

## 2025-06-20 NOTE — LETTER
AUTHORIZATION FOR RELEASE OF   CONFIDENTIAL INFORMATION    Dear Dr Marie,    We are seeing Micaela Arvizu, date of birth 1955, in the clinic at [facility]. Kinga Robles MD is the patient's PCP. Micaela Arvizu has an outstanding lab/procedure at the time we reviewed her chart. In order to help keep her health information updated, she has authorized us to request the following medical record(s):                                          ( x )  EYE EXAM              Please fax records to [facility], Knight, Sarah, MD,                                       FAX  [phone]      Patient Name: Micaela Arvizu  : 1955  Patient Phone #: [phone]   Micaela Arvizu  MRN: [id number]  : 1955  Age: 68 y.o.  Sex: female         Patient/Legal Guardian Signature  This signature was collected at 2024           _______________________________   Printed Name/Relationship to Patient      Consent for Examination and Treatment: I hereby authorize the providers and employees of [facility] ([facility]) to provide medical treatment/services which includes, but is not limited to, performing and administering tests and diagnostic procedures that are deemed necessary, including, but not limited to, imaging examinations, blood tests and other laboratory procedures as may be required by the hospital, clinic, or may be ordered by my physician(s) or persons working under the general and/or special instructions of my physician(s).      I understand and agree that this consent covers all authorized persons, including but not limited to physicians, residents, nurse practitioners, physicians' assistants, specialists, consultants, student nurses, and independently contracted physicians, who are called upon by the physician in charge, to carry out the diagnostic procedures and medical or surgical treatment.     I hereby authorize [facility] to retain or dispose of any specimens or tissue, should there be  such remaining from any test or procedure.     I hereby authorize and give consent for [facility] providers and employees to take photographs, images or videotapes of such diagnostic, surgical or treatment procedures of Patient as may be required by [facility] or as may be ordered by a physician. I further acknowledge and agree that [facility] may use cameras or other devices for patient monitoring.     I am aware that the practice of medicine is not an exact science, and I acknowledge that no guarantees have been made to me as to the outcome of any tests, procedures or treatment.     Authorization for Release of Information: I understand that my insurance company and/or their agents may need information necessary to make determinations about payment/reimbursement. I hereby provide authorization to release to all insurance companies, their successors, assignees, other parties with whom they may have contracted, or others acting on their behalf, that are involved with payment for any hospital and/or clinic charges incurred by the patient, any information that they request and deem necessary for payment/reimbursement, and/or quality review.  I further authorize the release of my health information to physicians or other health care practitioners on staff who are involved in my health care now and in the future, and to other health care providers, entities, or institutions for the purpose of my continued care and treatment, including referrals.     REGISTRATION AUTHORIZATION  Form No. 52857 (Rev. 3/25/2024)    Page 1 of 3                       Medicare Patient's Certification and Authorization to Release Information and Payment Request:  I certify that the information given by me in applying for payment under Title XVIII of the Social Security Act is correct. I authorize any stein of medical or other information about me to release to the Social SecurityAdministration, or its intermediaries or carriers, any information needed  for this or a related Medicare claim. I request that payment of authorized benefits be made on my behalf.     Assignment of Insurance Benefits:   I hereby authorize any and all insurance companies, health plans, defined   benefit plans, health insurers or any entity that is or may be responsible for payment of my medical expenses to pay all hospital and medical benefits now due, and to become due and payable to me under any hospital benefits, sick benefits, injury benefits or any other benefit for services rendered to me, including Major Medical Benefits, direct to [facility] and all independently contracted physicians. I assign any and all rights that I may have against any and all insurance companies, health plans, defined benefit plans, health insurers or any entity that is or may be responsible for payment of my medical expenses, including, but not limited to any right to appeal a denial of a claim, any right to bring any action, lawsuit, administrative proceeding, or other cause of action on my behalf. I specifically assign my right to pursue litigation against any and all insurance companies, health plans, defined benefit plans, health insurers or any entity that is or may be responsible for payment of my medical expenses based upon a refusal to pay charges.            E. Valuables: It is understood and agreed that [facility] is not liable for the damage to or loss of any money, jewelry,   documents, dentures, eye glasses, hearing aids, prosthetics, or other property of value.     F. Computer Equipment: I understand and agree that should I choose to use computer equipment owned by [facility] or if I choose to access the Internet via [facility]s network, I do so at my own risk. [facility] is not responsible for any damage to my computer equipment or to any damages of any type that might arise from my loss of equipment or data.     G. Acceptance of Financial Responsibility:  I agree that in consideration of the services and    supplies that have been   or will be furnished to the patient, I am hereby obligated to pay all charges made for or on the account of the patient according to the standard rates (in effect at the time the services and supplies are delivered) established by [facility], including its Patient Financial Assistance Policy to the extent it is applicable. I understand that I am responsible for all charges, or portions thereof, not covered by insurance or other sources. Patient refunds will be distributed only after balances at all [facility] facilities are paid.     H. Communication Authorization:  I hereby authorize [facility] and its representatives, along with any billing service   or  who may work on their behalf, to contact me on   my cell phone and/or home phone using pre- recorded messages, artificial voice messages, automatic telephone dialing devices or other computer assisted technology, or by electronic      mail, text messaging, or by any other form of electronic communication. This includes, but is not limited to, appointment reminders, yearly physical exam reminders, preventive care reminders, patient campaigns, welcome calls, and calls about account balances on my account or any account on which I am listed as a guarantor. I understand I have the right to opt out of these communications at any time.      Relationship  Between  Facility and  Provider:      I understand that some, but not all, providers furnishing services to the patient are not employees or agents of [facility]. The patient is under the care and supervision of his/her attending physician, and it is the responsibility of the facility and its nursing staff to carry out the instructions of such physicians. It is the responsibility of the patient's physician/designee to obtain the patient's informed consent, when required, for medical or surgical treatment, special diagnostic or therapeutic procedures, or hospital services rendered for the  patient under the special instructions of the physician/designee.           REGISTRATION AUTHORIZATION  Form No. 31908 (Rev. 3/25/2024)    Page 2 of 3                       Immunizations: [facility] shares immunization information with state sponsored health departments to help you and your doctor keep track of your immunization records. By signing, you consent to have this information shared with the health department in your state:                                [place] - LINKS ([place] Immunization Network for Kids Statewide)                                [place] - MIIX ([place] Immunization Information eXchange)                                [place] - ImmPRINT (Immunization Patient Registry with Integrated Technology)     TERM: This authorization is valid for this and subsequent care/treatment I receive at [facility] and will remain valid unless/until revoked in writing by me.     [facility]: As used in this document, [facility] means all [facility] owned and managed facilities, including, but not limited to, all health centers, surgery centers, clinics, urgent care centers, and hospitals.         [facility] complies with applicable Federal civil rights laws and does not discriminate on the basis of race, color, national origin, age, disability, or sex.  ATENCIÓN: si habla español, tiene a solis disposición servicios gratuitos de asistencia lingüística. Llame al [phone].  CHÚ Ý: N?u b?n nói Ti?ng Vi?t, có các d?ch v? h? tr? ngôn ng? mi?n phí dành cho b?n. G?i s? [phone].        REGISTRATION AUTHORIZATION  Form No. 42439 (Rev. 3/25/2024)   Page 3 of 3

## 2025-08-22 ENCOUNTER — LAB VISIT (OUTPATIENT)
Dept: INTERNAL MEDICINE | Facility: CLINIC | Age: 70
End: 2025-08-22
Attending: INTERNAL MEDICINE
Payer: MEDICARE

## 2025-08-22 DIAGNOSIS — E78.00 HYPERCHOLESTEREMIA: ICD-10-CM

## 2025-08-22 DIAGNOSIS — I10 ESSENTIAL HYPERTENSION: ICD-10-CM

## 2025-08-22 DIAGNOSIS — E11.319 TYPE 2 DIABETES MELLITUS WITH BOTH EYES AFFECTED BY RETINOPATHY WITHOUT MACULAR EDEMA, WITHOUT LONG-TERM CURRENT USE OF INSULIN, UNSPECIFIED RETINOPATHY SEVERITY: ICD-10-CM

## 2025-08-22 DIAGNOSIS — E66.813 CLASS 3 SEVERE OBESITY DUE TO EXCESS CALORIES WITH SERIOUS COMORBIDITY AND BODY MASS INDEX (BMI) OF 40.0 TO 44.9 IN ADULT: ICD-10-CM

## 2025-08-22 DIAGNOSIS — I70.0 AORTIC ATHEROSCLEROSIS: ICD-10-CM

## 2025-08-22 LAB
ABSOLUTE EOSINOPHIL (OHS): 0.22 K/UL
ABSOLUTE MONOCYTE (OHS): 0.54 K/UL (ref 0.3–1)
ABSOLUTE NEUTROPHIL COUNT (OHS): 5.24 K/UL (ref 1.8–7.7)
ALBUMIN SERPL BCP-MCNC: 3.2 G/DL (ref 3.5–5.2)
ALP SERPL-CCNC: 68 UNIT/L (ref 40–150)
ALT SERPL W/O P-5'-P-CCNC: 14 UNIT/L (ref 10–44)
ANION GAP (OHS): 9 MMOL/L (ref 8–16)
AST SERPL-CCNC: 20 UNIT/L (ref 11–45)
BASOPHILS # BLD AUTO: 0.04 K/UL
BASOPHILS NFR BLD AUTO: 0.5 %
BILIRUB SERPL-MCNC: 0.3 MG/DL (ref 0.1–1)
BUN SERPL-MCNC: 15 MG/DL (ref 8–23)
CALCIUM SERPL-MCNC: 9.2 MG/DL (ref 8.7–10.5)
CHLORIDE SERPL-SCNC: 106 MMOL/L (ref 95–110)
CHOLEST SERPL-MCNC: 136 MG/DL (ref 120–199)
CHOLEST/HDLC SERPL: 2.4 {RATIO} (ref 2–5)
CO2 SERPL-SCNC: 26 MMOL/L (ref 23–29)
CREAT SERPL-MCNC: 0.7 MG/DL (ref 0.5–1.4)
EAG (OHS): 123 MG/DL (ref 68–131)
ERYTHROCYTE [DISTWIDTH] IN BLOOD BY AUTOMATED COUNT: 14.5 % (ref 11.5–14.5)
GFR SERPLBLD CREATININE-BSD FMLA CKD-EPI: >60 ML/MIN/1.73/M2
GLUCOSE SERPL-MCNC: 108 MG/DL (ref 70–110)
HBA1C MFR BLD: 5.9 % (ref 4–5.6)
HCT VFR BLD AUTO: 35.6 % (ref 37–48.5)
HDLC SERPL-MCNC: 56 MG/DL (ref 40–75)
HDLC SERPL: 41.2 % (ref 20–50)
HGB BLD-MCNC: 10.9 GM/DL (ref 12–16)
IMM GRANULOCYTES # BLD AUTO: 0.03 K/UL (ref 0–0.04)
IMM GRANULOCYTES NFR BLD AUTO: 0.4 % (ref 0–0.5)
LDLC SERPL CALC-MCNC: 49.2 MG/DL (ref 63–159)
LYMPHOCYTES # BLD AUTO: 1.34 K/UL (ref 1–4.8)
MCH RBC QN AUTO: 24.8 PG (ref 27–31)
MCHC RBC AUTO-ENTMCNC: 30.6 G/DL (ref 32–36)
MCV RBC AUTO: 81 FL (ref 82–98)
NONHDLC SERPL-MCNC: 80 MG/DL
NUCLEATED RBC (/100WBC) (OHS): 0 /100 WBC
PLATELET # BLD AUTO: 284 K/UL (ref 150–450)
PMV BLD AUTO: 8.5 FL (ref 9.2–12.9)
POTASSIUM SERPL-SCNC: 4.4 MMOL/L (ref 3.5–5.1)
PROT SERPL-MCNC: 6.8 GM/DL (ref 6–8.4)
RBC # BLD AUTO: 4.39 M/UL (ref 4–5.4)
RELATIVE EOSINOPHIL (OHS): 3 %
RELATIVE LYMPHOCYTE (OHS): 18.1 % (ref 18–48)
RELATIVE MONOCYTE (OHS): 7.3 % (ref 4–15)
RELATIVE NEUTROPHIL (OHS): 70.7 % (ref 38–73)
SODIUM SERPL-SCNC: 141 MMOL/L (ref 136–145)
TRIGL SERPL-MCNC: 154 MG/DL (ref 30–150)
TSH SERPL-ACNC: 1.71 UIU/ML (ref 0.4–4)
WBC # BLD AUTO: 7.41 K/UL (ref 3.9–12.7)

## 2025-08-22 PROCEDURE — 84443 ASSAY THYROID STIM HORMONE: CPT

## 2025-08-22 PROCEDURE — 85025 COMPLETE CBC W/AUTO DIFF WBC: CPT

## 2025-08-22 PROCEDURE — 82465 ASSAY BLD/SERUM CHOLESTEROL: CPT

## 2025-08-22 PROCEDURE — 83036 HEMOGLOBIN GLYCOSYLATED A1C: CPT

## 2025-08-22 PROCEDURE — 82247 BILIRUBIN TOTAL: CPT

## 2025-08-25 ENCOUNTER — OFFICE VISIT (OUTPATIENT)
Dept: ENDOCRINOLOGY | Facility: CLINIC | Age: 70
End: 2025-08-25
Payer: MEDICARE

## 2025-08-25 ENCOUNTER — OFFICE VISIT (OUTPATIENT)
Dept: INTERNAL MEDICINE | Facility: CLINIC | Age: 70
End: 2025-08-25
Payer: MEDICARE

## 2025-08-25 ENCOUNTER — LAB VISIT (OUTPATIENT)
Dept: LAB | Facility: HOSPITAL | Age: 70
End: 2025-08-25
Attending: INTERNAL MEDICINE
Payer: MEDICARE

## 2025-08-25 VITALS
SYSTOLIC BLOOD PRESSURE: 108 MMHG | HEIGHT: 58 IN | WEIGHT: 216.5 LBS | HEART RATE: 68 BPM | OXYGEN SATURATION: 99 % | DIASTOLIC BLOOD PRESSURE: 60 MMHG | HEART RATE: 83 BPM | WEIGHT: 216.81 LBS | BODY MASS INDEX: 45.44 KG/M2 | RESPIRATION RATE: 16 BRPM | BODY MASS INDEX: 45.51 KG/M2 | SYSTOLIC BLOOD PRESSURE: 102 MMHG | DIASTOLIC BLOOD PRESSURE: 60 MMHG | RESPIRATION RATE: 20 BRPM | HEIGHT: 58 IN

## 2025-08-25 DIAGNOSIS — E11.69 HYPERLIPIDEMIA ASSOCIATED WITH TYPE 2 DIABETES MELLITUS: ICD-10-CM

## 2025-08-25 DIAGNOSIS — Z79.899 OTHER LONG TERM (CURRENT) DRUG THERAPY: ICD-10-CM

## 2025-08-25 DIAGNOSIS — E66.813 CLASS 3 SEVERE OBESITY DUE TO EXCESS CALORIES WITH SERIOUS COMORBIDITY AND BODY MASS INDEX (BMI) OF 40.0 TO 44.9 IN ADULT: ICD-10-CM

## 2025-08-25 DIAGNOSIS — G47.00 INSOMNIA, UNSPECIFIED TYPE: ICD-10-CM

## 2025-08-25 DIAGNOSIS — D50.9 MICROCYTIC ANEMIA: ICD-10-CM

## 2025-08-25 DIAGNOSIS — E78.00 HYPERCHOLESTEREMIA: ICD-10-CM

## 2025-08-25 DIAGNOSIS — E78.5 HYPERLIPIDEMIA ASSOCIATED WITH TYPE 2 DIABETES MELLITUS: ICD-10-CM

## 2025-08-25 DIAGNOSIS — E11.319 TYPE 2 DIABETES MELLITUS WITH BOTH EYES AFFECTED BY RETINOPATHY WITHOUT MACULAR EDEMA, WITHOUT LONG-TERM CURRENT USE OF INSULIN, UNSPECIFIED RETINOPATHY SEVERITY: Primary | ICD-10-CM

## 2025-08-25 DIAGNOSIS — I10 ESSENTIAL HYPERTENSION: Primary | ICD-10-CM

## 2025-08-25 DIAGNOSIS — E11.319 TYPE 2 DIABETES MELLITUS WITH BOTH EYES AFFECTED BY RETINOPATHY WITHOUT MACULAR EDEMA, WITHOUT LONG-TERM CURRENT USE OF INSULIN, UNSPECIFIED RETINOPATHY SEVERITY: ICD-10-CM

## 2025-08-25 DIAGNOSIS — N39.41 URGE INCONTINENCE OF URINE: ICD-10-CM

## 2025-08-25 LAB
FERRITIN SERPL-MCNC: 14 NG/ML (ref 20–300)
FOLATE SERPL-MCNC: 18.2 NG/ML (ref 4–24)
IRON SATN MFR SERPL: 6 % (ref 20–50)
IRON SERPL-MCNC: 27 UG/DL (ref 30–160)
TIBC SERPL-MCNC: 462 UG/DL (ref 250–450)
TRANSFERRIN SERPL-MCNC: 312 MG/DL (ref 200–375)
VIT B12 SERPL-MCNC: 692 PG/ML (ref 210–950)

## 2025-08-25 PROCEDURE — 82607 VITAMIN B-12: CPT

## 2025-08-25 PROCEDURE — 84466 ASSAY OF TRANSFERRIN: CPT

## 2025-08-25 PROCEDURE — 99214 OFFICE O/P EST MOD 30 MIN: CPT | Mod: S$PBB | Performed by: STUDENT IN AN ORGANIZED HEALTH CARE EDUCATION/TRAINING PROGRAM

## 2025-08-25 PROCEDURE — 82746 ASSAY OF FOLIC ACID SERUM: CPT

## 2025-08-25 PROCEDURE — G2211 COMPLEX E/M VISIT ADD ON: HCPCS | Performed by: INTERNAL MEDICINE

## 2025-08-25 PROCEDURE — 82728 ASSAY OF FERRITIN: CPT

## 2025-08-25 PROCEDURE — G2211 COMPLEX E/M VISIT ADD ON: HCPCS | Performed by: STUDENT IN AN ORGANIZED HEALTH CARE EDUCATION/TRAINING PROGRAM

## 2025-08-25 PROCEDURE — 99214 OFFICE O/P EST MOD 30 MIN: CPT | Mod: PBBFAC | Performed by: STUDENT IN AN ORGANIZED HEALTH CARE EDUCATION/TRAINING PROGRAM

## 2025-08-25 PROCEDURE — 99999 PR STA SHADOW: CPT | Mod: PBBFAC,,,

## 2025-08-25 PROCEDURE — 99215 OFFICE O/P EST HI 40 MIN: CPT | Mod: S$PBB | Performed by: INTERNAL MEDICINE

## 2025-08-25 PROCEDURE — 36415 COLL VENOUS BLD VENIPUNCTURE: CPT

## 2025-08-25 PROCEDURE — 99999 PR PBB SHADOW E&M-EST. PATIENT-LVL V: CPT | Mod: PBBFAC,,, | Performed by: INTERNAL MEDICINE

## 2025-08-25 PROCEDURE — 99999 PR PBB SHADOW E&M-EST. PATIENT-LVL IV: CPT | Mod: PBBFAC,,, | Performed by: STUDENT IN AN ORGANIZED HEALTH CARE EDUCATION/TRAINING PROGRAM

## 2025-08-25 PROCEDURE — 99215 OFFICE O/P EST HI 40 MIN: CPT | Mod: PBBFAC,27 | Performed by: INTERNAL MEDICINE

## 2025-08-25 RX ORDER — TIRZEPATIDE 10 MG/.5ML
10 INJECTION, SOLUTION SUBCUTANEOUS
Qty: 2 ML | Refills: 11 | Status: SHIPPED | OUTPATIENT
Start: 2025-08-25

## 2025-08-25 RX ORDER — OXYBUTYNIN CHLORIDE 5 MG/1
5 TABLET, EXTENDED RELEASE ORAL DAILY
Qty: 30 TABLET | Refills: 11 | Status: SHIPPED | OUTPATIENT
Start: 2025-08-25 | End: 2026-08-25

## 2025-08-25 RX ORDER — FERROUS SULFATE 325(65) MG
325 TABLET ORAL
Qty: 90 TABLET | Refills: 1 | Status: SHIPPED | OUTPATIENT
Start: 2025-08-25

## 2025-08-26 RX ORDER — TRAZODONE HYDROCHLORIDE 50 MG/1
50 TABLET ORAL NIGHTLY
Qty: 90 TABLET | Refills: 0 | Status: SHIPPED | OUTPATIENT
Start: 2025-08-26

## 2025-08-27 ENCOUNTER — TELEPHONE (OUTPATIENT)
Dept: INTERNAL MEDICINE | Facility: CLINIC | Age: 70
End: 2025-08-27
Payer: MEDICARE

## 2025-08-27 ENCOUNTER — PATIENT OUTREACH (OUTPATIENT)
Dept: ADMINISTRATIVE | Facility: HOSPITAL | Age: 70
End: 2025-08-27
Payer: MEDICARE

## 2025-08-27 RX ORDER — METHOCARBAMOL 500 MG/1
500 TABLET, FILM COATED ORAL 3 TIMES DAILY PRN
Qty: 30 TABLET | Refills: 0 | Status: SHIPPED | OUTPATIENT
Start: 2025-08-27 | End: 2025-09-06

## 2025-08-29 ENCOUNTER — HOSPITAL ENCOUNTER (EMERGENCY)
Facility: HOSPITAL | Age: 70
Discharge: HOME | End: 2025-08-29
Attending: SURGERY
Payer: MEDICARE

## 2025-08-29 ENCOUNTER — TELEPHONE (OUTPATIENT)
Dept: INTERNAL MEDICINE | Facility: CLINIC | Age: 70
End: 2025-08-29
Payer: MEDICARE

## 2025-08-29 VITALS
TEMPERATURE: 98 F | DIASTOLIC BLOOD PRESSURE: 55 MMHG | RESPIRATION RATE: 18 BRPM | OXYGEN SATURATION: 99 % | WEIGHT: 214.63 LBS | BODY MASS INDEX: 44.86 KG/M2 | SYSTOLIC BLOOD PRESSURE: 108 MMHG | HEART RATE: 76 BPM

## 2025-08-29 DIAGNOSIS — N30.00 ACUTE CYSTITIS WITHOUT HEMATURIA: Primary | ICD-10-CM

## 2025-08-29 DIAGNOSIS — M54.50 ACUTE LEFT-SIDED LOW BACK PAIN WITHOUT SCIATICA: ICD-10-CM

## 2025-08-29 LAB
ABSOLUTE EOSINOPHIL (OHS): 0.3 K/UL
ABSOLUTE MONOCYTE (OHS): 0.78 K/UL (ref 0.3–1)
ABSOLUTE NEUTROPHIL COUNT (OHS): 9.44 K/UL (ref 1.8–7.7)
ALBUMIN SERPL BCP-MCNC: 3.9 G/DL (ref 3.5–5.2)
ALP SERPL-CCNC: 80 UNIT/L (ref 40–150)
ALT SERPL W/O P-5'-P-CCNC: 13 UNIT/L (ref 10–44)
ANION GAP (OHS): 15 MMOL/L (ref 8–16)
AST SERPL-CCNC: 19 UNIT/L (ref 11–45)
BACTERIA #/AREA URNS HPF: ABNORMAL /HPF
BASOPHILS # BLD AUTO: 0.07 K/UL
BASOPHILS NFR BLD AUTO: 0.6 %
BILIRUB SERPL-MCNC: 0.3 MG/DL (ref 0.1–1)
BILIRUB UR QL STRIP.AUTO: NEGATIVE
BUN SERPL-MCNC: 21 MG/DL (ref 8–23)
CALCIUM SERPL-MCNC: 10.4 MG/DL (ref 8.7–10.5)
CHLORIDE SERPL-SCNC: 98 MMOL/L (ref 95–110)
CLARITY UR: ABNORMAL
CO2 SERPL-SCNC: 27 MMOL/L (ref 23–29)
COLOR UR AUTO: YELLOW
CREAT SERPL-MCNC: 1 MG/DL (ref 0.5–1.4)
ERYTHROCYTE [DISTWIDTH] IN BLOOD BY AUTOMATED COUNT: 14.6 % (ref 11.5–14.5)
GFR SERPLBLD CREATININE-BSD FMLA CKD-EPI: >60 ML/MIN/1.73/M2
GLUCOSE SERPL-MCNC: 114 MG/DL (ref 70–110)
GLUCOSE UR QL STRIP: NEGATIVE
HCT VFR BLD AUTO: 39.5 % (ref 37–48.5)
HGB BLD-MCNC: 12.2 GM/DL (ref 12–16)
HGB UR QL STRIP: NEGATIVE
HYALINE CASTS #/AREA URNS LPF: 65 /LPF (ref 0–1)
IMM GRANULOCYTES # BLD AUTO: 0.05 K/UL (ref 0–0.04)
IMM GRANULOCYTES NFR BLD AUTO: 0.4 % (ref 0–0.5)
KETONES UR QL STRIP: NEGATIVE
LEUKOCYTE ESTERASE UR QL STRIP: ABNORMAL
LYMPHOCYTES # BLD AUTO: 1.95 K/UL (ref 1–4.8)
MCH RBC QN AUTO: 24.4 PG (ref 27–31)
MCHC RBC AUTO-ENTMCNC: 30.9 G/DL (ref 32–36)
MCV RBC AUTO: 79 FL (ref 82–98)
MICROSCOPIC COMMENT: ABNORMAL
NITRITE UR QL STRIP: NEGATIVE
NUCLEATED RBC (/100WBC) (OHS): 0 /100 WBC
PH UR STRIP: 5 [PH]
PLATELET # BLD AUTO: 348 K/UL (ref 150–450)
PMV BLD AUTO: 8.2 FL (ref 9.2–12.9)
POTASSIUM SERPL-SCNC: 3.6 MMOL/L (ref 3.5–5.1)
PROT SERPL-MCNC: 8.3 GM/DL (ref 6–8.4)
PROT UR QL STRIP: NEGATIVE
RBC # BLD AUTO: 5 M/UL (ref 4–5.4)
RELATIVE EOSINOPHIL (OHS): 2.4 %
RELATIVE LYMPHOCYTE (OHS): 15.5 % (ref 18–48)
RELATIVE MONOCYTE (OHS): 6.2 % (ref 4–15)
RELATIVE NEUTROPHIL (OHS): 74.9 % (ref 38–73)
SODIUM SERPL-SCNC: 140 MMOL/L (ref 136–145)
SP GR UR STRIP: 1.01
SQUAMOUS #/AREA URNS HPF: 12 /HPF
UROBILINOGEN UR STRIP-ACNC: NEGATIVE EU/DL
WBC # BLD AUTO: 12.59 K/UL (ref 3.9–12.7)
WBC #/AREA URNS HPF: 15 /HPF (ref 0–5)
WBC CLUMPS URNS QL MICRO: ABNORMAL

## 2025-08-29 PROCEDURE — 25000003 PHARM REV CODE 250: Performed by: STUDENT IN AN ORGANIZED HEALTH CARE EDUCATION/TRAINING PROGRAM

## 2025-08-29 PROCEDURE — 25500020 PHARM REV CODE 255: Performed by: SURGERY

## 2025-08-29 PROCEDURE — 96374 THER/PROPH/DIAG INJ IV PUSH: CPT

## 2025-08-29 PROCEDURE — 85025 COMPLETE CBC W/AUTO DIFF WBC: CPT | Performed by: NURSE PRACTITIONER

## 2025-08-29 PROCEDURE — 99285 EMERGENCY DEPT VISIT HI MDM: CPT | Mod: 25

## 2025-08-29 PROCEDURE — 87077 CULTURE AEROBIC IDENTIFY: CPT | Performed by: NURSE PRACTITIONER

## 2025-08-29 PROCEDURE — 81003 URINALYSIS AUTO W/O SCOPE: CPT | Performed by: NURSE PRACTITIONER

## 2025-08-29 PROCEDURE — 63600175 PHARM REV CODE 636 W HCPCS: Performed by: NURSE PRACTITIONER

## 2025-08-29 PROCEDURE — 96375 TX/PRO/DX INJ NEW DRUG ADDON: CPT

## 2025-08-29 PROCEDURE — 80053 COMPREHEN METABOLIC PANEL: CPT | Performed by: NURSE PRACTITIONER

## 2025-08-29 PROCEDURE — 25000003 PHARM REV CODE 250: Performed by: NURSE PRACTITIONER

## 2025-08-29 RX ORDER — MORPHINE SULFATE 2 MG/ML
2 INJECTION, SOLUTION INTRAMUSCULAR; INTRAVENOUS
Status: COMPLETED | OUTPATIENT
Start: 2025-08-29 | End: 2025-08-29

## 2025-08-29 RX ORDER — ONDANSETRON HYDROCHLORIDE 2 MG/ML
4 INJECTION, SOLUTION INTRAVENOUS
Status: COMPLETED | OUTPATIENT
Start: 2025-08-29 | End: 2025-08-29

## 2025-08-29 RX ORDER — CEPHALEXIN 500 MG/1
500 CAPSULE ORAL 4 TIMES DAILY
Qty: 20 CAPSULE | Refills: 0 | Status: SHIPPED | OUTPATIENT
Start: 2025-08-29 | End: 2025-09-03

## 2025-08-29 RX ORDER — CEPHALEXIN 500 MG/1
500 CAPSULE ORAL
Status: COMPLETED | OUTPATIENT
Start: 2025-08-29 | End: 2025-08-29

## 2025-08-29 RX ORDER — MORPHINE SULFATE 2 MG/ML
2 INJECTION, SOLUTION INTRAMUSCULAR; INTRAVENOUS
Status: DISCONTINUED | OUTPATIENT
Start: 2025-08-29 | End: 2025-08-29

## 2025-08-29 RX ORDER — TRAMADOL HYDROCHLORIDE 50 MG/1
50 TABLET, FILM COATED ORAL EVERY 6 HOURS PRN
Qty: 12 TABLET | Refills: 0 | Status: SHIPPED | OUTPATIENT
Start: 2025-08-29

## 2025-08-29 RX ORDER — OXYCODONE AND ACETAMINOPHEN 5; 325 MG/1; MG/1
1 TABLET ORAL
Refills: 0 | Status: COMPLETED | OUTPATIENT
Start: 2025-08-29 | End: 2025-08-29

## 2025-08-29 RX ADMIN — MORPHINE SULFATE 2 MG: 2 INJECTION, SOLUTION INTRAMUSCULAR; INTRAVENOUS at 07:08

## 2025-08-29 RX ADMIN — ONDANSETRON 4 MG: 2 INJECTION INTRAMUSCULAR; INTRAVENOUS at 08:08

## 2025-08-29 RX ADMIN — CEPHALEXIN 500 MG: 500 CAPSULE ORAL at 09:08

## 2025-08-29 RX ADMIN — IOHEXOL 100 ML: 350 INJECTION, SOLUTION INTRAVENOUS at 08:08

## 2025-08-29 RX ADMIN — OXYCODONE HYDROCHLORIDE AND ACETAMINOPHEN 1 TABLET: 5; 325 TABLET ORAL at 09:08

## 2025-08-30 LAB — HOLD SPECIMEN: NORMAL

## 2025-09-01 LAB — BACTERIA UR CULT: ABNORMAL

## 2025-09-02 ENCOUNTER — OFFICE VISIT (OUTPATIENT)
Dept: INTERNAL MEDICINE | Facility: CLINIC | Age: 70
End: 2025-09-02
Payer: MEDICARE

## 2025-09-02 VITALS
BODY MASS INDEX: 45.08 KG/M2 | DIASTOLIC BLOOD PRESSURE: 68 MMHG | RESPIRATION RATE: 16 BRPM | HEART RATE: 64 BPM | WEIGHT: 214.75 LBS | OXYGEN SATURATION: 98 % | SYSTOLIC BLOOD PRESSURE: 126 MMHG | HEIGHT: 58 IN

## 2025-09-02 DIAGNOSIS — M54.50 ACUTE MIDLINE LOW BACK PAIN WITHOUT SCIATICA: Primary | ICD-10-CM

## 2025-09-02 PROCEDURE — 99999 PR PBB SHADOW E&M-EST. PATIENT-LVL III: CPT | Mod: PBBFAC,,, | Performed by: NURSE PRACTITIONER

## 2025-09-02 PROCEDURE — 96372 THER/PROPH/DIAG INJ SC/IM: CPT | Mod: PBBFAC

## 2025-09-02 PROCEDURE — 99214 OFFICE O/P EST MOD 30 MIN: CPT | Mod: S$PBB | Performed by: NURSE PRACTITIONER

## 2025-09-02 PROCEDURE — 99999PBSHW PR PBB SHADOW TECHNICAL ONLY FILED TO HB: Mod: JZ,PBBFAC,,

## 2025-09-02 PROCEDURE — 99213 OFFICE O/P EST LOW 20 MIN: CPT | Mod: PBBFAC | Performed by: NURSE PRACTITIONER

## 2025-09-02 PROCEDURE — 99999 PR STA SHADOW: CPT | Mod: PBBFAC,TB,,

## 2025-09-02 RX ORDER — CEPHALEXIN 500 MG/1
500 CAPSULE ORAL EVERY 6 HOURS
Qty: 8 CAPSULE | Refills: 0 | Status: SHIPPED | OUTPATIENT
Start: 2025-09-02

## 2025-09-02 RX ORDER — NAPROXEN 500 MG/1
500 TABLET ORAL 2 TIMES DAILY
Qty: 30 TABLET | Refills: 0 | Status: SHIPPED | OUTPATIENT
Start: 2025-09-02

## 2025-09-02 RX ORDER — METHYLPREDNISOLONE ACETATE 80 MG/ML
80 INJECTION, SUSPENSION INTRA-ARTICULAR; INTRALESIONAL; INTRAMUSCULAR; SOFT TISSUE
Status: COMPLETED | OUTPATIENT
Start: 2025-09-02 | End: 2025-09-02

## 2025-09-02 RX ORDER — CYCLOBENZAPRINE HCL 5 MG
TABLET ORAL
Qty: 30 TABLET | Refills: 0 | Status: SHIPPED | OUTPATIENT
Start: 2025-09-02 | End: 2025-09-04 | Stop reason: SDUPTHER

## 2025-09-02 RX ADMIN — METHYLPREDNISOLONE ACETATE 80 MG: 80 INJECTION, SUSPENSION INTRA-ARTICULAR; INTRALESIONAL; INTRAMUSCULAR; SOFT TISSUE at 04:09

## 2025-09-03 ENCOUNTER — TELEPHONE (OUTPATIENT)
Dept: INTERNAL MEDICINE | Facility: CLINIC | Age: 70
End: 2025-09-03
Payer: MEDICARE

## 2025-09-03 DIAGNOSIS — M54.50 ACUTE MIDLINE LOW BACK PAIN WITHOUT SCIATICA: ICD-10-CM

## 2025-09-04 RX ORDER — CYCLOBENZAPRINE HCL 5 MG
5 TABLET ORAL 3 TIMES DAILY PRN
Qty: 45 TABLET | Refills: 0 | Status: SHIPPED | OUTPATIENT
Start: 2025-09-04

## 2025-09-05 ENCOUNTER — HOSPITAL ENCOUNTER (OUTPATIENT)
Dept: RADIOLOGY | Facility: HOSPITAL | Age: 70
Discharge: HOME | End: 2025-09-05
Attending: INTERNAL MEDICINE
Payer: MEDICARE

## 2025-09-05 ENCOUNTER — TELEPHONE (OUTPATIENT)
Dept: FAMILY MEDICINE | Facility: CLINIC | Age: 70
End: 2025-09-05
Payer: MEDICARE

## 2025-09-05 ENCOUNTER — OFFICE VISIT (OUTPATIENT)
Dept: FAMILY MEDICINE | Facility: CLINIC | Age: 70
End: 2025-09-05
Payer: MEDICARE

## 2025-09-05 VITALS
BODY MASS INDEX: 44.08 KG/M2 | RESPIRATION RATE: 16 BRPM | SYSTOLIC BLOOD PRESSURE: 92 MMHG | OXYGEN SATURATION: 98 % | HEIGHT: 58 IN | DIASTOLIC BLOOD PRESSURE: 70 MMHG | HEART RATE: 80 BPM | WEIGHT: 210 LBS

## 2025-09-05 DIAGNOSIS — R30.0 DYSURIA: Primary | ICD-10-CM

## 2025-09-05 DIAGNOSIS — M79.641 RIGHT HAND PAIN: Primary | ICD-10-CM
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
## 2025-09-05 DIAGNOSIS — Z01.818 PRE-OP EVALUATION: ICD-10-CM

## 2025-09-05 PROCEDURE — 71046 X-RAY EXAM CHEST 2 VIEWS: CPT | Mod: TC

## 2025-09-05 PROCEDURE — 71046 X-RAY EXAM CHEST 2 VIEWS: CPT | Mod: 26,,, | Performed by: RADIOLOGY

## (undated) DEVICE — SKIN MARKER DEVON 160

## (undated) DEVICE — SYR 10CC LUER LOCK

## (undated) DEVICE — TIP I & A

## (undated) DEVICE — PAD GROUNDING DISPER ELECTRODE

## (undated) DEVICE — GLOVE BIOGEL SKINSENSE PI 7.5

## (undated) DEVICE — INVISISHIELD

## (undated) DEVICE — NEEDLE SPINAL CLR HUB 22GX3

## (undated) DEVICE — RING MALUYGEN

## (undated) DEVICE — FLEXIBLE LIGHT HANDLE COVER

## (undated) DEVICE — PARACENTESIS KNIFE 1.1MM

## (undated) DEVICE — CANNULA RADIOPAQUE 20G 145MM

## (undated) DEVICE — DUOVISC

## (undated) DEVICE — PROBE RF PMP 20GX145MM CURVED

## (undated) DEVICE — Device

## (undated) DEVICE — UTILITY DRAPE

## (undated) DEVICE — ANTERIOR CHAMBER CANNULA

## (undated) DEVICE — CANNULA RADIOPAQUE 20G CURVED

## (undated) DEVICE — ULTRA INFUSION SLEEVES 0.9MM TIPLESS

## (undated) DEVICE — SAFETY SLIT KNIFE 2.4

## (undated) DEVICE — TRAY NERVE BLOCK